# Patient Record
Sex: FEMALE | Race: OTHER | Employment: OTHER | URBAN - METROPOLITAN AREA
[De-identification: names, ages, dates, MRNs, and addresses within clinical notes are randomized per-mention and may not be internally consistent; named-entity substitution may affect disease eponyms.]

---

## 2017-05-10 ENCOUNTER — OFFICE VISIT (OUTPATIENT)
Dept: CARDIOLOGY CLINIC | Age: 82
End: 2017-05-10

## 2017-05-10 VITALS
WEIGHT: 181 LBS | HEART RATE: 70 BPM | BODY MASS INDEX: 30.16 KG/M2 | SYSTOLIC BLOOD PRESSURE: 122 MMHG | OXYGEN SATURATION: 98 % | DIASTOLIC BLOOD PRESSURE: 80 MMHG | HEIGHT: 65 IN

## 2017-05-10 DIAGNOSIS — I25.10 CORONARY ARTERY DISEASE INVOLVING NATIVE CORONARY ARTERY OF NATIVE HEART WITHOUT ANGINA PECTORIS: Primary | Chronic | ICD-10-CM

## 2017-05-10 NOTE — MR AVS SNAPSHOT
Visit Information Date & Time Provider Department Dept. Phone Encounter #  
 5/10/2017  2:00 PM Michelle Odonnell MD [facility] [phone] [id number] Follow-up Instructions Return in about 6 months (around 11/10/2017). Upcoming Health Maintenance Date Due ZOSTER VACCINE AGE 60> 4/26/1984 GLAUCOMA SCREENING Q2Y 4/26/1989 OSTEOPOROSIS SCREENING (DEXA) 4/26/1989 MEDICARE YEARLY EXAM 4/26/1989 Pneumococcal 65+ Low/Medium Risk (2 of 2 - PCV13) 5/10/2016 INFLUENZA AGE 9 TO ADULT 8/1/2017 DTaP/Tdap/Td series (2 - Td) 9/30/2024 Allergies as of 5/10/2017  Review Complete On: 5/10/2017 By: Osorio Fulton  
  
 Severity Noted Reaction Type Reactions Sulfa (Sulfonamide Antibiotics) Medium 05/08/2015   Systemic Rash Ampicillin  01/11/2011    Rash Tolerates cefazolin Lescol [Fluvastatin]  01/11/2011    Unknown (comments) Current Immunizations  Never Reviewed Name Date Pneumococcal Polysaccharide (PPSV-23) 5/10/2015  2:00 PM  
 Tdap 9/30/2014  4:17 PM  
  
 Not reviewed this visit You Were Diagnosed With   
  
 Codes Comments Coronary artery disease involving native coronary artery of native heart without angina pectoris    -  Primary ICD-10-CM: I25.10 ICD-9-CM: 414.01 Vitals BP Pulse Height(growth percentile) Weight(growth percentile) SpO2 BMI  
 122/80 (BP 1 Location: Left arm, BP Patient Position: Sitting) 70 5' 5\" (1.651 m) 181 lb (82.1 kg) 98% 30.12 kg/m2 OB Status Smoking Status Postmenopausal Never Smoker Vitals History BMI and BSA Data Body Mass Index Body Surface Area  
 30.12 kg/m 2 1.94 m 2 Preferred Pharmacy Pharmacy Name Phone [facility] [address]  [facility] [phone] Your Updated Medication List  
  
   
 This list is accurate as of: 5/10/17  3:04 PM.  Always use your most recent med list. amLODIPine 5 mg tablet Commonly known as:  Sharion Bayside Take 1 Tab by mouth daily. aspirin delayed-release 81 mg tablet Take 81 mg by mouth daily. calcium-vitamin D 500 mg(1,250mg) -200 unit per tablet Commonly known as:  OYSTER SHELL Take 1 Tab by mouth daily. FISH OIL 1,000 mg Cap Generic drug:  omega-3 fatty acids-vitamin e Take 1 Cap by mouth daily. gabapentin 100 mg capsule Commonly known as:  NEURONTIN Take 300 mg by mouth nightly. LIPITOR 10 mg tablet Generic drug:  atorvastatin Take 10 mg by mouth nightly. therapeutic multivitamin tablet Commonly known as:  [facility] Take 1 Tab by mouth daily. traMADol 50 mg tablet Commonly known as:  ULTRAM  
Take 50 mg by mouth every six (6) hours as needed for Pain. TYLENOL 325 mg tablet Generic drug:  acetaminophen Take 500 mg by mouth every six (6) hours as needed for Pain. We Performed the Following AMB POC EKG ROUTINE W/ 12 LEADS, INTER & REP [30018 CPT(R)] Follow-up Instructions Return in about 6 months (around 11/10/2017). Introducing [facility]! [facility] introduces AdYouNet patient portal. Now you can access parts of your medical record, email your doctor's office, and request medication refills online. 1. In your internet browser, go to [email or web address] 2. Click on the First Time User? Click Here link in the Sign In box. You will see the New Member Sign Up page. 3. Enter your AdYouNet Access Code exactly as it appears below. You will not need to use this code after youve completed the sign-up process. If you do not sign up before the expiration date, you must request a new code. · AdYouNet Access Code: [email or web address] Expires: 8/8/2017  3:04 PM 
 
 4. Enter the last four digits of your Social Security Number (xxxx) and Date of Birth (mm/dd/yyyy) as indicated and click Submit. You will be taken to the next sign-up page. 5. Create a Lellan ID. This will be your Lellan login ID and cannot be changed, so think of one that is secure and easy to remember. 6. Create a Lellan password. You can change your password at any time. 7. Enter your Password Reset Question and Answer. This can be used at a later time if you forget your password. 8. Enter your e-mail address. You will receive e-mail notification when new information is available in [address] 9. Click Sign Up. You can now view and download portions of your medical record. 10. Click the Download Summary menu link to download a portable copy of your medical information. If you have questions, please visit the Frequently Asked Questions section of the Lellan website. Remember, Lellan is NOT to be used for urgent needs. For medical emergencies, dial 911. Now available from your iPhone and Android! Please provide this summary of care documentation to your next provider. Your primary care clinician is listed as Darrian Salas. If you have any questions after today's visit, please call [phone].

## 2017-05-10 NOTE — PROGRESS NOTES
Visit Vitals    /80 (BP 1 Location: Left arm, BP Patient Position: Sitting)    Pulse 70    Ht 5' 5\" (1.651 m)    Wt 181 lb (82.1 kg)    SpO2 98%    BMI 30.12 kg/m2

## 2017-05-10 NOTE — PROGRESS NOTES
LAST OFFICE VISIT : 2016        ICD-10-CM ICD-9-CM   1. Coronary artery disease involving native coronary artery of native heart without angina pectoris I25.10 414.01            Guillaume Bird is a 80 y.o. female with HTN and dyslipidemia referred for 6 month follow up. Cardiac risk factors: dyslipidemia, hypertension, post-menopausal  I have personally obtained the history from the patient. Sandeep Rodriguez is doing well with no interval issues. She is normotensive at home on current medical regimen. Adherent with BP regimen. No recurrent chest pain or palpitations. She remains active with no exertional symptoms. The patient denies chest pain/ shortness of breath, orthopnea, PND, LE edema, palpitations, syncope, presyncope or fatigue.        ACTIVE PROBLEM LIST     Patient Active Problem List    Diagnosis Date Noted    UTI (urinary tract infection) 2016    Rash 2015    GERD (gastroesophageal reflux disease) 2014    CAD (coronary artery disease)     HTN (hypertension), benign 2011    Mixed hyperlipidemia 2011           PAST MEDICAL HISTORY     Past Medical History:   Diagnosis Date    CAD (coronary artery disease)     Femur fracture (HCC)     right    GERD (gastroesophageal reflux disease)     HTN (hypertension), benign 2011    Mixed hyperlipidemia 2011           PAST SURGICAL HISTORY     Past Surgical History:   Procedure Laterality Date    HX FEMUR FRACTURE TX      HX KNEE REPLACEMENT      bilateral    HX ORTHOPAEDIC      bilat knee replacements    HX VEIN STRIPPING            ALLERGIES     Allergies   Allergen Reactions    Sulfa (Sulfonamide Antibiotics) Rash    Ampicillin Rash     Tolerates cefazolin    Lescol [Fluvastatin] Unknown (comments)          FAMILY HISTORY     Family History   Problem Relation Age of Onset    Other Mother       of renal failure, not sure what caused id    Other Father something with throat, not sure if it was cancer    Heart Disease Sister     negative for cardiac disease       SOCIAL HISTORY     Social History     Social History    Marital status:      Spouse name: N/A    Number of children: N/A    Years of education: N/A     Social History Main Topics    Smoking status: Never Smoker    Smokeless tobacco: Never Used    Alcohol use No    Drug use: No    Sexual activity: Not Asked     Other Topics Concern    None     Social History Narrative         MEDICATIONS     Current Outpatient Prescriptions   Medication Sig    traMADol (ULTRAM) 50 mg tablet Take 50 mg by mouth every six (6) hours as needed for Pain.  omega-3 fatty acids-vitamin e (FISH OIL) 1,000 mg cap Take 1 Cap by mouth daily.  atorvastatin (LIPITOR) 10 mg tablet Take 10 mg by mouth nightly.  acetaminophen (TYLENOL) 325 mg tablet Take 500 mg by mouth every six (6) hours as needed for Pain.  aspirin delayed-release 81 mg tablet Take 81 mg by mouth daily.  calcium-vitamin D (OYSTER SHELL) 500 mg(1,250mg) -200 unit per tablet Take 1 Tab by mouth daily.  therapeutic multivitamin (THERAGRAN) tablet Take 1 Tab by mouth daily.  gabapentin (NEURONTIN) 100 mg capsule Take 300 mg by mouth nightly.  amLODIPine (NORVASC) 5 mg tablet Take 1 Tab by mouth daily. No current facility-administered medications for this visit. I have reviewed the nurses notes, vitals, problem list, allergy list, medical history, family, social history and medications. REVIEW OF SYMPTOMS      General: Pt denies excessive weight gain or loss. Pt is able to conduct ADL's  HEENT: Denies blurred vision, headaches, hearing loss, epistaxis and difficulty swallowing. Respiratory: Denies cough, congestion, shortness of breath, SHAH, wheezing or stridor.   Cardiovascular: Denies precordial pain, palpitations, edema or PND  Gastrointestinal: Denies poor appetite, indigestion, abdominal pain or blood in stool  Genitourinary: Denies hematuria, dysuria, increased urinary frequency  Musculoskeletal: Denies joint pain or swelling from muscles or joints  Neurologic: Denies tremor, paresthesias, headache, or sensory motor disturbance  Psychiatric: Denies confusion, insomnia, depression  Integumentray: Denies rash, itching or ulcers. Hematologic: Denies easy bruising, bleeding     PHYSICAL EXAMINATION      Vitals:    05/10/17 1417   BP: 122/80   Pulse: 70   SpO2: 98%   Weight: 181 lb (82.1 kg)   Height: 5' 5\" (1.651 m)     General: Well developed, in no acute distress. HEENT: No jaundice, oral mucosa moist, no oral ulcers  Neck: Supple, no stiffness, no lymphadenopathy, supple  Heart:  Normal S1/S2 negative S3 or S4. Regular, no murmur, gallop or rub, no jugular venous distention  Respiratory: Clear bilaterally x 4, no wheezing or rales  Extremities:  No edema, normal cap refill, no cyanosis. Musculoskeletal: No clubbing, no deformities  Neuro: A&Ox3, speech clear, gait stable, cooperative, no focal neurologic deficits  Skin: Skin color is normal. No rashes or lesions. Non diaphoretic, moist.  Vascular: 2+ pulses symmetric in all extremities    EKG 05/10/17-SR, frequent PACs, left axis deviation, poor R-wave progression     DIAGNOSTIC DATA   1. Echo  12/2/08 - EF 60-65%    2. Myocardial perfusion study  12/20/00 - persantine, mild mid distal anterolateral wall ischemia, EF 62%    3.  Cholesterol  8/23/12 - , HDL 61, LDL 85,   8/2/13 - , HDL 61, LDL 86,   8/24/14 - , HDL 61, LDL 88,   2/24/17- , HDL 75, ,      LABORATORY DATA        Lab Results   Component Value Date/Time    WBC 8.7 02/08/2016 03:45 AM    HGB 12.3 02/08/2016 03:45 AM    HCT 38.4 02/08/2016 03:45 AM    PLATELET 274 63/45/8200 03:45 AM    MCV 95.0 02/08/2016 03:45 AM      Lab Results   Component Value Date/Time    Sodium 140 02/08/2016 03:45 AM    Potassium 3.8 02/08/2016 03:45 AM    Chloride 106 02/08/2016 03:45 AM    CO2 26 02/08/2016 03:45 AM    Anion gap 8 02/08/2016 03:45 AM    Glucose 96 02/08/2016 03:45 AM    BUN 14 02/08/2016 03:45 AM    Creatinine 0.98 02/08/2016 03:45 AM    BUN/Creatinine ratio 14 02/08/2016 03:45 AM    GFR est AA >60 02/08/2016 03:45 AM    GFR est non-AA 53 02/08/2016 03:45 AM    Calcium 8.0 02/08/2016 03:45 AM    Bilirubin, total 0.4 02/07/2016 03:15 AM    AST (SGOT) 22 02/07/2016 03:15 AM    Alk. phosphatase 97 02/07/2016 03:15 AM    Protein, total 7.3 02/07/2016 03:15 AM    Albumin 2.7 02/07/2016 03:15 AM    Globulin 4.6 02/07/2016 03:15 AM    A-G Ratio 0.6 02/07/2016 03:15 AM    ALT (SGPT) 22 02/07/2016 03:15 AM           ASSESSMENT/RECOMMENDATIONS:.      1. HTN  -BP under good control current medical regimen. No adjustments to antihypertensives. 2. Dyslipidemia  -Followed by Paty Wellington MD    3. Return in 6 months or PRN    Orders Placed This Encounter    AMB POC EKG ROUTINE W/ 12 LEADS, INTER & REP     Order Specific Question:   Reason for Exam:     Answer:   cad        Follow-up Disposition: Not on File      I have discussed the diagnosis with  Laurence Abel and the intended plan as seen in the above orders. Questions were answered concerning future plans. I have discussed medication side effects and warnings with the patient as well. Thank you,  Paty Wellington MD for involving me in the care of  Laurence Abel. Please do not hesitate to contact me for further questions/concerns. Written by Javon Shah, as dictated by Jane Mistry MD.    Umesh Pradhan MD, [address]      [facility], [address] [place] [address] Drive      [phone] / [phone] Fax

## 2017-11-08 ENCOUNTER — OFFICE VISIT (OUTPATIENT)
Dept: CARDIOLOGY CLINIC | Age: 82
End: 2017-11-08

## 2017-11-08 VITALS
OXYGEN SATURATION: 96 % | WEIGHT: 183.7 LBS | BODY MASS INDEX: 30.6 KG/M2 | HEIGHT: 65 IN | HEART RATE: 72 BPM | SYSTOLIC BLOOD PRESSURE: 160 MMHG | DIASTOLIC BLOOD PRESSURE: 80 MMHG

## 2017-11-08 DIAGNOSIS — I10 HTN (HYPERTENSION), BENIGN: Chronic | ICD-10-CM

## 2017-11-08 DIAGNOSIS — I25.10 CORONARY ARTERY DISEASE INVOLVING NATIVE CORONARY ARTERY OF NATIVE HEART WITHOUT ANGINA PECTORIS: Primary | Chronic | ICD-10-CM

## 2017-11-08 DIAGNOSIS — E78.2 MIXED HYPERLIPIDEMIA: Chronic | ICD-10-CM

## 2017-11-08 NOTE — PROGRESS NOTES
LAST OFFICE VISIT : 5/10/2017        ICD-10-CM ICD-9-CM   1. Coronary artery disease involving native coronary artery of native heart without angina pectoris I25.10 414.01   2. HTN (hypertension), benign I10 401.1   3. Mixed hyperlipidemia E78.2 272.2        Alley Morrison is a 80 y.o. female with HTN and dyslipidemia referred for 6 month follow up.        Cardiac risk factors: dyslipidemia, hypertension, post-menopausal  I have personally obtained the history from the patient. Chivo Sorensen feels well overall with no interval issues. She has not been checking her BP at home but notes BP 1 month ago at doctor's office was normal. She admits she ate a salty lunch at [employer]. The patient denies chest pain/ shortness of breath, orthopnea, PND, LE edema, palpitations, syncope, presyncope or fatigue. Lipids monitored by PCP.        ACTIVE PROBLEM LIST     Patient Active Problem List    Diagnosis Date Noted    UTI (urinary tract infection) 02/06/2016    Rash 05/07/2015    GERD (gastroesophageal reflux disease) 09/30/2014    CAD (coronary artery disease)     HTN (hypertension), benign 01/11/2011    Mixed hyperlipidemia 01/11/2011           PAST MEDICAL HISTORY     Past Medical History:   Diagnosis Date    CAD (coronary artery disease)     Femur fracture (HCC)     right    GERD (gastroesophageal reflux disease)     HTN (hypertension), benign 1/11/2011    Mixed hyperlipidemia 1/11/2011           PAST SURGICAL HISTORY     Past Surgical History:   Procedure Laterality Date    HX FEMUR FRACTURE TX      HX KNEE REPLACEMENT      bilateral    HX ORTHOPAEDIC      bilat knee replacements    HX VEIN STRIPPING            ALLERGIES     Allergies   Allergen Reactions    Sulfa (Sulfonamide Antibiotics) Rash    Ampicillin Rash     Tolerates cefazolin    Lescol [Fluvastatin] Unknown (comments)          FAMILY HISTORY     Family History   Problem Relation Age of Onset    Other Mother       of renal failure, not sure what caused id    Other Father      something with throat, not sure if it was cancer    Heart Disease Sister     negative for cardiac disease       SOCIAL HISTORY     Social History     Social History    Marital status:      Spouse name: N/A    Number of children: N/A    Years of education: N/A     Social History Main Topics    Smoking status: Never Smoker    Smokeless tobacco: Never Used    Alcohol use No    Drug use: No    Sexual activity: Not Asked     Other Topics Concern    None     Social History Narrative         MEDICATIONS     Current Outpatient Prescriptions   Medication Sig    omega-3 fatty acids-vitamin e (FISH OIL) 1,000 mg cap Take 1 Cap by mouth daily.  atorvastatin (LIPITOR) 10 mg tablet Take 10 mg by mouth nightly.  aspirin delayed-release 81 mg tablet Take 81 mg by mouth daily.  calcium-vitamin D (OYSTER SHELL) 500 mg(1,250mg) -200 unit per tablet Take 1 Tab by mouth daily.  gabapentin (NEURONTIN) 100 mg capsule Take 300 mg by mouth nightly.  amLODIPine (NORVASC) 5 mg tablet Take 1 Tab by mouth daily. No current facility-administered medications for this visit. I have reviewed the nurses notes, vitals, problem list, allergy list, medical history, family, social history and medications. REVIEW OF SYMPTOMS      General: Pt denies excessive weight gain or loss. Pt is able to conduct ADL's  HEENT: Denies blurred vision, headaches, hearing loss, epistaxis and difficulty swallowing. Respiratory: Denies cough, congestion, shortness of breath, SHAH, wheezing or stridor.   Cardiovascular: Denies precordial pain, palpitations, edema or PND  Gastrointestinal: Denies poor appetite, indigestion, abdominal pain or blood in stool  Genitourinary: Denies hematuria, dysuria, increased urinary frequency  Musculoskeletal: Denies joint pain or swelling from muscles or joints  Neurologic: Denies tremor, paresthesias, headache, or sensory motor disturbance  Psychiatric: Denies confusion, insomnia, depression  Integumentray: Denies rash, itching or ulcers. Hematologic: Denies easy bruising, bleeding     PHYSICAL EXAMINATION      Vitals:    11/08/17 1432   BP: 160/80   Pulse: 72   SpO2: 96%   Weight: 183 lb 11.2 oz (83.3 kg)   Height: 5' 5\" (1.651 m)     General: Well developed, in no acute distress. HEENT: No jaundice, oral mucosa moist, no oral ulcers  Neck: Supple, no stiffness, no lymphadenopathy, supple  Heart:  Normal S1/S2 negative S3 or S4. Regular, no murmur, gallop or rub, no jugular venous distention  Respiratory: Clear bilaterally x 4, no wheezing or rales  Extremities:  No edema, normal cap refill, no cyanosis. Musculoskeletal: No clubbing, no deformities  Neuro: A&Ox3, speech clear, gait stable, cooperative, no focal neurologic deficits  Skin: Skin color is normal. No rashes or lesions. Non diaphoretic, moist.         DIAGNOSTIC DATA     1. Echo  12/2/08 - EF 60-65%  10/1/14- EF 70%, grade 2 DD, LAE mod, AI/TR mild    2. Myocardial perfusion study  12/20/00 - persantine, mild mid distal anterolateral wall ischemia, EF 62%    3.  Cholesterol  8/23/12 - , HDL 61, LDL 85,   8/2/13 - , HDL 61, LDL 86,   8/24/14 - , HDL 61, LDL 88,   2/24/17- , HDL 75, ,   9/21/17- , HDL 55, LDL 70,          LABORATORY DATA            Lab Results   Component Value Date/Time    WBC 8.7 02/08/2016 03:45 AM    HGB 12.3 02/08/2016 03:45 AM    HCT 38.4 02/08/2016 03:45 AM    PLATELET 151 14/90/4240 03:45 AM    MCV 95.0 02/08/2016 03:45 AM      Lab Results   Component Value Date/Time    Sodium 140 02/08/2016 03:45 AM    Potassium 3.8 02/08/2016 03:45 AM    Chloride 106 02/08/2016 03:45 AM    CO2 26 02/08/2016 03:45 AM    Anion gap 8 02/08/2016 03:45 AM    Glucose 96 02/08/2016 03:45 AM    BUN 14 02/08/2016 03:45 AM    Creatinine 0.98 02/08/2016 03:45 AM    BUN/Creatinine ratio 14 02/08/2016 03:45 AM    GFR est AA >60 02/08/2016 03:45 AM    GFR est non-AA 53 02/08/2016 03:45 AM    Calcium 8.0 02/08/2016 03:45 AM    Bilirubin, total 0.4 02/07/2016 03:15 AM    AST (SGOT) 22 02/07/2016 03:15 AM    Alk. phosphatase 97 02/07/2016 03:15 AM    Protein, total 7.3 02/07/2016 03:15 AM    Albumin 2.7 02/07/2016 03:15 AM    Globulin 4.6 02/07/2016 03:15 AM    A-G Ratio 0.6 02/07/2016 03:15 AM    ALT (SGPT) 22 02/07/2016 03:15 AM           ASSESSMENT/RECOMMENDATIONS:.      1. HTN  -BP currently elevated but she states she most likely had a salty meal. She will have her BP rechecked in 2 weeks at PCP's office. No adjustments to antihypertensives.      2. Dyslipidemia  -TG slightly high but would not restrict her diet. May even consider stopping her Lipitor at her age.      3. Return in 6 months or PRN    No orders of the defined types were placed in this encounter. Follow-up Disposition: Not on File      I have discussed the diagnosis with  Rosmery Lares and the intended plan as seen in the above orders. Questions were answered concerning future plans. I have discussed medication side effects and warnings with the patient as well. Thank you,  Irais Paniagua MD for involving me in the care of  Rosmery Lares. Please do not hesitate to contact me for further questions/concerns. Written by Haily Beck, dictated by Mili Malin MD.    Liza Pradhan MD, [address]      [facility], [address] [place] [address], [facility] [address]      [phone] / [phone] Fax

## 2017-11-08 NOTE — PROGRESS NOTES
Visit Vitals    /80 (BP 1 Location: Left arm)    Pulse 72    Ht 5' 5\" (1.651 m)    Wt 183 lb 11.2 oz (83.3 kg)    SpO2 96%    BMI 30.57 kg/m2

## 2017-11-08 NOTE — MR AVS SNAPSHOT
Visit Information Date & Time Provider Department Dept. Phone Encounter #  
 11/8/2017  2:20 PM Chencho Calvert MD [facility] Leora Garduno [phone] [id number] Upcoming Health Maintenance Date Due ZOSTER VACCINE AGE 60> 2/26/1984 GLAUCOMA SCREENING Q2Y 4/26/1989 OSTEOPOROSIS SCREENING (DEXA) 4/26/1989 MEDICARE YEARLY EXAM 4/26/1989 Pneumococcal 65+ Low/Medium Risk (2 of 2 - PCV13) 5/10/2016 Influenza Age 5 to Adult 8/1/2017 DTaP/Tdap/Td series (2 - Td) 9/30/2024 Allergies as of 11/8/2017  Review Complete On: 11/8/2017 By: Chencho Calvert MD  
  
 Severity Noted Reaction Type Reactions Sulfa (Sulfonamide Antibiotics) Medium 05/08/2015   Systemic Rash Ampicillin  01/11/2011    Rash Tolerates cefazolin Lescol [Fluvastatin]  01/11/2011    Unknown (comments) Current Immunizations  Never Reviewed Name Date Pneumococcal Polysaccharide (PPSV-23) 5/10/2015  2:00 PM  
 Tdap 9/30/2014  4:17 PM  
  
 Not reviewed this visit You Were Diagnosed With   
  
 Codes Comments Coronary artery disease involving native coronary artery of native heart without angina pectoris    -  Primary ICD-10-CM: I25.10 ICD-9-CM: 414.01   
 HTN (hypertension), benign     ICD-10-CM: I10 
ICD-9-CM: 401.1 Mixed hyperlipidemia     ICD-10-CM: E78.2 ICD-9-CM: 272.2 Vitals BP Pulse Height(growth percentile) Weight(growth percentile) SpO2 BMI  
 160/80 (BP 1 Location: Left arm) 72 5' 5\" (1.651 m) 183 lb 11.2 oz (83.3 kg) 96% 30.57 kg/m2 OB Status Smoking Status Postmenopausal Never Smoker Vitals History BMI and BSA Data Body Mass Index Body Surface Area 30.57 kg/m 2 1.95 m 2 Preferred Pharmacy Pharmacy Name Phone [facility] 1 [address]  [facility] [phone] Your Updated Medication List  
  
   
 This list is accurate as of: 11/8/17  3:00 PM.  Always use your most recent med list. amLODIPine 5 mg tablet Commonly known as:  Alex Anne Take 1 Tab by mouth daily. aspirin delayed-release 81 mg tablet Take 81 mg by mouth daily. calcium-vitamin D 500 mg(1,250mg) -200 unit per tablet Commonly known as:  OYSTER SHELL Take 1 Tab by mouth daily. FISH OIL 1,000 mg Cap Generic drug:  omega-3 fatty acids-vitamin e Take 1 Cap by mouth daily. gabapentin 100 mg capsule Commonly known as:  NEURONTIN Take 300 mg by mouth nightly. LIPITOR 10 mg tablet Generic drug:  atorvastatin Take 10 mg by mouth nightly. Patient Instructions Get your blood pressure checked at your primary care's office in 2 weeks. Introducing [facility] & [facility]! [facility] introduces Rep patient portal. Now you can access parts of your medical record, email your doctor's office, and request medication refills online. 1. In your internet browser, go to [email or web address] 2. Click on the First Time User? Click Here link in the Sign In box. You will see the New Member Sign Up page. 3. Enter your Rep Access Code exactly as it appears below. You will not need to use this code after youve completed the sign-up process. If you do not sign up before the expiration date, you must request a new code. · Rep Access Code: [email or web address] Expires: 2/6/2018  2:24 PM 
 
4. Enter the last four digits of your Social Security Number (xxxx) and Date of Birth (mm/dd/yyyy) as indicated and click Submit. You will be taken to the next sign-up page. 5. Create a Rep ID. This will be your Rep login ID and cannot be changed, so think of one that is secure and easy to remember. 6. Create a Rep password. You can change your password at any time. 7. Enter your Password Reset Question and Answer.  This can be used at a later time if you forget your password. 8. Enter your e-mail address. You will receive e-mail notification when new information is available in [address] 9. Click Sign Up. You can now view and download portions of your medical record. 10. Click the Download Summary menu link to download a portable copy of your medical information. If you have questions, please visit the Frequently Asked Questions section of the [employer] website. Remember, [employer] is NOT to be used for urgent needs. For medical emergencies, dial 911. Now available from your iPhone and Android! Please provide this summary of care documentation to your next provider. Your primary care clinician is listed as Jose Shin. If you have any questions after today's visit, please call [phone].

## 2017-11-14 ENCOUNTER — TELEPHONE (OUTPATIENT)
Dept: CARDIOLOGY CLINIC | Age: 82
End: 2017-11-14

## 2017-11-14 NOTE — TELEPHONE ENCOUNTER
Patient states that her bp continues to be elevated. Had it checked at [place]. Requests a call back at [phone]. Thanks!

## 2017-11-16 RX ORDER — AMLODIPINE BESYLATE 10 MG/1
TABLET ORAL DAILY
COMMUNITY
End: 2017-11-16 | Stop reason: SDUPTHER

## 2017-11-16 RX ORDER — AMLODIPINE BESYLATE 10 MG/1
10 TABLET ORAL DAILY
Qty: 30 TAB | Refills: 6 | Status: SHIPPED | OUTPATIENT
Start: 2017-11-16 | End: 2018-05-09 | Stop reason: SDUPTHER

## 2018-05-09 ENCOUNTER — OFFICE VISIT (OUTPATIENT)
Dept: CARDIOLOGY CLINIC | Age: 83
End: 2018-05-09

## 2018-05-09 VITALS
BODY MASS INDEX: 30.92 KG/M2 | HEART RATE: 80 BPM | SYSTOLIC BLOOD PRESSURE: 132 MMHG | WEIGHT: 185.6 LBS | OXYGEN SATURATION: 96 % | HEIGHT: 65 IN | DIASTOLIC BLOOD PRESSURE: 64 MMHG | RESPIRATION RATE: 18 BRPM

## 2018-05-09 DIAGNOSIS — E78.2 MIXED HYPERLIPIDEMIA: Chronic | ICD-10-CM

## 2018-05-09 DIAGNOSIS — I10 HTN (HYPERTENSION), BENIGN: Primary | Chronic | ICD-10-CM

## 2018-05-09 RX ORDER — CALCIUM CARBONATE 600 MG
600 TABLET ORAL 2 TIMES DAILY
COMMUNITY
End: 2019-09-08

## 2018-05-09 RX ORDER — LISINOPRIL 10 MG/1
10 TABLET ORAL DAILY
COMMUNITY
End: 2020-01-20

## 2018-05-09 RX ORDER — AMLODIPINE BESYLATE 5 MG/1
5 TABLET ORAL DAILY
Status: ON HOLD | COMMUNITY
End: 2021-12-30 | Stop reason: SDUPTHER

## 2018-05-09 RX ORDER — PREGABALIN 50 MG/1
50 CAPSULE ORAL
COMMUNITY

## 2018-05-09 NOTE — PROGRESS NOTES
LAST OFFICE VISIT : 2017        ICD-10-CM ICD-9-CM   1. HTN (hypertension), benign I10 401.1   2. Mixed hyperlipidemia E78.2 272.2       Lawrence López is a 80 y.o. female with HTN and dyslipidemia referred for 6 month follow up.         Cardiac risk factors: dyslipidemia, hypertension, post-menopausal  I have personally obtained the history from the patient. Craig Dukesdock      Ms. Basilia Wooten feels well overall although notes her fingers will burn from time to time, typically in the AM, and will sometimes wake her from sleep. She notes her Amlodipine was decreased from 10 to 5 mg recently by her PCP due to LE edema, now resolved. The patient denies chest pain/ shortness of breath, orthopnea, PND, LE edema, palpitations, syncope, presyncope or fatigue.        ACTIVE PROBLEM LIST     Patient Active Problem List    Diagnosis Date Noted    UTI (urinary tract infection) 2016    Rash 2015    GERD (gastroesophageal reflux disease) 2014    HTN (hypertension), benign 2011    Mixed hyperlipidemia 2011           PAST MEDICAL HISTORY     Past Medical History:   Diagnosis Date    CAD (coronary artery disease)     Femur fracture (HCC)     right    GERD (gastroesophageal reflux disease)     HTN (hypertension), benign 2011    Mixed hyperlipidemia 2011           PAST SURGICAL HISTORY     Past Surgical History:   Procedure Laterality Date    HX FEMUR FRACTURE TX      HX KNEE REPLACEMENT      bilateral    HX ORTHOPAEDIC      bilat knee replacements    HX VEIN STRIPPING            ALLERGIES     Allergies   Allergen Reactions    Sulfa (Sulfonamide Antibiotics) Rash    Ampicillin Rash     Tolerates cefazolin    Lescol [Fluvastatin] Unknown (comments)          FAMILY HISTORY     Family History   Problem Relation Age of Onset    Other Mother       of renal failure, not sure what caused id    Other Father      something with throat, not sure if it was cancer    Heart Disease Sister     negative for cardiac disease       SOCIAL HISTORY     Social History     Social History    Marital status:      Spouse name: N/A    Number of children: N/A    Years of education: N/A     Social History Main Topics    Smoking status: Never Smoker    Smokeless tobacco: Never Used    Alcohol use No    Drug use: No    Sexual activity: Not Asked     Other Topics Concern    None     Social History Narrative         MEDICATIONS     Current Outpatient Prescriptions   Medication Sig    amLODIPine (NORVASC) 5 mg tablet Take 5 mg by mouth daily.  lisinopril (PRINIVIL, ZESTRIL) 10 mg tablet Take  by mouth daily.  pregabalin (LYRICA) 50 mg capsule Take 100 mg by mouth nightly.  calcium carbonate (CALTREX) 600 mg calcium (1,500 mg) tablet Take 600 mg by mouth two (2) times a day.  folic acid/multivit-min/lutein (CENTRUM SILVER PO) Take  by mouth daily.  omega-3 fatty acids-vitamin e (FISH OIL) 1,000 mg cap Take 3 Caps by mouth daily.  atorvastatin (LIPITOR) 10 mg tablet Take 10 mg by mouth nightly.  aspirin delayed-release 81 mg tablet Take 81 mg by mouth daily. No current facility-administered medications for this visit. I have reviewed the nurses notes, vitals, problem list, allergy list, medical history, family, social history and medications. REVIEW OF SYMPTOMS      General: Pt denies excessive weight gain or loss. Pt is able to conduct ADL's  HEENT: Denies blurred vision, headaches, hearing loss, epistaxis and difficulty swallowing. Respiratory: Denies cough, congestion, shortness of breath, SHAH, wheezing or stridor.   Cardiovascular: Denies precordial pain, palpitations, edema or PND  Gastrointestinal: Denies poor appetite, indigestion, abdominal pain or blood in stool  Genitourinary: Denies hematuria, dysuria, increased urinary frequency  Musculoskeletal: Denies joint pain or swelling from muscles or joints  Neurologic: Denies tremor, paresthesias, headache, or sensory motor disturbance +finger burning   Psychiatric: Denies confusion, insomnia, depression  Integumentray: Denies rash, itching or ulcers. Hematologic: Denies easy bruising, bleeding     PHYSICAL EXAMINATION      Vitals:    05/09/18 1405   BP: 132/64   Pulse: 80   Resp: 18   SpO2: 96%   Weight: 185 lb 9.6 oz (84.2 kg)   Height: 5' 5\" (1.651 m)     General: Well developed, in no acute distress. HEENT: No jaundice, oral mucosa moist, no oral ulcers  Neck: Supple, no stiffness, no lymphadenopathy, supple  Heart:  Normal S1/S2 negative S3 or S4. Regular, no murmur, gallop or rub, no jugular venous distention  Respiratory: Clear bilaterally x 4, no wheezing or rales   Extremities:  No edema, normal cap refill, no cyanosis. Musculoskeletal: No clubbing, no deformities  Neuro: A&Ox3, speech clear, gait stable, cooperative, no focal neurologic deficits  Skin: Skin color is normal. No rashes or lesions. Non diaphoretic, moist.       DIAGNOSTIC DATA     1. Echo  12/2/08 - EF 60-65%  10/1/14- EF 70%, grade 2 DD, LAE mod, AI/TR mild    2. Myocardial perfusion study  12/20/00 - persantine, mild mid distal anterolateral wall ischemia, EF 62%    3.  Cholesterol  8/23/12 - , HDL 61, LDL 85,   8/2/13 - , HDL 61, LDL 86,   8/24/14 - , HDL 61, LDL 88,   2/24/17- , HDL 75, ,   9/21/17- , HDL 55, LDL 70,   3/14/18- , HDL 62, LDL 70,            LABORATORY DATA            Lab Results   Component Value Date/Time    WBC 8.7 02/08/2016 03:45 AM    HGB 12.3 02/08/2016 03:45 AM    HCT 38.4 02/08/2016 03:45 AM    PLATELET 481 73/58/8297 03:45 AM    MCV 95.0 02/08/2016 03:45 AM      Lab Results   Component Value Date/Time    Sodium 140 02/08/2016 03:45 AM    Potassium 3.8 02/08/2016 03:45 AM    Chloride 106 02/08/2016 03:45 AM    CO2 26 02/08/2016 03:45 AM    Anion gap 8 02/08/2016 03:45 AM    Glucose 96 02/08/2016 03:45 AM BUN 14 02/08/2016 03:45 AM    Creatinine 0.98 02/08/2016 03:45 AM    BUN/Creatinine ratio 14 02/08/2016 03:45 AM    GFR est AA >60 02/08/2016 03:45 AM    GFR est non-AA 53 (L) 02/08/2016 03:45 AM    Calcium 8.0 (L) 02/08/2016 03:45 AM    Bilirubin, total 0.4 02/07/2016 03:15 AM    AST (SGOT) 22 02/07/2016 03:15 AM    Alk. 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      1. HTN  -BP is good in this 79 yo. Amlodipine was recently decreased from 10 mg to 5 mg by PCP due to increasing LE edema, now resolved. No adjustments in antihypertensives.      2. Dyslipidemia  -Remains on Lipitor 10 mg daily. Followed by PCP.       3. Return in 1 year, sooner PRN    Orders Placed This Encounter    AMB POC EKG ROUTINE W/ 12 LEADS, INTER & REP     Order Specific Question:   Reason for Exam:     Answer:   CAD    amLODIPine (NORVASC) 5 mg tablet     Sig: Take 5 mg by mouth daily.  lisinopril (PRINIVIL, ZESTRIL) 10 mg tablet     Sig: Take  by mouth daily.  pregabalin (LYRICA) 50 mg capsule     Sig: Take 100 mg by mouth nightly.  calcium carbonate (CALTREX) 600 mg calcium (1,500 mg) tablet     Sig: Take 600 mg by mouth two (2) times a day.  folic acid/multivit-min/lutein (CENTRUM SILVER PO)     Sig: Take  by mouth daily. Follow-up Disposition: Not on File      I have discussed the diagnosis with  Lucy Acosta and the intended plan as seen in the above orders. Questions were answered concerning future plans. I have discussed medication side effects and warnings with the patient as well. Thank you,  Kenneth Seaman MD for involving me in the care of  Lucy Dave. Please do not hesitate to contact me for further questions/concerns. Written by Romayne Speak, dictated by Mauro Gtz MD.    Albania Pradhan MD, [address] [place] [address] Rd., [address]      [facility], [address] [place] [address] [facility]      [phone] / [phone] Fax

## 2018-05-09 NOTE — PROGRESS NOTES
Medication changes are per verbal order of Dr. Isis Crowder.    Visit Darcus Barefoot /64 (BP 1 Location: Left arm)    Pulse 80    Resp 18    Ht 5' 5\" (1.651 m)    Wt 185 lb 9.6 oz (84.2 kg)    SpO2 96%    BMI 30.89 kg/m2

## 2018-05-09 NOTE — MR AVS SNAPSHOT
[address] [facility] [address] 
[phone] Patient: Marycruz Harrington MRN: [id number] Visit Information Date & Time Provider Department Dept. Phone Encounter #  
 2018  2:00 PM Chelsy Amos MD [facility] [place] [phone] [id number] Follow-up Instructions Return in about 1 year (around 2019). Your Appointments 5/15/2019  2:00 PM  
ESTABLISHED PATIENT with Chelsy Amos MD  
[facility] ([facility] SCHEDULING) Appt Note: annual  
 [address] [facility] [address]  
[address] [facility] [address] [address] Upcoming Health Maintenance Date Due ZOSTER VACCINE AGE 60> 1984 GLAUCOMA SCREENING Q2Y 1989 Bone Densitometry (Dexa) Screening 1989 Pneumococcal 65+ Low/Medium Risk (2 of 2 - PCV13) 5/10/2016 MEDICARE YEARLY EXAM 3/14/2018 Influenza Age 5 to Adult 2018 DTaP/Tdap/Td series (2 - Td) 2024 Allergies as of 2018  Review Complete On: 2018 By: Chelsy Amos MD  
  
 Severity Noted Reaction Type Reactions Sulfa (Sulfonamide Antibiotics) Medium 2015   Systemic Rash Ampicillin  2011    Rash Tolerates cefazolin Lescol [Fluvastatin]  2011    Unknown (comments) Current Immunizations  Never Reviewed Name Date Pneumococcal Polysaccharide (PPSV-23) 5/10/2015  2:00 PM  
 Tdap 2014  4:17 PM  
  
 Not reviewed this visit You Were Diagnosed With   
  
 Codes Comments HTN (hypertension), benign    -  Primary ICD-10-CM: I10 
ICD-9-CM: 401.1 Mixed hyperlipidemia     ICD-10-CM: E78.2 ICD-9-CM: 272.2 Vitals BP Pulse Resp Height(growth percentile) Weight(growth percentile) SpO2  
 132/64 (BP 1 Location: Left arm) 80 18 5' 5\" (1.651 m) 185 lb 9.6 oz (84.2 kg) 96% BMI OB Status Smoking Status 30.89 kg/m2 Postmenopausal Never Smoker Vitals History BMI and BSA Data Body Mass Index Body Surface Area  
 30.89 kg/m 2 1.97 m 2 Preferred Pharmacy Pharmacy Name Phone [facility] [address]  [facility] [phone] Your Updated Medication List  
  
   
This list is accurate as of 5/9/18  2:28 PM.  Always use your most recent med list.  
  
  
  
  
 aspirin delayed-release 81 mg tablet Take 81 mg by mouth daily. calcium carbonate 600 mg calcium (1,500 mg) tablet Commonly known as:  Kim Mailman Take 600 mg by mouth two (2) times a day. CENTRUM SILVER PO Take  by mouth daily. FISH OIL 1,000 mg Cap Generic drug:  omega-3 fatty acids-vitamin e Take 3 Caps by mouth daily. LIPITOR 10 mg tablet Generic drug:  atorvastatin Take 10 mg by mouth nightly. lisinopril 10 mg tablet Commonly known as:  Yony Littler Take  by mouth daily. LYRICA 50 mg capsule Generic drug:  pregabalin Take 100 mg by mouth nightly. NORVASC 5 mg tablet Generic drug:  amLODIPine Take 5 mg by mouth daily. We Performed the Following AMB POC EKG ROUTINE W/ 12 LEADS, INTER & REP [90053 CPT(R)] Follow-up Instructions Return in about 1 year (around 5/9/2019). Introducing [facility] & HEALTH SERVICES! [facility] introduces [employer] patient portal. Now you can access parts of your medical record, email your doctor's office, and request medication refills online. 1. In your internet browser, go to [email or web address] 2. Click on the First Time User? Click Here link in the Sign In box. You will see the New Member Sign Up page. 3. Enter your zhiwo Access Code exactly as it appears below. You will not need to use this code after youve completed the sign-up process.  If you do not sign up before the expiration date, you must request a new code. · MEEP Access Code: [email or web address] Expires: 8/7/2018  2:28 PM 
 
4. Enter the last four digits of your Social Security Number (xxxx) and Date of Birth (mm/dd/yyyy) as indicated and click Submit. You will be taken to the next sign-up page. 5. Create a MEEP ID. This will be your [employer] login ID and cannot be changed, so think of one that is secure and easy to remember. 6. Create a MEEP password. You can change your password at any time. 7. Enter your Password Reset Question and Answer. This can be used at a later time if you forget your password. 8. Enter your e-mail address. You will receive e-mail notification when new information is available in [address] 9. Click Sign Up. You can now view and download portions of your medical record. 10. Click the Download Summary menu link to download a portable copy of your medical information. If you have questions, please visit the Frequently Asked Questions section of the [employer] website. Remember, [employer] is NOT to be used for urgent needs. For medical emergencies, dial 911. Now available from your iPhone and Android! Please provide this summary of care documentation to your next provider. Your primary care clinician is listed as Santosh Allan. If you have any questions after today's visit, please call [phone].

## 2019-05-15 ENCOUNTER — OFFICE VISIT (OUTPATIENT)
Dept: CARDIOLOGY CLINIC | Age: 84
End: 2019-05-15

## 2019-05-15 VITALS
OXYGEN SATURATION: 95 % | WEIGHT: 184 LBS | HEIGHT: 65 IN | BODY MASS INDEX: 30.66 KG/M2 | HEART RATE: 80 BPM | DIASTOLIC BLOOD PRESSURE: 60 MMHG | RESPIRATION RATE: 20 BRPM | SYSTOLIC BLOOD PRESSURE: 130 MMHG

## 2019-05-15 DIAGNOSIS — E78.2 MIXED HYPERLIPIDEMIA: ICD-10-CM

## 2019-05-15 DIAGNOSIS — I25.10 CORONARY ARTERY DISEASE INVOLVING NATIVE CORONARY ARTERY OF NATIVE HEART WITHOUT ANGINA PECTORIS: ICD-10-CM

## 2019-05-15 DIAGNOSIS — I10 HTN (HYPERTENSION), BENIGN: Primary | ICD-10-CM

## 2019-05-15 DIAGNOSIS — I10 HTN (HYPERTENSION), BENIGN: ICD-10-CM

## 2019-05-15 NOTE — PROGRESS NOTES
Visit Vitals  /60   Pulse 80   Resp 20   Ht 5' 5\" (1.651 m)   Wt 184 lb (83.5 kg)   SpO2 95%   BMI 30.62 kg/m²     EKG today

## 2019-05-15 NOTE — PROGRESS NOTES
LAST OFFICE VISIT : 2018      ICD-10-CM ICD-9-CM   1. HTN (hypertension), benign I10 401.1   2. Mixed hyperlipidemia E78.2 272.2   3. Coronary artery disease involving native coronary artery of native heart without angina pectoris I25.10 414.01       Migdalia Benoit is a 80 y.o. female with HTN and dyslipidemia referred for 6 month follow up.         Cardiac risk factors: dyslipidemia, hypertension, post-menopausal  I have personally obtained the history from the patient. HISTORY OF PRESENTING ILLNESS      . Ms. Joseluis Rodriguez denies any issues and feels well overall. The patient denies chest pain/ shortness of breath, orthopnea, PND, LE edema, palpitations, syncope, presyncope or fatigue.        ACTIVE PROBLEM LIST     Patient Active Problem List    Diagnosis Date Noted    UTI (urinary tract infection) 2016    Rash 2015    GERD (gastroesophageal reflux disease) 2014    HTN (hypertension), benign 2011    Mixed hyperlipidemia 2011           PAST MEDICAL HISTORY     Past Medical History:   Diagnosis Date    CAD (coronary artery disease)     Femur fracture (HCC)     right    GERD (gastroesophageal reflux disease)     HTN (hypertension), benign 2011    Mixed hyperlipidemia 2011           PAST SURGICAL HISTORY     Past Surgical History:   Procedure Laterality Date    HX FEMUR FRACTURE TX      HX KNEE REPLACEMENT      bilateral    HX ORTHOPAEDIC      bilat knee replacements    HX VEIN STRIPPING            ALLERGIES     Allergies   Allergen Reactions    Sulfa (Sulfonamide Antibiotics) Rash    Ampicillin Rash     Tolerates cefazolin    Lescol [Fluvastatin] Unknown (comments)          FAMILY HISTORY     Family History   Problem Relation Age of Onset    Other Mother          of renal failure, not sure what caused id    Other Father         something with throat, not sure if it was cancer    Heart Disease Sister     negative for cardiac disease       SOCIAL HISTORY     Social History     Socioeconomic History    Marital status:      Spouse name: Not on file    Number of children: Not on file    Years of education: Not on file    Highest education level: Not on file   Tobacco Use    Smoking status: Never Smoker    Smokeless tobacco: Never Used   Substance and Sexual Activity    Alcohol use: No    Drug use: No         MEDICATIONS     Current Outpatient Medications   Medication Sig    amLODIPine (NORVASC) 5 mg tablet Take 5 mg by mouth daily.  lisinopril (PRINIVIL, ZESTRIL) 10 mg tablet Take  by mouth daily.  pregabalin (LYRICA) 50 mg capsule Take 100 mg by mouth nightly.  calcium carbonate (CALTREX) 600 mg calcium (1,500 mg) tablet Take 600 mg by mouth two (2) times a day.  folic acid/multivit-min/lutein (CENTRUM SILVER PO) Take  by mouth daily.  omega-3 fatty acids-vitamin e (FISH OIL) 1,000 mg cap Take 3 Caps by mouth daily.  atorvastatin (LIPITOR) 10 mg tablet Take 10 mg by mouth nightly.  aspirin delayed-release 81 mg tablet Take 81 mg by mouth daily. No current facility-administered medications for this visit. I have reviewed the nurses notes, vitals, problem list, allergy list, medical history, family, social history and medications. REVIEW OF SYMPTOMS      General: Pt denies excessive weight gain or loss. Pt is able to conduct ADL's  HEENT: Denies blurred vision, headaches, hearing loss, epistaxis and difficulty swallowing. Respiratory: Denies cough, congestion, shortness of breath, SHAH, wheezing or stridor.   Cardiovascular: Denies precordial pain, palpitations, edema or PND  Gastrointestinal: Denies poor appetite, indigestion, abdominal pain or blood in stool  Genitourinary: Denies hematuria, dysuria, increased urinary frequency  Musculoskeletal: Denies joint pain or swelling from muscles or joints  Neurologic: Denies tremor, paresthesias, headache, or sensory motor disturbance +finger burning   Psychiatric: Denies confusion, insomnia, depression  Integumentray: Denies rash, itching or ulcers. Hematologic: Denies easy bruising, bleeding     PHYSICAL EXAMINATION      Vitals:    05/15/19 1424   BP: 130/60   Pulse: 80   Resp: 20   SpO2: 95%   Weight: 184 lb (83.5 kg)   Height: 5' 5\" (1.651 m)      General: Well developed, in no acute distress. HEENT: No jaundice, oral mucosa moist, no oral ulcers  Neck: Supple, no stiffness, no lymphadenopathy, supple  Heart:  Normal S1/S2 negative S3 or S4. Regular, no murmur, gallop or rub, no jugular venous distention  Respiratory: Clear bilaterally x 4, no wheezing or rales   Extremities:  No edema, normal cap refill, no cyanosis. Musculoskeletal: No clubbing, no deformities  Neuro: A&Ox3, speech clear, gait stable, cooperative, no focal neurologic deficits  Skin: Skin color is normal. No rashes or lesions. Non diaphoretic, moist.    EKG: SR, occasional PACs with LAB      DIAGNOSTIC DATA     1. Echo  12/2/08 - EF 60-65%  10/1/14- EF 70%, grade 2 DD, LAE mod, AI/TR mild    2. Myocardial perfusion study  12/20/00 - persantine, mild mid distal anterolateral wall ischemia, EF 62%    3.  Cholesterol  8/23/12 - , HDL 61, LDL 85,   8/2/13 - , HDL 61, LDL 86,   8/24/14 - , HDL 61, LDL 88,   2/24/17- , HDL 75, ,   9/21/17- , HDL 55, LDL 70,   3/14/18- , HDL 62, LDL 70,            LABORATORY DATA            Lab Results   Component Value Date/Time    WBC 8.7 02/08/2016 03:45 AM    HGB 12.3 02/08/2016 03:45 AM    HCT 38.4 02/08/2016 03:45 AM    PLATELET 189 74/00/6208 03:45 AM    MCV 95.0 02/08/2016 03:45 AM      Lab Results   Component Value Date/Time    Sodium 140 02/08/2016 03:45 AM    Potassium 3.8 02/08/2016 03:45 AM    Chloride 106 02/08/2016 03:45 AM    CO2 26 02/08/2016 03:45 AM    Anion gap 8 02/08/2016 03:45 AM    Glucose 96 02/08/2016 03:45 AM    BUN 14 02/08/2016 03:45 AM    Creatinine 0.98 02/08/2016 03:45 AM    BUN/Creatinine ratio 14 02/08/2016 03:45 AM    GFR est AA >60 02/08/2016 03:45 AM    GFR est non-AA 53 (L) 02/08/2016 03:45 AM    Calcium 8.0 (L) 02/08/2016 03:45 AM    Bilirubin, total 0.4 02/07/2016 03:15 AM    AST (SGOT) 22 02/07/2016 03:15 AM    Alk. 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      1. HTN  -BP is good in this 79 yo. Under control on current medication regiment, no adjustments.      2. Dyslipidemia  -Remains on Lipitor 10 mg daily. Followed by Yuliya Leonard MD . Lipids have not been checked in a year. Given a requisition to have this checked.      3. Return in 1 year, sooner PRN    Orders Placed This Encounter    AMB POC EKG ROUTINE W/ 12 LEADS, INTER & REP     Order Specific Question:   Reason for Exam:     Answer:   annual              I have discussed the diagnosis with  Glenetta Sicard and the intended plan as seen in the above orders. Questions were answered concerning future plans. I have discussed medication side effects and warnings with the patient as well. Thank you,  Yuliya Leonard MD for involving me in the care of  Glenetta Sicard. Please do not hesitate to contact me for further questions/concerns. Written by Monty Roberts, dictated by Corrine Paige MD.    Inez Pradhan MD, [address]  [address]      [facility], [address] [place] [address], [facility] [address].      [phone] / [phone] Fax

## 2019-09-08 ENCOUNTER — APPOINTMENT (OUTPATIENT)
Dept: CT IMAGING | Age: 84
End: 2019-09-08
Attending: EMERGENCY MEDICINE
Payer: MEDICARE

## 2019-09-08 ENCOUNTER — HOSPITAL ENCOUNTER (EMERGENCY)
Dept: GENERAL RADIOLOGY | Age: 84
Discharge: HOME | End: 2019-09-08
Attending: EMERGENCY MEDICINE
Payer: MEDICARE

## 2019-09-08 ENCOUNTER — HOSPITAL ENCOUNTER (OUTPATIENT)
Age: 84
Setting detail: OBSERVATION
Discharge: HOME HEALTH | End: 2019-09-13
Attending: EMERGENCY MEDICINE | Admitting: INTERNAL MEDICINE
Payer: MEDICARE

## 2019-09-08 DIAGNOSIS — R62.7 FTT (FAILURE TO THRIVE) IN ADULT: ICD-10-CM

## 2019-09-08 DIAGNOSIS — Z71.89 ADVANCED CARE PLANNING/COUNSELING DISCUSSION: ICD-10-CM

## 2019-09-08 DIAGNOSIS — Z71.89 DNR (DO NOT RESUSCITATE) DISCUSSION: ICD-10-CM

## 2019-09-08 DIAGNOSIS — R53.1 WEAKNESS: Primary | ICD-10-CM

## 2019-09-08 DIAGNOSIS — M25.511 ACUTE PAIN OF RIGHT SHOULDER: ICD-10-CM

## 2019-09-08 DIAGNOSIS — Z71.89 GOALS OF CARE, COUNSELING/DISCUSSION: ICD-10-CM

## 2019-09-08 PROBLEM — R82.71 BACTERIURIA: Status: ACTIVE | Noted: 2019-09-08

## 2019-09-08 PROBLEM — G62.9 NEUROPATHY: Status: ACTIVE | Noted: 2019-09-08

## 2019-09-08 PROBLEM — N18.30 CKD (CHRONIC KIDNEY DISEASE) STAGE 3, GFR 30-59 ML/MIN (HCC): Status: ACTIVE | Noted: 2019-09-08

## 2019-09-08 PROBLEM — R29.6 FREQUENT FALLS: Status: ACTIVE | Noted: 2019-09-08

## 2019-09-08 LAB
ALBUMIN SERPL-MCNC: 3.3 G/DL (ref 3.5–5)
ALBUMIN/GLOB SERPL: 0.7 {RATIO} (ref 1.1–2.2)
ALP SERPL-CCNC: 82 U/L (ref 45–117)
ALT SERPL-CCNC: 20 U/L (ref 12–78)
ANION GAP SERPL CALC-SCNC: 6 MMOL/L (ref 5–15)
APPEARANCE UR: ABNORMAL
AST SERPL-CCNC: 24 U/L (ref 15–37)
BACTERIA URNS QL MICRO: ABNORMAL /HPF
BASOPHILS # BLD: 0.1 K/UL (ref 0–0.1)
BASOPHILS NFR BLD: 1 % (ref 0–1)
BILIRUB SERPL-MCNC: 0.5 MG/DL (ref 0.2–1)
BILIRUB UR QL: NEGATIVE
BUN SERPL-MCNC: 22 MG/DL (ref 6–20)
BUN/CREAT SERPL: 21 (ref 12–20)
CALCIUM SERPL-MCNC: 8.9 MG/DL (ref 8.5–10.1)
CHLORIDE SERPL-SCNC: 113 MMOL/L (ref 97–108)
CO2 SERPL-SCNC: 24 MMOL/L (ref 21–32)
COLOR UR: ABNORMAL
CREAT SERPL-MCNC: 1.07 MG/DL (ref 0.55–1.02)
DIFFERENTIAL METHOD BLD: ABNORMAL
EOSINOPHIL # BLD: 0.1 K/UL (ref 0–0.4)
EOSINOPHIL NFR BLD: 1 % (ref 0–7)
EPITH CASTS URNS QL MICRO: ABNORMAL /LPF
ERYTHROCYTE [DISTWIDTH] IN BLOOD BY AUTOMATED COUNT: 13.3 % (ref 11.5–14.5)
GLOBULIN SER CALC-MCNC: 4.5 G/DL (ref 2–4)
GLUCOSE SERPL-MCNC: 107 MG/DL (ref 65–100)
GLUCOSE UR STRIP.AUTO-MCNC: NEGATIVE MG/DL
HCT VFR BLD AUTO: 40.3 % (ref 35–47)
HGB BLD-MCNC: 13 G/DL (ref 11.5–16)
HGB UR QL STRIP: NEGATIVE
HYALINE CASTS URNS QL MICRO: ABNORMAL /LPF (ref 0–5)
IMM GRANULOCYTES # BLD AUTO: 0.1 K/UL (ref 0–0.04)
IMM GRANULOCYTES NFR BLD AUTO: 1 % (ref 0–0.5)
KETONES UR QL STRIP.AUTO: NEGATIVE MG/DL
LEUKOCYTE ESTERASE UR QL STRIP.AUTO: ABNORMAL
LYMPHOCYTES # BLD: 1.9 K/UL (ref 0.8–3.5)
LYMPHOCYTES NFR BLD: 20 % (ref 12–49)
MAGNESIUM SERPL-MCNC: 2.3 MG/DL (ref 1.6–2.4)
MCH RBC QN AUTO: 30.4 PG (ref 26–34)
MCHC RBC AUTO-ENTMCNC: 32.3 G/DL (ref 30–36.5)
MCV RBC AUTO: 94.2 FL (ref 80–99)
MONOCYTES # BLD: 0.8 K/UL (ref 0–1)
MONOCYTES NFR BLD: 9 % (ref 5–13)
NEUTS SEG # BLD: 6.7 K/UL (ref 1.8–8)
NEUTS SEG NFR BLD: 68 % (ref 32–75)
NITRITE UR QL STRIP.AUTO: NEGATIVE
NRBC # BLD: 0 K/UL (ref 0–0.01)
NRBC BLD-RTO: 0 PER 100 WBC
PH UR STRIP: 7 [PH] (ref 5–8)
PLATELET # BLD AUTO: 201 K/UL (ref 150–400)
PMV BLD AUTO: 11.4 FL (ref 8.9–12.9)
POTASSIUM SERPL-SCNC: 4.4 MMOL/L (ref 3.5–5.1)
PROT SERPL-MCNC: 7.8 G/DL (ref 6.4–8.2)
PROT UR STRIP-MCNC: NEGATIVE MG/DL
RBC # BLD AUTO: 4.28 M/UL (ref 3.8–5.2)
RBC #/AREA URNS HPF: ABNORMAL /HPF (ref 0–5)
SODIUM SERPL-SCNC: 143 MMOL/L (ref 136–145)
SP GR UR REFRACTOMETRY: <1.005 (ref 1–1.03)
TROPONIN I SERPL-MCNC: <0.05 NG/ML
UR CULT HOLD, URHOLD: NORMAL
UROBILINOGEN UR QL STRIP.AUTO: 0.2 EU/DL (ref 0.2–1)
WBC # BLD AUTO: 9.6 K/UL (ref 3.6–11)
WBC URNS QL MICRO: ABNORMAL /HPF (ref 0–4)

## 2019-09-08 PROCEDURE — 85025 COMPLETE CBC W/AUTO DIFF WBC: CPT

## 2019-09-08 PROCEDURE — 87086 URINE CULTURE/COLONY COUNT: CPT

## 2019-09-08 PROCEDURE — 99218 HC RM OBSERVATION: CPT

## 2019-09-08 PROCEDURE — 87077 CULTURE AEROBIC IDENTIFY: CPT

## 2019-09-08 PROCEDURE — 74011250636 HC RX REV CODE- 250/636: Performed by: INTERNAL MEDICINE

## 2019-09-08 PROCEDURE — 70450 CT HEAD/BRAIN W/O DYE: CPT

## 2019-09-08 PROCEDURE — 96372 THER/PROPH/DIAG INJ SC/IM: CPT

## 2019-09-08 PROCEDURE — 83735 ASSAY OF MAGNESIUM: CPT

## 2019-09-08 PROCEDURE — 74011000250 HC RX REV CODE- 250: Performed by: INTERNAL MEDICINE

## 2019-09-08 PROCEDURE — 81001 URINALYSIS AUTO W/SCOPE: CPT

## 2019-09-08 PROCEDURE — 65660000000 HC RM CCU STEPDOWN

## 2019-09-08 PROCEDURE — 96375 TX/PRO/DX INJ NEW DRUG ADDON: CPT

## 2019-09-08 PROCEDURE — 71046 X-RAY EXAM CHEST 2 VIEWS: CPT

## 2019-09-08 PROCEDURE — 65270000029 HC RM PRIVATE

## 2019-09-08 PROCEDURE — 80053 COMPREHEN METABOLIC PANEL: CPT

## 2019-09-08 PROCEDURE — 74011250637 HC RX REV CODE- 250/637: Performed by: INTERNAL MEDICINE

## 2019-09-08 PROCEDURE — 99284 EMERGENCY DEPT VISIT MOD MDM: CPT

## 2019-09-08 PROCEDURE — 71100 X-RAY EXAM RIBS UNI 2 VIEWS: CPT

## 2019-09-08 PROCEDURE — 93005 ELECTROCARDIOGRAM TRACING: CPT

## 2019-09-08 PROCEDURE — 84484 ASSAY OF TROPONIN QUANT: CPT

## 2019-09-08 PROCEDURE — 51701 INSERT BLADDER CATHETER: CPT

## 2019-09-08 PROCEDURE — 36415 COLL VENOUS BLD VENIPUNCTURE: CPT

## 2019-09-08 RX ORDER — PREGABALIN 50 MG/1
50 CAPSULE ORAL
Status: DISCONTINUED | OUTPATIENT
Start: 2019-09-08 | End: 2019-09-13 | Stop reason: HOSPADM

## 2019-09-08 RX ORDER — ASPIRIN 81 MG/1
81 TABLET ORAL
Status: DISCONTINUED | OUTPATIENT
Start: 2019-09-08 | End: 2019-09-13 | Stop reason: HOSPADM

## 2019-09-08 RX ORDER — FERROUS SULFATE, DRIED 160(50) MG
2 TABLET, EXTENDED RELEASE ORAL
Status: DISCONTINUED | OUTPATIENT
Start: 2019-09-09 | End: 2019-09-13 | Stop reason: HOSPADM

## 2019-09-08 RX ORDER — ACETAMINOPHEN 500 MG
500 TABLET ORAL
COMMUNITY
End: 2021-03-02

## 2019-09-08 RX ORDER — GLUCOSAM/CHONDRO/HERB 149/HYAL 750-100 MG
1 TABLET ORAL DAILY
Status: DISCONTINUED | OUTPATIENT
Start: 2019-09-09 | End: 2019-09-13 | Stop reason: HOSPADM

## 2019-09-08 RX ORDER — THERA TABS 400 MCG
1 TAB ORAL DAILY
Status: DISCONTINUED | OUTPATIENT
Start: 2019-09-09 | End: 2019-09-13 | Stop reason: HOSPADM

## 2019-09-08 RX ORDER — MULTIVITAMIN
2 TABLET ORAL DAILY
COMMUNITY

## 2019-09-08 RX ORDER — ATORVASTATIN CALCIUM 10 MG/1
10 TABLET, FILM COATED ORAL
Status: DISCONTINUED | OUTPATIENT
Start: 2019-09-08 | End: 2019-09-13 | Stop reason: HOSPADM

## 2019-09-08 RX ORDER — LISINOPRIL 5 MG/1
10 TABLET ORAL DAILY
Status: DISCONTINUED | OUTPATIENT
Start: 2019-09-09 | End: 2019-09-13 | Stop reason: HOSPADM

## 2019-09-08 RX ORDER — SODIUM CHLORIDE 0.9 % (FLUSH) 0.9 %
5-40 SYRINGE (ML) INJECTION EVERY 8 HOURS
Status: DISCONTINUED | OUTPATIENT
Start: 2019-09-08 | End: 2019-09-13 | Stop reason: HOSPADM

## 2019-09-08 RX ORDER — ACETAMINOPHEN 325 MG/1
650 TABLET ORAL
Status: DISCONTINUED | OUTPATIENT
Start: 2019-09-08 | End: 2019-09-09

## 2019-09-08 RX ORDER — AMLODIPINE BESYLATE 5 MG/1
5 TABLET ORAL DAILY
Status: DISCONTINUED | OUTPATIENT
Start: 2019-09-09 | End: 2019-09-13 | Stop reason: HOSPADM

## 2019-09-08 RX ORDER — NALOXONE HYDROCHLORIDE 0.4 MG/ML
0.4 INJECTION, SOLUTION INTRAMUSCULAR; INTRAVENOUS; SUBCUTANEOUS AS NEEDED
Status: DISCONTINUED | OUTPATIENT
Start: 2019-09-08 | End: 2019-09-13 | Stop reason: HOSPADM

## 2019-09-08 RX ORDER — ENOXAPARIN SODIUM 100 MG/ML
40 INJECTION SUBCUTANEOUS EVERY 24 HOURS
Status: DISCONTINUED | OUTPATIENT
Start: 2019-09-08 | End: 2019-09-13 | Stop reason: HOSPADM

## 2019-09-08 RX ORDER — SODIUM CHLORIDE 0.9 % (FLUSH) 0.9 %
5-40 SYRINGE (ML) INJECTION AS NEEDED
Status: DISCONTINUED | OUTPATIENT
Start: 2019-09-08 | End: 2019-09-13 | Stop reason: HOSPADM

## 2019-09-08 RX ADMIN — ATORVASTATIN CALCIUM 10 MG: 10 TABLET, FILM COATED ORAL at 22:29

## 2019-09-08 RX ADMIN — ENOXAPARIN SODIUM 40 MG: 40 INJECTION SUBCUTANEOUS at 20:24

## 2019-09-08 RX ADMIN — WATER 1 G: 1 INJECTION INTRAMUSCULAR; INTRAVENOUS; SUBCUTANEOUS at 15:56

## 2019-09-08 RX ADMIN — ACETAMINOPHEN 650 MG: 325 TABLET ORAL at 16:46

## 2019-09-08 RX ADMIN — PREGABALIN 50 MG: 50 CAPSULE ORAL at 22:29

## 2019-09-08 RX ADMIN — ASPIRIN 81 MG: 81 TABLET, COATED ORAL at 22:29

## 2019-09-08 RX ADMIN — Medication 10 ML: at 22:29

## 2019-09-08 RX ADMIN — ACETAMINOPHEN 650 MG: 325 TABLET ORAL at 22:27

## 2019-09-08 NOTE — ROUTINE PROCESS
Bedside shift change report given to [address] [place] [address] (oncoming nurse) by Eduardo Miranda (offgoing nurse). Report included the following information SBAR, Kardex and ED Summary.

## 2019-09-08 NOTE — ED PROVIDER NOTES
80 y.o. female with past medical history significant for HTN, mixed hyperlipidemia, GERD, CAD, femur fracture s/p knee replacement who presents via EMS to the ED with cc of evaluation after fall. Patient states that she fell yesterday was able to get up and walk around. Patient reports that she fell again this morning, and struck the right side of her torso on a chair. She arrives at the ED with worsening right rib pain. Patient states that she took tylenol last night and this morning with some relief. She endorses use of walker at baseline and notes that she lives alone. Patient also endorses history of breaking her femur in . Patient endorses general weakness and some incontinence. She denies chest pain, lightheadedness, dizziness, cough, fever, vomiting, diarrhea, dysuria or numbness    There are no other acute medical concerns at this time. Social Hx: no Tobacco use, no EtOH use, no Illicit Drug use  PCP: Sally Lindo MD    Note written by Areli Lemons, as dictated by Eric Flores MD 2:05 PM      The history is provided by the patient and the EMS personnel. No  was used.         Past Medical History:   Diagnosis Date    CAD (coronary artery disease)     Femur fracture (HCC)     right    GERD (gastroesophageal reflux disease)     HTN (hypertension), benign 2011    Mixed hyperlipidemia 2011       Past Surgical History:   Procedure Laterality Date    HX FEMUR FRACTURE TX      HX KNEE REPLACEMENT      bilateral    HX ORTHOPAEDIC      bilat knee replacements    HX VEIN STRIPPING           Family History:   Problem Relation Age of Onset    Other Mother          of renal failure, not sure what caused id    Other Father         something with throat, not sure if it was cancer    Heart Disease Sister        Social History     Socioeconomic History    Marital status:      Spouse name: Not on file    Number of children: Not on file    Years of education: Not on file    Highest education level: Not on file   Occupational History    Not on file   Social Needs    Financial resource strain: Not on file    Food insecurity:     Worry: Not on file     Inability: Not on file    Transportation needs:     Medical: Not on file     Non-medical: Not on file   Tobacco Use    Smoking status: Never Smoker    Smokeless tobacco: Never Used   Substance and Sexual Activity    Alcohol use: No    Drug use: No    Sexual activity: Not on file   Lifestyle    Physical activity:     Days per week: Not on file     Minutes per session: Not on file    Stress: Not on file   Relationships    Social connections:     Talks on phone: Not on file     Gets together: Not on file     Attends Religion service: Not on file     Active member of club or organization: Not on file     Attends meetings of clubs or organizations: Not on file     Relationship status: Not on file    Intimate partner violence:     Fear of current or ex partner: Not on file     Emotionally abused: Not on file     Physically abused: Not on file     Forced sexual activity: Not on file   Other Topics Concern    Not on file   Social History Narrative    Not on file         ALLERGIES: Sulfa (sulfonamide antibiotics); Ampicillin; and Lescol [fluvastatin]    Review of Systems   Constitutional: Negative for activity change, chills and fever. HENT: Negative for nosebleeds, sore throat, trouble swallowing and voice change. Eyes: Negative for visual disturbance. Respiratory: Negative for cough and shortness of breath. Cardiovascular: Negative for chest pain and palpitations. Gastrointestinal: Negative for abdominal pain, constipation, diarrhea and nausea. Genitourinary: Negative for difficulty urinating, dysuria, hematuria and urgency. Musculoskeletal: Positive for arthralgias. Negative for back pain, neck pain and neck stiffness. Skin: Negative for color change.    Allergic/Immunologic: Negative for immunocompromised state. Neurological: Positive for weakness. Negative for dizziness, seizures, syncope, light-headedness, numbness and headaches. Psychiatric/Behavioral: Negative for behavioral problems, confusion, hallucinations, self-injury and suicidal ideas. Vitals:    09/08/19 1357   BP: 147/55   Pulse: 76   Resp: 15   Temp: 97.7 °F (36.5 °C)   SpO2: 94%   Weight: 83 kg (183 lb)   Height: 5' 5\" (1.651 m)            Physical Exam   Constitutional: She is oriented to person, place, and time. She appears well-developed and well-nourished. No distress. HENT:   Head: Normocephalic and atraumatic. Eyes: Pupils are equal, round, and reactive to light. Neck: Normal range of motion. Neck supple. Cardiovascular: Normal rate, regular rhythm and normal heart sounds. Exam reveals no gallop and no friction rub. No murmur heard. Pulmonary/Chest: Effort normal and breath sounds normal. No respiratory distress. She has no wheezes. Abdominal: Soft. Bowel sounds are normal. She exhibits no distension. There is no tenderness. There is no rebound and no guarding. Musculoskeletal: Normal range of motion. Neurological: She is alert and oriented to person, place, and time. Skin: Skin is warm. No rash noted. She is not diaphoretic. Psychiatric: She has a normal mood and affect. Her behavior is normal. Judgment and thought content normal.   Nursing note and vitals reviewed. MDM     This is a 41-year-old female presenting with complaints of weakness, ground-level falls, urinary incontinence, and chest wall pain. Patient states she had 2 ground-level falls yesterday, despite the use of her walker. She endorses the second fall caused her to fall across a chair, injuring her right chest wall. Per report, she was evaluated by EMS, declining transportation, until this afternoon.   She denies recent chest pain, shortness of breath, lightheadedness, dizziness, states she is incontinent of urine, taking Tylenol for her right chest wall pain with some improvement. Physical exam is remarkable for a well-appearing elderly female, in no acute distress, noted to be afebrile without tachycardia or hypotension, satting well on room air. She has mild chest wall tenderness to palpation in the anterior axillary line, approximately T12 without ecchymosis crepitus, or instability. Differential includes rib fracture versus contusion, dehydration, electrolyte abnormality, UTI, CVA. Plan to obtain CMP, CBC, UA, plan films of the chest, right ribs, head CT. We will reevaluate, and make a disposition. Procedures    Hospitalist David for Admission  3:33 PM    ED Room Number: ER03/03  Patient Name and age:  Pool Fernand[age] y.o.  female  Working Diagnosis:   1. Weakness    2. FTT (failure to thrive) in adult      Readmission: no  Isolation Requirements:  no  Recommended Level of Care:  med/surg  Code Status:  Full Code  Department:[facility] ED - [phone]  Other:  Discussed with Dr. Rosalinda Elias    CONSULT NOTE:   Dr Toi Apley is in the ED at this time and evaluated the patient. He will write orders and admit the patient.  3:45 PM

## 2019-09-08 NOTE — ED NOTES
TRANSFER - OUT REPORT:    Verbal report given to Kandy on [facility]  being transferred to [phone] for routine progression of care       Report consisted of patients Situation, Background, Assessment and   Recommendations(SBAR). Information from the following report(s) SBAR, ED Summary, [facility] and Recent Results was reviewed with the receiving nurse. Opportunity for questions and clarification was provided.

## 2019-09-08 NOTE — PROGRESS NOTES
[facility]: MED RECONCILIATION    Comments/Recommendations:   Patient is awake, alert, and knowledgeable about home medications   Verifies allergies    Medications added:     Acetaminophen    Medications removed:    None    Medications adjusted:    Aspirin  Calcium plus D  Centrum silver  Fish oil  Pregabalin    Allergies: Sulfa (sulfonamide antibiotics); Ampicillin; and Lescol [fluvastatin]    Prior to Admission Medications:       Prior to Admission Medications   Prescriptions Last Dose Informant Patient Reported? Taking?   acetaminophen (TYLENOL) 500 mg tablet 9/7/2019 at pm Self Yes Yes   Sig: Take 500 mg by mouth every six (6) hours as needed for Pain. amLODIPine (NORVASC) 5 mg tablet 9/8/2019 at Unknown time Self Yes Yes   Sig: Take 5 mg by mouth daily. aspirin delayed-release 81 mg tablet 9/7/2019 at Unknown time Self Yes Yes   Sig: Take 81 mg by mouth nightly. atorvastatin (LIPITOR) 10 mg tablet 9/7/2019 at Unknown time Self Yes Yes   Sig: Take 10 mg by mouth nightly. calcium-cholecalciferol, D3, (CALTRATE 600+D) tablet 9/8/2019 at Unknown time Self Yes Yes   Sig: Take 2 Tabs by mouth daily. folic acid/multivit-min/lutein (CENTRUM SILVER PO) 9/8/2019 at Unknown time Self Yes Yes   Sig: Take 1 Tab by mouth daily. lisinopril (PRINIVIL, ZESTRIL) 10 mg tablet 9/8/2019 at Unknown time Self Yes Yes   Sig: Take 10 mg by mouth daily. omega-3 fatty acids-vitamin e (FISH OIL) 1,000 mg cap 9/8/2019 at Unknown time Self Yes Yes   Sig: Take 2 Caps by mouth daily. pregabalin (LYRICA) 50 mg capsule 9/7/2019 at Unknown time Self Yes Yes   Sig: Take 50 mg by mouth nightly.  Indications: neuropathy      Facility-Administered Medications: None      Thank you,      Ej Coleman, SheilaD, BCPS

## 2019-09-08 NOTE — ED TRIAGE NOTES
Pt arrives for fall last night after tripping. EMS helped pt up but did not transport at that time. Today pt reports worsening right rib pain. Pt struck side on chair when she fell. EMS adds that pt had episode of incontinence with malodorous urine.  Denies hitting head or use of blood thinners

## 2019-09-08 NOTE — H&P
[facility]  [address] [facility], [facility] [address]  [phone]    Hospitalist Admission Note      NAME:  Efrain Ruiz   :   1924   MRN:  [id number]     PCP:  Jacki Cai MD     Date/Time:  2019 5:50 PM         Assessment / Plan:         Frequent falls: 2 yesterday. No rib fractures. No focal neurological deficits to suggest CVA. May have been precipitated by neuropathy, advanced age/deconditioning, possible UTI. Unsafe to live alone. Likely needs placement. Discussed with pt and daughter. Fall precautions, PT/OT, DC planning. Bacteriuria: possible UTI. Empiric IV CTX for now pending urine culture. Noted ampicillin allergy however has tolerated cefazolin prior. CAD (coronary artery disease) (): continue ASA, statin. Not on BB       HTN (hypertension), benign: BP fluctuating however acceptable for now considering age. Continue Norvasc, lisinopril. Mixed hyperlipidemia: continue statin, fish oil      CKD (chronic kidney disease) stage 3, GFR 30-59 ml/min: appears stable. Follow BMP      Neuropathy: cont Lyrica         Code Status: DNR, d/w pt and daughter     Surrogate decision maker: daughter      ED notes and lab results reviewed.        Total time spent with patient: 79 Minutes   Time spent in the care of this patient included reviewing records, discussing with nursing, obtaining history and examining the patient, and discussing treatment plans, with >50% time spent counseling/coordinating care    Risk of deterioration: High                 Care Plan discussed with: ED provider, Patient, Family, Nursing Staff and >50% of time spent in counseling and coordination of care    Discussed:  Code Status, Care Plan and D/C Planning    Prophylaxis:  Lovenox    Disposition:  SNF/LTC                 Subjective:     CHIEF COMPLAINT: falls    HISTORY OF PRESENT ILLNESS:     Ms. Araceli Taylor is a 80 y.o. female w/ hx of CAD, HTN, HLD, GERD who presents with falls. Had 2 falls at home yesterday. Legs gave out, unsteady on her feet resulting in falls. Lives alone. Has noted more frequent falls recently. Has life-alert so called for help. Denies HA, dizziness, CP, SOB, fevers, chills, focal weakness, numbness. Ms. Gonzales is admitted for further evaluation and management. Past Medical History:   Diagnosis Date    CAD (coronary artery disease)     Femur fracture (HCC)     right    GERD (gastroesophageal reflux disease)     HTN (hypertension), benign 2011    Mixed hyperlipidemia 2011        Past Surgical History:   Procedure Laterality Date    HX FEMUR FRACTURE TX      HX KNEE REPLACEMENT      bilateral    HX ORTHOPAEDIC      bilat knee replacements    HX VEIN STRIPPING         Social History     Tobacco Use    Smoking status: Never Smoker    Smokeless tobacco: Never Used   Substance Use Topics    Alcohol use: No        Family History   Problem Relation Age of Onset    Other Mother          of renal failure, not sure what caused id    Other Father         something with throat, not sure if it was cancer    Heart Disease Sister      Allergies   Allergen Reactions    Sulfa (Sulfonamide Antibiotics) Rash    Ampicillin Rash     Tolerates cefazolin    Lescol [Fluvastatin] Unknown (comments)        Prior to Admission medications    Medication Sig Start Date End Date Taking? Authorizing Provider   calcium-cholecalciferol, D3, (CALTRATE 600+D) tablet Take 2 Tabs by mouth daily. Yes Provider, Historical   acetaminophen (TYLENOL) 500 mg tablet Take 500 mg by mouth every six (6) hours as needed for Pain. Yes Provider, Historical   amLODIPine (NORVASC) 5 mg tablet Take 5 mg by mouth daily. Yes Provider, Historical   lisinopril (PRINIVIL, ZESTRIL) 10 mg tablet Take 10 mg by mouth daily. Yes Provider, Historical   pregabalin (LYRICA) 50 mg capsule Take 50 mg by mouth nightly.  Indications: neuropathy   Yes Provider, Historical   folic acid/multivit-min/lutein (CENTRUM SILVER PO) Take 1 Tab by mouth daily. Yes Provider, Historical   omega-3 fatty acids-vitamin e (FISH OIL) 1,000 mg cap Take 2 Caps by mouth daily. Yes Provider, Historical   atorvastatin (LIPITOR) 10 mg tablet Take 10 mg by mouth nightly. Yes Provider, Historical   aspirin delayed-release 81 mg tablet Take 81 mg by mouth nightly. Yes Provider, Historical       Review of Systems:  (bold if positive, if negative)    Gen:  Eyes:  ENT:  CVS:  Pulm:  GI:    :    MS:  Pain, weaknessarthritisSkin:  Psych:  Endo:    Hem:  Renal:    Neuro:            Objective:      VITALS:    Vital signs reviewed; most recent are:    Visit Vitals  /85   Pulse 82   Temp 97.7 °F (36.5 °C)   Resp 19   Ht 5' 5\" (1.651 m)   Wt 83 kg (183 lb)   SpO2 95%   BMI 30.45 kg/m²     SpO2 Readings from Last 6 Encounters:   09/08/19 95%   05/15/19 95%   05/09/18 96%   11/08/17 96%   05/10/17 98%   02/08/16 94%        No intake or output data in the 24 hours ending 09/08/19 1750         Exam:     Physical Exam:    Gen:  Elderly, well-developed, well-nourished, in no acute distress. Appears younger than stated age  [de-identified]:  No scleral icterus, PERRL, hearing intact to voice, moist mucous membranes  Neck:  Supple, without masses. Thyroid non-tender  Resp:  No accessory muscle use. CTAB without wheezing, rales, rhonchi  Card: RRR. Normal S1 and S2 without murmurs, rubs, or gallops. No peripheral lower extremity edema. No JVD. Peripheral pulses in tact. Abd:  Normoactive bowel sounds. Soft, non-tender, non-distended. No rebound, no guarding. No appreciable hepatosplenomegaly   Lymph:  No cervical adenopathy  Musc:  No cyanosis or clubbing  Skin:  No rashes or ulcers; turgor intact. Neuro:  Cranial nerves 3-12 intact, no focal motor weakness (generalized weakness), no numbness. Follows commands appropriately  Psych:  Fair insight, normal affect. Alert, oriented x 3.  Answers questions appropriately Labs:    Recent Labs     09/08/19  1413   WBC 9.6   HGB 13.0   HCT 40.3        Recent Labs     09/08/19  1413      K 4.4   *   CO2 24   *   BUN 22*   CREA 1.07*   CA 8.9   MG 2.3   ALB 3.3*   SGOT 24   ALT 20     No components found for: GLPOC  No results for input(s): PH, PCO2, PO2, HCO3, FIO2 in the last 72 hours. No results for input(s): INR in the last 72 hours. No lab exists for component: INREXT  No results found for: SDES  Lab Results   Component Value Date/Time    Culture result: NO GROWTH 2 DAYS 02/06/2016 05:20 PM    Culture result: NO GROWTH 5 DAYS 02/06/2016 04:44 PM    Culture result: NO GROWTH 6 DAYS 05/07/2015 08:40 PM     All other current labs reviewed in the computer. Imaging/Studies:\    Head CT:  IMPRESSION: No evidence of acute process.       CXR: No acute cardiopulmonary process  Imaging personally reviewed    Rib XR: no fxs    EKG: sinus rhythm  EKG personally reviewed    ___________________________________________________    Attending Physician: Ashely Vale MD

## 2019-09-09 ENCOUNTER — APPOINTMENT (OUTPATIENT)
Dept: GENERAL RADIOLOGY | Age: 84
End: 2019-09-09
Attending: INTERNAL MEDICINE
Payer: MEDICARE

## 2019-09-09 PROBLEM — G62.9 NEUROPATHY: Chronic | Status: ACTIVE | Noted: 2019-09-08

## 2019-09-09 PROBLEM — N18.30 CKD (CHRONIC KIDNEY DISEASE) STAGE 3, GFR 30-59 ML/MIN (HCC): Chronic | Status: ACTIVE | Noted: 2019-09-08

## 2019-09-09 LAB
ALBUMIN SERPL-MCNC: 2.9 G/DL (ref 3.5–5)
ALBUMIN/GLOB SERPL: 0.8 {RATIO} (ref 1.1–2.2)
ALP SERPL-CCNC: 70 U/L (ref 45–117)
ALT SERPL-CCNC: 18 U/L (ref 12–78)
ANION GAP SERPL CALC-SCNC: 3 MMOL/L (ref 5–15)
AST SERPL-CCNC: 18 U/L (ref 15–37)
ATRIAL RATE: 79 BPM
BACTERIA SPEC CULT: ABNORMAL
BASOPHILS # BLD: 0 K/UL (ref 0–0.1)
BASOPHILS NFR BLD: 1 % (ref 0–1)
BILIRUB SERPL-MCNC: 0.5 MG/DL (ref 0.2–1)
BUN SERPL-MCNC: 19 MG/DL (ref 6–20)
BUN/CREAT SERPL: 20 (ref 12–20)
CALCIUM SERPL-MCNC: 8.4 MG/DL (ref 8.5–10.1)
CALCULATED R AXIS, ECG10: -40 DEGREES
CALCULATED T AXIS, ECG11: 41 DEGREES
CC UR VC: ABNORMAL
CHLORIDE SERPL-SCNC: 115 MMOL/L (ref 97–108)
CO2 SERPL-SCNC: 25 MMOL/L (ref 21–32)
CREAT SERPL-MCNC: 0.96 MG/DL (ref 0.55–1.02)
DIAGNOSIS, 93000: NORMAL
DIFFERENTIAL METHOD BLD: NORMAL
EOSINOPHIL # BLD: 0.1 K/UL (ref 0–0.4)
EOSINOPHIL NFR BLD: 2 % (ref 0–7)
ERYTHROCYTE [DISTWIDTH] IN BLOOD BY AUTOMATED COUNT: 13.5 % (ref 11.5–14.5)
GLOBULIN SER CALC-MCNC: 3.8 G/DL (ref 2–4)
GLUCOSE SERPL-MCNC: 102 MG/DL (ref 65–100)
HCT VFR BLD AUTO: 37.2 % (ref 35–47)
HGB BLD-MCNC: 11.8 G/DL (ref 11.5–16)
IMM GRANULOCYTES # BLD AUTO: 0 K/UL (ref 0–0.04)
IMM GRANULOCYTES NFR BLD AUTO: 0 % (ref 0–0.5)
LYMPHOCYTES # BLD: 2.4 K/UL (ref 0.8–3.5)
LYMPHOCYTES NFR BLD: 33 % (ref 12–49)
MAGNESIUM SERPL-MCNC: 2.2 MG/DL (ref 1.6–2.4)
MCH RBC QN AUTO: 30.3 PG (ref 26–34)
MCHC RBC AUTO-ENTMCNC: 31.7 G/DL (ref 30–36.5)
MCV RBC AUTO: 95.4 FL (ref 80–99)
MONOCYTES # BLD: 0.6 K/UL (ref 0–1)
MONOCYTES NFR BLD: 8 % (ref 5–13)
NEUTS SEG # BLD: 4 K/UL (ref 1.8–8)
NEUTS SEG NFR BLD: 56 % (ref 32–75)
NRBC # BLD: 0 K/UL (ref 0–0.01)
NRBC BLD-RTO: 0 PER 100 WBC
PHOSPHATE SERPL-MCNC: 2.9 MG/DL (ref 2.6–4.7)
PLATELET # BLD AUTO: 183 K/UL (ref 150–400)
PMV BLD AUTO: 11.3 FL (ref 8.9–12.9)
POTASSIUM SERPL-SCNC: 4.5 MMOL/L (ref 3.5–5.1)
PROT SERPL-MCNC: 6.7 G/DL (ref 6.4–8.2)
Q-T INTERVAL, ECG07: 400 MS
QRS DURATION, ECG06: 98 MS
QTC CALCULATION (BEZET), ECG08: 458 MS
RBC # BLD AUTO: 3.9 M/UL (ref 3.8–5.2)
SERVICE CMNT-IMP: ABNORMAL
SODIUM SERPL-SCNC: 143 MMOL/L (ref 136–145)
VENTRICULAR RATE, ECG03: 79 BPM
WBC # BLD AUTO: 7.2 K/UL (ref 3.6–11)

## 2019-09-09 PROCEDURE — 96365 THER/PROPH/DIAG IV INF INIT: CPT

## 2019-09-09 PROCEDURE — 74011250637 HC RX REV CODE- 250/637: Performed by: INTERNAL MEDICINE

## 2019-09-09 PROCEDURE — 85025 COMPLETE CBC W/AUTO DIFF WBC: CPT

## 2019-09-09 PROCEDURE — 74011250636 HC RX REV CODE- 250/636: Performed by: INTERNAL MEDICINE

## 2019-09-09 PROCEDURE — 97116 GAIT TRAINING THERAPY: CPT

## 2019-09-09 PROCEDURE — 73030 X-RAY EXAM OF SHOULDER: CPT

## 2019-09-09 PROCEDURE — 77030038269 HC DRN EXT URIN PURWCK BARD -A

## 2019-09-09 PROCEDURE — 36415 COLL VENOUS BLD VENIPUNCTURE: CPT

## 2019-09-09 PROCEDURE — 96372 THER/PROPH/DIAG INJ SC/IM: CPT

## 2019-09-09 PROCEDURE — 84100 ASSAY OF PHOSPHORUS: CPT

## 2019-09-09 PROCEDURE — 80053 COMPREHEN METABOLIC PANEL: CPT

## 2019-09-09 PROCEDURE — 97162 PT EVAL MOD COMPLEX 30 MIN: CPT

## 2019-09-09 PROCEDURE — 76450000000

## 2019-09-09 PROCEDURE — 97535 SELF CARE MNGMENT TRAINING: CPT

## 2019-09-09 PROCEDURE — 74011000250 HC RX REV CODE- 250: Performed by: INTERNAL MEDICINE

## 2019-09-09 PROCEDURE — 65660000000 HC RM CCU STEPDOWN

## 2019-09-09 PROCEDURE — 97165 OT EVAL LOW COMPLEX 30 MIN: CPT

## 2019-09-09 PROCEDURE — 74011000258 HC RX REV CODE- 258: Performed by: INTERNAL MEDICINE

## 2019-09-09 PROCEDURE — 83735 ASSAY OF MAGNESIUM: CPT

## 2019-09-09 PROCEDURE — 99218 HC RM OBSERVATION: CPT

## 2019-09-09 RX ORDER — LIDOCAINE 4 G/100G
1 PATCH TOPICAL EVERY 24 HOURS
Status: DISCONTINUED | OUTPATIENT
Start: 2019-09-09 | End: 2019-09-13 | Stop reason: HOSPADM

## 2019-09-09 RX ORDER — ACETAMINOPHEN 325 MG/1
650 TABLET ORAL EVERY 6 HOURS
Status: DISCONTINUED | OUTPATIENT
Start: 2019-09-09 | End: 2019-09-13 | Stop reason: HOSPADM

## 2019-09-09 RX ORDER — TRAMADOL HYDROCHLORIDE 50 MG/1
50 TABLET ORAL ONCE
Status: COMPLETED | OUTPATIENT
Start: 2019-09-09 | End: 2019-09-09

## 2019-09-09 RX ADMIN — Medication 10 ML: at 21:15

## 2019-09-09 RX ADMIN — ASPIRIN 81 MG: 81 TABLET, COATED ORAL at 21:15

## 2019-09-09 RX ADMIN — ENOXAPARIN SODIUM 40 MG: 40 INJECTION SUBCUTANEOUS at 19:44

## 2019-09-09 RX ADMIN — LISINOPRIL 10 MG: 5 TABLET ORAL at 08:50

## 2019-09-09 RX ADMIN — AMLODIPINE BESYLATE 5 MG: 5 TABLET ORAL at 08:50

## 2019-09-09 RX ADMIN — THERA TABS 1 TABLET: TAB at 08:50

## 2019-09-09 RX ADMIN — Medication 10 ML: at 15:09

## 2019-09-09 RX ADMIN — OMEGA-3 FATTY ACIDS CAP 1000 MG 1 CAPSULE: 1000 CAP at 08:50

## 2019-09-09 RX ADMIN — OYSTER SHELL CALCIUM WITH VITAMIN D 2 TABLET: 500; 200 TABLET, FILM COATED ORAL at 08:50

## 2019-09-09 RX ADMIN — Medication 10 ML: at 06:00

## 2019-09-09 RX ADMIN — ACETAMINOPHEN 650 MG: 325 TABLET ORAL at 04:46

## 2019-09-09 RX ADMIN — TRAMADOL HYDROCHLORIDE 50 MG: 50 TABLET, FILM COATED ORAL at 01:40

## 2019-09-09 RX ADMIN — ATORVASTATIN CALCIUM 10 MG: 10 TABLET, FILM COATED ORAL at 21:15

## 2019-09-09 RX ADMIN — PREGABALIN 50 MG: 50 CAPSULE ORAL at 21:15

## 2019-09-09 RX ADMIN — CEFTRIAXONE SODIUM 1 G: 1 INJECTION, POWDER, FOR SOLUTION INTRAMUSCULAR; INTRAVENOUS at 16:05

## 2019-09-09 NOTE — PROGRESS NOTES
Bedside shift change report given to Cyndy (oncoming nurse) by Shlomo Salas (offgoing nurse). Report included the following information SBAR, Kardex, Intake/Output, MAR and Accordion.

## 2019-09-09 NOTE — PROGRESS NOTES
Problem: Mobility Impaired (Adult and Pediatric)  Goal: *Acute Goals and Plan of Care (Insert Text)  Description  FUNCTIONAL STATUS PRIOR TO ADMISSION: Patient was modified independent using a Rolling walker for functional mobility. HOME SUPPORT PRIOR TO ADMISSION: The patient lived alone with daughter nearby to bring groceries, transportation, assist with cleaning. Physical Therapy Goals  Initiated 9/9/2019  1. Patient will move from supine to sit and sit to supine , scoot up and down and roll side to side in bed with independence within 7 day(s). 2.  Patient will transfer from bed to chair and chair to bed with modified independence using the least restrictive device within 7 day(s). 3.  Patient will perform sit to stand with modified independence within 7 day(s). 4.  Patient will ambulate with modified independence for 200 feet with the least restrictive device within 7 day(s). Outcome: Progressing Towards Goal   PHYSICAL THERAPY EVALUATION  Patient: Alice Bermudez (64 y.o. female)  Date: 9/9/2019  Primary Diagnosis: Frequent falls [R29.6]        Precautions:   Fall      ASSESSMENT  Based on the objective data described below, the patient presents with impaired dynamic standing balance, gait disturbance, decreased activity tolerance and genearlized weakness which mildly is below her baseline level. Patient exhibited generally appropriate safety awareness and proper use of rolling walker without cues today. Primarily limited by fatigue, no LOB observed. Will benefit from skilled PT to work to improve endurance and balance while in acute setting    Current Level of Function Impacting Discharge (mobility/balance): CGA for transfers and gait with RW x 100' with one brief standing break    Functional Outcome Measure: The patient scored 55 on the Barthel outcome measure which is indicative of 45% impaired ability to care for basic self needs/dependency on others.       Other factors to consider for discharge: Patient lives alone in [facility] unit, multiple falls in the last few months/years. Patient considering move to FDC facility. Patient will benefit from skilled therapy intervention to address the above noted impairments. PLAN :  Recommendations and Planned Interventions: bed mobility training, transfer training, gait training, therapeutic exercises, patient and family training/education and therapeutic activities      Frequency/Duration: Patient will be followed by physical therapy:  5 times a week to address goals.     Recommendation for discharge: (in order for the patient to meet his/her long term goals)  Physical therapy at least 2 days/week in the home with 24/7 supervision either via in home care/family or via FDC    This discharge recommendation:  Has been made in collaboration with the attending provider and/or case management    Equipment recommendations for successful discharge (if) home: patient owns DME required for discharge         SUBJECTIVE:   Patient stated It may be time for me to move to an FDC.    OBJECTIVE DATA SUMMARY:   HISTORY:    Past Medical History:   Diagnosis Date    CAD (coronary artery disease)     Femur fracture ([address]     right    GERD (gastroesophageal reflux disease)     HTN (hypertension), benign 1/11/2011    Mixed hyperlipidemia 1/11/2011     Past Surgical History:   Procedure Laterality Date    HX FEMUR FRACTURE TX      HX KNEE REPLACEMENT      bilateral    HX ORTHOPAEDIC      bilat knee replacements    HX VEIN STRIPPING             Home Situation  Home Environment: Apartment  # Steps to Enter: 0  One/Two Story Residence: One story  Living Alone: Yes  Support Systems: Family member(s)(daughter comes daily, brings grocery)  Patient Expects to be Discharged to[de-identified] Private residence  Current DME Used/Available at Home: [address], rolling, [address], rollator, Cathlene Backers, straight, Grab bars, Shower chair, Raised toilet seat  Tub or Shower Type: Shower    EXAMINATION/PRESENTATION/DECISION MAKING:   Critical Behavior:  Neurologic State: Alert  Orientation Level: Oriented X4  Cognition: Follows commands  Safety/Judgement: Awareness of environment  Hearing: Auditory  Auditory Impairment: None    Range Of Motion:  AROM: Generally decreased, functional                       Strength:    Strength: Generally decreased, functional                    Tone & Sensation:   Tone: Normal              Sensation: Intact               Coordination:  Coordination: Within functional limits  Vision:   Corrective Lenses: Glasses  Functional Mobility:  Bed Mobility:     Supine to Sit: Supervision; Additional time     Scooting: Supervision  Transfers:  Sit to Stand: Contact guard assistance  Stand to Sit: Contact guard assistance                       Balance:   Sitting: Intact  Standing: Intact; With support  Ambulation/Gait Training:  Distance (ft): 100 Feet (ft)  Assistive Device: Gait belt;Walker, rolling  Ambulation - Level of Assistance: Contact guard assistance        Gait Abnormalities: Decreased step clearance              Speed/Meghan: Pace decreased (<100 feet/min)  Step Length: Right shortened;Left shortened                Functional Measure:  Barthel Index:    Bathin  Bladder: 5  Bowels: 5  Groomin  Dressin  Feeding: 10  Mobility: 10  Stairs: 0  Toilet Use: 5  Transfer (Bed to Chair and Back): 10  Total: 55/100       The Barthel ADL Index: Guidelines  1. The index should be used as a record of what a patient does, not as a record of what a patient could do. 2. The main aim is to establish degree of independence from any help, physical or verbal, however minor and for whatever reason. 3. The need for supervision renders the patient not independent. 4. A patient's performance should be established using the best available evidence.  Asking the patient, friends/relatives and nurses are the usual sources, but direct observation and common sense are also important. However direct testing is not needed. 5. Usually the patient's performance over the preceding 24-48 hours is important, but occasionally longer periods will be relevant. 6. Middle categories imply that the patient supplies over 50 per cent of the effort. 7. Use of aids to be independent is allowed. Samantha Faith., Barthel, D.W. ([id number]). Functional evaluation: the Barthel Index. [address] (14)2. Genia Voss darryl YENI Booth, Juan Jose Cool, Imer Medina., [place], [address] (1999). Measuring the change indisability after inpatient rehabilitation; comparison of the responsiveness of the Barthel Index and Functional [place] Measure. Journal of Neurology, Neurosurgery, and Psychiatry, 66(4), 108-568. LETICIA Duran, RACHEL Weaver, & Vikas Sheridan M.A. (2004.) Assessment of post-stroke quality of life in cost-effectiveness studies: The usefulness of the Barthel Index and the EuroQoL-5D. Quality of Life Research, 15, 879-76            Physical Therapy Evaluation Charge Determination   History Examination Presentation Decision-Making   HIGH Complexity :3+ comorbidities / personal factors will impact the outcome/ POC  MEDIUM Complexity : 3 Standardized tests and measures addressing body structure, function, activity limitation and / or participation in recreation  MEDIUM Complexity : Evolving with changing characteristics  Other outcome measures Barthel 55/100  MEDIUM      Based on the above components, the patient evaluation is determined to be of the following complexity level: MEDIUM        Activity Tolerance:   Fair  Please refer to the flowsheet for vital signs taken during this treatment. After treatment patient left in no apparent distress:   Sitting in chair, Call bell within reach and Bed / chair alarm activated    COMMUNICATION/EDUCATION:   The patients plan of care was discussed with: Registered Nurse.     Fall prevention education was provided and the patient/caregiver indicated understanding. and Patient/family agree to work toward stated goals and plan of care.     Thank you for this referral.  Malena Werner, PT   Time Calculation: 20 mins

## 2019-09-09 NOTE — ACP (ADVANCE CARE PLANNING)
Advance care planning reviewed with patient. She has not completed advanced medical directive but remains very clear that her legal next of kin and only child, Gianluca Villa would be her medical power of . She is clear on do not attempt resuscitation and we will complete a durable DO NOT RESUSCITATE form prior to discharge. Her #1 goal is to return to her current home if at all possible.

## 2019-09-09 NOTE — PROGRESS NOTES
CM Note:  Met with pt for d/c planning. PTA pt stated she had been independent with ADL's and used a RW to assist with ambulation. She relies on her DIL for transportation. She has someone in once a week to vacuum. Medications from [facility].  Reason for Admission:   Frequent falls                  RRAT Score:     15             Do you (patient/family) have any concerns for transition/discharge? no              Plan for utilizing home health: To be determined; none in past.  She was at [facility] for rehab    Current Advanced Directive/Advance Care Plan:  Yes, not on file            Transition of Care Plan:        1. PT/OT consult  2. Palliative Care consult  3. Daughter to transport at d/c  4. Home with help or snf  MARKUS Francisco RN    Care Management Interventions  PCP Verified by CM: Yes(Dr. Alatorre Bel. No NN)  Palliative Care Criteria Met (RRAT>21 & CHF Dx)?: No  Mode of Transport at Discharge:  Other (see comment)(daughter)  Transition of Care Consult (CM Consult): Discharge Planning  MyChart Signup: No  Discharge Durable Medical Equipment: No(RW, grab bars, shower chair, life alert)  Physical Therapy Consult: Yes  Occupational Therapy Consult: Yes  Speech Therapy Consult: No  Current Support Network: Lives Alone, Own Home(1 level condo with no entry steps)  Plan discussed with Pt/Family/Caregiver: Yes  Discharge Location  Discharge Placement: Unable to determine at this time

## 2019-09-09 NOTE — CONSULTS
Palliative Medicine Consult  Reddy: [phone] ([phone])    Patient Name: Fiona Jimenez  YOB: 1924    Date of Initial Consult: 2019  Reason for Consult: Care decisions  Requesting Provider: Dr. Samuel Parker  Primary Care Physician: George May MD     SUMMARY:   Fiona Jimenez is a 80 y. o. with a past history of neuropathy, coronary disease, hypertension, hyperlipidemia, chronic kidney disease, who was admitted on 2019 from home with a diagnosis of fall. Current medical issues leading to Palliative Medicine involvement include: Care decisions. Chart reviewedpatient admitted after a fall over the weekend. Patient with some mild right shoulder pain. She has been living alone for many years in a townhouse that is all one for. She enjoys living in her own home and has been able to care for herself. She did have 2 falls over the weekend where she just felt like her legs \"gave way. She had been driving up to the age of 80. Social historypatient has been  since . Her   of cancer. She had 2 children but her son  in 56. She has 1 daughterSoila Arroyo and a daughter-in-lawSaadia who are very hopeful in caring for her. Her daughter-in-law has stayed very involved even after the death of the patient's son/her . Patient still enjoys going to [employer]. She worked for [employer] for many years after the death of her . PALLIATIVE DIAGNOSES:   1. Goals of care discussion  2. DNR discussion  3. Advance care planning  4. Right shoulder pain       PLAN:   1. Met with patient and reviewed the role of palliative medicine in her care. Despite being 80years of age, patient is very alert and oriented and able to provide a comprehensive history. She has no significant new complaints except for some slight right shoulder pain which is been managed by Tylenol.   2. Goals of care ideally, the patient wants to return to her current home and not go to assisted living. She feels comfortable in her current home and it is all on one level. She knows that people are talking about transition to assisted living but she does not feel this would provide any significant benefit. 3. Advance care planningshe has not completed an advanced medical directive but is clear that her daughter would be her medical power of . As her legal next of kin and only child, her daughter automatically would serve as medical POA unless the patient wanted somebody else. 4. CODE STATUSpatient very clear on no attempts at resuscitation. We will assist with the completion of a durable DO NOT RESUSCITATE form when she is in the hospital  5. Psychosocialpatient has great support from her daughter and daughter-in-law. Angela is very important to her and she attempts to go to [employer] regularly. 6. Symptom managementshe has mild right shoulder pain which is currently being managed with Tylenol and Lidoderm patch. 7. Discussed with bedside nurse  8. Initial consult note routed to primary continuity provider and/or primary health care team members  9.  Communicated plan of care with: Palliative Ryan CAROLINA [facility] Team     GOALS OF CARE / TREATMENT PREFERENCES:     GOALS OF CARE:  Patient/Health Care Proxy Stated Goals: Prolong life    TREATMENT PREFERENCES:   Code Status: DNR    Advance Care Planning:  [x] [facility] Interdisciplinary Team has updated the ACP Navigator with Health Care Decision Maker and Patient Capacity      Advance Care Planning 9/8/2019   Patient's Healthcare Decision Maker is: -   Confirm Advance Directive Yes, on file   Does the patient have other document types -       Medical Interventions: Limited additional interventions     Other Instructions:   Artificially Administered Nutrition: No feeding tube     Other:    As far as possible, the palliative care team has discussed with patient / health care proxy about goals of care / treatment preferences for patient. HISTORY:     History obtained from: Chart, patient    CHIEF COMPLAINT: Fall    HPI/SUBJECTIVE:    The patient is:   [x] Verbal and participatory  [] Non-participatory due to:   Patient with mild right shoulder discomfort only with reaching. Feels better overall    Clinical Pain Assessment (nonverbal scale for severity on nonverbal patients):   Clinical Pain Assessment  Severity: 3  Location: Right shoulder  Character: Throbbing  Duration: Days  Effect: Limits reaching  Factors: Movement  Frequency: Intermittent          Duration: for how long has pt been experiencing pain (e.g., 2 days, 1 month, years)  Frequency: how often pain is an issue (e.g., several times per day, once every few days, constant)     FUNCTIONAL ASSESSMENT:     Palliative Performance Scale (PPS):  PPS: 60       PSYCHOSOCIAL/SPIRITUAL SCREENING:     Palliative IDT has assessed this patient for cultural preferences / practices and a referral made as appropriate to needs (Cultural Services, Patient Advocacy, Ethics, etc.)    Any spiritual / Anglican concerns:  [] Yes /  [x] No    Caregiver Burnout:  [] Yes /  [] No /  [x] No Caregiver Present      Anticipatory grief assessment:   [x] Normal  / [] Maladaptive       ESAS Anxiety: Anxiety: 0    ESAS Depression: Depression: 0        REVIEW OF SYSTEMS:     Positive and pertinent negative findings in ROS are noted above in HPI. The following systems were [x] reviewed / [] unable to be reviewed as noted in HPI  Other findings are noted below. Systems: constitutional, ears/nose/mouth/throat, respiratory, gastrointestinal, genitourinary, musculoskeletal, integumentary, neurologic, psychiatric, endocrine. Positive findings noted below.   Modified ESAS Completed by: provider   Fatigue: 0 Drowsiness: 0   Depression: 0 Pain: 3   Anxiety: 0 Nausea: 0   Anorexia: 0 Dyspnea: 0     Constipation: No     Stool Occurrence(s): 0        PHYSICAL EXAM:     From RN flowsheet:  Wt Readings from Last 3 Encounters:   19 183 lb (83 kg)   05/15/19 184 lb (83.5 kg)   18 185 lb 9.6 oz (84.2 kg)     Blood pressure 168/79, pulse 83, temperature 98 °F (36.7 °C), resp. rate 18, height 5' 5\" (1.651 m), weight 183 lb (83 kg), SpO2 96 %. Pain Scale 1: Numeric (0 - 10)  Pain Intensity 1: 3     Pain Location 1: Shoulder  Pain Orientation 1: Right  Pain Description 1: Aching, Constant  Pain Intervention(s) 1: Medication (see MAR)  Last bowel movement, if known:     Constitutional: Alert and oriented, no acute distress  Eyes: pupils equal, anicteric  ENMT: no nasal discharge, moist mucous membranes  Cardiovascular: regular rhythm, distal pulses intact  Respiratory: breathing not labored, symmetric  Gastrointestinal: soft non-tender, +bowel sounds  Musculoskeletal: no deformity, mild tenderness to palpation to the proximal humerus on the right.   Skin: warm, dry  Neurologic: following commands, moving all extremities  Psychiatric: full affect, no hallucinations  Other:       HISTORY:     Principal Problem:    Frequent falls (2019)    Active Problems:    HTN (hypertension), benign (2011)      Mixed hyperlipidemia (2011)      CAD (coronary artery disease) ()      CKD (chronic kidney disease) stage 3, GFR 30-59 ml/min ([facility] [address] (2019)      Bacteriuria (2019)      Neuropathy (2019)      Past Medical History:   Diagnosis Date    CAD (coronary artery disease)     Femur fracture (HCC)     right    GERD (gastroesophageal reflux disease)     HTN (hypertension), benign 2011    Mixed hyperlipidemia 2011      Past Surgical History:   Procedure Laterality Date    HX FEMUR FRACTURE TX      HX KNEE REPLACEMENT      bilateral    HX ORTHOPAEDIC      bilat knee replacements    HX VEIN STRIPPING        Family History   Problem Relation Age of Onset    Other Mother          of renal failure, not sure what caused id    Other Father         something with throat, not sure if it was cancer    Heart Disease Sister       History reviewed, no pertinent family history.   Social History     Tobacco Use    Smoking status: Never Smoker    Smokeless tobacco: Never Used   Substance Use Topics    Alcohol use: No     Allergies   Allergen Reactions    Sulfa (Sulfonamide Antibiotics) Rash    Ampicillin Rash     Tolerates cefazolin    Lescol [Fluvastatin] Unknown (comments)      Current Facility-Administered Medications   Medication Dose Route Frequency    lidocaine 4 % patch 1 Patch  1 Patch TransDERmal Q24H    acetaminophen (TYLENOL) tablet 650 mg  650 mg Oral Q6H    sodium chloride (NS) flush 5-40 mL  5-40 mL IntraVENous Q8H    sodium chloride (NS) flush 5-40 mL  5-40 mL IntraVENous PRN    naloxone (NARCAN) injection 0.4 mg  0.4 mg IntraVENous PRN    cefTRIAXone (ROCEPHIN) 1 g in 0.9% sodium chloride (MBP/ADV) 50 mL  1 g IntraVENous Q24H    enoxaparin (LOVENOX) injection 40 mg  40 mg SubCUTAneous Q24H    amLODIPine (NORVASC) tablet 5 mg  5 mg Oral DAILY    aspirin delayed-release tablet 81 mg  81 mg Oral QHS    atorvastatin (LIPITOR) tablet 10 mg  10 mg Oral QHS    therapeutic multivitamin (THERAGRAN) tablet 1 Tab  1 Tab Oral DAILY    lisinopril (PRINIVIL, ZESTRIL) tablet 10 mg  10 mg Oral DAILY    omega 3-DHA-EPA-fish oil 1,000 mg (120 mg-180 mg) capsule 1 Cap  1 Cap Oral DAILY    pregabalin (LYRICA) capsule 50 mg  50 mg Oral QHS    calcium-vitamin D (OS-DARIN) 500 mg-200 unit tablet  2 Tab Oral DAILY WITH BREAKFAST          LAB AND IMAGING FINDINGS:     Lab Results   Component Value Date/Time    WBC 7.2 09/09/2019 04:39 AM    HGB 11.8 09/09/2019 04:39 AM    PLATELET 349 94/71/6994 04:39 AM     Lab Results   Component Value Date/Time    Sodium 143 09/09/2019 04:39 AM    Potassium 4.5 09/09/2019 04:39 AM    Chloride 115 (H) 09/09/2019 04:39 AM    CO2 25 09/09/2019 04:39 AM    BUN 19 09/09/2019 04:39 AM    Creatinine 0.96 09/09/2019 04:39 AM    Calcium 8.4 (L) 09/09/2019 04:39 AM    Magnesium 2.2 09/09/2019 04:39 AM    Phosphorus 2.9 09/09/2019 04:39 AM      Lab Results   Component Value Date/Time    AST (SGOT) 18 09/09/2019 04:39 AM    Alk. phosphatase 70 09/09/2019 04:39 AM    Protein, total 6.7 09/09/2019 04:39 AM    Albumin 2.9 (L) 09/09/2019 04:39 AM    Globulin 3.8 09/09/2019 04:39 AM     Lab Results   Component Value Date/Time    INR 1.0 05/07/2015 08:40 PM    INR, External 1.1 05/20/2015    Prothrombin time 10.0 05/07/2015 08:40 PM    aPTT 32.1 05/07/2015 08:40 PM      No results found for: IRON, FE, TIBC, IBCT, PSAT, FERR   No results found for: PH, PCO2, PO2  No components found for: [place]   Lab Results   Component Value Date/Time    CK 38 04/05/2015 08:21 PM    CK - MB <0.5 (L) 04/05/2015 08:21 PM                Total time: 50  Counseling / coordination time, spent as noted above:45   > 50% counseling / coordination?: yes    Prolonged service was provided for  []30 min   []75 min in face to face time in the presence of the patient, spent as noted above. Time Start:   Time End:   Note: this can only be billed with 48194 (initial) or 78722 (follow up). If multiple start / stop times, list each separately.

## 2019-09-09 NOTE — PROGRESS NOTES
Chris Diggs gianni [place] 79  380 [facility], [address] [place] [address]  [phone]      Medical Progress Note      NAME: Rollo Bamberger   :  1924  MRM:  [id number]    Date/Time: 2019  7:53 AM         Subjective:     Chief Complaint:  Pain: right shoulder, severe at times, mainly with movement/pulling herself up in bed, intermittent    ROS:  (bold if positive, if negative)                        Tolerating Diet          Objective:       Vitals:          Last 24hrs VS reviewed since prior progress note.  Most recent are:    Visit Vitals  /69 (BP 1 Location: Right arm, BP Patient Position: At rest)   Pulse 60   Temp 97.5 °F (36.4 °C)   Resp 18   Ht 5' 5\" (1.651 m)   Wt 83 kg (183 lb)   SpO2 90%   BMI 30.45 kg/m²     SpO2 Readings from Last 6 Encounters:   19 90%   05/15/19 95%   18 96%   17 96%   05/10/17 98%   16 94%            Intake/Output Summary (Last 24 hours) at 2019 0753  Last data filed at 2019 0617  Gross per 24 hour   Intake    Output 350 ml   Net -350 ml          Exam:     Physical Exam:    Gen:  Well-developed, obese, frail, elderly, in no acute distress  HEENT:  Pink conjunctivae, PERRL, hearing intact to voice, moist mucous membranes  Neck:  Supple, without masses, thyroid non-tender  Resp:  No accessory muscle use, clear breath sounds without wheezes rales or rhonchi  Card:  No murmurs, normal S1, S2 without thrills, bruits or peripheral edema  Abd:  Soft, non-tender, non-distended, normoactive bowel sounds are present, no palpable organomegaly and no detectable hernias  Lymph:  No cervical or inguinal adenopathy  Musc:  No cyanosis or clubbing  Skin:  No rashes or ulcers, skin turgor is good  Neuro:  Cranial nerves are grossly intact, no focal motor weakness, follows commands appropriately  Psych:  Fair insight, oriented to person, place and time, alert    Medications Reviewed: (see below)    Lab Data Reviewed: (see below)    ______________________________________________________________________    Medications:     Current Facility-Administered Medications   Medication Dose Route Frequency    lidocaine 4 % patch 1 Patch  1 Patch TransDERmal Q24H    acetaminophen (TYLENOL) tablet 650 mg  650 mg Oral Q6H    sodium chloride (NS) flush 5-40 mL  5-40 mL IntraVENous Q8H    sodium chloride (NS) flush 5-40 mL  5-40 mL IntraVENous PRN    naloxone (NARCAN) injection 0.4 mg  0.4 mg IntraVENous PRN    cefTRIAXone (ROCEPHIN) 1 g in 0.9% sodium chloride (MBP/ADV) 50 mL  1 g IntraVENous Q24H    enoxaparin (LOVENOX) injection 40 mg  40 mg SubCUTAneous Q24H    amLODIPine (NORVASC) tablet 5 mg  5 mg Oral DAILY    aspirin delayed-release tablet 81 mg  81 mg Oral QHS    atorvastatin (LIPITOR) tablet 10 mg  10 mg Oral QHS    therapeutic multivitamin (THERAGRAN) tablet 1 Tab  1 Tab Oral DAILY    lisinopril (PRINIVIL, ZESTRIL) tablet 10 mg  10 mg Oral DAILY    omega 3-DHA-EPA-fish oil 1,000 mg (120 mg-180 mg) capsule 1 Cap  1 Cap Oral DAILY    pregabalin (LYRICA) capsule 50 mg  50 mg Oral QHS    calcium-vitamin D (OS-DARIN) 500 mg-200 unit tablet  2 Tab Oral DAILY WITH BREAKFAST            Lab Review:     Recent Labs     09/09/19  0439 09/08/19  1413   WBC 7.2 9.6   HGB 11.8 13.0   HCT 37.2 40.3    201     Recent Labs     09/09/19  0439 09/08/19  1413    143   K 4.5 4.4   * 113*   CO2 25 24   * 107*   BUN 19 22*   CREA 0.96 1.07*   CA 8.4* 8.9   MG 2.2 2.3   PHOS 2.9  --    ALB 2.9* 3.3*   TBILI 0.5 0.5   SGOT 18 24   ALT 18 20     No results found for: GLUCPOC  No results for input(s): PH, PCO2, PO2, HCO3, FIO2 in the last 72 hours. No results for input(s): INR in the last 72 hours.     No lab exists for component: INREXT  No results found for: SDES  Lab Results   Component Value Date/Time    Culture result: NO GROWTH 2 DAYS 02/06/2016 05:20 PM    Culture result: NO GROWTH 5 DAYS 02/06/2016 04:44 PM    Culture result: NO GROWTH 6 DAYS 05/07/2015 08:40 PM            Assessment:     Principal Problem:    Frequent falls (9/8/2019)    Active Problems:    HTN (hypertension), benign (1/11/2011)      Mixed hyperlipidemia (1/11/2011)      CAD (coronary artery disease) ()      CKD (chronic kidney disease) stage 3, GFR 30-59 ml/min ([facility] [address] (9/8/2019)      Bacteriuria (9/8/2019)      Neuropathy (9/8/2019)           Plan:     Principal Problem:    Frequent falls (9/8/2019)   - needs 24 hour supervision, PT/OT to assess, CM consulted   - complaining of more right shoulder pain than anything else this AM, will get plain films, place lidocaine patch and schedule Tylenol    Active Problems:    HTN (hypertension), benign (1/11/2011)   - BP okay on meds as above      Mixed hyperlipidemia (1/11/2011)   - continue statin      CAD (coronary artery disease) ()   - stable, continue cardiac meds      CKD (chronic kidney disease) stage 3, GFR 30-59 ml/min ([facility]) (9/8/2019)   - creatinine at baseline      Bacteriuria (9/8/2019)   - suspect asymptomatic bacteriuria, NOT UTI   - await culture, on ceftriaxone for now per Dr. Jaz Muñiz      Neuropathy (9/8/2019)   - continue Lyrica, although I am concerned this may be contributing to her fall risk, alternatively her neuropathy may also contribute   - PT/OT to assess      Total time spent in patient care: 35 minutes                  Care Plan discussed with: Patient, Care Manager and Nursing Staff    Discussed:  Code Status, Care Plan and D/C Planning    Prophylaxis:  Lovenox    Disposition:  SNF/LTC           ___________________________________________________    Attending Physician: Derick Davis MD

## 2019-09-09 NOTE — PROGRESS NOTES
Bedside and Verbal shift change report given to Jeanne Sanchez (oncoming nurse) by Milo Hodgkin (offgoing nurse). Report included the following information SBAR, Kardex, Procedure Summary, Intake/Output, MAR, Recent Results and Med Rec Status.

## 2019-09-10 PROBLEM — R29.6 FALLS FREQUENTLY: Status: ACTIVE | Noted: 2019-09-10

## 2019-09-10 PROCEDURE — 96366 THER/PROPH/DIAG IV INF ADDON: CPT

## 2019-09-10 PROCEDURE — 77030038269 HC DRN EXT URIN PURWCK BARD -A

## 2019-09-10 PROCEDURE — 74011000250 HC RX REV CODE- 250: Performed by: INTERNAL MEDICINE

## 2019-09-10 PROCEDURE — 74011250636 HC RX REV CODE- 250/636: Performed by: INTERNAL MEDICINE

## 2019-09-10 PROCEDURE — 96372 THER/PROPH/DIAG INJ SC/IM: CPT

## 2019-09-10 PROCEDURE — 74011250637 HC RX REV CODE- 250/637: Performed by: INTERNAL MEDICINE

## 2019-09-10 PROCEDURE — 99218 HC RM OBSERVATION: CPT

## 2019-09-10 PROCEDURE — 74011000258 HC RX REV CODE- 258: Performed by: INTERNAL MEDICINE

## 2019-09-10 PROCEDURE — 97530 THERAPEUTIC ACTIVITIES: CPT

## 2019-09-10 PROCEDURE — 97116 GAIT TRAINING THERAPY: CPT

## 2019-09-10 RX ADMIN — ASPIRIN 81 MG: 81 TABLET, COATED ORAL at 21:53

## 2019-09-10 RX ADMIN — ACETAMINOPHEN 650 MG: 325 TABLET ORAL at 05:40

## 2019-09-10 RX ADMIN — AMLODIPINE BESYLATE 5 MG: 5 TABLET ORAL at 08:45

## 2019-09-10 RX ADMIN — ACETAMINOPHEN 650 MG: 325 TABLET ORAL at 21:53

## 2019-09-10 RX ADMIN — OYSTER SHELL CALCIUM WITH VITAMIN D 2 TABLET: 500; 200 TABLET, FILM COATED ORAL at 08:46

## 2019-09-10 RX ADMIN — PREGABALIN 50 MG: 50 CAPSULE ORAL at 21:53

## 2019-09-10 RX ADMIN — ATORVASTATIN CALCIUM 10 MG: 10 TABLET, FILM COATED ORAL at 21:53

## 2019-09-10 RX ADMIN — CEFTRIAXONE SODIUM 1 G: 1 INJECTION, POWDER, FOR SOLUTION INTRAMUSCULAR; INTRAVENOUS at 15:32

## 2019-09-10 RX ADMIN — THERA TABS 1 TABLET: TAB at 08:46

## 2019-09-10 RX ADMIN — LISINOPRIL 10 MG: 5 TABLET ORAL at 08:46

## 2019-09-10 RX ADMIN — ENOXAPARIN SODIUM 40 MG: 40 INJECTION SUBCUTANEOUS at 20:04

## 2019-09-10 RX ADMIN — Medication 10 ML: at 15:00

## 2019-09-10 RX ADMIN — OMEGA-3 FATTY ACIDS CAP 1000 MG 1 CAPSULE: 1000 CAP at 08:45

## 2019-09-10 RX ADMIN — Medication 10 ML: at 05:40

## 2019-09-10 NOTE — PROGRESS NOTES
Bedside and Verbal shift change report given to Johnathan Francisco RN   (oncoming nurse) by Theron Leventhal, RN  (offgoing nurse). Report included the following information SBAR, Kardex and Recent Results.

## 2019-09-10 NOTE — PROGRESS NOTES
Problem: Mobility Impaired (Adult and Pediatric)  Goal: *Acute Goals and Plan of Care (Insert Text)  Description  FUNCTIONAL STATUS PRIOR TO ADMISSION: Patient was modified independent using a Rolling walker for functional mobility. HOME SUPPORT PRIOR TO ADMISSION: The patient lived alone with daughter nearby to bring groceries, transportation, assist with cleaning. Physical Therapy Goals  Initiated 9/9/2019  1. Patient will move from supine to sit and sit to supine , scoot up and down and roll side to side in bed with independence within 7 day(s). 2.  Patient will transfer from bed to chair and chair to bed with modified independence using the least restrictive device within 7 day(s). 3.  Patient will perform sit to stand with modified independence within 7 day(s). 4.  Patient will ambulate with modified independence for 200 feet with the least restrictive device within 7 day(s). Outcome: Progressing Towards Goal   PHYSICAL THERAPY TREATMENT  Patient: Luis Alberto Anderson (89 y.o. female)  Date: 9/10/2019  Diagnosis: Frequent falls [R29.6]  Falls frequently [R29.6] Frequent falls       Precautions: Fall  Chart, physical therapy assessment, plan of care and goals were reviewed. ASSESSMENT  Based on the objective data described below, the patient demonstrates slightly decreased safety awareness, decreased dynamic balance and high risk for falls. Patient should have assistance at all times with gait activity. Current Level of Function Impacting Discharge (mobility/balance): Received in chair willing to work with PT but asking to use restroom. She stood and ambulated with rolling walker to restroom, then in hallway with +1 CGA for 120 feet total. Patient needs cues at times for safety but does well overall especially for advanced age. Returned to chair and purewick in place.     Other factors to consider for discharge: lives alone         PLAN :  Patient continues to benefit from skilled intervention to address the above impairments. Continue treatment per established plan of care. to address goals. Recommendation for discharge: (in order for the patient to meet his/her long term goals)  Therapy up to 5 days/week in SNF setting    This discharge recommendation:  Has not yet been discussed the attending provider and/or case management    Equipment recommendations for successful discharge (if) home: patient owns DME required for discharge       SUBJECTIVE:   Patient stated People mean well when you are in the hospital, but I have been getting so many phone calls.     OBJECTIVE DATA SUMMARY:   Critical Behavior:  Neurologic State: Alert  Orientation Level: Oriented X4  Cognition: Appropriate for age attention/concentration, Appropriate decision making, Appropriate safety awareness, Follows commands, Recognition of people/places  Safety/Judgement: Awareness of environment  Functional Mobility Training:  Bed Mobility:      Received up in chair and left up in chair. Transfers:  Sit to Stand: Contact guard assistance  Stand to Sit: Contact guard assistance                             Balance:  Sitting: Intact  Standing: Intact; With support  Ambulation/Gait Training:  Distance (ft): 120 Feet (ft)  Assistive Device: Walker, rolling;Gait belt  Ambulation - Level of Assistance: Contact guard assistance        Gait Abnormalities: Decreased step clearance              Speed/Meghan: Pace decreased (<100 feet/min)  Step Length: Left shortened;Right shortened                                    Pain Rating:  None reported. Activity Tolerance:   Good  Please refer to the flowsheet for vital signs taken during this treatment.     After treatment patient left in no apparent distress:   Sitting in chair, Call bell within reach and Bed / chair alarm activated    COMMUNICATION/COLLABORATION:   The patients plan of care was discussed with: Registered Nurse and Certified Nursing Assistant/Patient Care Technician    [place], PT   Time Calculation: 25 mins

## 2019-09-10 NOTE — PROGRESS NOTES
Chris Diggs gianni [place] [address]  [address]  [phone]      Medical Progress Note      NAME: Carter Enrique   :  1924  MRM:  [id number]    Date/Time: 9/10/2019  11:20 AM          Subjective:     Chief Complaint:  Pain: right shoulder, severe at times, mainly with movement/pulling herself up in bed, intermittent, better today    ROS:  (bold if positive, if negative)                        Tolerating Diet          Objective:       Vitals:          Last 24hrs VS reviewed since prior progress note.  Most recent are:    Visit Vitals  /67 (BP 1 Location: Right arm, BP Patient Position: At rest)   Pulse 73   Temp 97.5 °F (36.4 °C)   Resp 16   Ht 5' 5\" (1.651 m)   Wt 83 kg (183 lb)   SpO2 91%   BMI 30.45 kg/m²     SpO2 Readings from Last 6 Encounters:   09/10/19 91%   05/15/19 95%   18 96%   17 96%   05/10/17 98%   16 94%            Intake/Output Summary (Last 24 hours) at 9/10/2019 1120  Last data filed at 9/10/2019 0908  Gross per 24 hour   Intake    Output 500 ml   Net -500 ml          Exam:     Physical Exam:    Gen:  Well-developed, obese, frail, elderly, in no acute distress  HEENT:  Pink conjunctivae, PERRL, hearing intact to voice, moist mucous membranes  Neck:  Supple, without masses, thyroid non-tender  Resp:  No accessory muscle use, clear breath sounds without wheezes rales or rhonchi  Card:  No murmurs, normal S1, S2 without thrills, bruits or peripheral edema  Abd:  Soft, non-tender, non-distended, normoactive bowel sounds are present, no palpable organomegaly and no detectable hernias  Lymph:  No cervical or inguinal adenopathy  Musc:  No cyanosis or clubbing  Skin:  No rashes or ulcers, skin turgor is good  Neuro:  Cranial nerves are grossly intact, no focal motor weakness, follows commands appropriately  Psych:  Fair insight, oriented to person, place and time, alert    Medications Reviewed: (see below)    Lab Data Reviewed: (see below)    ______________________________________________________________________    Medications:     Current Facility-Administered Medications   Medication Dose Route Frequency    lidocaine 4 % patch 1 Patch  1 Patch TransDERmal Q24H    acetaminophen (TYLENOL) tablet 650 mg  650 mg Oral Q6H    sodium chloride (NS) flush 5-40 mL  5-40 mL IntraVENous Q8H    sodium chloride (NS) flush 5-40 mL  5-40 mL IntraVENous PRN    naloxone (NARCAN) injection 0.4 mg  0.4 mg IntraVENous PRN    cefTRIAXone (ROCEPHIN) 1 g in 0.9% sodium chloride (MBP/ADV) 50 mL  1 g IntraVENous Q24H    enoxaparin (LOVENOX) injection 40 mg  40 mg SubCUTAneous Q24H    amLODIPine (NORVASC) tablet 5 mg  5 mg Oral DAILY    aspirin delayed-release tablet 81 mg  81 mg Oral QHS    atorvastatin (LIPITOR) tablet 10 mg  10 mg Oral QHS    therapeutic multivitamin (THERAGRAN) tablet 1 Tab  1 Tab Oral DAILY    lisinopril (PRINIVIL, ZESTRIL) tablet 10 mg  10 mg Oral DAILY    omega 3-DHA-EPA-fish oil 1,000 mg (120 mg-180 mg) capsule 1 Cap  1 Cap Oral DAILY    pregabalin (LYRICA) capsule 50 mg  50 mg Oral QHS    calcium-vitamin D (OS-DARIN) 500 mg-200 unit tablet  2 Tab Oral DAILY WITH BREAKFAST            Lab Review:     Recent Labs     09/09/19  0439 09/08/19  1413   WBC 7.2 9.6   HGB 11.8 13.0   HCT 37.2 40.3    201     Recent Labs     09/09/19  0439 09/08/19  1413    143   K 4.5 4.4   * 113*   CO2 25 24   * 107*   BUN 19 22*   CREA 0.96 1.07*   CA 8.4* 8.9   MG 2.2 2.3   PHOS 2.9  --    ALB 2.9* 3.3*   TBILI 0.5 0.5   SGOT 18 24   ALT 18 20     No results found for: GLUCPOC  No results for input(s): PH, PCO2, PO2, HCO3, FIO2 in the last 72 hours. No results for input(s): INR in the last 72 hours.     No lab exists for component: INREXT, INREXT  No results found for: SDES  Lab Results   Component Value Date/Time    Culture result: (A) 09/08/2019 02:49 PM     AEROCOCCUS URINAE A. URINAE HAS BEEN DESCRIBED AS SUSCEPTIBLE TO PENICILLIN, AMOXICILLIN, PIPERACILLIN, CEFIPIME, RIFAMPIN AND NITROFURANTOIN, BUT RESISTANT TO SULFONAMIDES.     Culture result: NO GROWTH 2 DAYS 02/06/2016 05:20 PM    Culture result: NO GROWTH 5 DAYS 02/06/2016 04:44 PM            Assessment:     Principal Problem:    Frequent falls (9/8/2019)    Active Problems:    HTN (hypertension), benign (1/11/2011)      Mixed hyperlipidemia (1/11/2011)      CAD (coronary artery disease) ()      CKD (chronic kidney disease) stage 3, GFR 30-59 ml/min ([facility] [address] (9/8/2019)      Bacteriuria (9/8/2019)      Neuropathy (9/8/2019)      Falls frequently (9/10/2019)           Plan:     Principal Problem:    Frequent falls (9/8/2019)   - needs 24 hour supervision, PT/OT to assess, CM consulted   - no fractures, OA and rotator cuff issues, chronic, on plain films, visualized by me   - agreeable to placement at this point    Active Problems:    HTN (hypertension), benign (1/11/2011)   - BP okay on meds as above      Mixed hyperlipidemia (1/11/2011)   - continue statin      CAD (coronary artery disease) ()   - stable, continue cardiac meds      CKD (chronic kidney disease) stage 3, GFR 30-59 ml/min ([facility]) (9/8/2019)   - creatinine at baseline      Bacteriuria (9/8/2019)   - suspect asymptomatic bacteriuria, NOT UTI   - Aerococcus on culture, because we cannot be sure this did not contribute to fall, will treat with 3 days of ceftriaxone, finishes today      Neuropathy (9/8/2019)   - continue Lyrica, although I am concerned this may be contributing to her fall risk, alternatively her neuropathy may also contribute   - PT/OT to assess      Total time spent in patient care: 25 minutes                  Care Plan discussed with: Patient, Care Manager and Nursing Staff    Discussed:  Code Status, Care Plan and D/C Planning    Prophylaxis:  Lovenox    Disposition:  SNF/LTC           ___________________________________________________    Attending Physician: Maikol Alfaro MD

## 2019-09-10 NOTE — PROGRESS NOTES
Bedside and Verbal shift change report given to Cyndy (oncoming nurse) by Neil Vieyra (offgoing nurse). Report included the following information SBAR, Kardex, Intake/Output, MAR and Accordion.

## 2019-09-10 NOTE — PROGRESS NOTES
CMS Note   09/10/2019    Patient recived and signed both Observation and [facility] letter. Patient was given copies for their record.   Monisha Patches CMS

## 2019-09-10 NOTE — PROGRESS NOTES
09/10/19     MSW met with the patient to inform her that the [facility] [address] has accepted her. Answered questions for her . She agreed to going there and for them to start Authorization. HIRA Correa of [facility] and requested they start insurance authorization. Talked with Bulmaro Ga and she agreed.     Edwin Clinton MSW

## 2019-09-10 NOTE — PROGRESS NOTES
IV antibiotics completed at 1615 today; peripheral IV infiltrated upon recent assessment. Per MD, pt is okay without IV access due to pending discharge and completion of IV medications.  IV removed

## 2019-09-10 NOTE — PROGRESS NOTES
Problem: Falls - Risk of  Goal: *Absence of Falls  Description  Document Iván Deng Fall Risk and appropriate interventions in the flowsheet.   Outcome: Progressing Towards Goal  Note:   Fall Risk Interventions:  Mobility Interventions: Bed/chair exit alarm, Patient to call before getting OOB         Medication Interventions: Bed/chair exit alarm, Patient to call before getting OOB    Elimination Interventions: Bed/chair exit alarm, Call light in reach    History of Falls Interventions: Door open when patient unattended, Bed/chair exit alarm, Room close to nurse's station         Problem: Patient Education: Go to Patient Education Activity  Goal: Patient/Family Education  Outcome: Progressing Towards Goal

## 2019-09-10 NOTE — PROGRESS NOTES
1622:  Message from [facility]: The doctor can do a peer to peer by 12 noon on Wednesday by calling [phone] or if family wants to do a peer to peer themselves, they can call [phone] with reference # [id number]. MARKUS Callaway RN    CM Note:  Order for snf noted. Met with pt for choice. She wanted to be somewhere near her daughter. She chose [facility] and [facility]. Referrals were sent in CC link. Pt will need auth.   SAILAJA Hutchison

## 2019-09-11 PROCEDURE — 74011250637 HC RX REV CODE- 250/637: Performed by: INTERNAL MEDICINE

## 2019-09-11 PROCEDURE — 77030038269 HC DRN EXT URIN PURWCK BARD -A

## 2019-09-11 PROCEDURE — 97530 THERAPEUTIC ACTIVITIES: CPT

## 2019-09-11 PROCEDURE — 99218 HC RM OBSERVATION: CPT

## 2019-09-11 PROCEDURE — 74011250636 HC RX REV CODE- 250/636: Performed by: INTERNAL MEDICINE

## 2019-09-11 PROCEDURE — 97116 GAIT TRAINING THERAPY: CPT

## 2019-09-11 PROCEDURE — 96372 THER/PROPH/DIAG INJ SC/IM: CPT

## 2019-09-11 PROCEDURE — 74011000250 HC RX REV CODE- 250: Performed by: INTERNAL MEDICINE

## 2019-09-11 RX ORDER — POLYETHYLENE GLYCOL 3350 17 G/17G
17 POWDER, FOR SOLUTION ORAL 2 TIMES DAILY
Status: DISCONTINUED | OUTPATIENT
Start: 2019-09-11 | End: 2019-09-13 | Stop reason: HOSPADM

## 2019-09-11 RX ADMIN — LISINOPRIL 10 MG: 5 TABLET ORAL at 08:30

## 2019-09-11 RX ADMIN — THERA TABS 1 TABLET: TAB at 08:30

## 2019-09-11 RX ADMIN — POLYETHYLENE GLYCOL 3350 17 G: 17 POWDER, FOR SOLUTION ORAL at 18:29

## 2019-09-11 RX ADMIN — ENOXAPARIN SODIUM 40 MG: 40 INJECTION SUBCUTANEOUS at 20:28

## 2019-09-11 RX ADMIN — ACETAMINOPHEN 650 MG: 325 TABLET ORAL at 06:26

## 2019-09-11 RX ADMIN — ATORVASTATIN CALCIUM 10 MG: 10 TABLET, FILM COATED ORAL at 22:29

## 2019-09-11 RX ADMIN — OMEGA-3 FATTY ACIDS CAP 1000 MG 1 CAPSULE: 1000 CAP at 08:30

## 2019-09-11 RX ADMIN — POLYETHYLENE GLYCOL 3350 17 G: 17 POWDER, FOR SOLUTION ORAL at 06:47

## 2019-09-11 RX ADMIN — PREGABALIN 50 MG: 50 CAPSULE ORAL at 22:29

## 2019-09-11 RX ADMIN — AMLODIPINE BESYLATE 5 MG: 5 TABLET ORAL at 08:30

## 2019-09-11 RX ADMIN — ACETAMINOPHEN 650 MG: 325 TABLET ORAL at 22:29

## 2019-09-11 RX ADMIN — ASPIRIN 81 MG: 81 TABLET, COATED ORAL at 22:29

## 2019-09-11 RX ADMIN — OYSTER SHELL CALCIUM WITH VITAMIN D 2 TABLET: 500; 200 TABLET, FILM COATED ORAL at 08:30

## 2019-09-11 NOTE — PROGRESS NOTES
CM Note:  Hospitalist was paged x2 for an update on P2P with insurance company. No answer.   SAILAJA Hutchison

## 2019-09-11 NOTE — PROGRESS NOTES
Bedside and Verbal shift change report given to [address]  (oncoming nurse) by Theron Leventhal, RN  (offgoing nurse). Report included the following information SBAR, Kardex, Intake/Output, MAR and Recent Results.

## 2019-09-11 NOTE — PROGRESS NOTES
Problem: Falls - Risk of  Goal: *Absence of Falls  Description  Document Kerry Rajesh Fall Risk and appropriate interventions in the flowsheet.   Outcome: Progressing Towards Goal  Note:   Fall Risk Interventions:  Mobility Interventions: Bed/chair exit alarm, Patient to call before getting OOB         Medication Interventions: Bed/chair exit alarm, Patient to call before getting OOB    Elimination Interventions: Bed/chair exit alarm, Call light in reach    History of Falls Interventions: Bed/chair exit alarm, Door open when patient unattended, Room close to nurse's station         Problem: Patient Education: Go to Patient Education Activity  Goal: Patient/Family Education  Outcome: Progressing Towards Goal

## 2019-09-11 NOTE — ROUTINE PROCESS
Bedside shift change report given to Margarita Fagan [address] (oncoming nurse) by Devon Abebe (offgoing nurse). Report included the following information SBAR and Kardex.

## 2019-09-11 NOTE — PROGRESS NOTES
Chris Diggs [facility] [place] [facility]  [address] [facility], [address] [place] [address]  [phone]      Medical Progress Note      NAME: Luis Alberto Anderson   :  1924  MRM:  [id number]    Date/Time: 2019  11:20 AM          Subjective:     Chief Complaint:  Pain: right shoulder, severe at times, mainly with movement/pulling herself up in bed, intermittent, better     ROS:  (bold if positive, if negative)                        Tolerating Diet          Objective:       Vitals:          Last 24hrs VS reviewed since prior progress note.  Most recent are:    Visit Vitals  /68 (BP 1 Location: Right arm, BP Patient Position: At rest)   Pulse 61   Temp 98.1 °F (36.7 °C)   Resp 17   Ht 5' 5\" (1.651 m)   Wt 83 kg (183 lb)   SpO2 93%   BMI 30.45 kg/m²     SpO2 Readings from Last 6 Encounters:   19 93%   05/15/19 95%   18 96%   17 96%   05/10/17 98%   16 94%          No intake or output data in the 24 hours ending 19 0952       Exam:     Physical Exam:    Gen:  Well-developed, obese, frail, elderly, in no acute distress  HEENT:  Pink conjunctivae, PERRL, hearing intact to voice, moist mucous membranes  Neck:  Supple, without masses, thyroid non-tender  Resp:  No accessory muscle use, clear breath sounds without wheezes rales or rhonchi  Card:  No murmurs, normal S1, S2 without thrills, bruits or peripheral edema  Abd:  Soft, non-tender, non-distended, normoactive bowel sounds are present, no palpable organomegaly and no detectable hernias  Lymph:  No cervical or inguinal adenopathy  Musc:  No cyanosis or clubbing  Skin:  No rashes or ulcers, skin turgor is good  Neuro:  Cranial nerves are grossly intact, no focal motor weakness, follows commands appropriately  Psych:  Fair insight, oriented to person, place and time, alert    Medications Reviewed: (see below)    Lab Data Reviewed: (see below)    ______________________________________________________________________    Medications: Current Facility-Administered Medications   Medication Dose Route Frequency    polyethylene glycol (MIRALAX) packet 17 g  17 g Oral BID    lidocaine 4 % patch 1 Patch  1 Patch TransDERmal Q24H    acetaminophen (TYLENOL) tablet 650 mg  650 mg Oral Q6H    sodium chloride (NS) flush 5-40 mL  5-40 mL IntraVENous Q8H    sodium chloride (NS) flush 5-40 mL  5-40 mL IntraVENous PRN    naloxone (NARCAN) injection 0.4 mg  0.4 mg IntraVENous PRN    enoxaparin (LOVENOX) injection 40 mg  40 mg SubCUTAneous Q24H    amLODIPine (NORVASC) tablet 5 mg  5 mg Oral DAILY    aspirin delayed-release tablet 81 mg  81 mg Oral QHS    atorvastatin (LIPITOR) tablet 10 mg  10 mg Oral QHS    therapeutic multivitamin (THERAGRAN) tablet 1 Tab  1 Tab Oral DAILY    lisinopril (PRINIVIL, ZESTRIL) tablet 10 mg  10 mg Oral DAILY    omega 3-DHA-EPA-fish oil 1,000 mg (120 mg-180 mg) capsule 1 Cap  1 Cap Oral DAILY    pregabalin (LYRICA) capsule 50 mg  50 mg Oral QHS    calcium-vitamin D (OS-DARIN) 500 mg-200 unit tablet  2 Tab Oral DAILY WITH BREAKFAST            Lab Review:     Recent Labs     09/09/19  0439 09/08/19  1413   WBC 7.2 9.6   HGB 11.8 13.0   HCT 37.2 40.3    201     Recent Labs     09/09/19  0439 09/08/19  1413    143   K 4.5 4.4   * 113*   CO2 25 24   * 107*   BUN 19 22*   CREA 0.96 1.07*   CA 8.4* 8.9   MG 2.2 2.3   PHOS 2.9  --    ALB 2.9* 3.3*   TBILI 0.5 0.5   SGOT 18 24   ALT 18 20     No results found for: GLUCPOC  No results for input(s): PH, PCO2, PO2, HCO3, FIO2 in the last 72 hours. No results for input(s): INR in the last 72 hours. No lab exists for component: INREXT, INREXT  No results found for: SDES  Lab Results   Component Value Date/Time    Culture result: (A) 09/08/2019 02:49 PM     AEROCOCCUS URINAE A. URINAE HAS BEEN DESCRIBED AS SUSCEPTIBLE TO PENICILLIN, AMOXICILLIN, PIPERACILLIN, CEFIPIME, RIFAMPIN AND NITROFURANTOIN, BUT RESISTANT TO SULFONAMIDES.     Culture result: NO GROWTH 2 DAYS 02/06/2016 05:20 PM    Culture result: NO GROWTH 5 DAYS 02/06/2016 04:44 PM            Assessment:     Principal Problem:    Frequent falls (9/8/2019)    Active Problems:    HTN (hypertension), benign (1/11/2011)      Mixed hyperlipidemia (1/11/2011)      CAD (coronary artery disease) ()      CKD (chronic kidney disease) stage 3, GFR 30-59 ml/min ([facility] (9/8/2019)      Bacteriuria (9/8/2019)      Neuropathy (9/8/2019)      Falls frequently (9/10/2019)           Plan:     Principal Problem:    Frequent falls (9/8/2019)   no fractures, OA and rotator cuff issues, chronic, on plain films, visualized by me  - needs 24 hour supervision, PT/OT to assess, CM consulted    Active Problems:    HTN (hypertension), benign (1/11/2011)   - BP okay on meds as above      Mixed hyperlipidemia (1/11/2011)   - continue statin      CAD (coronary artery disease) ()   - stable, continue cardiac meds      CKD (chronic kidney disease) stage 3, GFR 30-59 ml/min ([facility]) (9/8/2019)   - creatinine at baseline      Bacteriuria (9/8/2019)   - suspect asymptomatic bacteriuria, NOT UTI   - Aerococcus on culture, because we cannot be sure this did not contribute to fall, will treat with 3 days of ceftriaxone, finishes today      Neuropathy (9/8/2019)   - continue Lyrica, although I am concerned this may be contributing to her fall risk, alternatively her neuropathy may also contribute   - PT/OT to assess      Total time spent in patient care: 25 minutes                  Care Plan discussed with: Patient, Care Manager and Nursing Staff    Discussed:  Code Status, Care Plan and D/C Planning     DNR    DVT Prophylaxis:  Lovenox    Disposition:  SNF/LTC           ___________________________________________________    Attending Physician: Izzy Mabry MD

## 2019-09-11 NOTE — PROGRESS NOTES
Spiritual Care Assessment/Progress Note  Danni Hodgkin      NAME: Cait Quinonez      MRN: [id number]  AGE: 80 y.o.  SEX: female  Voodoo Affiliation: Evangelical   Language: English     9/11/2019     Total Time (in minutes): 20     Spiritual Assessment begun in [facility] 5M1 MED SURG 1 through conversation with:         [x]Patient        [] Family    [] Friend(s)        Reason for Consult: Palliative Care, Initial/Spiritual Assessment     Spiritual beliefs: (Please include comment if needed)     [x] Identifies with a carlita tradition: Mell Mosquera        [x] Supported by a carlita community: Priscilla Damon [address]            [] Claims no spiritual orientation:           [] Seeking spiritual identity:                [] Adheres to an individual form of spirituality:           [] Not able to assess:                           Identified resources for coping:      [x] Prayer                               [] Music                  [] Guided Imagery     [x] Family/friends                 [] Pet visits     [] Devotional reading                         [] Unknown     [] Other:                                              Interventions offered during this visit: (See comments for more details)    Patient Interventions: Affirmation of emotions/emotional suffering, Affirmation of carlita, Coping skills reviewed/reinforced, Catharsis/review of pertinent events in supportive environment, Life review/legacy, Normalization of emotional/spiritual concerns, Issues around forgiveness/closure           Plan of Care:     [x] Support spiritual and/or cultural needs    [] Support AMD and/or advance care planning process      [] Support grieving process   [] Coordinate Rites and/or Rituals    [] Coordination with community clergy   [] No spiritual needs identified at this time   [] Detailed Plan of Care below (See Comments)  [] Make referral to Music Therapy  [] Make referral to Pet Therapy     [] Make referral to Addiction services  [] Make referral to [facility]  [] Make referral to Spiritual Care Partner  [] No future visits requested        [x] Follow up visits as needed     Comments:     visited patient, Mere Foster, for a palliative care initial spiritual assessment on the [facility]. Surigcal floor. Mere Foster was awake, alert, and sitting up in her recliner. No family was present at this time.  introduced herself and extended support through active listening and pastoral presence. Mere Foster greeted the  warmly and made good eye contact. She openly shared the recent falls she has been having and her hopes to be discharged to a rehab facility in the next few day. Mere Foster hopes that she will be strong enough to go back home, as she greatly valued her independence.  continued to be a supportive presence as Mere Foster shared her challenges with the aging process, the difficulty of being cared for by other people, and her desire to remain as independent as possible. Mere Foster continued to engage in story telling and life review, reminiscing on significant events that happened over the course of her life. She shared stories of frank and loss and reflected on the changes that she has seen over her 95 years of living. Mere Foster noted that she was  at an early age, and decided not to remarry. During that time her [employer] became a vital source of community for her and she continues to remain active in that community as much as possible. She has a strong carilta and God and feels that God and her family are helping her through her recovery process.  continued to engage active listening as Mere Foster discussed other thoughts and concerns. She thanked the  for her visit and is aware of the 's availability.  will follow up as able and/or needed  [employer]. Tyler Smith.      Paging Service: [phone])

## 2019-09-11 NOTE — PROGRESS NOTES
Problem: Mobility Impaired (Adult and Pediatric)  Goal: *Acute Goals and Plan of Care (Insert Text)  Description  FUNCTIONAL STATUS PRIOR TO ADMISSION: Patient was modified independent using a Rolling walker for functional mobility. HOME SUPPORT PRIOR TO ADMISSION: The patient lived alone with daughter nearby to bring groceries, transportation, assist with cleaning. Physical Therapy Goals  Initiated 9/9/2019  1. Patient will move from supine to sit and sit to supine , scoot up and down and roll side to side in bed with independence within 7 day(s). 2.  Patient will transfer from bed to chair and chair to bed with modified independence using the least restrictive device within 7 day(s). 3.  Patient will perform sit to stand with modified independence within 7 day(s). 4.  Patient will ambulate with modified independence for 200 feet with the least restrictive device within 7 day(s). Note:   PHYSICAL THERAPY TREATMENT  Patient: Janentte Diana (67 y.o. female)  Date: 9/11/2019  Diagnosis: Frequent falls [R29.6]  Falls frequently [R29.6] Frequent falls       Precautions: Fall  Chart, physical therapy assessment, plan of care and goals were reviewed. ASSESSMENT  Pt comes to stand with CGA to min assist.Pt ambulated 100ft with RW CGA. Pt progressing with mobility. Current Level of Function Impacting Discharge (mobility/balance): Pt ambulating at CGA to min assist level             PLAN :  Patient continues to benefit from skilled intervention to address the above impairments. Continue treatment per established plan of care. to address goals.     Recommendation for discharge: (in order for the patient to meet his/her long term goals)  Therapy up to 5 days/week in SNF setting    This discharge recommendation:  Has been made in collaboration with the attending provider and/or case management    Equipment recommendations for successful discharge (if) home: rolling walker           OBJECTIVE DATA SUMMARY:   Critical Behavior:  Neurologic State: Alert  Orientation Level: Oriented X4  Cognition: Appropriate for age attention/concentration  Safety/Judgement: Awareness of environment  Functional Mobility Training:             Transfers:  Sit to Stand: Contact guard assistance;Minimum assistance  Stand to Sit: Contact guard assistance                             Balance:     Ambulation/Gait Training:  Distance (ft): 100 Feet (ft)  Assistive Device: Gait belt;Walker, rolling  Ambulation - Level of Assistance: Contact guard assistance                       Speed/Meghan: Pace decreased (<100 feet/min)  Step Length: Right shortened;Left shortened                  Activity Tolerance:   Fair  Please refer to the flowsheet for vital signs taken during this treatment.     After treatment patient left in no apparent distress:   Sitting in chair    COMMUNICATION/COLLABORATION:   The patients plan of care was discussed with: Physical Therapist    Chandni Gallego PTA   Time Calculation: 23 mins

## 2019-09-12 ENCOUNTER — HOME HEALTH ADMISSION (OUTPATIENT)
Dept: HOME HEALTH SERVICES | Facility: HOME HEALTH | Age: 84
End: 2019-09-12
Payer: MEDICARE

## 2019-09-12 PROCEDURE — 77030038269 HC DRN EXT URIN PURWCK BARD -A

## 2019-09-12 PROCEDURE — 99218 HC RM OBSERVATION: CPT

## 2019-09-12 PROCEDURE — 74011000250 HC RX REV CODE- 250: Performed by: INTERNAL MEDICINE

## 2019-09-12 PROCEDURE — 74011250637 HC RX REV CODE- 250/637: Performed by: INTERNAL MEDICINE

## 2019-09-12 PROCEDURE — 74011250636 HC RX REV CODE- 250/636: Performed by: INTERNAL MEDICINE

## 2019-09-12 PROCEDURE — 96372 THER/PROPH/DIAG INJ SC/IM: CPT

## 2019-09-12 PROCEDURE — 97110 THERAPEUTIC EXERCISES: CPT

## 2019-09-12 RX ADMIN — PREGABALIN 50 MG: 50 CAPSULE ORAL at 21:01

## 2019-09-12 RX ADMIN — THERA TABS 1 TABLET: TAB at 08:51

## 2019-09-12 RX ADMIN — ENOXAPARIN SODIUM 40 MG: 40 INJECTION SUBCUTANEOUS at 20:56

## 2019-09-12 RX ADMIN — ACETAMINOPHEN 650 MG: 325 TABLET ORAL at 11:56

## 2019-09-12 RX ADMIN — AMLODIPINE BESYLATE 5 MG: 5 TABLET ORAL at 08:51

## 2019-09-12 RX ADMIN — ACETAMINOPHEN 650 MG: 325 TABLET ORAL at 22:48

## 2019-09-12 RX ADMIN — ACETAMINOPHEN 650 MG: 325 TABLET ORAL at 04:48

## 2019-09-12 RX ADMIN — POLYETHYLENE GLYCOL 3350 17 G: 17 POWDER, FOR SOLUTION ORAL at 08:51

## 2019-09-12 RX ADMIN — OMEGA-3 FATTY ACIDS CAP 1000 MG 1 CAPSULE: 1000 CAP at 08:51

## 2019-09-12 RX ADMIN — ATORVASTATIN CALCIUM 10 MG: 10 TABLET, FILM COATED ORAL at 21:01

## 2019-09-12 RX ADMIN — LISINOPRIL 10 MG: 5 TABLET ORAL at 08:51

## 2019-09-12 RX ADMIN — OYSTER SHELL CALCIUM WITH VITAMIN D 2 TABLET: 500; 200 TABLET, FILM COATED ORAL at 08:51

## 2019-09-12 RX ADMIN — ASPIRIN 81 MG: 81 TABLET, COATED ORAL at 21:01

## 2019-09-12 RX ADMIN — ACETAMINOPHEN 650 MG: 325 TABLET ORAL at 17:32

## 2019-09-12 NOTE — PROGRESS NOTES
Bedside and Verbal shift change report given to Dalia Sever, RN (oncoming nurse) by Macrina Ahn RN (offgoing nurse). Report included the following information SBAR, Kardex, Intake/Output, MAR and Recent Results.

## 2019-09-12 NOTE — PHYSICIAN ADVISORY
Letter of Status Determination: Current Status   OBSERVATION is Appropriate         Pt Name:  Abdifatah Bobo   MR#  [id number]   [facility]#   [id number]   [address] [place] [address]  [phone]  @ [address] [place] [address]   Hospitalization date  9/8/2019  1:54 PM   Current Attending Physician  Alma Lee MD   Principal diagnosis  Frequent falls    Clinicals  Ms. Nayana Cruz is a 80 y.o. female w/ hx of CAD, HTN, HLD, GERD who presents with falls. Had 2 falls at home yesterday. Legs gave out, unsteady on her feet resulting in falls. Lives alone. Has noted more frequent falls recently. probable UTI, given IV abx x 3 days, c/o shoulder pain, shoulder XR with rotator cuff pathology, fall risk, vitals stable, mildly dehtdrated,      [place] MCG criteria   Does  NOT apply    STATUS DETERMINATION  On the basis of clinical data, available documentaion, we believe that the current status of this patient as OBSERVATION is Appropriate     The final decision of the patient's hospitalization status depends on the attending physician's judgment    Additional comments     Insurance  Payor: Alessandra Barr / Plan: Oneyda Gil / Product Type: Managed Care Medicare /    [address] [place] [address] Phone:     Subscriber: Sugar Reagan Subscriber#: [id number]    Group#: [id number] Precert#:                    Armin Fernandez MD  Cell: [phone]  Physician Advisor

## 2019-09-12 NOTE — PROGRESS NOTES
Problem: Mobility Impaired (Adult and Pediatric)  Goal: *Acute Goals and Plan of Care (Insert Text)  Description  FUNCTIONAL STATUS PRIOR TO ADMISSION: Patient was modified independent using a Rolling walker for functional mobility. HOME SUPPORT PRIOR TO ADMISSION: The patient lived alone with daughter nearby to bring groceries, transportation, assist with cleaning. Physical Therapy Goals  Initiated 9/9/2019  1. Patient will move from supine to sit and sit to supine , scoot up and down and roll side to side in bed with independence within 7 day(s). 2.  Patient will transfer from bed to chair and chair to bed with modified independence using the least restrictive device within 7 day(s). 3.  Patient will perform sit to stand with modified independence within 7 day(s). 4.  Patient will ambulate with modified independence for 200 feet with the least restrictive device within 7 day(s). Note:   PHYSICAL THERAPY TREATMENT  Patient: Ceclia Cowden (89 y.o. female)  Date: 9/12/2019  Diagnosis: Frequent falls [R29.6]  Falls frequently [R29.6] Frequent falls       Precautions: Fall  Chart, physical therapy assessment, plan of care and goals were reviewed. ASSESSMENT  Pt declined PT concerned about multiple bouts of incontinence. Pt agreed to and performed LE exercise with cues. (Heel slides ankle pumps and hip abd/add)Pt tolerated treatment well. Current Level of Function Impacting Discharge (mobility/balance): CGA to min assist.             PLAN :  Patient continues to benefit from skilled intervention to address the above impairments. Continue treatment per established plan of care. to address goals.     Recommendation for discharge: (in order for the patient to meet his/her long term goals)  Therapy up to 5 days/week in SNF setting    This discharge recommendation:  Has been made in collaboration with the attending provider and/or case management    Equipment recommendations for successful discharge (if) home: rolling walker       SUBJECTIVE:       OBJECTIVE DATA SUMMARY:   Critical Behavior:  Neurologic State: Alert  Orientation Level: Oriented X4  Cognition: Appropriate decision making, Appropriate safety awareness, Follows commands  Safety/Judgement: Awareness of environment       Therapeutic Exercises:   Heel slides ankle pumps hip abd/add       Activity Tolerance:   Good  Please refer to the flowsheet for vital signs taken during this treatment.     After treatment patient left in no apparent distress:   Supine in bed    COMMUNICATION/COLLABORATION:   The patients plan of care was discussed with: Physical Therapist    Maldonado Johnson PTA   Time Calculation: 8 mins

## 2019-09-12 NOTE — PROGRESS NOTES
CM Note:  Denial for snf was upheld by [employer] during [facility] [employer]. Order for home health noted. Met with pt for choice who wanted [facility]. A referral was sent in [address] [place] [address]. Pt to call dtr to see if she is able to transport pt this evening and ask if she is able to stay with pt for several nights after d/c. MARKUS Bell RN

## 2019-09-12 NOTE — PROGRESS NOTES
Bedside shift change report given to Hieu Ortega RN (oncoming nurse) by Maricarmen Stewart RN (offgoing nurse). Report included the following information SBAR, MAR, Accordion and Recent Results.

## 2019-09-12 NOTE — PROGRESS NOTES
Chris Diggs [facility] [place] [facility]  [address] [facility], [place], [id number] [place] [address]  [phone]      Medical Progress Note      NAME: Sosa Chinchilla   :  1924  MRM:  [id number]    Date/Time: 2019  11:20 AM          Subjective:     Chief Complaint:  Pain: right shoulder, severe at times, mainly with movement/pulling herself up in bed, intermittent, better     ROS:  (bold if positive, if negative)                        Tolerating Diet          Objective:       Vitals:          Last 24hrs VS reviewed since prior progress note.  Most recent are:    Visit Vitals  /69 (BP 1 Location: Right arm, BP Patient Position: At rest)   Pulse 72   Temp 97.4 °F (36.3 °C)   Resp 18   Ht 5' 5\" (1.651 m)   Wt 83 kg (183 lb)   SpO2 95%   BMI 30.45 kg/m²     SpO2 Readings from Last 6 Encounters:   19 95%   05/15/19 95%   18 96%   17 96%   05/10/17 98%   16 94%            Intake/Output Summary (Last 24 hours) at 2019 0920  Last data filed at 2019 2101  Gross per 24 hour   Intake 100 ml   Output 1025 ml   Net -925 ml          Exam:     Physical Exam:    Gen:  Well-developed, obese, frail, elderly, in no acute distress  HEENT:  Pink conjunctivae, PERRL, hearing intact to voice, moist mucous membranes  Neck:  Supple, without masses, thyroid non-tender  Resp:  No accessory muscle use, clear breath sounds without wheezes rales or rhonchi  Card:  No murmurs, normal S1, S2 without thrills, bruits or peripheral edema  Abd:  Soft, non-tender, non-distended, normoactive bowel sounds are present, no palpable organomegaly and no detectable hernias  Lymph:  No cervical or inguinal adenopathy  Musc:  No cyanosis or clubbing  Skin:  No rashes or ulcers, skin turgor is good  Neuro:  Cranial nerves are grossly intact, no focal motor weakness, follows commands appropriately  Psych:  Fair insight, oriented to person, place and time, alert    Medications Reviewed: (see below)    Lab Data Reviewed: (see below)    ______________________________________________________________________    Medications:     Current Facility-Administered Medications   Medication Dose Route Frequency    polyethylene glycol (MIRALAX) packet 17 g  17 g Oral BID    lidocaine 4 % patch 1 Patch  1 Patch TransDERmal Q24H    acetaminophen (TYLENOL) tablet 650 mg  650 mg Oral Q6H    sodium chloride (NS) flush 5-40 mL  5-40 mL IntraVENous Q8H    sodium chloride (NS) flush 5-40 mL  5-40 mL IntraVENous PRN    naloxone (NARCAN) injection 0.4 mg  0.4 mg IntraVENous PRN    enoxaparin (LOVENOX) injection 40 mg  40 mg SubCUTAneous Q24H    amLODIPine (NORVASC) tablet 5 mg  5 mg Oral DAILY    aspirin delayed-release tablet 81 mg  81 mg Oral QHS    atorvastatin (LIPITOR) tablet 10 mg  10 mg Oral QHS    therapeutic multivitamin (THERAGRAN) tablet 1 Tab  1 Tab Oral DAILY    lisinopril (PRINIVIL, ZESTRIL) tablet 10 mg  10 mg Oral DAILY    omega 3-DHA-EPA-fish oil 1,000 mg (120 mg-180 mg) capsule 1 Cap  1 Cap Oral DAILY    pregabalin (LYRICA) capsule 50 mg  50 mg Oral QHS    calcium-vitamin D (OS-DARIN) 500 mg-200 unit tablet  2 Tab Oral DAILY WITH BREAKFAST            Lab Review:     No results for input(s): WBC, HGB, HCT, PLT, HGBEXT, HCTEXT, PLTEXT, HGBEXT, HCTEXT, PLTEXT in the last 72 hours. No results for input(s): NA, K, CL, CO2, GLU, BUN, CREA, CA, MG, PHOS, ALB, TBIL, TBILI, SGOT, ALT, INR in the last 72 hours. No lab exists for component: INREXT, INREXT  No results found for: GLUCPOC  No results for input(s): PH, PCO2, PO2, HCO3, FIO2 in the last 72 hours. No results for input(s): INR in the last 72 hours.     No lab exists for component: INREXT, INREXT  No results found for: SDES  Lab Results   Component Value Date/Time    Culture result: (A) 09/08/2019 02:49 PM     AEROCOCCUS URINAE A. URINAE HAS BEEN DESCRIBED AS SUSCEPTIBLE TO PENICILLIN, AMOXICILLIN, PIPERACILLIN, CEFIPIME, RIFAMPIN AND NITROFURANTOIN, BUT RESISTANT TO SULFONAMIDES.     Culture result: NO GROWTH 2 DAYS 02/06/2016 05:20 PM    Culture result: NO GROWTH 5 DAYS 02/06/2016 04:44 PM            Assessment:     Principal Problem:    Frequent falls (9/8/2019)    Active Problems:    HTN (hypertension), benign (1/11/2011)      Mixed hyperlipidemia (1/11/2011)      CAD (coronary artery disease) ()      CKD (chronic kidney disease) stage 3, GFR 30-59 ml/[facility] (9/8/2019)      Bacteriuria (9/8/2019)      Neuropathy (9/8/2019)      Falls frequently (9/10/2019)           Plan:     Principal Problem:    Frequent falls (9/8/2019)   no fractures, OA and rotator cuff issues, chronic, on plain films, visualized by me  - needs 24 hour supervision, PT/OT to assess, CM consulted    Active Problems:    HTN (hypertension), benign (1/11/2011)   - BP okay on meds as above      Mixed hyperlipidemia (1/11/2011)   - continue statin      CAD (coronary artery disease) ()   - stable, continue cardiac meds      CKD (chronic kidney disease) stage 3, GFR 30-59 ml/min ([facility]) (9/8/2019)   - creatinine at baseline      Bacteriuria (9/8/2019)   - suspect asymptomatic bacteriuria, NOT UTI   - Aerococcus on culture, because we cannot be sure this did not contribute to fall, will treat with 3 days of ceftriaxone, finishes today      Neuropathy (9/8/2019)   - continue Lyrica, although I am concerned this may be contributing to her fall risk, alternatively her neuropathy may also contribute   - PT/OT to assess      Total time spent in patient care: 25 minutes                  Care Plan discussed with: Patient, Care Manager and Nursing Staff    Discussed:  Code Status, Care Plan and D/C Planning     DNR    DVT Prophylaxis:  Lovenox    Disposition:  Home w/Family and HH PT, OT, RN           ___________________________________________________    Attending Physician: Corbin Sutherland MD

## 2019-09-13 VITALS
DIASTOLIC BLOOD PRESSURE: 65 MMHG | SYSTOLIC BLOOD PRESSURE: 117 MMHG | HEART RATE: 72 BPM | BODY MASS INDEX: 30.49 KG/M2 | TEMPERATURE: 98.2 F | RESPIRATION RATE: 17 BRPM | HEIGHT: 65 IN | OXYGEN SATURATION: 95 % | WEIGHT: 183 LBS

## 2019-09-13 PROCEDURE — 74011250637 HC RX REV CODE- 250/637: Performed by: INTERNAL MEDICINE

## 2019-09-13 PROCEDURE — 74011000250 HC RX REV CODE- 250: Performed by: INTERNAL MEDICINE

## 2019-09-13 PROCEDURE — 99218 HC RM OBSERVATION: CPT

## 2019-09-13 RX ADMIN — ACETAMINOPHEN 650 MG: 325 TABLET ORAL at 11:26

## 2019-09-13 RX ADMIN — ACETAMINOPHEN 650 MG: 325 TABLET ORAL at 04:30

## 2019-09-13 RX ADMIN — ACETAMINOPHEN 650 MG: 325 TABLET ORAL at 17:01

## 2019-09-13 RX ADMIN — LISINOPRIL 10 MG: 5 TABLET ORAL at 08:18

## 2019-09-13 RX ADMIN — AMLODIPINE BESYLATE 5 MG: 5 TABLET ORAL at 08:18

## 2019-09-13 RX ADMIN — OMEGA-3 FATTY ACIDS CAP 1000 MG 1 CAPSULE: 1000 CAP at 08:17

## 2019-09-13 RX ADMIN — OYSTER SHELL CALCIUM WITH VITAMIN D 2 TABLET: 500; 200 TABLET, FILM COATED ORAL at 08:18

## 2019-09-13 RX ADMIN — THERA TABS 1 TABLET: TAB at 08:18

## 2019-09-13 NOTE — PROGRESS NOTES
Chris Diggs gianni [place] [address]  [address]  [phone]      Medical Progress Note      NAME: Gloria Perez   :  1924  MRM:  [id number]    Date/Time: 2019  11:20 AM          Subjective:     Chief Complaint:  Pain: right shoulder, severe at times, mainly with movement/pulling herself up in bed, intermittent, better     ROS:  (bold if positive, if negative)                        Tolerating Diet          Objective:       Vitals:          Last 24hrs VS reviewed since prior progress note.  Most recent are:    Visit Vitals  /61 (BP 1 Location: Left arm, BP Patient Position: At rest)   Pulse 88   Temp 97.7 °F (36.5 °C)   Resp 16   Ht 5' 5\" (1.651 m)   Wt 83 kg (183 lb)   SpO2 92%   BMI 30.45 kg/m²     SpO2 Readings from Last 6 Encounters:   19 92%   05/15/19 95%   18 96%   17 96%   05/10/17 98%   16 94%            Intake/Output Summary (Last 24 hours) at 2019 0836  Last data filed at 2019 3842  Gross per 24 hour   Intake 1410 ml   Output 1281 ml   Net 129 ml          Exam:     Physical Exam:    Gen:  Well-developed, obese, frail, elderly, in no acute distress  HEENT:  Pink conjunctivae, PERRL, hearing intact to voice, moist mucous membranes  Neck:  Supple, without masses, thyroid non-tender  Resp:  No accessory muscle use, clear breath sounds without wheezes rales or rhonchi  Card:  No murmurs, normal S1, S2 without thrills, bruits or peripheral edema  Abd:  Soft, non-tender, non-distended, normoactive bowel sounds are present, no palpable organomegaly and no detectable hernias  Lymph:  No cervical or inguinal adenopathy  Musc:  No cyanosis or clubbing  Skin:  No rashes or ulcers, skin turgor is good  Neuro:  Cranial nerves are grossly intact, no focal motor weakness, follows commands appropriately  Psych:  Fair insight, oriented to person, place and time, alert    Medications Reviewed: (see below)    Lab Data Reviewed: (see below)    ______________________________________________________________________    Medications:     Current Facility-Administered Medications   Medication Dose Route Frequency    polyethylene glycol (MIRALAX) packet 17 g  17 g Oral BID    lidocaine 4 % patch 1 Patch  1 Patch TransDERmal Q24H    acetaminophen (TYLENOL) tablet 650 mg  650 mg Oral Q6H    sodium chloride (NS) flush 5-40 mL  5-40 mL IntraVENous Q8H    sodium chloride (NS) flush 5-40 mL  5-40 mL IntraVENous PRN    naloxone (NARCAN) injection 0.4 mg  0.4 mg IntraVENous PRN    enoxaparin (LOVENOX) injection 40 mg  40 mg SubCUTAneous Q24H    amLODIPine (NORVASC) tablet 5 mg  5 mg Oral DAILY    aspirin delayed-release tablet 81 mg  81 mg Oral QHS    atorvastatin (LIPITOR) tablet 10 mg  10 mg Oral QHS    therapeutic multivitamin (THERAGRAN) tablet 1 Tab  1 Tab Oral DAILY    lisinopril (PRINIVIL, ZESTRIL) tablet 10 mg  10 mg Oral DAILY    omega 3-DHA-EPA-fish oil 1,000 mg (120 mg-180 mg) capsule 1 Cap  1 Cap Oral DAILY    pregabalin (LYRICA) capsule 50 mg  50 mg Oral QHS    calcium-vitamin D (OS-DARIN) 500 mg-200 unit tablet  2 Tab Oral DAILY WITH BREAKFAST            Lab Review:     No results for input(s): WBC, HGB, HCT, PLT, HGBEXT, HCTEXT, PLTEXT, HGBEXT, HCTEXT, PLTEXT in the last 72 hours. No results for input(s): NA, K, CL, CO2, GLU, BUN, CREA, CA, MG, PHOS, ALB, TBIL, TBILI, SGOT, ALT, INR in the last 72 hours. No lab exists for component: INREXT, INREXT  No results found for: GLUCPOC  No results for input(s): PH, PCO2, PO2, HCO3, FIO2 in the last 72 hours. No results for input(s): INR in the last 72 hours.     No lab exists for component: INREXT, INREXT  No results found for: SDES  Lab Results   Component Value Date/Time    Culture result: (A) 09/08/2019 02:49 PM     AEROCOCCUS URINAE A. URINAE HAS BEEN DESCRIBED AS SUSCEPTIBLE TO PENICILLIN, AMOXICILLIN, PIPERACILLIN, CEFIPIME, RIFAMPIN AND NITROFURANTOIN, BUT RESISTANT TO SULFONAMIDES.     Culture result: NO GROWTH 2 DAYS 02/06/2016 05:20 PM    Culture result: NO GROWTH 5 DAYS 02/06/2016 04:44 PM            Assessment:     Principal Problem:    Frequent falls (9/8/2019)    Active Problems:    HTN (hypertension), benign (1/11/2011)      Mixed hyperlipidemia (1/11/2011)      CAD (coronary artery disease) ()      CKD (chronic kidney disease) stage 3, [facility] 30-59 ml[facility] (9/8/2019)      Bacteriuria (9/8/2019)      Neuropathy (9/8/2019)      Falls frequently (9/10/2019)           Plan:     Principal Problem:    Frequent falls (9/8/2019)   no fractures, OA and rotator cuff issues, chronic, on plain films, visualized by me  - needs 24 hour supervision, PT/OT to assess, CM consulted    Active Problems:    HTN (hypertension), benign (1/11/2011)   - BP okay on meds as above      Mixed hyperlipidemia (1/11/2011)   - continue statin      CAD (coronary artery disease) ()   - stable, continue cardiac meds      CKD (chronic kidney disease) stage 3, GFR 30-59 ml/min ([facility]) (9/8/2019)   - creatinine at baseline      Bacteriuria (9/8/2019)   - suspect asymptomatic bacteriuria, NOT UTI   - Aerococcus on culture, because we cannot be sure this did not contribute to fall, will treat with 3 days of ceftriaxone, finishes today      Neuropathy (9/8/2019)   - continue Lyrica, although I am concerned this may be contributing to her fall risk, alternatively her neuropathy may also contribute   - PT/OT to assess      Total time spent in patient care: 25 minutes                  Care Plan discussed with: Patient, Care Manager and Nursing Staff    Discussed:  Code Status, Care Plan and D/C Planning     DNR    DVT Prophylaxis:  Lovenox    Disposition:  Home w/Family and HH PT, OT, RN           ___________________________________________________    Attending Physician: Elida Israel MD

## 2019-09-13 NOTE — DISCHARGE SUMMARY
Physician Discharge Summary     Patient ID:  Jannette Diana  [id number]  24 y.o.  4/26/1924    Admit date: 9/8/2019    Discharge date and time: 9/13/2019    Admission Diagnoses: Frequent falls [R29.6]  Falls frequently [R29.6]    Discharge Diagnoses:    Principal Problem:    Frequent falls (9/8/2019)    Active Problems:    HTN (hypertension), benign (1/11/2011)      Mixed hyperlipidemia (1/11/2011)      CAD (coronary artery disease) ()      CKD (chronic kidney disease) stage 3, GFR 30-59 ml/min ([facility]) (9/8/2019)      Bacteriuria (9/8/2019)      Neuropathy (9/8/2019)      Falls frequently (9/10/2019)           Hospital Course:   80 y.o. female w/ hx of CAD, HTN, HLD, GERD who presents with falls. Had 2 falls at home yesterday. Legs gave out, unsteady on her feet resulting in falls. Lives alone. Has noted more frequent falls recently. Has life-alert so called for help. Frequent falls (9/8/2019)   no fractures, OA and rotator cuff issues, chronic, on plain films, visualized by me  - needs 24 hour supervision, PT/OT to assess, CM consulted    PCP: Quinton Hayes MD       Condition of patient at discharge: stable    Discharge Exam:  General:   WD, WN. Alert, cooperative, no acute distress    EENT:      EOMI. Anicteric sclerae. MMM  Resp:       CTA bilaterally, no wheezing or rales. No accessory muscle use  CV:           Regular  rhythm,  No edema  GI:            Soft, Non distended, Non tender.  +Bowel sounds  Neurologic: Alert and oriented X 3, normal speech,   Psych:       Good insight. Not anxious nor agitated  Skin:          No rashes. No jaundice    Disposition: home    Patient Instructions:   Current Discharge Medication List      CONTINUE these medications which have NOT CHANGED    Details   calcium-cholecalciferol, D3, (CALTRATE 600+D) tablet Take 2 Tabs by mouth daily. acetaminophen (TYLENOL) 500 mg tablet Take 500 mg by mouth every six (6) hours as needed for Pain.       amLODIPine (NORVASC) 5 mg tablet Take 5 mg by mouth daily. Associated Diagnoses: HTN (hypertension), benign; Mixed hyperlipidemia      lisinopril (PRINIVIL, ZESTRIL) 10 mg tablet Take 10 mg by mouth daily. Associated Diagnoses: HTN (hypertension), benign; Mixed hyperlipidemia      pregabalin (LYRICA) 50 mg capsule Take 50 mg by mouth nightly. Indications: neuropathy    Associated Diagnoses: HTN (hypertension), benign; Mixed hyperlipidemia      folic acid/multivit-min/lutein (CENTRUM SILVER PO) Take 1 Tab by mouth daily. Associated Diagnoses: HTN (hypertension), benign; Mixed hyperlipidemia      omega-3 fatty acids-vitamin e (FISH OIL) 1,000 mg cap Take 2 Caps by mouth daily. atorvastatin (LIPITOR) 10 mg tablet Take 10 mg by mouth nightly. aspirin delayed-release 81 mg tablet Take 81 mg by mouth nightly.            Activity: Activity as tolerated and PT/OT per Home Health  Diet: Regular Diet    Follow-up with Cassandra Blanco MD in 1-2weeks  Follow-up tests/labs n/a    Approximate time spent in patient care on day of discharge: Greater than 30mins     Signed:  Chayito Brady MD  9/13/2019  4:07 PM

## 2019-09-13 NOTE — PROGRESS NOTES
Palliative Medicine      Code Status: DNR    Advance Care Planning:  Advance Care Planning 2019   Patient's Healthcare Decision Maker is: Legal Next of Kin:  Dtr Carlo Hand, [phone]   Confirm Advance Directive Not on file   Does the patient have other document types Durable Do Not Resuscitate       Patient / Family Encounter Documentation    Participants (names): Pt, Palliative Medicine (Dr. Norma Diaz, [facility])    Narrative: Met with pt to assist with completion of DDNR in preparation for discharge. Pt was awake in bed, no family present. Pt was alert, pleasant, expressed some disappointment that her insurance denied SNF stay, was hopeful for some rehab before returning home. Pt will have home health in place, stated she was also given a list of private caregivers if she wishes to pay out of pocket for care. Pt stated dtr \"is in her 70s\" and will not be able to drive her home, stated transportation will need to be arranged. Dr. Norma Diaz spoke with pt earlier in the week re: code status. Pt confirmed today that she would not want attempts at resuscitation in the event of cardiac/respiratory arrest.  DDNR was reviewed and completed. Original and copies were returned to pt with instructions to post original in visible location of the home. Copy was placed on chart to be scanned into medical record. Psychosocial Issues Identified/ Resilience Factors:  Pt spoke of her advanced age and limited life expectancy, expressed gratitude that her mental capacity remains intact. Pt's  and son are ; dtr and dtr-in-law are supportive. Goals of Care / Plan: Discharge home today with home health. Pt now has DDNR in place. SW will be available for additional support/assistance, as needed. Discussed with Care Manager. Thank you for including Palliative Medicine in the care of Ms. Shana Murray.     Pete  DIEGO Henao, [facility]-  [phone])

## 2019-09-13 NOTE — PROGRESS NOTES
1542:  Pt's dtr unable to transport pt home but is planning to meet pt at her home at 4:30. [facility] was called for w/c [facility] transport.  is scheduled for a 5:30  and is $60 which was charged to pt's credit card. Pt was notified and suggested she may want to order dinner. Called pt's dtr to notify her of  time. CM Note:  Pt accepted by [facility]. Pt is trying to reach her dtr for transport home. She was given a list of private duty aides. No NN.   SAILAJA Hutchison

## 2019-09-13 NOTE — PROGRESS NOTES
Palliative Medicine Consult  [place]: [phone])    Patient Name: Toribio Bird  YOB: 1924    Date of Initial Consult: 2019  Reason for Consult: Care decisions  Requesting Provider: Dr. Kael Garcia  Primary Care Physician: Ema Porter MD     SUMMARY:   Toribio Bird is a 80 y. o. with a past history of neuropathy, coronary disease, hypertension, hyperlipidemia, chronic kidney disease, who was admitted on 2019 from home with a diagnosis of fall. Current medical issues leading to Palliative Medicine involvement include: Care decisions. Chart reviewedpatient admitted after a fall over the weekend. Patient with some mild right shoulder pain. She has been living alone for many years in a townhouse that is all one for. She enjoys living in her own home and has been able to care for herself. She did have 2 falls over the weekend where she just felt like her legs \"gave way. She had been driving up to the age of 80. Social historypatient has been  since . Her   of cancer. She had 2 children but her son  in 56. She has 1 daughterSoila Arroyo and a daughter-in-lawSaadia who are very hopeful in caring for her. Her daughter-in-law has stayed very involved even after the death of the patient's son/her . Patient still enjoys going to [employer]. She worked for [employer] for many years after the death of her . PALLIATIVE DIAGNOSES:   1. Goals of care discussion  2. DNR discussion  3. Advance care planning  4. Right shoulder pain       PLAN:   1. Tone Ireland, licensed clinical , and I met with patient. She is leaving today and we wanted to follow-up on her CODE STATUS discussion. She is glad to be returning to her apartment but was hopeful that she can receive more therapy in an inpatient setting. She was denied for skilled care. They plan on doing PT in the house.   2. Goals of care returning to her home and continue attempts at full restorative measures to at least return to her prior baseline. 3. Advance care planningshe has not completed an advanced medical directive but is clear that her daughter would be her medical power of . As her legal next of kin and only child, her daughter automatically would serve as medical POA unless the patient wanted somebody else. 4. CODE STATUSpatient very clear on no attempts at resuscitation. Completed a durable DO NOT RESUSCITATE form today. Copy made for the chart and original given to patient  5. Psychosocialpatient has great support from her daughter and daughter-in-law. Angela is very important to her and she attempts to go to [employer] regularly. 6. Symptom managementshe has mild right shoulder pain which is currently being managed with Tylenol and Lidoderm patch. 7. Discussed with bedside nurse  8. Initial consult note routed to primary continuity provider and/or primary health care team members  9. Communicated plan of care with: Palliative Ryan CAROLINA 192 Team     GOALS OF CARE / TREATMENT PREFERENCES:     GOALS OF CARE:  Patient/Health Care Proxy Stated Goals: Prolong life    TREATMENT PREFERENCES:   Code Status: DNR    Advance Care Planning:  [x] [facility] Interdisciplinary Team has updated the ACP Navigator with Health Care Decision Maker and Patient Capacity      Advance Care Planning 9/13/2019   Patient's Healthcare Decision Maker is: Legal Next of Kin   Confirm Advance Directive -   Does the patient have other document types Do Not Resuscitate       Medical Interventions: Limited additional interventions     Other Instructions:   Artificially Administered Nutrition: No feeding tube     Other:    As far as possible, the palliative care team has discussed with patient / health care proxy about goals of care / treatment preferences for patient.      HISTORY:     History obtained from: Chart, patient    CHIEF COMPLAINT: Fall    HPI/SUBJECTIVE: The patient is:   [x] Verbal and participatory  [] Non-participatory due to:   Patient with mild right shoulder discomfort only with reaching. Feels better overall    9/13patient feeling better overall. Ready to go home    Clinical Pain Assessment (nonverbal scale for severity on nonverbal patients):   Clinical Pain Assessment  Severity: 0  Location: Right shoulder  Character: Throbbing  Duration: Days  Effect: Limits reaching  Factors: Movement  Frequency: Intermittent          Duration: for how long has pt been experiencing pain (e.g., 2 days, 1 month, years)  Frequency: how often pain is an issue (e.g., several times per day, once every few days, constant)     FUNCTIONAL ASSESSMENT:     Palliative Performance Scale (PPS):  PPS: 60       PSYCHOSOCIAL/SPIRITUAL SCREENING:     Palliative IDT has assessed this patient for cultural preferences / practices and a referral made as appropriate to needs (Cultural Services, Patient Advocacy, Ethics, etc.)    Any spiritual / Taoism concerns:  [] Yes /  [x] No    Caregiver Burnout:  [] Yes /  [] No /  [x] No Caregiver Present      Anticipatory grief assessment:   [x] Normal  / [] Maladaptive       ESAS Anxiety: Anxiety: 0    ESAS Depression: Depression: 0        REVIEW OF SYSTEMS:     Positive and pertinent negative findings in ROS are noted above in HPI. The following systems were [x] reviewed / [] unable to be reviewed as noted in HPI  Other findings are noted below. Systems: constitutional, ears/nose/mouth/throat, respiratory, gastrointestinal, genitourinary, musculoskeletal, integumentary, neurologic, psychiatric, endocrine. Positive findings noted below.   Modified ESAS Completed by: provider   Fatigue: 0 Drowsiness: 0   Depression: 0 Pain: 0   Anxiety: 0 Nausea: 0   Anorexia: 0 Dyspnea: 0     Constipation: No     Stool Occurrence(s): 1        PHYSICAL EXAM:     From RN flowsheet:  Wt Readings from Last 3 Encounters:   09/08/19 183 lb (83 kg)   05/15/19 184 lb (83.5 kg)   18 185 lb 9.6 oz (84.2 kg)     Blood pressure 116/66, pulse 69, temperature 98.5 °F (36.9 °C), resp. rate 16, height 5' 5\" (1.651 m), weight 183 lb (83 kg), SpO2 94 %. Pain Scale 1: FLACC  Pain Intensity 1: 0     Pain Location 1: Shoulder  Pain Orientation 1: Right  Pain Description 1: Aching  Pain Intervention(s) 1: Medication (see MAR)  Last bowel movement, if known:     Constitutional: Alert and oriented, no acute distress  Eyes: pupils equal, anicteric  ENMT: no nasal discharge, moist mucous membranes  Cardiovascular: regular rhythm, distal pulses intact  Respiratory: breathing not labored, symmetric  Gastrointestinal: soft non-tender, +bowel sounds  Musculoskeletal: no deformity, mild tenderness to palpation to the proximal humerus on the right.   Skin: warm, dry  Neurologic: following commands, moving all extremities  Psychiatric: full affect, no hallucinations  Other:       HISTORY:     Principal Problem:    Frequent falls (2019)    Active Problems:    HTN (hypertension), benign (2011)      Mixed hyperlipidemia (2011)      CAD (coronary artery disease) ()      CKD (chronic kidney disease) stage 3, GFR 30-59 ml/min ([facility] (2019)      Bacteriuria (2019)      Neuropathy (2019)      Falls frequently (9/10/2019)      Past Medical History:   Diagnosis Date    CAD (coronary artery disease)     Femur fracture ([facility]     right    GERD (gastroesophageal reflux disease)     HTN (hypertension), benign 2011    Mixed hyperlipidemia 2011      Past Surgical History:   Procedure Laterality Date    HX FEMUR FRACTURE TX      HX KNEE REPLACEMENT      bilateral    HX ORTHOPAEDIC      bilat knee replacements    HX VEIN STRIPPING        Family History   Problem Relation Age of Onset    Other Mother          of renal failure, not sure what caused id    Other Father         something with throat, not sure if it was cancer    Heart Disease Sister       History reviewed, no pertinent family history.   Social History     Tobacco Use    Smoking status: Never Smoker    Smokeless tobacco: Never Used   Substance Use Topics    Alcohol use: No     Allergies   Allergen Reactions    Sulfa (Sulfonamide Antibiotics) Rash    Ampicillin Rash     Tolerates cefazolin    Lescol [Fluvastatin] Unknown (comments)      Current Facility-Administered Medications   Medication Dose Route Frequency    polyethylene glycol (MIRALAX) packet 17 g  17 g Oral BID    lidocaine 4 % patch 1 Patch  1 Patch TransDERmal Q24H    acetaminophen (TYLENOL) tablet 650 mg  650 mg Oral Q6H    sodium chloride (NS) flush 5-40 mL  5-40 mL IntraVENous Q8H    sodium chloride (NS) flush 5-40 mL  5-40 mL IntraVENous PRN    naloxone (NARCAN) injection 0.4 mg  0.4 mg IntraVENous PRN    enoxaparin (LOVENOX) injection 40 mg  40 mg SubCUTAneous Q24H    amLODIPine (NORVASC) tablet 5 mg  5 mg Oral DAILY    aspirin delayed-release tablet 81 mg  81 mg Oral QHS    atorvastatin (LIPITOR) tablet 10 mg  10 mg Oral QHS    therapeutic multivitamin (THERAGRAN) tablet 1 Tab  1 Tab Oral DAILY    lisinopril (PRINIVIL, ZESTRIL) tablet 10 mg  10 mg Oral DAILY    omega 3-DHA-EPA-fish oil 1,000 mg (120 mg-180 mg) capsule 1 Cap  1 Cap Oral DAILY    pregabalin (LYRICA) capsule 50 mg  50 mg Oral QHS    calcium-vitamin D (OS-DARIN) 500 mg-200 unit tablet  2 Tab Oral DAILY WITH BREAKFAST          LAB AND IMAGING FINDINGS:     Lab Results   Component Value Date/Time    WBC 7.2 09/09/2019 04:39 AM    HGB 11.8 09/09/2019 04:39 AM    PLATELET 098 96/90/0969 04:39 AM     Lab Results   Component Value Date/Time    Sodium 143 09/09/2019 04:39 AM    Potassium 4.5 09/09/2019 04:39 AM    Chloride 115 (H) 09/09/2019 04:39 AM    CO2 25 09/09/2019 04:39 AM    BUN 19 09/09/2019 04:39 AM    Creatinine 0.96 09/09/2019 04:39 AM    Calcium 8.4 (L) 09/09/2019 04:39 AM    Magnesium 2.2 09/09/2019 04:39 AM    Phosphorus 2.9 09/09/2019 04:39 AM      Lab Results   Component Value Date/Time    AST (SGOT) 18 09/09/2019 04:39 AM    Alk. phosphatase 70 09/09/2019 04:39 AM    Protein, total 6.7 09/09/2019 04:39 AM    Albumin 2.9 (L) 09/09/2019 04:39 AM    Globulin 3.8 09/09/2019 04:39 AM     Lab Results   Component Value Date/Time    INR 1.0 05/07/2015 08:40 PM    INR, External 1.1 05/20/2015    Prothrombin time 10.0 05/07/2015 08:40 PM    aPTT 32.1 05/07/2015 08:40 PM      No results found for: IRON, FE, TIBC, IBCT, PSAT, FERR   No results found for: PH, PCO2, PO2  No components found for: [place]   Lab Results   Component Value Date/Time    CK 38 04/05/2015 08:21 PM    CK - MB <0.5 (L) 04/05/2015 08:21 PM                Total time: 25  Counseling / coordination time, spent as noted above:20   > 50% counseling / coordination?: yes    Prolonged service was provided for  []30 min   []75 min in face to face time in the presence of the patient, spent as noted above. Time Start:   Time End:   Note: this can only be billed with 97087 (initial) or 06387 (follow up). If multiple start / stop times, list each separately.

## 2019-09-13 NOTE — PROGRESS NOTES
Noted plans for discharge, attempted to meet with pt to address DDNR. Staff currently working with pt; will attempt follow up prior to discharge.

## 2019-09-13 NOTE — PROGRESS NOTES
Bedside and Verbal shift change report given to Gretchen Moore RN (oncoming nurse) by Tasha Juarez RN (offgoing nurse). Report included the following information SBAR, Kardex, Intake/Output, MAR and Recent Results.

## 2019-09-13 NOTE — PALLIATIVE CARE DISCHARGE
Goals of Care/Treatment Preferences    The Palliative Medicine team was consulted as part of your/your loved one's care in the hospital. Our team is a supportive service; we strive to relieve suffering and improve quality of life. We reviewed advance care planning information, which includes the following:  Patient's [place] is[de-identified] Legal Next of Kin  Confirm Advance Directive: Yes, on file  Does the patient have other document types: Do Not Resuscitate    Patient/Health Care Proxy Stated Goals: Prolong life    We reviewed / discussed your code status as: DNR     Full Code means perform CPR in the event of cardiac arrest.      DNR means do NOT perform CPR in the event of cardiac arrest.      Partial Code means you have specific preferences, please discuss with your healthcare team.      Ihsan Arias means this issue was not addressed / resolved during your stay    Medical Interventions: Limited additional interventions     Other Instructions: You have a Durable Do Not Resuscitate Order in place, which should travel with you. When you are in a facility, this form should be placed on your chart. Once you are home, it is recommended that the [facility] form be placed in a visible location such as on the refrigerator or bedroom door. Artificially Administered Nutrition: No feeding tube    Because of the importance of this information, we are providing you with a printed copy to share with other healthcare providers after this hospitalization is complete.

## 2019-09-14 ENCOUNTER — HOME CARE VISIT (OUTPATIENT)
Dept: SCHEDULING | Facility: HOME HEALTH | Age: 84
End: 2019-09-14
Payer: MEDICARE

## 2019-09-14 VITALS
OXYGEN SATURATION: 97 % | SYSTOLIC BLOOD PRESSURE: 120 MMHG | DIASTOLIC BLOOD PRESSURE: 60 MMHG | TEMPERATURE: 98.7 F | HEART RATE: 57 BPM | RESPIRATION RATE: 16 BRPM

## 2019-09-14 PROCEDURE — G0151 HHCP-SERV OF PT,EA 15 MIN: HCPCS

## 2019-09-14 PROCEDURE — 400013 HH SOC

## 2019-09-16 ENCOUNTER — HOME CARE VISIT (OUTPATIENT)
Dept: SCHEDULING | Facility: HOME HEALTH | Age: 84
End: 2019-09-16
Payer: MEDICARE

## 2019-09-16 VITALS
SYSTOLIC BLOOD PRESSURE: 144 MMHG | RESPIRATION RATE: 18 BRPM | OXYGEN SATURATION: 94 % | TEMPERATURE: 99.4 F | DIASTOLIC BLOOD PRESSURE: 60 MMHG | HEART RATE: 57 BPM

## 2019-09-16 PROCEDURE — G0152 HHCP-SERV OF OT,EA 15 MIN: HCPCS

## 2019-09-17 ENCOUNTER — HOME CARE VISIT (OUTPATIENT)
Dept: SCHEDULING | Facility: HOME HEALTH | Age: 84
End: 2019-09-17
Payer: MEDICARE

## 2019-09-17 VITALS
DIASTOLIC BLOOD PRESSURE: 64 MMHG | RESPIRATION RATE: 16 BRPM | TEMPERATURE: 98.5 F | HEART RATE: 67 BPM | SYSTOLIC BLOOD PRESSURE: 120 MMHG | OXYGEN SATURATION: 97 %

## 2019-09-17 PROCEDURE — G0151 HHCP-SERV OF PT,EA 15 MIN: HCPCS

## 2019-09-19 ENCOUNTER — HOME CARE VISIT (OUTPATIENT)
Dept: SCHEDULING | Facility: HOME HEALTH | Age: 84
End: 2019-09-19
Payer: MEDICARE

## 2019-09-19 VITALS
HEART RATE: 57 BPM | SYSTOLIC BLOOD PRESSURE: 140 MMHG | TEMPERATURE: 97.7 F | DIASTOLIC BLOOD PRESSURE: 70 MMHG | RESPIRATION RATE: 16 BRPM | OXYGEN SATURATION: 94 %

## 2019-09-19 PROCEDURE — G0151 HHCP-SERV OF PT,EA 15 MIN: HCPCS

## 2019-09-20 ENCOUNTER — HOME CARE VISIT (OUTPATIENT)
Dept: SCHEDULING | Facility: HOME HEALTH | Age: 84
End: 2019-09-20
Payer: MEDICARE

## 2019-09-20 VITALS
RESPIRATION RATE: 18 BRPM | SYSTOLIC BLOOD PRESSURE: 125 MMHG | TEMPERATURE: 98.6 F | HEART RATE: 61 BPM | DIASTOLIC BLOOD PRESSURE: 69 MMHG

## 2019-09-20 VITALS
TEMPERATURE: 98.6 F | OXYGEN SATURATION: 97 % | DIASTOLIC BLOOD PRESSURE: 76 MMHG | SYSTOLIC BLOOD PRESSURE: 122 MMHG | RESPIRATION RATE: 16 BRPM | HEART RATE: 60 BPM

## 2019-09-20 PROCEDURE — G0151 HHCP-SERV OF PT,EA 15 MIN: HCPCS

## 2019-09-20 PROCEDURE — G0158 HHC OT ASSISTANT EA 15: HCPCS

## 2019-09-23 ENCOUNTER — HOME CARE VISIT (OUTPATIENT)
Dept: SCHEDULING | Facility: HOME HEALTH | Age: 84
End: 2019-09-23
Payer: MEDICARE

## 2019-09-23 VITALS
SYSTOLIC BLOOD PRESSURE: 118 MMHG | OXYGEN SATURATION: 97 % | RESPIRATION RATE: 16 BRPM | HEART RATE: 74 BPM | DIASTOLIC BLOOD PRESSURE: 57 MMHG | TEMPERATURE: 98.2 F

## 2019-09-23 VITALS
TEMPERATURE: 98.7 F | DIASTOLIC BLOOD PRESSURE: 58 MMHG | SYSTOLIC BLOOD PRESSURE: 112 MMHG | OXYGEN SATURATION: 97 % | HEART RATE: 59 BPM | RESPIRATION RATE: 16 BRPM

## 2019-09-23 PROCEDURE — G0151 HHCP-SERV OF PT,EA 15 MIN: HCPCS

## 2019-09-23 PROCEDURE — G0158 HHC OT ASSISTANT EA 15: HCPCS

## 2019-09-24 ENCOUNTER — HOME CARE VISIT (OUTPATIENT)
Dept: SCHEDULING | Facility: HOME HEALTH | Age: 84
End: 2019-09-24
Payer: MEDICARE

## 2019-09-24 VITALS
SYSTOLIC BLOOD PRESSURE: 118 MMHG | RESPIRATION RATE: 16 BRPM | TEMPERATURE: 98 F | DIASTOLIC BLOOD PRESSURE: 68 MMHG | HEART RATE: 57 BPM | OXYGEN SATURATION: 97 %

## 2019-09-24 PROCEDURE — G0151 HHCP-SERV OF PT,EA 15 MIN: HCPCS

## 2019-09-25 ENCOUNTER — HOME CARE VISIT (OUTPATIENT)
Dept: SCHEDULING | Facility: HOME HEALTH | Age: 84
End: 2019-09-25
Payer: MEDICARE

## 2019-09-26 ENCOUNTER — HOME CARE VISIT (OUTPATIENT)
Dept: SCHEDULING | Facility: HOME HEALTH | Age: 84
End: 2019-09-26
Payer: MEDICARE

## 2019-09-26 VITALS
DIASTOLIC BLOOD PRESSURE: 60 MMHG | HEART RATE: 61 BPM | OXYGEN SATURATION: 97 % | RESPIRATION RATE: 16 BRPM | TEMPERATURE: 98.4 F | SYSTOLIC BLOOD PRESSURE: 120 MMHG

## 2019-09-26 PROCEDURE — G0158 HHC OT ASSISTANT EA 15: HCPCS

## 2019-09-27 ENCOUNTER — HOME CARE VISIT (OUTPATIENT)
Dept: SCHEDULING | Facility: HOME HEALTH | Age: 84
End: 2019-09-27
Payer: MEDICARE

## 2019-09-27 VITALS
RESPIRATION RATE: 16 BRPM | TEMPERATURE: 98.8 F | HEART RATE: 70 BPM | DIASTOLIC BLOOD PRESSURE: 70 MMHG | SYSTOLIC BLOOD PRESSURE: 130 MMHG | OXYGEN SATURATION: 93 %

## 2019-09-27 PROCEDURE — G0151 HHCP-SERV OF PT,EA 15 MIN: HCPCS

## 2019-09-30 ENCOUNTER — HOME CARE VISIT (OUTPATIENT)
Dept: SCHEDULING | Facility: HOME HEALTH | Age: 84
End: 2019-09-30
Payer: MEDICARE

## 2019-09-30 VITALS
RESPIRATION RATE: 16 BRPM | HEART RATE: 69 BPM | DIASTOLIC BLOOD PRESSURE: 68 MMHG | SYSTOLIC BLOOD PRESSURE: 130 MMHG | OXYGEN SATURATION: 98 % | TEMPERATURE: 97.7 F

## 2019-09-30 PROCEDURE — G0151 HHCP-SERV OF PT,EA 15 MIN: HCPCS

## 2019-09-30 PROCEDURE — G0156 HHCP-SVS OF AIDE,EA 15 MIN: HCPCS

## 2019-10-01 ENCOUNTER — HOME CARE VISIT (OUTPATIENT)
Dept: SCHEDULING | Facility: HOME HEALTH | Age: 84
End: 2019-10-01
Payer: MEDICARE

## 2019-10-01 ENCOUNTER — HOME CARE VISIT (OUTPATIENT)
Dept: HOME HEALTH SERVICES | Facility: HOME HEALTH | Age: 84
End: 2019-10-01
Payer: MEDICARE

## 2019-10-01 VITALS
HEART RATE: 60 BPM | RESPIRATION RATE: 18 BRPM | TEMPERATURE: 98.5 F | OXYGEN SATURATION: 98 % | DIASTOLIC BLOOD PRESSURE: 60 MMHG | SYSTOLIC BLOOD PRESSURE: 120 MMHG

## 2019-10-01 PROCEDURE — G0151 HHCP-SERV OF PT,EA 15 MIN: HCPCS

## 2019-10-01 PROCEDURE — G0158 HHC OT ASSISTANT EA 15: HCPCS

## 2019-10-03 ENCOUNTER — HOME CARE VISIT (OUTPATIENT)
Dept: HOME HEALTH SERVICES | Facility: HOME HEALTH | Age: 84
End: 2019-10-03
Payer: MEDICARE

## 2019-10-03 ENCOUNTER — HOME CARE VISIT (OUTPATIENT)
Dept: SCHEDULING | Facility: HOME HEALTH | Age: 84
End: 2019-10-03
Payer: MEDICARE

## 2019-10-03 VITALS
SYSTOLIC BLOOD PRESSURE: 128 MMHG | OXYGEN SATURATION: 94 % | RESPIRATION RATE: 16 BRPM | HEART RATE: 55 BPM | TEMPERATURE: 98.6 F | DIASTOLIC BLOOD PRESSURE: 58 MMHG

## 2019-10-03 VITALS
HEART RATE: 55 BPM | RESPIRATION RATE: 18 BRPM | SYSTOLIC BLOOD PRESSURE: 128 MMHG | TEMPERATURE: 98.6 F | DIASTOLIC BLOOD PRESSURE: 58 MMHG | OXYGEN SATURATION: 94 %

## 2019-10-03 PROCEDURE — G0152 HHCP-SERV OF OT,EA 15 MIN: HCPCS

## 2019-10-03 PROCEDURE — G0156 HHCP-SVS OF AIDE,EA 15 MIN: HCPCS

## 2019-10-03 PROCEDURE — G0151 HHCP-SERV OF PT,EA 15 MIN: HCPCS

## 2020-01-18 ENCOUNTER — APPOINTMENT (OUTPATIENT)
Dept: GENERAL RADIOLOGY | Age: 85
End: 2020-01-18
Attending: EMERGENCY MEDICINE
Payer: MEDICARE

## 2020-01-18 ENCOUNTER — HOSPITAL ENCOUNTER (OUTPATIENT)
Age: 85
Setting detail: OBSERVATION
Discharge: HOME | End: 2020-01-20
Attending: EMERGENCY MEDICINE | Admitting: HOSPITALIST
Payer: MEDICARE

## 2020-01-18 ENCOUNTER — APPOINTMENT (OUTPATIENT)
Dept: CT IMAGING | Age: 85
End: 2020-01-18
Attending: EMERGENCY MEDICINE
Payer: MEDICARE

## 2020-01-18 DIAGNOSIS — R09.02 HYPOXIA: ICD-10-CM

## 2020-01-18 DIAGNOSIS — R06.02 SOB (SHORTNESS OF BREATH): Primary | ICD-10-CM

## 2020-01-18 LAB
ALBUMIN SERPL-MCNC: 3.3 G/DL (ref 3.5–5)
ALBUMIN/GLOB SERPL: 0.8 {RATIO} (ref 1.1–2.2)
ALP SERPL-CCNC: 81 U/L (ref 45–117)
ALT SERPL-CCNC: 18 U/L (ref 12–78)
ANION GAP SERPL CALC-SCNC: 4 MMOL/L (ref 5–15)
APPEARANCE UR: ABNORMAL
AST SERPL-CCNC: 15 U/L (ref 15–37)
BASOPHILS # BLD: 0.1 K/UL (ref 0–0.1)
BASOPHILS NFR BLD: 1 % (ref 0–1)
BILIRUB SERPL-MCNC: 0.4 MG/DL (ref 0.2–1)
BILIRUB UR QL: NEGATIVE
BNP SERPL-MCNC: 1090 PG/ML
BUN SERPL-MCNC: 39 MG/DL (ref 6–20)
BUN/CREAT SERPL: 27 (ref 12–20)
CALCIUM SERPL-MCNC: 9.1 MG/DL (ref 8.5–10.1)
CHLORIDE SERPL-SCNC: 114 MMOL/L (ref 97–108)
CO2 SERPL-SCNC: 25 MMOL/L (ref 21–32)
COLOR UR: ABNORMAL
COMMENT, HOLDF: NORMAL
CREAT SERPL-MCNC: 1.44 MG/DL (ref 0.55–1.02)
DIFFERENTIAL METHOD BLD: ABNORMAL
EOSINOPHIL # BLD: 0.1 K/UL (ref 0–0.4)
EOSINOPHIL NFR BLD: 1 % (ref 0–7)
ERYTHROCYTE [DISTWIDTH] IN BLOOD BY AUTOMATED COUNT: 13.9 % (ref 11.5–14.5)
FLUAV AG NPH QL IA: NEGATIVE
FLUBV AG NOSE QL IA: NEGATIVE
GLOBULIN SER CALC-MCNC: 3.9 G/DL (ref 2–4)
GLUCOSE SERPL-MCNC: 79 MG/DL (ref 65–100)
GLUCOSE UR STRIP.AUTO-MCNC: NEGATIVE MG/DL
HCT VFR BLD AUTO: 43.1 % (ref 35–47)
HGB BLD-MCNC: 13.7 G/DL (ref 11.5–16)
HGB UR QL STRIP: NEGATIVE
IMM GRANULOCYTES # BLD AUTO: 0 K/UL (ref 0–0.04)
IMM GRANULOCYTES NFR BLD AUTO: 1 % (ref 0–0.5)
KETONES UR QL STRIP.AUTO: NEGATIVE MG/DL
LEUKOCYTE ESTERASE UR QL STRIP.AUTO: NEGATIVE
LYMPHOCYTES # BLD: 2.4 K/UL (ref 0.8–3.5)
LYMPHOCYTES NFR BLD: 32 % (ref 12–49)
MCH RBC QN AUTO: 30.5 PG (ref 26–34)
MCHC RBC AUTO-ENTMCNC: 31.8 G/DL (ref 30–36.5)
MCV RBC AUTO: 96 FL (ref 80–99)
MONOCYTES # BLD: 0.6 K/UL (ref 0–1)
MONOCYTES NFR BLD: 9 % (ref 5–13)
NEUTS SEG # BLD: 4.2 K/UL (ref 1.8–8)
NEUTS SEG NFR BLD: 56 % (ref 32–75)
NITRITE UR QL STRIP.AUTO: NEGATIVE
NRBC # BLD: 0 K/UL (ref 0–0.01)
NRBC BLD-RTO: 0 PER 100 WBC
PH UR STRIP: 7.5 [PH] (ref 5–8)
PLATELET # BLD AUTO: 224 K/UL (ref 150–400)
PMV BLD AUTO: 10.9 FL (ref 8.9–12.9)
POTASSIUM SERPL-SCNC: 4.7 MMOL/L (ref 3.5–5.1)
PROT SERPL-MCNC: 7.2 G/DL (ref 6.4–8.2)
PROT UR STRIP-MCNC: NEGATIVE MG/DL
RBC # BLD AUTO: 4.49 M/UL (ref 3.8–5.2)
SAMPLES BEING HELD,HOLD: NORMAL
SODIUM SERPL-SCNC: 143 MMOL/L (ref 136–145)
SP GR UR REFRACTOMETRY: 1.01 (ref 1–1.03)
TROPONIN I SERPL-MCNC: <0.05 NG/ML
UROBILINOGEN UR QL STRIP.AUTO: 0.2 EU/DL (ref 0.2–1)
WBC # BLD AUTO: 7.4 K/UL (ref 3.6–11)

## 2020-01-18 PROCEDURE — 71046 X-RAY EXAM CHEST 2 VIEWS: CPT

## 2020-01-18 PROCEDURE — 96365 THER/PROPH/DIAG IV INF INIT: CPT

## 2020-01-18 PROCEDURE — 71250 CT THORAX DX C-: CPT

## 2020-01-18 PROCEDURE — 74011250636 HC RX REV CODE- 250/636: Performed by: HOSPITALIST

## 2020-01-18 PROCEDURE — 65270000029 HC RM PRIVATE

## 2020-01-18 PROCEDURE — 99285 EMERGENCY DEPT VISIT HI MDM: CPT

## 2020-01-18 PROCEDURE — 81003 URINALYSIS AUTO W/O SCOPE: CPT

## 2020-01-18 PROCEDURE — 96375 TX/PRO/DX INJ NEW DRUG ADDON: CPT

## 2020-01-18 PROCEDURE — 99218 HC RM OBSERVATION: CPT

## 2020-01-18 PROCEDURE — 83880 ASSAY OF NATRIURETIC PEPTIDE: CPT

## 2020-01-18 PROCEDURE — 80053 COMPREHEN METABOLIC PANEL: CPT

## 2020-01-18 PROCEDURE — 74011000250 HC RX REV CODE- 250: Performed by: EMERGENCY MEDICINE

## 2020-01-18 PROCEDURE — 74011636637 HC RX REV CODE- 636/637: Performed by: HOSPITALIST

## 2020-01-18 PROCEDURE — 96372 THER/PROPH/DIAG INJ SC/IM: CPT

## 2020-01-18 PROCEDURE — 93005 ELECTROCARDIOGRAM TRACING: CPT

## 2020-01-18 PROCEDURE — 74011250636 HC RX REV CODE- 250/636: Performed by: EMERGENCY MEDICINE

## 2020-01-18 PROCEDURE — 85025 COMPLETE CBC W/AUTO DIFF WBC: CPT

## 2020-01-18 PROCEDURE — 84484 ASSAY OF TROPONIN QUANT: CPT

## 2020-01-18 PROCEDURE — 36415 COLL VENOUS BLD VENIPUNCTURE: CPT

## 2020-01-18 PROCEDURE — 74011250637 HC RX REV CODE- 250/637: Performed by: HOSPITALIST

## 2020-01-18 PROCEDURE — 87086 URINE CULTURE/COLONY COUNT: CPT

## 2020-01-18 PROCEDURE — 77030038269 HC DRN EXT URIN PURWCK BARD -A

## 2020-01-18 PROCEDURE — 87804 INFLUENZA ASSAY W/OPTIC: CPT

## 2020-01-18 RX ORDER — SODIUM CHLORIDE 0.9 % (FLUSH) 0.9 %
5-40 SYRINGE (ML) INJECTION AS NEEDED
Status: DISCONTINUED | OUTPATIENT
Start: 2020-01-18 | End: 2020-01-20 | Stop reason: HOSPADM

## 2020-01-18 RX ORDER — ATORVASTATIN CALCIUM 10 MG/1
10 TABLET, FILM COATED ORAL
Status: DISCONTINUED | OUTPATIENT
Start: 2020-01-18 | End: 2020-01-20 | Stop reason: HOSPADM

## 2020-01-18 RX ORDER — AMLODIPINE BESYLATE 5 MG/1
5 TABLET ORAL DAILY
Status: DISCONTINUED | OUTPATIENT
Start: 2020-01-19 | End: 2020-01-20 | Stop reason: HOSPADM

## 2020-01-18 RX ORDER — GLUCOSAM/CHONDRO/HERB 149/HYAL 750-100 MG
2 TABLET ORAL DAILY
Status: DISCONTINUED | OUTPATIENT
Start: 2020-01-19 | End: 2020-01-20 | Stop reason: HOSPADM

## 2020-01-18 RX ORDER — ENOXAPARIN SODIUM 100 MG/ML
30 INJECTION SUBCUTANEOUS EVERY 24 HOURS
Status: DISCONTINUED | OUTPATIENT
Start: 2020-01-18 | End: 2020-01-20 | Stop reason: HOSPADM

## 2020-01-18 RX ORDER — PREGABALIN 50 MG/1
50 CAPSULE ORAL
Status: DISCONTINUED | OUTPATIENT
Start: 2020-01-18 | End: 2020-01-20 | Stop reason: HOSPADM

## 2020-01-18 RX ORDER — ASPIRIN 81 MG/1
81 TABLET ORAL
Status: DISCONTINUED | OUTPATIENT
Start: 2020-01-18 | End: 2020-01-20 | Stop reason: HOSPADM

## 2020-01-18 RX ORDER — LISINOPRIL 5 MG/1
10 TABLET ORAL DAILY
Status: CANCELLED | OUTPATIENT
Start: 2020-01-19

## 2020-01-18 RX ORDER — SODIUM CHLORIDE 0.9 % (FLUSH) 0.9 %
5-40 SYRINGE (ML) INJECTION EVERY 8 HOURS
Status: DISCONTINUED | OUTPATIENT
Start: 2020-01-18 | End: 2020-01-20 | Stop reason: HOSPADM

## 2020-01-18 RX ORDER — AZITHROMYCIN 250 MG/1
250 TABLET, FILM COATED ORAL DAILY
Status: DISCONTINUED | OUTPATIENT
Start: 2020-01-19 | End: 2020-01-19

## 2020-01-18 RX ORDER — ACETAMINOPHEN 500 MG
500 TABLET ORAL
Status: DISCONTINUED | OUTPATIENT
Start: 2020-01-18 | End: 2020-01-20 | Stop reason: HOSPADM

## 2020-01-18 RX ADMIN — ATORVASTATIN CALCIUM 10 MG: 10 TABLET, FILM COATED ORAL at 21:27

## 2020-01-18 RX ADMIN — ENOXAPARIN SODIUM 30 MG: 30 INJECTION SUBCUTANEOUS at 21:28

## 2020-01-18 RX ADMIN — PREGABALIN 50 MG: 50 CAPSULE ORAL at 21:27

## 2020-01-18 RX ADMIN — PREDNISONE 30 MG: 5 TABLET ORAL at 17:25

## 2020-01-18 RX ADMIN — ASPIRIN 81 MG: 81 TABLET, COATED ORAL at 21:27

## 2020-01-18 RX ADMIN — AZITHROMYCIN MONOHYDRATE 500 MG: 500 INJECTION, POWDER, LYOPHILIZED, FOR SOLUTION INTRAVENOUS at 14:57

## 2020-01-18 RX ADMIN — Medication 10 ML: at 21:28

## 2020-01-18 RX ADMIN — WATER 2 G: 1 INJECTION INTRAMUSCULAR; INTRAVENOUS; SUBCUTANEOUS at 14:53

## 2020-01-18 RX ADMIN — SODIUM CHLORIDE 500 ML: 900 INJECTION, SOLUTION INTRAVENOUS at 14:23

## 2020-01-18 NOTE — ED TRIAGE NOTES
Patient arrives via EMS for shortness of breath and general malaise since yesterday, denies any CP. Per EMS patient hypoxic at 89-90% on room air with clear lung sounds. . History of HTN and high cholestrol.  Patient reports burning with urination x1 day

## 2020-01-18 NOTE — ED PROVIDER NOTES
69-year-old female with history of CAD, femur fracture, GERD, HTN, HLD, with recent URI type symptoms up a couple of weeks ago presents to the emergency department noting progressively worsening shortness of breath and increased fatigue since yesterday. She denies any fever chills, chest pain but has been having persistent intermittent cough. Denies any other URI symptoms, nausea, vomiting, diarrhea, abdominal pain. EMS was called and found the patient to be hypoxic in the high 80s on room air and was transported to the ED on 2 L by nasal cannula with improvement in her symptoms. Additionally the patient notes some dysuria for the past 1 to 2 days and expresses some concern for urinary tract infection possibly.            Past Medical History:   Diagnosis Date    CAD (coronary artery disease)     Femur fracture (HCC)     right    GERD (gastroesophageal reflux disease)     HTN (hypertension), benign 2011    Mixed hyperlipidemia 2011       Past Surgical History:   Procedure Laterality Date    HX FEMUR FRACTURE TX      HX KNEE REPLACEMENT      bilateral    HX ORTHOPAEDIC      bilat knee replacements    HX VEIN STRIPPING           Family History:   Problem Relation Age of Onset    Other Mother          of renal failure, not sure what caused id    Other Father         something with throat, not sure if it was cancer    Heart Disease Sister        Social History     Socioeconomic History    Marital status:      Spouse name: Not on file    Number of children: Not on file    Years of education: Not on file    Highest education level: Not on file   Occupational History    Not on file   Social Needs    Financial resource strain: Not on file    Food insecurity:     Worry: Not on file     Inability: Not on file    Transportation needs:     Medical: Not on file     Non-medical: Not on file   Tobacco Use    Smoking status: Never Smoker    Smokeless tobacco: Never Used   Substance and Sexual Activity    Alcohol use: No    Drug use: No    Sexual activity: Not on file   Lifestyle    Physical activity:     Days per week: Not on file     Minutes per session: Not on file    Stress: Not on file   Relationships    Social connections:     Talks on phone: Not on file     Gets together: Not on file     Attends [employer] service: Not on file     Active member of club or organization: Not on file     Attends meetings of clubs or organizations: Not on file     Relationship status: Not on file    Intimate partner violence:     Fear of current or ex partner: Not on file     Emotionally abused: Not on file     Physically abused: Not on file     Forced sexual activity: Not on file   Other Topics Concern    Not on file   Social History Narrative    Not on file         ALLERGIES: Sulfa (sulfonamide antibiotics); Ampicillin; and Lescol [fluvastatin]    Review of Systems   Constitutional: Positive for activity change and fatigue. Negative for appetite change, chills and fever. HENT: Negative for congestion, rhinorrhea, sinus pressure, sneezing and sore throat. Eyes: Negative for photophobia and visual disturbance. Respiratory: Positive for cough and shortness of breath. Negative for chest tightness. Cardiovascular: Negative for chest pain and leg swelling. Gastrointestinal: Negative for abdominal pain, blood in stool, constipation, diarrhea, nausea and vomiting. Genitourinary: Negative for difficulty urinating, dysuria, flank pain, frequency, hematuria, menstrual problem, urgency, vaginal bleeding and vaginal discharge. Musculoskeletal: Negative for arthralgias, back pain, myalgias and neck pain. Skin: Negative for rash and wound. Neurological: Negative for syncope, weakness, numbness and headaches. Psychiatric/Behavioral: Negative for self-injury and suicidal ideas. All other systems reviewed and are negative.       Vitals:    01/18/20 1224   BP: 153/64   Pulse: 88   Resp: 18   Temp: 97.8 °F (36.6 °C)   SpO2: (!) 89%   Weight: 81.6 kg (180 lb)   Height: 5' 4\" (1.626 m)            Physical Exam  Vitals signs and nursing note reviewed. Constitutional:       General: She is not in acute distress. Appearance: She is well-developed. She is not diaphoretic. HENT:      Head: Normocephalic and atraumatic. Nose: Nose normal.      Comments: Nasal cannula in place  Eyes:      Conjunctiva/sclera: Conjunctivae normal.      Pupils: Pupils are equal, round, and reactive to light. Neck:      Musculoskeletal: Neck supple. Cardiovascular:      Rate and Rhythm: Normal rate and regular rhythm. Heart sounds: Normal heart sounds. Pulmonary:      Effort: Pulmonary effort is normal.      Breath sounds: Normal breath sounds. Abdominal:      General: There is no distension. Palpations: Abdomen is soft. Tenderness: There is no tenderness. Musculoskeletal:         General: No tenderness. Skin:     General: Skin is warm and dry. Neurological:      Mental Status: She is alert and oriented to person, place, and time. GCS: GCS eye subscore is 4. GCS verbal subscore is 5. GCS motor subscore is 6. Cranial Nerves: No cranial nerve deficit. Sensory: No sensory deficit. Coordination: Coordination normal.          MDM   61-year-old female presents with hypoxia on room air, but stable on 2 L by nasal cannula. History suggestive of post viral pneumonia    CXR viewed by myself and read by radiology showing no acute abnormalities. Labs returned showing BUN 39, creatinine 1.44, BNP 1090 but negative troponin, no leukocytosis nor anemia. Influenza swab negative  UA negative for infection    Given negative chest x-ray but hypoxia concern for occult pneumonia not visualized on x-ray. No chest pain or pleurisy suggestive of pulmonary embolism. Poor renal function limits ability for CTA chest to rule out pulmonary embolism.      CT chest without contrast was ordered to further evaluate. We will give dose of empiric Rocephin, azitho. with suspicion for CAP. Procedures    1225 EKG shows normal sinus rhythm with a rate of 91 bpm with LVH, prior septal infarct but no acute ST elevation or depression. Hospitalist Perfect Serve for Admission  2:13 PM    ED Room Number: ER07/07  Patient Name and age:  Kelli Doll 80 y.o.  female  Working Diagnosis:   1. SOB (shortness of breath)    2. Hypoxia      Readmission: no  Isolation Requirements:  no  Recommended Level of Care:  telemetry  Code Status:  Do Not Resuscitate  Department:[facility] ED - [phone]  Other: Recent viral type URI symptoms now with acute worsening shortness of breath and new hypoxia starting last night. No history of prior COPD, asthma, on no home O2. Stable on 2 L by nasal cannula. Chest x-ray negative, concern for occult pneumonia, ordered CT chest to further evaluate.   No chest pain, renal function too poor to tolerate CTA chest.  will give empiric antibiotics and small IV fluid bolus

## 2020-01-18 NOTE — PROGRESS NOTES
TRANSFER - IN REPORT:    Verbal report received from [address] [place] [address] Uday RN(name) on Natalie Monsalve  being received from ED(unit) for routine progression of care      Report consisted of patients Situation, Background, Assessment and   Recommendations(SBAR). Information from the following report(s) SBAR and Kardex was reviewed with the receiving nurse. Opportunity for questions and clarification was provided. Assessment completed upon patients arrival to unit and care assumed.

## 2020-01-18 NOTE — ED NOTES
TRANSFER - OUT REPORT:    Verbal report given to Evonne(name) on Anahi Hancock  being transferred to 5th floor(unit) for routine progression of care       Report consisted of patients Situation, Background, Assessment and   Recommendations(SBAR). Information from the following report(s) SBAR, ED Summary and MAR was reviewed with the receiving nurse. Lines:   Peripheral IV 01/18/20 Right Antecubital (Active)   Site Assessment Clean, dry, & intact 1/18/2020  2:52 PM   Phlebitis Assessment 0 1/18/2020  2:52 PM   Infiltration Assessment 0 1/18/2020  2:52 PM   Dressing Status Clean, dry, & intact 1/18/2020  2:52 PM   Dressing Type Transparent 1/18/2020  2:52 PM   Hub Color/Line Status Pink 1/18/2020  2:52 PM        Opportunity for questions and clarification was provided.       Patient transported with:   O2 @ 3 liters, transport

## 2020-01-18 NOTE — PROGRESS NOTES
[facility] Dosing Services: 1/18/20    Pharmacy note for Dr Dodie De La Rosa regarding Lovenox renal dose adjustment:    Wt Readings from Last 1 Encounters:   01/18/20 81.6 kg (180 lb)       Ht Readings from Last 1 Encounters:   01/18/20 162.6 cm (64\")           Previous Dose 40mg sq q24h   Creatinine Clearance Estimated Creatinine Clearance: 24.2 mL/min (A) (based on SCr of 1.44 mg/dL (H)). Creatinine Lab Results   Component Value Date/Time    Creatinine 1.44 (H) 01/18/2020 12:32 PM       Platelet Lab Results   Component Value Date/Time    PLATELET 722 66/21/6367 12:32 PM      H/H Lab Results   Component Value Date/Time    HGB 13.7 01/18/2020 12:32 PM           Pharmacist made change to enoxaparin therapy based on:  [ x ] Renal function: dose changed to: 30mg sq q 24h      Pharmacy to automatically make dose adjustment for renal dysfunction (creatinine clearance less than 30 mL/min)  Pharmacy to make dose rounding adjustments per [facility] dose adjustment scale. Pharmacy to monitor patients progress. Will make dose adjustment as needed per changing renal function. Will communicate further recommendations regarding patients anticoagulation therapy with prescriber.     Mart Ellison PHARMD . Contact information: [phone]

## 2020-01-18 NOTE — PROGRESS NOTES
Primary Nurse Rangel Solis RN and Killian Crowder RN performed a dual skin assessment on this patient Impairment noted- see wound doc flow sheet  John score is 16

## 2020-01-18 NOTE — PROGRESS NOTES
HP dictated     Hypoxia due to URI. Check flu swab/ sputum gram stain and culture. CT ordered pending.

## 2020-01-19 ENCOUNTER — APPOINTMENT (OUTPATIENT)
Dept: NON INVASIVE DIAGNOSTICS | Age: 85
End: 2020-01-19
Attending: HOSPITALIST
Payer: MEDICARE

## 2020-01-19 LAB
ANION GAP SERPL CALC-SCNC: 7 MMOL/L (ref 5–15)
ARTERIAL PATENCY WRIST A: ABNORMAL
AV PEAK GRADIENT: 41.27 MMHG
AV VELOCITY RATIO: 0.89
B PERT DNA SPEC QL NAA+PROBE: NOT DETECTED
BASE DEFICIT BLDA-SCNC: 2.8 MMOL/L
BDY SITE: ABNORMAL
BORDETELLA PARAPERTUSSIS PCR, BORPAR: NOT DETECTED
BUN SERPL-MCNC: 31 MG/DL (ref 6–20)
BUN/CREAT SERPL: 27 (ref 12–20)
C PNEUM DNA SPEC QL NAA+PROBE: NOT DETECTED
CALCIUM SERPL-MCNC: 8.3 MG/DL (ref 8.5–10.1)
CHLORIDE SERPL-SCNC: 114 MMOL/L (ref 97–108)
CO2 SERPL-SCNC: 20 MMOL/L (ref 21–32)
CREAT SERPL-MCNC: 1.13 MG/DL (ref 0.55–1.02)
ECHO AO ROOT DIAM: 3.39 CM
ECHO AV AREA PEAK VELOCITY: 3.6 CM2
ECHO AV AREA/BSA PEAK VELOCITY: 2.6 CM2/M2
ECHO AV PEAK GRADIENT: 6.1 MMHG
ECHO AV PEAK VELOCITY: 123.84 CM/S
ECHO AV REGURGITANT PHT: 982.1 CM
ECHO EST RA PRESSURE: 8 MMHG
ECHO LA AREA 4C: 25.7 CM2
ECHO LA MAJOR AXIS: 3.07 CM
ECHO LA TO AORTIC ROOT RATIO: 0.9
ECHO LA VOL 2C: 67.59 ML (ref 22–52)
ECHO LA VOL 4C: 89.41 ML (ref 22–52)
ECHO LA VOL BP: 79.5 ML (ref 22–52)
ECHO LA VOL/BSA BIPLANE: 42.52 ML/M2 (ref 16–28)
ECHO LA VOLUME INDEX A2C: 36.15 ML/M2 (ref 16–28)
ECHO LA VOLUME INDEX A4C: 47.82 ML/M2 (ref 16–28)
ECHO LV E' LATERAL VELOCITY: 8.1 CENTIMETER/SECOND
ECHO LV E' SEPTAL VELOCITY: 5.96 CENTIMETER/SECOND
ECHO LV EDV TEICHHOLZ: 33.81 ML
ECHO LV ESV TEICHHOLZ: 9.43 ML
ECHO LV INTERNAL DIMENSION DIASTOLIC: 3.38 CM (ref 3.9–5.3)
ECHO LV INTERNAL DIMENSION SYSTOLIC: 2.02 CM
ECHO LV IVSD: 1.19 CM (ref 0.6–0.9)
ECHO LV MASS 2D: 83.3 G (ref 67–162)
ECHO LV MASS INDEX 2D: 44.5 G/M2 (ref 43–95)
ECHO LV POSTERIOR WALL DIASTOLIC: 0.52 CM (ref 0.6–0.9)
ECHO LVOT DIAM: 2.27 CM
ECHO LVOT PEAK GRADIENT: 4.9 MMHG
ECHO LVOT PEAK VELOCITY: 110.58 CM/S
ECHO MV A VELOCITY: 141.88 CM/S
ECHO MV AREA PHT: 3.6 CM2
ECHO MV E DECELERATION TIME (DT): 213.1 MS
ECHO MV E VELOCITY: 112.11 CM/S
ECHO MV E/A RATIO: 0.79
ECHO MV PRESSURE HALF TIME (PHT): 61.8 MS
ECHO PULMONARY ARTERY SYSTOLIC PRESSURE (PASP): 49.1 MMHG
ECHO PV MAX VELOCITY: 67.67 CM/S
ECHO PV PEAK GRADIENT: 1.8 MMHG
ECHO RIGHT VENTRICULAR SYSTOLIC PRESSURE (RVSP): 49.1 MMHG
ECHO RV INTERNAL DIMENSION: 3.43 CM
ECHO RV TAPSE: 2.14 CM (ref 1.5–2)
ECHO TV REGURGITANT MAX VELOCITY: 320.51 CM/S
ECHO TV REGURGITANT PEAK GRADIENT: 41.1 MMHG
ERYTHROCYTE [DISTWIDTH] IN BLOOD BY AUTOMATED COUNT: 13.6 % (ref 11.5–14.5)
FIO2 ON VENT: 21 %
FLUAV H1 2009 PAND RNA SPEC QL NAA+PROBE: NOT DETECTED
FLUAV H1 RNA SPEC QL NAA+PROBE: NOT DETECTED
FLUAV H3 RNA SPEC QL NAA+PROBE: NOT DETECTED
FLUAV SUBTYP SPEC NAA+PROBE: NOT DETECTED
FLUBV RNA SPEC QL NAA+PROBE: NOT DETECTED
GLUCOSE SERPL-MCNC: 136 MG/DL (ref 65–100)
HADV DNA SPEC QL NAA+PROBE: NOT DETECTED
HCO3 BLDA-SCNC: 20 MMOL/L (ref 22–26)
HCOV 229E RNA SPEC QL NAA+PROBE: NOT DETECTED
HCOV HKU1 RNA SPEC QL NAA+PROBE: NOT DETECTED
HCOV NL63 RNA SPEC QL NAA+PROBE: NOT DETECTED
HCOV OC43 RNA SPEC QL NAA+PROBE: NOT DETECTED
HCT VFR BLD AUTO: 39.7 % (ref 35–47)
HGB BLD-MCNC: 12.6 G/DL (ref 11.5–16)
HMPV RNA SPEC QL NAA+PROBE: NOT DETECTED
HPIV1 RNA SPEC QL NAA+PROBE: NOT DETECTED
HPIV2 RNA SPEC QL NAA+PROBE: NOT DETECTED
HPIV3 RNA SPEC QL NAA+PROBE: NOT DETECTED
HPIV4 RNA SPEC QL NAA+PROBE: NOT DETECTED
LVFS 2D: 40.26 %
LVSV (TEICH): 24.39 ML
M PNEUMO DNA SPEC QL NAA+PROBE: NOT DETECTED
MCH RBC QN AUTO: 30.1 PG (ref 26–34)
MCHC RBC AUTO-ENTMCNC: 31.7 G/DL (ref 30–36.5)
MCV RBC AUTO: 95 FL (ref 80–99)
MV DEC SLOPE: 8.3
NRBC # BLD: 0 K/UL (ref 0–0.01)
NRBC BLD-RTO: 0 PER 100 WBC
PCO2 BLDA: 30 MMHG (ref 35–45)
PH BLDA: 7.44 [PH] (ref 7.35–7.45)
PISA AR MAX VEL: 321.21 CM/S
PLATELET # BLD AUTO: 210 K/UL (ref 150–400)
PMV BLD AUTO: 11.1 FL (ref 8.9–12.9)
PO2 BLDA: 51 MMHG (ref 80–100)
POTASSIUM SERPL-SCNC: 5.3 MMOL/L (ref 3.5–5.1)
PULMONARY ARTERY END DIASTOLIC PRESSURE: 11.7 MMHG
PULMONARY ARTERY MEAN PRESURE: 24.1 MMHG
PV END DIASTOLIC VELOCITY: 1 MMHG
RBC # BLD AUTO: 4.18 M/UL (ref 3.8–5.2)
RSV RNA SPEC QL NAA+PROBE: NOT DETECTED
RV+EV RNA SPEC QL NAA+PROBE: NOT DETECTED
SAO2 % BLD: 88 % (ref 92–97)
SAO2% DEVICE SAO2% SENSOR NAME: ABNORMAL
SERVICE CMNT-IMP: ABNORMAL
SODIUM SERPL-SCNC: 141 MMOL/L (ref 136–145)
SPECIMEN SITE: ABNORMAL
WBC # BLD AUTO: 5.1 K/UL (ref 3.6–11)

## 2020-01-19 PROCEDURE — 65270000029 HC RM PRIVATE

## 2020-01-19 PROCEDURE — 93306 TTE W/DOPPLER COMPLETE: CPT

## 2020-01-19 PROCEDURE — 77030029684 HC NEB SM VOL KT MONA -A

## 2020-01-19 PROCEDURE — 74011000250 HC RX REV CODE- 250: Performed by: HOSPITALIST

## 2020-01-19 PROCEDURE — 36600 WITHDRAWAL OF ARTERIAL BLOOD: CPT

## 2020-01-19 PROCEDURE — 36415 COLL VENOUS BLD VENIPUNCTURE: CPT

## 2020-01-19 PROCEDURE — 74011250637 HC RX REV CODE- 250/637: Performed by: HOSPITALIST

## 2020-01-19 PROCEDURE — 94640 AIRWAY INHALATION TREATMENT: CPT

## 2020-01-19 PROCEDURE — 77030038269 HC DRN EXT URIN PURWCK BARD -A

## 2020-01-19 PROCEDURE — 0100U RESPIRATORY PANEL,PCR,NASOPHARYNGEAL: CPT

## 2020-01-19 PROCEDURE — 77010033678 HC OXYGEN DAILY

## 2020-01-19 PROCEDURE — 80048 BASIC METABOLIC PNL TOTAL CA: CPT

## 2020-01-19 PROCEDURE — 82803 BLOOD GASES ANY COMBINATION: CPT

## 2020-01-19 PROCEDURE — 74011636637 HC RX REV CODE- 636/637: Performed by: HOSPITALIST

## 2020-01-19 PROCEDURE — 96372 THER/PROPH/DIAG INJ SC/IM: CPT

## 2020-01-19 PROCEDURE — 99218 HC RM OBSERVATION: CPT

## 2020-01-19 PROCEDURE — 74011250636 HC RX REV CODE- 250/636: Performed by: HOSPITALIST

## 2020-01-19 PROCEDURE — 85027 COMPLETE CBC AUTOMATED: CPT

## 2020-01-19 RX ORDER — IPRATROPIUM BROMIDE AND ALBUTEROL SULFATE 2.5; .5 MG/3ML; MG/3ML
3 SOLUTION RESPIRATORY (INHALATION)
Status: DISCONTINUED | OUTPATIENT
Start: 2020-01-20 | End: 2020-01-20 | Stop reason: HOSPADM

## 2020-01-19 RX ORDER — IPRATROPIUM BROMIDE AND ALBUTEROL SULFATE 2.5; .5 MG/3ML; MG/3ML
3 SOLUTION RESPIRATORY (INHALATION) EVERY 8 HOURS
Status: DISCONTINUED | OUTPATIENT
Start: 2020-01-19 | End: 2020-01-19

## 2020-01-19 RX ADMIN — PREGABALIN 50 MG: 50 CAPSULE ORAL at 22:18

## 2020-01-19 RX ADMIN — OMEGA-3 FATTY ACIDS CAP 1000 MG 2 CAPSULE: 1000 CAP at 09:12

## 2020-01-19 RX ADMIN — Medication 10 ML: at 22:19

## 2020-01-19 RX ADMIN — ATORVASTATIN CALCIUM 10 MG: 10 TABLET, FILM COATED ORAL at 22:18

## 2020-01-19 RX ADMIN — IPRATROPIUM BROMIDE AND ALBUTEROL SULFATE 3 ML: .5; 3 SOLUTION RESPIRATORY (INHALATION) at 14:20

## 2020-01-19 RX ADMIN — IPRATROPIUM BROMIDE AND ALBUTEROL SULFATE 3 ML: .5; 3 SOLUTION RESPIRATORY (INHALATION) at 19:48

## 2020-01-19 RX ADMIN — AZITHROMYCIN MONOHYDRATE 250 MG: 250 TABLET ORAL at 09:12

## 2020-01-19 RX ADMIN — ASPIRIN 81 MG: 81 TABLET, COATED ORAL at 22:18

## 2020-01-19 RX ADMIN — Medication 10 ML: at 06:00

## 2020-01-19 RX ADMIN — ENOXAPARIN SODIUM 30 MG: 30 INJECTION SUBCUTANEOUS at 22:17

## 2020-01-19 RX ADMIN — PREDNISONE 30 MG: 5 TABLET ORAL at 09:12

## 2020-01-19 RX ADMIN — AMLODIPINE BESYLATE 5 MG: 5 TABLET ORAL at 09:12

## 2020-01-19 NOTE — H&P
Chris De La Rosa [place] 79  HISTORY AND PHYSICAL    Name:  Andreina Mantilla  MR#:  [id number]  :  1924  ACCOUNT #:  [de-identified]  ADMIT DATE:  2020      PRIMARY CARE PHYSICIAN:      Ilya Mendoza MD    PRESENTING COMPLAINT:      Shortness of breath. HISTORY OF PRESENTING ILLNESS:      Patient is a 80-year-old  female who has previous history significant for coronary artery disease, hyperlipidemia, hypertension, neuropathy, she presents to the emergency room from home due to shortness of breath which is associated with cough. The patient's family members are on the bedside, they tell me that she has cold going on since . She was seen by Dr Leandra Mcneal. Her chest x-ray was done which was negative for pneumonia. She has taken two courses of steroids since then. Last night, she got acutely short of breath. She has a persistent cough with some sputum production. The patient denies having any fever or chills. In the emergency room, the patient was found to be hypoxic. On room air, her oxygen saturation was in mid 80s. Chest x-ray was done which was unremarkable . The patient tells me that she has no chest pain. No nausea, vomiting, or abdominal pain. She denies any fever. PAST MEDICAL HISTORY:      1. Coronary artery disease. 2.  Hypertension. 3.  Hyperlipidemia. 4.  Stage III chronic kidney disease. 5.  History of neuropathy. SOCIOECONOMIC HISTORY:      The patient does not smoke or drink. She lives alone at home. Code status is do not resuscitate. CURRENT MEDICATIONS PRIOR TO ADMISSION:        1. Tylenol. 2.  Norvasc 5 mg daily. 3.  Aspirin 81 daily. 4.  Lipitor 10 mg daily. 5.  Lisinopril 10 mg daily. 6.  Lyrica 50 mg daily. ALLERGIES:  INCLUDE SULFA, AMPICILLIN, AND LESCOL. REVIEW OF SYSTEMS:  Negative except as mentioned in history of present illness. All systems were reviewed. No positive finding was noted.     PHYSICAL EXAMINATION:  GENERAL:  The patient is a 41-year-old  female, not in any acute distress. VITAL SIGNS:  Temperature 97.8, blood pressure 152/64, pulse 88, respiratory rate of 18, and saturations 89% on room air, 94% on 3 L. HEENT:  Pupils equally reactive to light and accommodation. NECK:  Supple. There is no lymphadenopathy or JVD. CHEST:  Clear. No significant wheezing or crackles. CARDIOVASCULAR SYSTEM:  S1 and S2 regular. No murmur. No S3.  ABDOMEN:  No tenderness. No guarding or rigidity. Bowel sound are active. EXTREMITIES:  No pedal edema. Decreased peripheral pulses. No cyanosis or clubbing. CNS:  The patient is alert and oriented. Has normal strength, normal reflexes. Motor exam grossly is normal.  PSYCHE:  Unremarkable. LABORATORY DATA:  Revealed a white count of 7.4, hemoglobin of 13.7, hematocrit 43.1, MCV 96, platelet count is 799,234. Chemistry; sodium 143, potassium 4.7, chloride 114, bicarb is 25, gap of 4, glucose 79, BUN 39, creatinine is 1.44, bilirubin 0.4, protein 7.2, albumin 3.3, globulin is 3.9, ALT is 18, AST 15, alk phos 81. Troponin I less than 0.045, ProBNP is 1090. IMAGING DATA:  Chest x-ray shows no acute process. EKG is not done. ASSESSMENT AND PLAN:      The patient is a 41-year-old female who has previous history significant for hypertension, coronary artery disease, hyperlipidemia, and chronic kidney disease, who was admitted due to:    1. Cough, shortness of breath without fever, probably viral upper respiratory tract infection. The patient had a normal chest x-ray. A CT has been ordered but not done. ER physician has given her Rocephin and Zithromax. 2.  We will start empirically on Zithromax. If CT does not show pneumonia, antibiotics can be discontinued. 3.  Check influenza panel. 4.  Acute hypoxic respiratory failure. The patient will be started on supplemental oxygen. I have added low dose Prednisone.   5.  Chronic kidney disease. The patient is currently on lisinopril which will be held at this time. 6.  Deep vein thrombosis prophylaxis.       MD KALEB Lopez/[email or web address]  D:  01/18/2020 14:37  T:  JOB #:  [id number]

## 2020-01-19 NOTE — PROGRESS NOTES
Bedside shift change report given to Silvia Glez RN (oncoming nurse) by Peyton Roldan RN (offgoing nurse). Report included the following information SBAR and Kardex.

## 2020-01-19 NOTE — PROGRESS NOTES
Hospitalist Progress Note           2020  11:39 AM                       Patient:  Jared Strickland  PCP:  Alireza Scales MD  Date of admission:  2020    81 yo female with PMHx of CAD, HTN, HLD, Neuropathy admitted for SOB and cough. This has been ongoing issue since . Pt s/p 2 rounds of steroids since. No fever, chills, sore throate.      Assessment & Plan  Acute Respiratory failure with Hypoxia  - r/o CHF  - Not convinced this is infectious at this time   - ABG: pO2 51 on RA on admission   - improved Sat to 90s with 2L   - Wean o2  - DuoNeb q8h  - d/c prednisone as already had 2 rounds  - d/c Azithromycin, no signs of Pneumonia or bronchitis at this time  - ECHO to r/o LV dysfunction     Pam on CKD 3  - improved with hydration   - monitor   - avoid volume overload     CAD  - c/w ASA/Lipitor per home    Neuropathy  - c/w Gabapentin    HTN  - c/w Amlodipine      VTE prophylaxis: Lovenox   Follow-up labs/studies: AM labs  Discussed plan of care with Patient/Family and Nurse   Disposition:  Anticipate discharge to HOME      Subjective  Pt seen and examined  Sitting in bed  Appears comfortable on 2L  Still with cough and sob with exertion   No fever, chills   Review of systems otherwise as above     Physical examination  Visit Vitals  /67   Pulse 78   Temp 98.1 °F (36.7 °C)   Resp 18   Ht 5' 4\" (1.626 m)   Wt 81.6 kg (179 lb 14.3 oz)   SpO2 91%   BMI 30.88 kg/m²      Temp (24hrs), Av.9 °F (36.6 °C), Min:97.6 °F (36.4 °C), Max:98.4 °F (36.9 °C)     O2 Flow Rate (L/min): 4 l/min   O2 Device: Nasal cannula  Visit Vitals  /67   Pulse 78   Temp 98.1 °F (36.7 °C)   Resp 18   Ht 5' 4\" (1.626 m)   Wt 81.6 kg (179 lb 14.3 oz)   SpO2 91%   BMI 30.88 kg/m²    O2 Device: Nasal cannula  Visit Vitals  /67   Pulse 78   Temp 98.1 °F (36.7 °C)   Resp 18   Ht 5' 4\" (1.626 m)   Wt 81.6 kg (179 lb 14.3 oz)   SpO2 91%   BMI 30.88 kg/m²         Intake/Output Summary (Last 24 hours) at 1/19/2020 1139  Last data filed at 1/19/2020 1127  Gross per 24 hour   Intake 120 ml   Output 450 ml   Net -330 ml     Last shift:    01/19 0701 - 01/19 1900  In: 120 [P.O.:120]  Out: 450 [Urine:450]  Last 3 shifts:    No intake/output data recorded. General:   Alert, cooperative, no acute distress   Head:   No obvious abnormalities, atraumatic   Eyes:   Conjunctivae clear   Oropharynx:  Oral mucosa normal   Neck:  Supple, trachea midline, no adenopathy   No JVD   Lungs:   good air entry, no wheezing, rales at bases    Heart:   Regular rhythm, no murmur   Abdomen:    Soft, non-tender    Bowel sounds normal    No masses or organomegaly    Extremities:  No edema or DVT signs   Pulses:  Symmetric all extremities   Skin:  Warm and dry    No rashes or lesions   Neurologic:  Oriented x3   No focal deficits   Urinary catheter:  deferred     Data review  CT Chest  IMPRESSION:   A right middle lobe lung nodule measuring 6 x 10 mm is nonspecific. Malignancy  is not excluded. There is minimal bilateral lower lobe scarring/atelectasis.   There is no consolidation or evidence for pneumonia.         Recent Labs     01/19/20  0506 01/18/20  1232   WBC 5.1 7.4   HGB 12.6 13.7   HCT 39.7 43.1    224     Recent Labs     01/19/20  0506 01/18/20  1232    143   K 5.3* 4.7   * 114*   CO2 20* 25   * 79   BUN 31* 39*   CREA 1.13* 1.44*   CA 8.3* 9.1   ALB  --  3.3*   TBILI  --  0.4   SGOT  --  15   ALT  --  18     Current Facility-Administered Medications   Medication Dose Route Frequency    sodium chloride (NS) flush 5-40 mL  5-40 mL IntraVENous Q8H    sodium chloride (NS) flush 5-40 mL  5-40 mL IntraVENous PRN    enoxaparin (LOVENOX) injection 30 mg  30 mg SubCUTAneous Q24H    acetaminophen (TYLENOL) tablet 500 mg  500 mg Oral Q6H PRN    amLODIPine (NORVASC) tablet 5 mg  5 mg Oral DAILY    aspirin delayed-release tablet 81 mg  81 mg Oral QHS    atorvastatin (LIPITOR) tablet 10 mg  10 mg Oral QHS    pregabalin (LYRICA) capsule 50 mg  50 mg Oral QHS    azithromycin (ZITHROMAX) tablet 250 mg  250 mg Oral DAILY    albuterol 5mg / ipratropium 0.5mg neb solution  1 Dose Nebulization Q6H PRN    predniSONE (DELTASONE) tablet 30 mg  30 mg Oral DAILY WITH BREAKFAST    omega 3-DHA-EPA-fish oil 1,000 mg (120 mg-180 mg) capsule 2 Cap  2 Cap Oral DAILY     Total time spent managing care of the patient: 35 minutes.        Adalgisa Santos MD   --Hospitalist, Internal Medicine

## 2020-01-19 NOTE — PROGRESS NOTES
Bedside and Verbal shift change report given to SAILAJA Don (oncoming nurse) by Denver Decamp (offgoing nurse). Report included the following information SBAR, Kardex, Procedure Summary, Intake/Output, MAR, Recent Results and Med Rec Status.

## 2020-01-20 ENCOUNTER — HOME HEALTH ADMISSION (OUTPATIENT)
Dept: HOME HEALTH SERVICES | Facility: HOME HEALTH | Age: 85
End: 2020-01-20
Payer: MEDICARE

## 2020-01-20 VITALS
DIASTOLIC BLOOD PRESSURE: 61 MMHG | RESPIRATION RATE: 18 BRPM | OXYGEN SATURATION: 97 % | WEIGHT: 179.9 LBS | BODY MASS INDEX: 30.71 KG/M2 | TEMPERATURE: 98.2 F | SYSTOLIC BLOOD PRESSURE: 117 MMHG | HEART RATE: 71 BPM | HEIGHT: 64 IN

## 2020-01-20 PROBLEM — R06.02 SHORTNESS OF BREATH: Status: RESOLVED | Noted: 2020-01-18 | Resolved: 2020-01-20

## 2020-01-20 LAB
ANION GAP SERPL CALC-SCNC: 5 MMOL/L (ref 5–15)
ATRIAL RATE: 91 BPM
BACTERIA SPEC CULT: NORMAL
BUN SERPL-MCNC: 31 MG/DL (ref 6–20)
BUN/CREAT SERPL: 29 (ref 12–20)
CALCIUM SERPL-MCNC: 7.9 MG/DL (ref 8.5–10.1)
CALCULATED P AXIS, ECG09: 49 DEGREES
CALCULATED R AXIS, ECG10: -46 DEGREES
CALCULATED T AXIS, ECG11: 71 DEGREES
CC UR VC: NORMAL
CHLORIDE SERPL-SCNC: 113 MMOL/L (ref 97–108)
CO2 SERPL-SCNC: 23 MMOL/L (ref 21–32)
CREAT SERPL-MCNC: 1.08 MG/DL (ref 0.55–1.02)
DIAGNOSIS, 93000: NORMAL
GLUCOSE SERPL-MCNC: 123 MG/DL (ref 65–100)
P-R INTERVAL, ECG05: 172 MS
POTASSIUM SERPL-SCNC: 4.7 MMOL/L (ref 3.5–5.1)
Q-T INTERVAL, ECG07: 370 MS
QRS DURATION, ECG06: 96 MS
QTC CALCULATION (BEZET), ECG08: 455 MS
SERVICE CMNT-IMP: NORMAL
SODIUM SERPL-SCNC: 141 MMOL/L (ref 136–145)
VENTRICULAR RATE, ECG03: 91 BPM

## 2020-01-20 PROCEDURE — 77010033678 HC OXYGEN DAILY

## 2020-01-20 PROCEDURE — 77030038269 HC DRN EXT URIN PURWCK BARD -A

## 2020-01-20 PROCEDURE — 80048 BASIC METABOLIC PNL TOTAL CA: CPT

## 2020-01-20 PROCEDURE — 74011250637 HC RX REV CODE- 250/637: Performed by: HOSPITALIST

## 2020-01-20 PROCEDURE — 94640 AIRWAY INHALATION TREATMENT: CPT

## 2020-01-20 PROCEDURE — 99218 HC RM OBSERVATION: CPT

## 2020-01-20 PROCEDURE — 97116 GAIT TRAINING THERAPY: CPT

## 2020-01-20 PROCEDURE — 97161 PT EVAL LOW COMPLEX 20 MIN: CPT

## 2020-01-20 PROCEDURE — 74011000250 HC RX REV CODE- 250: Performed by: HOSPITALIST

## 2020-01-20 PROCEDURE — 36415 COLL VENOUS BLD VENIPUNCTURE: CPT

## 2020-01-20 RX ADMIN — Medication 20 ML: at 14:00

## 2020-01-20 RX ADMIN — OMEGA-3 FATTY ACIDS CAP 1000 MG 2 CAPSULE: 1000 CAP at 08:32

## 2020-01-20 RX ADMIN — IPRATROPIUM BROMIDE AND ALBUTEROL SULFATE 3 ML: .5; 3 SOLUTION RESPIRATORY (INHALATION) at 07:13

## 2020-01-20 RX ADMIN — AMLODIPINE BESYLATE 5 MG: 5 TABLET ORAL at 08:32

## 2020-01-20 NOTE — DISCHARGE INSTRUCTIONS
ACUTE DIAGNOSES:  Shortness of breath [R06.02]    CHRONIC MEDICAL DIAGNOSES:  Problem List as of 1/20/2020 Date Reviewed: 9/8/2019          Codes Class Noted - Resolved    Falls frequently ICD-10-CM: R29.6  ICD-9-CM: V15.88  9/10/2019 - Present        CKD (chronic kidney disease) stage 3, GFR 30-59 ml/min (HCC) (Chronic) ICD-10-CM: N18.3  ICD-9-CM: 585.3  9/8/2019 - Present        Bacteriuria ICD-10-CM: R82.71  ICD-9-CM: 791.9  9/8/2019 - Present        Frequent falls ICD-10-CM: R29.6  ICD-9-CM: V15.88  9/8/2019 - Present        Neuropathy (Chronic) ICD-10-CM: G62.9  ICD-9-CM: 355.9  9/8/2019 - Present        Rash ICD-10-CM: R21  ICD-9-CM: 782.1  5/7/2015 - Present        GERD (gastroesophageal reflux disease) (Chronic) ICD-10-CM: K21.9  ICD-9-CM: 530.81  9/30/2014 - Present        CAD (coronary artery disease) (Chronic) ICD-10-CM: I25.10  ICD-9-CM: 414.00  Unknown - Present        HTN (hypertension), benign (Chronic) ICD-10-CM: I10  ICD-9-CM: 401.1  1/11/2011 - Present        Mixed hyperlipidemia (Chronic) ICD-10-CM: E78.2  ICD-9-CM: 272.2  1/11/2011 - Present        RESOLVED: Shortness of breath ICD-10-CM: R06.02  ICD-9-CM: 786.05  1/18/2020 - 1/20/2020        RESOLVED: Sepsis ([facility] ICD-10-CM: A41.9  ICD-9-CM: 038.9, 995.91  2/6/2016 - 2/8/2016        RESOLVED: Nausea and vomiting ICD-10-CM: R11.2  ICD-9-CM: 787.01  2/6/2016 - 2/8/2016        RESOLVED: ARF (acute renal failure) ([facility] ICD-10-CM: N17.9  ICD-9-CM: 584.9  5/7/2015 - 5/10/2015        RESOLVED: Pneumonia ICD-10-CM: J18.9  ICD-9-CM: 486  4/5/2015 - 5/10/2015        RESOLVED: Sepsis ([facility] ICD-10-CM: A41.9  ICD-9-CM: 038.9, 995.91  4/5/2015 - 5/10/2015        RESOLVED: Femur fracture ([facility] ICD-10-CM: P50.17TU  ICD-9-CM: 821.00  12/1/2014 - 5/7/2015        RESOLVED: UTI (lower urinary tract infection) ICD-10-CM: N39.0  ICD-9-CM: 599.0  10/1/2014 - 12/1/2014        RESOLVED: Heart block ICD-10-CM: I45.9  ICD-9-CM: 426.9  9/30/2014 - 12/1/2014 RESOLVED: Junctional rhythm ICD-10-CM: I49.8  ICD-9-CM: 427.89  9/30/2014 - 12/1/2014        RESOLVED: Dehydration ICD-10-CM: E86.0  ICD-9-CM: 276.51  9/30/2014 - 12/1/2014        RESOLVED: ARF (acute renal failure) (HCC) ICD-10-CM: N17.9  ICD-9-CM: 584.9  9/30/2014 - 12/1/2014        RESOLVED: Dizziness ICD-10-CM: R42  ICD-9-CM: 780.4  9/30/2014 - 12/1/2014        RESOLVED: Syncope ICD-10-CM: R55  ICD-9-CM: 780.2  9/30/2014 - 12/1/2014        RESOLVED: Fall ICD-10-CM: W19. Toribio Boother  ICD-9-CM: E888.9  9/30/2014 - 12/1/2014        RESOLVED: Chest pain, unspecified ICD-10-CM: R07.9  ICD-9-CM: 786.50  1/11/2011 - 9/30/2014              DISCHARGE MEDICATIONS:          · It is important that you take the medication exactly as they are prescribed. · Keep your medication in the bottles provided by the pharmacist and keep a list of the medication names, dosages, and times to be taken in your wallet. · Do not take other medications without consulting your doctor. DIET:  Regular Diet    ACTIVITY: Activity as tolerated    ADDITIONAL INFORMATION: If you experience any of the following symptoms then please call your primary care physician or return to the emergency room if you cannot get hold of your doctor: Fever, chills, nausea, vomiting, diarrhea, change in mentation, falling, bleeding, shortness of breath. FOLLOW UP CARE:  Dr. Serina Barragan MD  you are to call and set up an appointment to see them in 2 weeks. Information obtained by :  I understand that if any problems occur once I am at home I am to contact my physician. I understand and acknowledge receipt of the instructions indicated above.                                                                                                                                            Physician's or R.N.'s Signature                                                                  Date/Time Patient or Representative Signature                                                          Date/Time

## 2020-01-20 NOTE — PROGRESS NOTES
Home Care Face to Face Encounter    Patients Name: Rena Reynolds    YOB: 1924    Primary Diagnosis: hypoxia    Date of Face to Face:   01/20/20                                  Face to Face Encounter findings are related to primary reason for home care:   yes. 1. I certify that the patient needs intermittent care as follows: physical therapy: strengthening, stretching/ROM, transfer training and gait/stair training  occupational therapy:  ADL safety (ie. cooking, bathing, dressing) and ROM    2. I certify that this patient is homebound, that is: 1) patient requires the use of a walker device, special transportation, or assistance of another to leave the home; or 2) patient's condition makes leaving the home medically contraindicated; and 3) patient has a normal inability to leave the home and leaving the home requires considerable and taxing effort. Patient may leave the home for infrequent and short duration for medical reasons, and occasional absences for non-medical reasons. Homebound status is due to the following functional limitations: Patient's ambulation limited secondary to severe pain and requires the use of an assistive device and the assistance of a caregiver for safe completion. Patient with strength and ROM deficits limiting ambulation endurance requiring the use of an assistive device and the assistance of a caregiver. Patient deemed temporarily homebound secondary to increased risk for infection when leaving home and going out into the community. 3. I certify that this patient is under my care and that I, or a nurse practitioner or  [id number], or clinical nurse specialist, or certified nurse midwife, working with me, had a Face-to-Face Encounter that meets the physician Face-to-Face Encounter requirements.   The following are the clinical findings from the [address] [place] [address] encounter that support the need for skilled services and is a summary of the encounter:      See discharge summary      Mauro Zabala MD  1/20/2020

## 2020-01-20 NOTE — PROGRESS NOTES
[facility]  [address]  [phone]      Medical Progress Note      NAME: Cynthia Aleman   :  1924  MRM:  [id number]    Date/Time: 2020  8:14 AM       Assessment and Plan:   1. Acute Respiratory failure with Hypoxia. Unclear cause. RVP is negative for viral infection. Echo EF is 65%. Doubt infection process. Improving Sat. Wean o2     2. Pam on CKD 3. improved with hydration      3.  CAD. Cont ASA/Lipitor per home     4. Neuropathy. On Gabapentin     5. HTN. Cont Amlodipine               Subjective:     Chief Complaint[de-identified] Patient was seen and examined as a follow up for acute resp failure. Chart was reviewed. feels well     ROS:  (bold if positive, if negative)    Tolerating PT  Tolerating Diet        Objective:     Last 24hrs VS reviewed since prior progress note.  Most recent are:    Visit Vitals  /61 (BP 1 Location: Right arm, BP Patient Position: At rest)   Pulse 71   Temp 98.2 °F (36.8 °C)   Resp 18   Ht 5' 4\" (1.626 m)   Wt 81.6 kg (179 lb 14.3 oz)   SpO2 97%   BMI 30.88 kg/m²     SpO2 Readings from Last 6 Encounters:   20 97%   10/03/19 94%   10/03/19 94%   10/01/19 98%   19 98%   19 93%    O2 Flow Rate (L/min): 1 l/min       Intake/Output Summary (Last 24 hours) at 2020 0814  Last data filed at 2020 4254  Gross per 24 hour   Intake 582 ml   Output 1050 ml   Net -468 ml        Physical Exam:    Gen:  Well-developed, well-nourished, in no acute distress  HEENT:  Pink conjunctivae, PERRL, hearing intact to voice, moist mucous membranes  Neck:  Supple, without masses, thyroid non-tender  Resp:  No accessory muscle use, clear breath sounds without wheezes rales or rhonchi  Card:  No murmurs, normal S1, S2 without thrills, bruits or peripheral edema  Abd:  Soft, non-tender, non-distended, normoactive bowel sounds are present, no palpable organomegaly and no detectable hernias  Lymph:  No cervical or inguinal adenopathy  Musc:  No cyanosis or clubbing  Skin:  No rashes or ulcers, skin turgor is good  Neuro:  Cranial nerves are grossly intact, no focal motor weakness, follows commands appropriately  Psych:  Good insight, oriented to person, place and time, alert  __________________________________________________________________  Medications Reviewed: (see below)  Medications:     Current Facility-Administered Medications   Medication Dose Route Frequency    albuterol-ipratropium (DUO-NEB) 2.5 MG-0.5 MG/3 ML  3 mL Nebulization Q8H RT    sodium chloride (NS) flush 5-40 mL  5-40 mL IntraVENous Q8H    sodium chloride (NS) flush 5-40 mL  5-40 mL IntraVENous PRN    enoxaparin (LOVENOX) injection 30 mg  30 mg SubCUTAneous Q24H    acetaminophen (TYLENOL) tablet 500 mg  500 mg Oral Q6H PRN    amLODIPine (NORVASC) tablet 5 mg  5 mg Oral DAILY    aspirin delayed-release tablet 81 mg  81 mg Oral QHS    atorvastatin (LIPITOR) tablet 10 mg  10 mg Oral QHS    pregabalin (LYRICA) capsule 50 mg  50 mg Oral QHS    albuterol 5mg / ipratropium 0.5mg neb solution  1 Dose Nebulization Q6H PRN    omega 3-DHA-EPA-fish oil 1,000 mg (120 mg-180 mg) capsule 2 Cap  2 Cap Oral DAILY        Lab Data Reviewed: (see below)  Lab Review:     Recent Labs     01/19/20  0506 01/18/20  1232   WBC 5.1 7.4   HGB 12.6 13.7   HCT 39.7 43.1    224     Recent Labs     01/20/20  0235 01/19/20  0506 01/18/20  1232    141 143   K 4.7 5.3* 4.7   * 114* 114*   CO2 23 20* 25   * 136* 79   BUN 31* 31* 39*   CREA 1.08* 1.13* 1.44*   CA 7.9* 8.3* 9.1   ALB  --   --  3.3*   TBILI  --   --  0.4   SGOT  --   --  15   ALT  --   --  18     No results found for: GLUCPOC  Recent Labs     01/19/20  0552   PH 7.44   PCO2 30*   PO2 51*   HCO3 20*   FIO2 21     No results for input(s): INR, INREXT in the last 72 hours.   All Micro Results     Procedure Component Value Units Date/Time    RESPIRATORY PANEL,PCR,NASOPHARYNGEAL [[id number]] Collected:  01/19/20 1737    Order Status:  Completed Specimen:  Nasopharyngeal Updated:  01/19/20 2330     Adenovirus NOT DETECTED        Coronavirus 229E NOT DETECTED        Coronavirus HKU1 NOT DETECTED        Coronavirus CVNL63 NOT DETECTED        Coronavirus OC43 NOT DETECTED        Metapneumovirus NOT DETECTED        Rhinovirus and Enterovirus NOT DETECTED        Influenza A NOT DETECTED        Influenza A, subtype H1 NOT DETECTED        Influenza A, subtype H3 NOT DETECTED        INFLUENZA A H1N1 PCR NOT DETECTED        Influenza B NOT DETECTED        Parainfluenza 1 NOT DETECTED        Parainfluenza 2 NOT DETECTED        Parainfluenza 3 NOT DETECTED        Parainfluenza virus 4 NOT DETECTED        RSV by PCR NOT DETECTED        B. parapertussis, PCR NOT DETECTED        Bordetella pertussis - PCR NOT DETECTED        Chlamydophila pneumoniae DNA, QL, PCR NOT DETECTED        Mycoplasma pneumoniae DNA, QL, PCR NOT DETECTED       CULTURE, URINE [[id number]] Collected:  01/18/20 1541    Order Status:  Completed Specimen:  Cath Urine Updated:  01/18/20 2249    URINE CULTURE HOLD SAMPLE [[id number]]     Order Status:  Sent Specimen:  Urine     CULTURE, RESPIRATORY/SPUTUM/BRONCH Elease Hussar STAIN [[id number]]     Order Status:  Sent Specimen:  Sputum     INFLUENZA A & B AG (RAPID TEST) [[id number]] Collected:  01/18/20 1501    Order Status:  Completed Specimen:  Nasopharyngeal from Nasal washing Updated:  01/18/20 1518     Influenza A Antigen NEGATIVE         Influenza B Antigen NEGATIVE              I have reviewed notes of prior 24hr. Other pertinent lab:      Total time spent with patient: [address] discussed with: Patient, Family, Nursing Staff and >50% of time spent in counseling and coordination of care    Discussed:  Care Plan    Prophylaxis:  Lovenox    Disposition:  Home w/Family           ___________________________________________________    Attending Physician: Rosalinda Pruitt MD

## 2020-01-20 NOTE — DISCHARGE SUMMARY
Hospitalist Discharge Summary     Patient ID:    Diaz Nunez  [id number]  15 y.o.  4/26/1924    Admit date: 1/18/2020    Discharge date and time: 1/20/2020    Admission Diagnoses: Shortness of breath [R06.02]    Chronic Diagnoses:    Problem List as of 1/20/2020 Date Reviewed: 9/8/2019          Codes Class Noted - Resolved    Falls frequently ICD-10-CM: R29.6  ICD-9-CM: V15.88  9/10/2019 - Present        CKD (chronic kidney disease) stage 3, GFR 30-59 ml/min (HCC) (Chronic) ICD-10-CM: N18.3  ICD-9-CM: 585.3  9/8/2019 - Present        Bacteriuria ICD-10-CM: R82.71  ICD-9-CM: 791.9  9/8/2019 - Present        Frequent falls ICD-10-CM: R29.6  ICD-9-CM: V15.88  9/8/2019 - Present        Neuropathy (Chronic) ICD-10-CM: G62.9  ICD-9-CM: 355.9  9/8/2019 - Present        Rash ICD-10-CM: R21  ICD-9-CM: 782.1  5/7/2015 - Present        GERD (gastroesophageal reflux disease) (Chronic) ICD-10-CM: K21.9  ICD-9-CM: 530.81  9/30/2014 - Present        CAD (coronary artery disease) (Chronic) ICD-10-CM: I25.10  ICD-9-CM: 414.00  Unknown - Present        HTN (hypertension), benign (Chronic) ICD-10-CM: I10  ICD-9-CM: 401.1  1/11/2011 - Present        Mixed hyperlipidemia (Chronic) ICD-10-CM: E78.2  ICD-9-CM: 272.2  1/11/2011 - Present        RESOLVED: Shortness of breath ICD-10-CM: R06.02  ICD-9-CM: 786.05  1/18/2020 - 1/20/2020        RESOLVED: Sepsis ([address] ICD-10-CM: A41.9  ICD-9-CM: 038.9, 995.91  2/6/2016 - 2/8/2016        RESOLVED: Nausea and vomiting ICD-10-CM: R11.2  ICD-9-CM: 787.01  2/6/2016 - 2/8/2016        RESOLVED: ARF (acute renal failure) ([facility] ICD-10-CM: N17.9  ICD-9-CM: 584.9  5/7/2015 - 5/10/2015        RESOLVED: Pneumonia ICD-10-CM: J18.9  ICD-9-CM: 778  4/5/2015 - 5/10/2015        RESOLVED: Sepsis ([facility] ICD-10-CM: A41.9  ICD-9-CM: 038.9, 995.91  4/5/2015 - 5/10/2015        RESOLVED: Femur fracture ([facility] ICD-10-CM: Z32.21ON  ICD-9-CM: 821.00  12/1/2014 - 5/7/2015        RESOLVED: UTI (lower urinary tract infection) ICD-10-CM: N39.0  ICD-9-CM: 599.0  10/1/2014 - 12/1/2014        RESOLVED: Heart block ICD-10-CM: I45.9  ICD-9-CM: 426.9  9/30/2014 - 12/1/2014        RESOLVED: Junctional rhythm ICD-10-CM: I49.8  ICD-9-CM: 427.89  9/30/2014 - 12/1/2014        RESOLVED: Dehydration ICD-10-CM: E86.0  ICD-9-CM: 276.51  9/30/2014 - 12/1/2014        RESOLVED: ARF (acute renal failure) ([facility] ICD-10-CM: N17.9  ICD-9-CM: 584.9  9/30/2014 - 12/1/2014        RESOLVED: Dizziness ICD-10-CM: R42  ICD-9-CM: 780.4  9/30/2014 - 12/1/2014        RESOLVED: Syncope ICD-10-CM: R55  ICD-9-CM: 780.2  9/30/2014 - 12/1/2014        RESOLVED: Fall ICD-10-CM: W19. Dirk Lawman  ICD-9-CM: E888.9  9/30/2014 - 12/1/2014        RESOLVED: Chest pain, unspecified ICD-10-CM: R07.9  ICD-9-CM: 786.50  1/11/2011 - 9/30/2014              Discharge Medications:   Current Discharge Medication List      CONTINUE these medications which have NOT CHANGED    Details   omega-3 fatty acids-fish oil (FISH OIL) 360-1,200 mg cap Take 2 Tabs by mouth daily. calcium-cholecalciferol, D3, (CALTRATE 600+D) tablet Take 2 Tabs by mouth daily. acetaminophen (TYLENOL) 500 mg tablet Take 500 mg by mouth every six (6) hours as needed for Pain. amLODIPine (NORVASC) 5 mg tablet Take 5 mg by mouth daily. Associated Diagnoses: HTN (hypertension), benign; Mixed hyperlipidemia      pregabalin (LYRICA) 50 mg capsule Take 50 mg by mouth nightly. Indications: neuropathy    Associated Diagnoses: HTN (hypertension), benign; Mixed hyperlipidemia      folic acid/multivit-min/lutein (CENTRUM SILVER PO) Take 1 Tab by mouth daily. Associated Diagnoses: HTN (hypertension), benign; Mixed hyperlipidemia      atorvastatin (LIPITOR) 10 mg tablet Take 10 mg by mouth nightly. aspirin delayed-release 81 mg tablet Take 81 mg by mouth nightly. STOP taking these medications       lisinopril (PRINIVIL, ZESTRIL) 10 mg tablet Comments:   Reason for Stopping: Follow up Care:    1. Essie Kiser MD in 1-2 weeks  2. Diet:  Regular Diet    Disposition:  Home. Advanced Directive:    Discharge Exam:  See today's note. CONSULTATIONS: None    Significant Diagnostic Studies:   Recent Labs     01/19/20  0506 01/18/20  1232   WBC 5.1 7.4   HGB 12.6 13.7   HCT 39.7 43.1    224     Recent Labs     01/20/20  0235 01/19/20  0506 01/18/20  1232    141 143   K 4.7 5.3* 4.7   * 114* 114*   CO2 23 20* 25   BUN 31* 31* 39*   CREA 1.08* 1.13* 1.44*   * 136* 79   CA 7.9* 8.3* 9.1     Recent Labs     01/18/20  1232   SGOT 15   ALT 18   AP 81   TBILI 0.4   TP 7.2   ALB 3.3*   GLOB 3.9     No results for input(s): INR, PTP, APTT, INREXT in the last 72 hours. No results for input(s): FE, TIBC, PSAT, FERR in the last 72 hours. Recent Labs     01/19/20  0552   PH 7.44   PCO2 30*   PO2 51*     No results for input(s): CPK, CKMB in the last 72 hours. No lab exists for component: TROPONINI  No results found for: Ilv[address] m [address]:   1. Acute Respiratory failure with Hypoxia. Now resolved. CT showed atelectasis. RVP is negative for viral infection. Echo EF is 65%. Doubt infection process. Improving Sat. off O2. Continue incentive spirometer. 2.  Pam on CKD 3. improved with hydration      3.  CAD. Cont ASA/Lipitor per home     4. Neuropathy. On Gabapentin     5. HTN. Cont Amlodipine        Discharged in improved condition.     Spent 35 minutes    Signed:  Rick Dickinson MD  1/20/2020  12:23 PM

## 2020-01-20 NOTE — PROGRESS NOTES
Problem: Mobility Impaired (Adult and Pediatric)  Goal: *Acute Goals and Plan of Care (Insert Text)  Description  FUNCTIONAL STATUS PRIOR TO ADMISSION: Patient was modified independent using a rolling walker for functional mobility. HOME SUPPORT PRIOR TO ADMISSION: The patient lived alone with daughter and additional family to provide assistance. Physical Therapy Goals  Initiated 1/20/2020  1. Patient will move from supine to sit and sit to supine  in bed with independence within 7 day(s). 2.  Patient will transfer from bed to chair and chair to bed with modified independence using the least restrictive device within 7 day(s). 3.  Patient will perform sit to stand with modified independence within 7 day(s). 4.  Patient will ambulate with modified independence for 250 feet with the least restrictive device within 7 day(s). 5.  Patient will ascend/descend 1 stairs with 1 handrail(s) with modified independence within 7 day(s). Outcome: Progressing Towards Goal   PHYSICAL THERAPY EVALUATION/DISCHARGE  Patient: Ron Munguia (57 y.o. female)  Date: 1/20/2020  Primary Diagnosis: Shortness of breath [R06.02]        Precautions:          ASSESSMENT  Based on the objective data described below, the patient presents with near baseline level of mobility following admission for SOB over the last several weeks. Pt has now weaned off of supplemental O2 and maintained saturations 95% and greater with ambulation. Largely SBA level for all mobility to include toileting and 200ft of ambulation with RW. Pt reports fear of falling and feeling off balanced while at home and is interested in trialing HHPT for further balance training. Functional Outcome Measure: The patient scored 20/28 on the Tinetti outcome measure which is indicative of moderate fall risk. Other factors to consider for discharge: lives alone     Further skilled acute physical therapy is not indicated at this time.      PLAN :  Recommendation for discharge: (in order for the patient to meet his/her long term goals)  Physical therapy at least 2 days/week in the home     This discharge recommendation:  Has been made in collaboration with the attending provider and/or case management    IF patient discharges home will need the following DME: patient owns DME required for discharge       SUBJECTIVE:   Patient stated I need my walker.     OBJECTIVE DATA SUMMARY:   HISTORY:    Past Medical History:   Diagnosis Date    CAD (coronary artery disease)     Femur fracture ([address]     right    GERD (gastroesophageal reflux disease)     HTN (hypertension), benign 1/11/2011    Mixed hyperlipidemia 1/11/2011     Past Surgical History:   Procedure Laterality Date    HX FEMUR FRACTURE TX      HX KNEE REPLACEMENT      bilateral    HX ORTHOPAEDIC      bilat knee replacements    HX VEIN STRIPPING         Prior level of function: Mod I with RW. Family lives close by and assists with transportation  Personal factors and/or comorbidities impacting plan of care: CAD, femur fracture, HTN, living alone    Home Situation  Home Environment: Private residence  # Steps to Enter: 1  One/Two Story Residence: One story  Living Alone: Yes  Support Systems: Family member(s), Friends \ neighbors  Patient Expects to be Discharged to[de-identified] Private residence  Current DME Used/Available at Home: Walker, rolling    EXAMINATION/PRESENTATION/DECISION MAKING:   Critical Behavior:  Neurologic State: Alert           Hearing: Auditory  Auditory Impairment: None  Skin:    Edema:   Range Of Motion:  AROM: Within functional limits           PROM: Within functional limits           Strength:    Strength:  Within functional limits                    Tone & Sensation:   Tone: Normal                              Coordination:  Coordination: Within functional limits  Vision:      Functional Mobility:  Bed Mobility:              Transfers:  Sit to Stand: Stand-by assistance  Stand to Sit: Stand-by assistance        Bed to Chair: Stand-by assistance              Balance:   Sitting: Intact  Standing: Intact; With support  Ambulation/Gait Training:  Distance (ft): 200 Feet (ft)  Assistive Device: Gait belt;Walker, rolling  Ambulation - Level of Assistance: Stand-by assistance        Gait Abnormalities: Decreased step clearance        Base of Support: Widened     Speed/Meghan: Pace decreased (<100 feet/min)  Step Length: Right shortened;Left shortened                     Stairs: Therapeutic Exercises:       Functional Measure:  Tinetti test:    Sitting Balance: 1  Arises: 1  Attempts to Rise: 2  Immediate Standing Balance: 2  Standing Balance: 1  Nudged: 1  Eyes Closed: 0  Turn 360 Degrees - Continuous/Discontinuous: 1  Turn 360 Degrees - Steady/Unsteady: 1  Sitting Down: 1  Balance Score: 11 Balance total score  Indication of Gait: 1  R Step Length/Height: 1  L Step Length/Height: 1  R Foot Clearance: 1  L Foot Clearance: 1  Step Symmetry: 1  Step Continuity: 1  Path: 1  Trunk: 0  Walking Time: 1  Gait Score: 9 Gait total score  Total Score: 20/28 Overall total score         Tinetti Tool Score Risk of Falls  <19 = High Fall Risk  19-24 = Moderate Fall Risk  25-28 = Low Fall Risk  Tinetti ME. Performance-Oriented Assessment of Mobility Problems in Elderly Patients. [address]; [id number].  (Scoring Description: PT Bulletin Feb. 10, 1993)    Older adults: Tanya Espinoza et al, 2009; n = 1000 [facility] elderly evaluated with ABC, JUN, ADL, and IADL)  · Mean JUN score for males aged 69-68 years = 26.21(3.40)  · Mean JUN score for females age 69-68 years = 25.16(4.30)  · Mean JUN score for males over 80 years = 23.29(6.02)  · Mean JUN score for females over 80 years = 17.20(8.32)            Physical Therapy Evaluation Charge Determination   History Examination Presentation Decision-Making   MEDIUM  Complexity : 1-2 comorbidities / personal factors will impact the outcome/ POC  MEDIUM Complexity : 3 Standardized tests and measures addressing body structure, function, activity limitation and / or participation in recreation  LOW Complexity : Stable, uncomplicated  Other outcome measures Tinetti  LOW       Based on the above components, the patient evaluation is determined to be of the following complexity level: LOW       Activity Tolerance:   Good and SpO2 stable on RA  Please refer to the flowsheet for vital signs taken during this treatment. After treatment patient left in no apparent distress:   Sitting in chair, Call bell within reach, and Caregiver / family present    COMMUNICATION/EDUCATION:   The patients plan of care was discussed with: Registered Nurse. Fall prevention education was provided and the patient/caregiver indicated understanding., Patient/family have participated as able in goal setting and plan of care. , and Patient/family agree to work toward stated goals and plan of care.     Thank you for this referral.  Ryan Galo, PT   Time Calculation: 22 mins

## 2020-01-20 NOTE — PROGRESS NOTES
1- Reason for Admission:  sob                   RRAT Score:                  Do you (patient/family) have any concerns for transition/discharge? nO                 Plan for utilizing home health:   yES    Current Advanced Directive/Advance Care Plan:  DNR            Transition of Care Plan:   Home with [address] with the patient to determine potential discharge needs. She lives alone in a one story condo with one entry step. She is independent with her ADL's, uses a rolling walker but depends on family for transportation. She has a cleaning woman every several weeks. Her PCP is Dr. Koki Kirkland. She has partial prescription drug coverage and gets her medications [facility] [place] [facility]. We discussed the consult for home PT/OT, she signed the choice  Letter for [employer] and I sent the referral thru [address] [facility]. [facility] accepted and the patient said her daughter will transport her home later today.     Care Management Interventions  PCP Verified by CM: Yes(Dr. Koki Kirkland)  Mode of Transport at Discharge: (Family)  Transition of Care Consult (CM Consult): [address]: Yes  Discharge Durable Medical Equipment: No(Has a rolling walker)  Physical Therapy Consult: Yes  Occupational Therapy Consult: Yes  Current Support Network: Lives Alone, Own Home  The Plan for Transition of Care is Related to the Following Treatment Goals : SOB  The Patient and/or Patient Representative was Provided with a Choice of Provider and Agrees with the Discharge Plan?: Yes  Name of the Patient Representative Who was Provided with a Choice of Provider and Agrees with the Discharge Plan: patient  Freedom of Choice List was Provided with Basic Dialogue that Supports the Patient's Individualized Plan of Care/Goals, Treatment Preferences and Shares the Quality Data Associated with the Providers?: (S) Yes  Discharge Location  Discharge Placement: (Home with [facility])    Scott Eubanks, BSW, CM

## 2020-01-20 NOTE — PROGRESS NOTES
Bedside and Verbal shift change report given to Joseluis Harris RN (oncoming nurse) by Seun Zapien RN (offgoing nurse). Report included the following information SBAR, Kardex, ED Summary, Procedure Summary, Intake/Output, Accordion and Recent Results.

## 2020-01-21 ENCOUNTER — HOME CARE VISIT (OUTPATIENT)
Dept: SCHEDULING | Facility: HOME HEALTH | Age: 85
End: 2020-01-21
Payer: MEDICARE

## 2020-01-21 VITALS
OXYGEN SATURATION: 95 % | RESPIRATION RATE: 16 BRPM | SYSTOLIC BLOOD PRESSURE: 138 MMHG | DIASTOLIC BLOOD PRESSURE: 72 MMHG | TEMPERATURE: 98.6 F | HEART RATE: 85 BPM

## 2020-01-21 PROCEDURE — G0151 HHCP-SERV OF PT,EA 15 MIN: HCPCS

## 2020-01-21 PROCEDURE — 400013 HH SOC

## 2020-01-23 ENCOUNTER — HOME CARE VISIT (OUTPATIENT)
Dept: SCHEDULING | Facility: HOME HEALTH | Age: 85
End: 2020-01-23
Payer: MEDICARE

## 2020-01-23 VITALS
OXYGEN SATURATION: 93 % | SYSTOLIC BLOOD PRESSURE: 127 MMHG | HEART RATE: 74 BPM | DIASTOLIC BLOOD PRESSURE: 65 MMHG | TEMPERATURE: 97.9 F

## 2020-01-23 PROCEDURE — G0157 HHC PT ASSISTANT EA 15: HCPCS

## 2020-01-23 PROCEDURE — G0152 HHCP-SERV OF OT,EA 15 MIN: HCPCS

## 2020-01-23 NOTE — ADT AUTH CERT NOTES
DOWNGRADED TO OBSERVATION 
 
ONCE RECEIVED AND COMPLETED, PLEASE FAX BACK CONFIRMATION AND NEW OBS AUTH# OR WHETHER OR NOT OBS REQUIRES AN AUTH.  
 
Leydi Lopez

## 2020-01-26 VITALS
HEART RATE: 63 BPM | RESPIRATION RATE: 18 BRPM | DIASTOLIC BLOOD PRESSURE: 65 MMHG | OXYGEN SATURATION: 95 % | TEMPERATURE: 97.5 F | SYSTOLIC BLOOD PRESSURE: 127 MMHG

## 2020-01-27 ENCOUNTER — HOME CARE VISIT (OUTPATIENT)
Dept: SCHEDULING | Facility: HOME HEALTH | Age: 85
End: 2020-01-27
Payer: MEDICARE

## 2020-01-27 VITALS
HEART RATE: 76 BPM | RESPIRATION RATE: 16 BRPM | OXYGEN SATURATION: 92 % | DIASTOLIC BLOOD PRESSURE: 72 MMHG | TEMPERATURE: 97.6 F | SYSTOLIC BLOOD PRESSURE: 124 MMHG

## 2020-01-27 PROCEDURE — G0157 HHC PT ASSISTANT EA 15: HCPCS

## 2020-01-29 ENCOUNTER — HOME CARE VISIT (OUTPATIENT)
Dept: SCHEDULING | Facility: HOME HEALTH | Age: 85
End: 2020-01-29
Payer: MEDICARE

## 2020-01-29 VITALS
SYSTOLIC BLOOD PRESSURE: 120 MMHG | TEMPERATURE: 98.3 F | OXYGEN SATURATION: 95 % | RESPIRATION RATE: 18 BRPM | DIASTOLIC BLOOD PRESSURE: 62 MMHG | HEART RATE: 94 BPM

## 2020-01-29 PROCEDURE — G0157 HHC PT ASSISTANT EA 15: HCPCS

## 2020-02-03 ENCOUNTER — HOME CARE VISIT (OUTPATIENT)
Dept: SCHEDULING | Facility: HOME HEALTH | Age: 85
End: 2020-02-03
Payer: MEDICARE

## 2020-02-03 PROCEDURE — G0151 HHCP-SERV OF PT,EA 15 MIN: HCPCS

## 2020-02-04 VITALS
TEMPERATURE: 98.7 F | RESPIRATION RATE: 14 BRPM | DIASTOLIC BLOOD PRESSURE: 82 MMHG | SYSTOLIC BLOOD PRESSURE: 158 MMHG | OXYGEN SATURATION: 95 % | HEART RATE: 50 BPM

## 2020-02-06 ENCOUNTER — HOME CARE VISIT (OUTPATIENT)
Dept: SCHEDULING | Facility: HOME HEALTH | Age: 85
End: 2020-02-06
Payer: MEDICARE

## 2020-02-06 PROCEDURE — G0151 HHCP-SERV OF PT,EA 15 MIN: HCPCS

## 2020-02-07 VITALS
OXYGEN SATURATION: 97 % | RESPIRATION RATE: 14 BRPM | SYSTOLIC BLOOD PRESSURE: 128 MMHG | TEMPERATURE: 98.6 F | DIASTOLIC BLOOD PRESSURE: 62 MMHG | HEART RATE: 59 BPM

## 2020-02-11 ENCOUNTER — HOME CARE VISIT (OUTPATIENT)
Dept: SCHEDULING | Facility: HOME HEALTH | Age: 85
End: 2020-02-11
Payer: MEDICARE

## 2020-02-11 VITALS
TEMPERATURE: 98.6 F | SYSTOLIC BLOOD PRESSURE: 142 MMHG | DIASTOLIC BLOOD PRESSURE: 72 MMHG | OXYGEN SATURATION: 96 % | RESPIRATION RATE: 16 BRPM | HEART RATE: 72 BPM

## 2020-02-11 PROCEDURE — G0151 HHCP-SERV OF PT,EA 15 MIN: HCPCS

## 2020-02-13 ENCOUNTER — HOME CARE VISIT (OUTPATIENT)
Dept: SCHEDULING | Facility: HOME HEALTH | Age: 85
End: 2020-02-13
Payer: MEDICARE

## 2020-02-13 VITALS
DIASTOLIC BLOOD PRESSURE: 70 MMHG | SYSTOLIC BLOOD PRESSURE: 144 MMHG | RESPIRATION RATE: 14 BRPM | HEART RATE: 84 BPM | OXYGEN SATURATION: 98 % | TEMPERATURE: 98.6 F

## 2020-02-13 PROCEDURE — G0151 HHCP-SERV OF PT,EA 15 MIN: HCPCS

## 2020-03-28 NOTE — TELEPHONE ENCOUNTER
Pt states that her BP still elevated - 161/70 at [place] done by pharmacist. She currently take Norvasc 5 mg. Improved

## 2020-05-21 ENCOUNTER — TELEPHONE (OUTPATIENT)
Dept: CARDIOLOGY CLINIC | Age: 85
End: 2020-05-21

## 2020-05-21 NOTE — TELEPHONE ENCOUNTER
Patient looking to have appt with Dr. Don Stratton on 6/5 moved to an afternoon appt. Please advise.      Phone: [phone]

## 2020-06-05 ENCOUNTER — VIRTUAL VISIT (OUTPATIENT)
Dept: CARDIOLOGY CLINIC | Age: 85
End: 2020-06-05

## 2020-06-05 DIAGNOSIS — I10 HTN (HYPERTENSION), BENIGN: Primary | ICD-10-CM

## 2020-06-05 DIAGNOSIS — E78.2 MIXED HYPERLIPIDEMIA: ICD-10-CM

## 2020-06-05 DIAGNOSIS — I25.10 CORONARY ARTERY DISEASE INVOLVING NATIVE CORONARY ARTERY OF NATIVE HEART WITHOUT ANGINA PECTORIS: ICD-10-CM

## 2020-06-05 DIAGNOSIS — N18.30 CKD (CHRONIC KIDNEY DISEASE) STAGE 3, GFR 30-59 ML/MIN (HCC): ICD-10-CM

## 2020-06-05 NOTE — PROGRESS NOTES
TELEPHONE VISIT DOCUMENTATION     Pursuant to the emergency declaration under the [facility], [facility] waiver authority and the [employer] Act, this Telephone Visit was conducted, with patient's consent, to reduce the patient's risk of exposure to COVID-19 and provide continuity of care for an established patient. She and/or health care decision maker is aware that that she may receive a bill for this telephone service, depending on her insurance coverage, and has provided verbal consent to proceed. This is a Patient Initiated Episode with an Established Patient who has not had a related appointment within my department in the past 7 days or scheduled within the next 24 hours. Total Time:6:20         ICD-10-CM ICD-9-CM   1. HTN (hypertension), benign I10 401.1   2. Mixed hyperlipidemia E78.2 272.2   3. Coronary artery disease involving native coronary artery of native heart without angina pectoris I25.10 414.01   4. CKD (chronic kidney disease) stage 3, GFR 30-59 ml/min ([facility]) N18.3 585. [facility] is a 80 y.o. female with HTN and dyslipidemia referred for 6 month follow up.         Cardiac risk factors: dyslipidemia, hypertension, post-menopausal  I have personally obtained the history from the patient. HISTORY OF PRESENTING ILLNESS      . Ms. Norma Marshall denies any issues and feels well overall. She states she is doing well with no chest pain or shortness of breath. She was admitted to the hospital earlier in the year with episode of shortness of breath and her proBNP was only 1000. Her cardiac enzymes were negative. She underwent a echocardiogram with normal heart function since then she is been feeling good. She was in the hospital for SOB . No etiology. ATTENTION:   This medical record was transcribed using an electronic medical records/speech recognition system.   Although proofread, it may and can contain electronic, spelling and other errors. Corrections may be executed at a later time. Please feel free to contact us for any clarifications as needed.        ACTIVE PROBLEM LIST     Patient Active Problem List    Diagnosis Date Noted    Falls frequently 09/10/2019    CKD (chronic kidney disease) stage 3, GFR 30-59 ml/min ([facility]) 2019    Bacteriuria 2019    Frequent falls 2019    Neuropathy 2019    Rash 2015    GERD (gastroesophageal reflux disease) 2014    CAD (coronary artery disease)     HTN (hypertension), benign 2011    Mixed hyperlipidemia 2011           PAST MEDICAL HISTORY     Past Medical History:   Diagnosis Date    CAD (coronary artery disease)     Femur fracture ([facility])     right    GERD (gastroesophageal reflux disease)     HTN (hypertension), benign 2011    Mixed hyperlipidemia 2011           PAST SURGICAL HISTORY     Past Surgical History:   Procedure Laterality Date    HX FEMUR FRACTURE TX      HX KNEE REPLACEMENT      bilateral    HX ORTHOPAEDIC      bilat knee replacements    HX VEIN STRIPPING            ALLERGIES     Allergies   Allergen Reactions    Sulfa (Sulfonamide Antibiotics) Rash    Ampicillin Rash     Tolerates cefazolin    Lescol [Fluvastatin] Unknown (comments)          FAMILY HISTORY     Family History   Problem Relation Age of Onset    Other Mother          of renal failure, not sure what caused id    Other Father         something with throat, not sure if it was cancer    Heart Disease Sister     negative for cardiac disease       SOCIAL HISTORY     Social History     Socioeconomic History    Marital status:      Spouse name: Not on file    Number of children: Not on file    Years of education: Not on file    Highest education level: Not on file   Tobacco Use    Smoking status: Never Smoker    Smokeless tobacco: Never Used   Substance and Sexual Activity    Alcohol use: No    Drug use: No         MEDICATIONS     Current Outpatient Medications   Medication Sig    omega-3 fatty acids-fish oil (FISH OIL) 360-1,200 mg cap Take 2 Tabs by mouth daily.  calcium-cholecalciferol, D3, (CALTRATE 600+D) tablet Take 2 Tabs by mouth daily.  acetaminophen (TYLENOL) 500 mg tablet Take 500 mg by mouth every six (6) hours as needed for Pain.  amLODIPine (NORVASC) 5 mg tablet Take 5 mg by mouth daily.  pregabalin (LYRICA) 50 mg capsule Take 50 mg by mouth nightly. Indications: neuropathy    folic acid/multivit-min/lutein (CENTRUM SILVER PO) Take 1 Tab by mouth daily.  atorvastatin (LIPITOR) 10 mg tablet Take 10 mg by mouth nightly.  aspirin delayed-release 81 mg tablet Take 81 mg by mouth nightly. No current facility-administered medications for this visit. I have reviewed the nurses notes, vitals, problem list, allergy list, medical history, family, social history and medications. REVIEW OF SYMPTOMS      General: Pt denies excessive weight gain or loss. Pt is able to conduct ADL's  HEENT: Denies blurred vision, headaches, hearing loss, epistaxis and difficulty swallowing. Respiratory: Denies cough, congestion, shortness of breath, SHAH, wheezing or stridor. Cardiovascular: Denies precordial pain, palpitations, edema or PND  Gastrointestinal: Denies poor appetite, indigestion, abdominal pain or blood in stool  Genitourinary: Denies hematuria, dysuria, increased urinary frequency  Musculoskeletal: Denies joint pain or swelling from muscles or joints  Neurologic: Denies tremor, paresthesias, headache, or sensory motor disturbance +finger burning   Psychiatric: Denies confusion, insomnia, depression  Integumentray: Denies rash, itching or ulcers. Hematologic: Denies easy bruising, bleeding     PHYSICAL EXAMINATION         General: Well developed, in no acute distress.   HEENT: No jaundice, oral mucosa moist, no oral ulcers  Neck: Supple, no stiffness, no lymphadenopathy, supple  Heart:  Normal S1/S2 negative S3 or S4. Regular, no murmur, gallop or rub, no jugular venous distention  Respiratory: Clear bilaterally x 4, no wheezing or rales   Extremities:  No edema, normal cap refill, no cyanosis. Musculoskeletal: No clubbing, no deformities  Neuro: A&Ox3, speech clear, gait stable, cooperative, no focal neurologic deficits  Skin: Skin color is normal. No rashes or lesions. Non diaphoretic, moist.    EKG: SR, occasional PACs with LAB      DIAGNOSTIC DATA     1. Echo  12/2/08 - EF 60-65%  10/1/14- EF 70%, grade 2 DD, LAE mod, AI/TR mild  1/19/20-EF 60 - 65%, LAE, Aortic valve sclerosis with no evidence of reduced excursion, Severe mitral annular calcification, mild MR, mild/mod pulm HTN    2. Myocardial perfusion study  12/20/00 - persantine, mild mid distal anterolateral wall ischemia, EF 62%    3.  Cholesterol  8/23/12 - , HDL 61, LDL 85,   8/2/13 - , HDL 61, LDL 86,   8/24/14 - , HDL 61, LDL 88,   2/24/17- , HDL 75, ,   9/21/17- , HDL 55, LDL 70,   3/14/18- , HDL 62, LDL 70,   11/29/18- , HDL 70, LDL 76,   1/6/20- , HDL 62, LDL 68,            LABORATORY DATA            Lab Results   Component Value Date/Time    WBC 5.1 01/19/2020 05:06 AM    HGB 12.6 01/19/2020 05:06 AM    HCT 39.7 01/19/2020 05:06 AM    PLATELET 099 34/40/7906 05:06 AM    MCV 95.0 01/19/2020 05:06 AM      Lab Results   Component Value Date/Time    Sodium 141 01/20/2020 02:35 AM    Potassium 4.7 01/20/2020 02:35 AM    Chloride 113 (H) 01/20/2020 02:35 AM    CO2 23 01/20/2020 02:35 AM    Anion gap 5 01/20/2020 02:35 AM    Glucose 123 (H) 01/20/2020 02:35 AM    BUN 31 (H) 01/20/2020 02:35 AM    Creatinine 1.08 (H) 01/20/2020 02:35 AM    BUN/Creatinine ratio 29 (H) 01/20/2020 02:35 AM    GFR est AA 57 (L) 01/20/2020 02:35 AM    GFR est non-AA 47 (L) 01/20/2020 02:35 AM    Calcium 7.9 (L) 01/20/2020 02:35 AM Bilirubin, total 0.4 01/18/2020 12:32 PM    Alk. phosphatase 81 01/18/2020 12:32 PM    Protein, total 7.2 01/18/2020 12:32 PM    Albumin 3.3 (L) 01/18/2020 12:32 PM    Globulin 3.9 01/18/2020 12:32 PM    A-G Ratio 0.8 (L) 01/18/2020 12:32 PM    ALT (SGPT) 18 01/18/2020 12:32 PM        ECG (5/9/18): NSR with old anterior MI and LAD   ASSESSMENT/RECOMMENDATIONS:.      1. HTN  -She is not checking her blood pressure at the house but occasionally they try to but she had no numbers at this time      2. Dyslipidemia  -Remains on Lipitor 10 mg daily. Followed by Adelaida Macdonald MD .  I see no recent cholesterol so I ordered a fasting lipid profile    3. Left foot swelling  -she is scheduled to see her primary care for this. No calf tenderness. Instructed her should this get worse she needs to go the emergency room     3. Return in 4 months, sooner PRN    No orders of the defined types were placed in this encounter. Follow-up and Dispositions  ·   Return in about 1 year (around 6/5/2021). I have discussed the diagnosis with  Bryanna Bliss and the intended plan as seen in the above orders. Questions were answered concerning future plans. I have discussed medication side effects and warnings with the patient as well. Thank you,  Adelaida Macdonald MD for involving me in the care of  Bryanna Bliss. Please do not hesitate to contact me for further questions/concerns. Written by Donal Castelan, dictated by Chitra Mas MD.    Mohinder Pradhan MD, [facility] [address]      [facility], [address] [place] [address], [facility] [address]      [phone] / [phone] Fax

## 2021-03-02 ENCOUNTER — HOSPITAL ENCOUNTER (EMERGENCY)
Age: 86
Discharge: NURSING, REHAB OR LONG-TERM CARE | End: 2021-03-02
Attending: EMERGENCY MEDICINE | Admitting: EMERGENCY MEDICINE
Payer: MEDICARE

## 2021-03-02 ENCOUNTER — APPOINTMENT (OUTPATIENT)
Dept: GENERAL RADIOLOGY | Age: 86
End: 2021-03-02
Attending: EMERGENCY MEDICINE
Payer: MEDICARE

## 2021-03-02 VITALS
RESPIRATION RATE: 21 BRPM | WEIGHT: 183 LBS | OXYGEN SATURATION: 93 % | HEART RATE: 89 BPM | TEMPERATURE: 98.5 F | HEIGHT: 64 IN | DIASTOLIC BLOOD PRESSURE: 68 MMHG | BODY MASS INDEX: 31.24 KG/M2 | SYSTOLIC BLOOD PRESSURE: 158 MMHG

## 2021-03-02 DIAGNOSIS — J98.11 ATELECTASIS: ICD-10-CM

## 2021-03-02 DIAGNOSIS — T50.Z95A SIDE EFFECTS OF VACCINATION, INITIAL ENCOUNTER: Primary | ICD-10-CM

## 2021-03-02 DIAGNOSIS — R11.0 NAUSEA WITHOUT VOMITING: ICD-10-CM

## 2021-03-02 LAB
ALBUMIN SERPL-MCNC: 4 G/DL (ref 3.5–5)
ALBUMIN/GLOB SERPL: 1 {RATIO} (ref 1.1–2.2)
ALP SERPL-CCNC: 97 U/L (ref 45–117)
ALT SERPL-CCNC: 20 U/L (ref 12–78)
ANION GAP SERPL CALC-SCNC: 7 MMOL/L (ref 5–15)
AST SERPL-CCNC: 19 U/L (ref 15–37)
ATRIAL RATE: 88 BPM
BASOPHILS # BLD: 0 K/UL (ref 0–0.1)
BASOPHILS NFR BLD: 0 % (ref 0–1)
BILIRUB SERPL-MCNC: 0.6 MG/DL (ref 0.2–1)
BUN SERPL-MCNC: 25 MG/DL (ref 6–20)
BUN/CREAT SERPL: 24 (ref 12–20)
CALCIUM SERPL-MCNC: 9.7 MG/DL (ref 8.5–10.1)
CALCULATED P AXIS, ECG09: 72 DEGREES
CALCULATED R AXIS, ECG10: -46 DEGREES
CALCULATED T AXIS, ECG11: 50 DEGREES
CHLORIDE SERPL-SCNC: 106 MMOL/L (ref 97–108)
CO2 SERPL-SCNC: 25 MMOL/L (ref 21–32)
CREAT SERPL-MCNC: 1.04 MG/DL (ref 0.55–1.02)
DIAGNOSIS, 93000: NORMAL
DIFFERENTIAL METHOD BLD: ABNORMAL
EOSINOPHIL # BLD: 0 K/UL (ref 0–0.4)
EOSINOPHIL NFR BLD: 0 % (ref 0–7)
ERYTHROCYTE [DISTWIDTH] IN BLOOD BY AUTOMATED COUNT: 13.5 % (ref 11.5–14.5)
GLOBULIN SER CALC-MCNC: 4 G/DL (ref 2–4)
GLUCOSE SERPL-MCNC: 130 MG/DL (ref 65–100)
HCT VFR BLD AUTO: 43.6 % (ref 35–47)
HGB BLD-MCNC: 14.2 G/DL (ref 11.5–16)
IMM GRANULOCYTES # BLD AUTO: 0.1 K/UL (ref 0–0.04)
IMM GRANULOCYTES NFR BLD AUTO: 1 % (ref 0–0.5)
LYMPHOCYTES # BLD: 0.7 K/UL (ref 0.8–3.5)
LYMPHOCYTES NFR BLD: 8 % (ref 12–49)
MCH RBC QN AUTO: 31.1 PG (ref 26–34)
MCHC RBC AUTO-ENTMCNC: 32.6 G/DL (ref 30–36.5)
MCV RBC AUTO: 95.4 FL (ref 80–99)
MONOCYTES # BLD: 1 K/UL (ref 0–1)
MONOCYTES NFR BLD: 12 % (ref 5–13)
NEUTS SEG # BLD: 6.4 K/UL (ref 1.8–8)
NEUTS SEG NFR BLD: 79 % (ref 32–75)
NRBC # BLD: 0 K/UL (ref 0–0.01)
NRBC BLD-RTO: 0 PER 100 WBC
P-R INTERVAL, ECG05: 164 MS
PLATELET # BLD AUTO: 194 K/UL (ref 150–400)
PMV BLD AUTO: 10.9 FL (ref 8.9–12.9)
POTASSIUM SERPL-SCNC: 4.4 MMOL/L (ref 3.5–5.1)
PROT SERPL-MCNC: 8 G/DL (ref 6.4–8.2)
Q-T INTERVAL, ECG07: 364 MS
QRS DURATION, ECG06: 98 MS
QTC CALCULATION (BEZET), ECG08: 440 MS
RBC # BLD AUTO: 4.57 M/UL (ref 3.8–5.2)
RBC MORPH BLD: ABNORMAL
SARS-COV-2, COV2: NORMAL
SARS-COV-2, XPLCVT: DETECTED
SODIUM SERPL-SCNC: 138 MMOL/L (ref 136–145)
SOURCE, COVRS: ABNORMAL
TROPONIN I SERPL-MCNC: <0.05 NG/ML
VENTRICULAR RATE, ECG03: 88 BPM
WBC # BLD AUTO: 8.2 K/UL (ref 3.6–11)

## 2021-03-02 PROCEDURE — 96374 THER/PROPH/DIAG INJ IV PUSH: CPT

## 2021-03-02 PROCEDURE — U0003 INFECTIOUS AGENT DETECTION BY NUCLEIC ACID (DNA OR RNA); SEVERE ACUTE RESPIRATORY SYNDROME CORONAVIRUS 2 (SARS-COV-2) (CORONAVIRUS DISEASE [COVID-19]), AMPLIFIED PROBE TECHNIQUE, MAKING USE OF HIGH THROUGHPUT TECHNOLOGIES AS DESCRIBED BY CMS-2020-01-R: HCPCS

## 2021-03-02 PROCEDURE — 74011250636 HC RX REV CODE- 250/636: Performed by: EMERGENCY MEDICINE

## 2021-03-02 PROCEDURE — 99284 EMERGENCY DEPT VISIT MOD MDM: CPT

## 2021-03-02 PROCEDURE — 74011250637 HC RX REV CODE- 250/637: Performed by: EMERGENCY MEDICINE

## 2021-03-02 PROCEDURE — 93005 ELECTROCARDIOGRAM TRACING: CPT

## 2021-03-02 PROCEDURE — 36415 COLL VENOUS BLD VENIPUNCTURE: CPT

## 2021-03-02 PROCEDURE — 84484 ASSAY OF TROPONIN QUANT: CPT

## 2021-03-02 PROCEDURE — 85025 COMPLETE CBC W/AUTO DIFF WBC: CPT

## 2021-03-02 PROCEDURE — 71045 X-RAY EXAM CHEST 1 VIEW: CPT

## 2021-03-02 PROCEDURE — 77030027138 HC INCENT SPIROMETER -A

## 2021-03-02 PROCEDURE — 80053 COMPREHEN METABOLIC PANEL: CPT

## 2021-03-02 RX ORDER — ONDANSETRON 4 MG/1
4 TABLET, ORALLY DISINTEGRATING ORAL
Qty: 15 TAB | Refills: 0 | Status: SHIPPED | OUTPATIENT
Start: 2021-03-02

## 2021-03-02 RX ORDER — ACETAMINOPHEN 325 MG/1
650 TABLET ORAL
Qty: 20 TAB | Refills: 0 | Status: SHIPPED | OUTPATIENT
Start: 2021-03-02

## 2021-03-02 RX ORDER — ONDANSETRON 2 MG/ML
4 INJECTION INTRAMUSCULAR; INTRAVENOUS
Status: DISCONTINUED | OUTPATIENT
Start: 2021-03-02 | End: 2021-03-02 | Stop reason: HOSPADM

## 2021-03-02 RX ORDER — ACETAMINOPHEN 500 MG
1000 TABLET ORAL
Status: COMPLETED | OUTPATIENT
Start: 2021-03-02 | End: 2021-03-02

## 2021-03-02 RX ADMIN — ONDANSETRON 4 MG: 2 INJECTION INTRAMUSCULAR; INTRAVENOUS at 00:41

## 2021-03-02 RX ADMIN — ACETAMINOPHEN 1000 MG: 500 TABLET ORAL at 00:38

## 2021-03-02 NOTE — ED NOTES
Patient seen by provider prior to discharge and no further questions. Discharge papers given to patient by RN. AMR to home and no apparent distress.      Visit Vitals  BP (!) 158/68   Pulse 89   Temp 98.5 °F (36.9 °C)   Resp 21   Ht 5' 4\" (1.626 m)   Wt 83 kg (183 lb)   SpO2 93%   BMI 31.41 kg/m²

## 2021-03-02 NOTE — ED TRIAGE NOTES
[facility] now reports \"feeling sick\" since receiving vaccine. Pt may have had an exposure to COVID two days ago.

## 2021-03-02 NOTE — ED PROVIDER NOTES
60-year-old female, DNR, history of CAD hypertension, GERD and recent hospitalization for hypoxia due to atelectasis January presenting to the ER with report of fatigue and nausea an hour or 2 after receiving her COVID-19 vaccination. Patient is from an assisted living facility and concern for possible exposure to Covid 19 2 days ago. Patient was not having any symptoms throughout the day. And then shortly after receiving the vaccination patient reports feeling chills nausea and fatigue. No chest pain no coughing. No abdominal pain no diarrhea. Patient denies any urinary symptoms. Patient has not taken any medicines for her symptoms.            Past Medical History:   Diagnosis Date    CAD (coronary artery disease)     Femur fracture (HCC)     right    GERD (gastroesophageal reflux disease)     HTN (hypertension), benign 2011    Mixed hyperlipidemia 2011       Past Surgical History:   Procedure Laterality Date    HX FEMUR FRACTURE TX      HX KNEE REPLACEMENT      bilateral    HX ORTHOPAEDIC      bilat knee replacements    HX VEIN STRIPPING           Family History:   Problem Relation Age of Onset    Other Mother          of renal failure, not sure what caused id    Other Father         something with throat, not sure if it was cancer    Heart Disease Sister        Social History     Socioeconomic History    Marital status:      Spouse name: Not on file    Number of children: Not on file    Years of education: Not on file    Highest education level: Not on file   Occupational History    Not on file   Social Needs    Financial resource strain: Not on file    Food insecurity     Worry: Not on file     Inability: Not on file    Transportation needs     Medical: Not on file     Non-medical: Not on file   Tobacco Use    Smoking status: Never Smoker    Smokeless tobacco: Never Used   Substance and Sexual Activity    Alcohol use: No    Drug use: No    Sexual activity: Not on file   Lifestyle    Physical activity     Days per week: Not on file     Minutes per session: Not on file    Stress: Not on file   Relationships    Social connections     Talks on phone: Not on file     Gets together: Not on file     Attends Mandaen service: Not on file     Active member of club or organization: Not on file     Attends meetings of clubs or organizations: Not on file     Relationship status: Not on file    Intimate partner violence     Fear of current or ex partner: Not on file     Emotionally abused: Not on file     Physically abused: Not on file     Forced sexual activity: Not on file   Other Topics Concern    Not on file   Social History Narrative    Not on file         ALLERGIES: Sulfa (sulfonamide antibiotics), Ampicillin, and Lescol [fluvastatin]    Review of Systems   Constitutional: Positive for chills and fatigue. Negative for fever. HENT: Negative for congestion and sore throat. Eyes: Negative for pain. Respiratory: Negative for cough and shortness of breath. Cardiovascular: Negative for chest pain. Gastrointestinal: Positive for nausea. Negative for abdominal pain, diarrhea and vomiting. Genitourinary: Negative for dysuria, flank pain, frequency and urgency. Musculoskeletal: Negative for back pain, myalgias and neck pain. Skin: Negative for rash. Neurological: Negative for dizziness and headaches. Vitals:    03/02/21 0008   BP: (!) 198/94   Pulse: 90   Resp: 21   Temp: 98.5 °F (36.9 °C)   SpO2: 92%   Weight: 83 kg (183 lb)   Height: 5' 4\" (1.626 m)            Physical Exam  Constitutional:       Appearance: She is well-developed. HENT:      Head: Normocephalic. Eyes:      Conjunctiva/sclera: Conjunctivae normal.   Neck:      Musculoskeletal: Normal range of motion and neck supple. Cardiovascular:      Rate and Rhythm: Normal rate and regular rhythm. Pulmonary:      Effort: Pulmonary effort is normal. No respiratory distress.       Breath sounds: Examination of the right-lower field reveals decreased breath sounds. Examination of the left-lower field reveals decreased breath sounds. Decreased breath sounds present. No wheezing, rhonchi or rales. Abdominal:      General: Bowel sounds are normal.      Palpations: Abdomen is soft. Tenderness: There is no abdominal tenderness. Musculoskeletal: Normal range of motion. Skin:     General: Skin is warm. Capillary Refill: Capillary refill takes less than 2 seconds. Findings: No rash. Neurological:      Mental Status: She is alert and oriented to person, place, and time. Comments: No gross motor or sensory deficits          MDM  Number of Diagnoses or Management Options  Atelectasis  Nausea without vomiting  Side effects of vaccination, initial encounter  Diagnosis management comments: Patient presented to ER started have nausea chills and fatigue after receiving her Covid shot. Work-up unremarkable. Patient having some lower oxygen levels. Atelectasis on chest x-ray and has known history of this with a recent hospitalization. Family concern she had Covid exposure 2 days ago. I explained that she should not be symptomatic from that exposure and symptoms align with postvaccination side effects. No other findings on lab work or examination. sPoke with patient's family regarding work-up and lab findings and disposition. Amount and/or Complexity of Data Reviewed  Clinical lab tests: reviewed  Tests in the radiology section of CPT®: reviewed      ED Course as of Mar 02 0106   Tue Mar 02, 2021   0048 EKG: Normal sinus rhythm rate of 88 beats minute with normal AL, QRS, QT interval.  Left axis deviation with LVH, no ST elevation or depressions.   EKG interpreted by Casey Sepulveda MD      [ZD]      ED Course User Index  [ZD] Pollo Mckeon MD       Procedures

## 2021-03-03 ENCOUNTER — PATIENT OUTREACH (OUTPATIENT)
Dept: CASE MANAGEMENT | Age: 86
End: 2021-03-03

## 2021-03-03 NOTE — PROGRESS NOTES
Called pt to follow up on ED visit to [facility] on 3/2/2021. Pt's grandson Anaid Stein answered the telephone and reports that pt was taken to [facility] ED and is being admitted to [facility] for [address]. Pt's grandson reports that pt received the Nelta Perking vaccine on 3/1/2021. I provided pt's grandson with my telephone number and name. Pt's grandson states that his main concern is the pt at this time. Episode of Care resolved.

## 2021-12-24 ENCOUNTER — APPOINTMENT (OUTPATIENT)
Dept: GENERAL RADIOLOGY | Age: 86
DRG: 689 | End: 2021-12-24
Attending: EMERGENCY MEDICINE
Payer: MEDICARE

## 2021-12-24 ENCOUNTER — APPOINTMENT (OUTPATIENT)
Dept: CT IMAGING | Age: 86
DRG: 689 | End: 2021-12-24
Attending: EMERGENCY MEDICINE
Payer: MEDICARE

## 2021-12-24 ENCOUNTER — HOSPITAL ENCOUNTER (INPATIENT)
Age: 86
LOS: 6 days | Discharge: NURSING, REHAB OR LONG-TERM CARE | DRG: 689 | End: 2021-12-30
Attending: EMERGENCY MEDICINE | Admitting: INTERNAL MEDICINE
Payer: MEDICARE

## 2021-12-24 DIAGNOSIS — R41.82 ALTERED MENTAL STATUS, UNSPECIFIED ALTERED MENTAL STATUS TYPE: ICD-10-CM

## 2021-12-24 DIAGNOSIS — I10 HTN (HYPERTENSION), BENIGN: Chronic | ICD-10-CM

## 2021-12-24 DIAGNOSIS — R52 GENERALIZED PAIN: ICD-10-CM

## 2021-12-24 DIAGNOSIS — R31.9 URINARY TRACT INFECTION WITH HEMATURIA, SITE UNSPECIFIED: Primary | ICD-10-CM

## 2021-12-24 DIAGNOSIS — E78.2 MIXED HYPERLIPIDEMIA: Chronic | ICD-10-CM

## 2021-12-24 DIAGNOSIS — R79.89 ELEVATED BRAIN NATRIURETIC PEPTIDE (BNP) LEVEL: ICD-10-CM

## 2021-12-24 DIAGNOSIS — Z51.5 ENCOUNTER FOR PALLIATIVE CARE: ICD-10-CM

## 2021-12-24 DIAGNOSIS — G93.41 ACUTE METABOLIC ENCEPHALOPATHY: ICD-10-CM

## 2021-12-24 DIAGNOSIS — I48.91 ATRIAL FIBRILLATION, RAPID (HCC): ICD-10-CM

## 2021-12-24 DIAGNOSIS — N39.0 URINARY TRACT INFECTION WITH HEMATURIA, SITE UNSPECIFIED: Primary | ICD-10-CM

## 2021-12-24 LAB
ALBUMIN SERPL-MCNC: 3.6 G/DL (ref 3.5–5)
ALBUMIN/GLOB SERPL: 0.8 {RATIO} (ref 1.1–2.2)
ALP SERPL-CCNC: 171 U/L (ref 45–117)
ALT SERPL-CCNC: 14 U/L (ref 12–78)
AMMONIA PLAS-SCNC: 13 UMOL/L
ANION GAP SERPL CALC-SCNC: 6 MMOL/L (ref 5–15)
APPEARANCE UR: ABNORMAL
ARTERIAL PATENCY WRIST A: POSITIVE
AST SERPL-CCNC: 20 U/L (ref 15–37)
BACTERIA URNS QL MICRO: ABNORMAL /HPF
BASE DEFICIT BLD-SCNC: 0.4 MMOL/L
BASOPHILS # BLD: 0 K/UL (ref 0–0.1)
BASOPHILS NFR BLD: 0 % (ref 0–1)
BDY SITE: ABNORMAL
BILIRUB SERPL-MCNC: 0.6 MG/DL (ref 0.2–1)
BILIRUB UR QL: NEGATIVE
BNP SERPL-MCNC: 1894 PG/ML
BUN SERPL-MCNC: 24 MG/DL (ref 6–20)
BUN/CREAT SERPL: 20 (ref 12–20)
CALCIUM SERPL-MCNC: 9.2 MG/DL (ref 8.5–10.1)
CHLORIDE SERPL-SCNC: 107 MMOL/L (ref 97–108)
CO2 SERPL-SCNC: 26 MMOL/L (ref 21–32)
COLOR UR: ABNORMAL
COMMENT, HOLDF: NORMAL
COVID-19 RAPID TEST, COVR: NOT DETECTED
CREAT SERPL-MCNC: 1.18 MG/DL (ref 0.55–1.02)
DIFFERENTIAL METHOD BLD: ABNORMAL
EOSINOPHIL # BLD: 0 K/UL (ref 0–0.4)
EOSINOPHIL NFR BLD: 0 % (ref 0–7)
EPITH CASTS URNS QL MICRO: ABNORMAL /LPF
ERYTHROCYTE [DISTWIDTH] IN BLOOD BY AUTOMATED COUNT: 14.2 % (ref 11.5–14.5)
FLUAV AG NPH QL IA: NEGATIVE
FLUBV AG NOSE QL IA: NEGATIVE
GLOBULIN SER CALC-MCNC: 4.6 G/DL (ref 2–4)
GLUCOSE SERPL-MCNC: 172 MG/DL (ref 65–100)
GLUCOSE UR STRIP.AUTO-MCNC: NEGATIVE MG/DL
HCO3 BLD-SCNC: 22.3 MMOL/L (ref 22–26)
HCT VFR BLD AUTO: 48.4 % (ref 35–47)
HGB BLD-MCNC: 15.6 G/DL (ref 11.5–16)
HGB UR QL STRIP: ABNORMAL
IMM GRANULOCYTES # BLD AUTO: 0.1 K/UL (ref 0–0.04)
IMM GRANULOCYTES NFR BLD AUTO: 1 % (ref 0–0.5)
KETONES UR QL STRIP.AUTO: NEGATIVE MG/DL
LACTATE SERPL-SCNC: 1.3 MMOL/L (ref 0.4–2)
LEUKOCYTE ESTERASE UR QL STRIP.AUTO: ABNORMAL
LYMPHOCYTES # BLD: 0.7 K/UL (ref 0.8–3.5)
LYMPHOCYTES NFR BLD: 8 % (ref 12–49)
MAGNESIUM SERPL-MCNC: 2.1 MG/DL (ref 1.6–2.4)
MCH RBC QN AUTO: 30.2 PG (ref 26–34)
MCHC RBC AUTO-ENTMCNC: 32.2 G/DL (ref 30–36.5)
MCV RBC AUTO: 93.8 FL (ref 80–99)
MONOCYTES # BLD: 0.3 K/UL (ref 0–1)
MONOCYTES NFR BLD: 3 % (ref 5–13)
NEUTS SEG # BLD: 7.3 K/UL (ref 1.8–8)
NEUTS SEG NFR BLD: 88 % (ref 32–75)
NITRITE UR QL STRIP.AUTO: NEGATIVE
NRBC # BLD: 0 K/UL (ref 0–0.01)
NRBC BLD-RTO: 0 PER 100 WBC
PCO2 BLD: 31.5 MMHG (ref 35–45)
PH BLD: 7.46 [PH] (ref 7.35–7.45)
PH UR STRIP: 7 [PH] (ref 5–8)
PLATELET # BLD AUTO: 206 K/UL (ref 150–400)
PMV BLD AUTO: 11.3 FL (ref 8.9–12.9)
PO2 BLD: 55 MMHG (ref 80–100)
POTASSIUM SERPL-SCNC: 4.7 MMOL/L (ref 3.5–5.1)
PROT SERPL-MCNC: 8.2 G/DL (ref 6.4–8.2)
PROT UR STRIP-MCNC: 300 MG/DL
RBC # BLD AUTO: 5.16 M/UL (ref 3.8–5.2)
RBC #/AREA URNS HPF: ABNORMAL /HPF (ref 0–5)
RBC MORPH BLD: ABNORMAL
SAMPLES BEING HELD,HOLD: NORMAL
SAO2 % BLD: 90.3 % (ref 92–97)
SODIUM SERPL-SCNC: 139 MMOL/L (ref 136–145)
SOURCE, COVRS: NORMAL
SP GR UR REFRACTOMETRY: 1.01 (ref 1–1.03)
SPECIMEN TYPE: ABNORMAL
TROPONIN-HIGH SENSITIVITY: 46 NG/L (ref 0–51)
UR CULT HOLD, URHOLD: NORMAL
UROBILINOGEN UR QL STRIP.AUTO: 0.2 EU/DL (ref 0.2–1)
WBC # BLD AUTO: 8.4 K/UL (ref 3.6–11)
WBC URNS QL MICRO: ABNORMAL /HPF (ref 0–4)

## 2021-12-24 PROCEDURE — 65270000029 HC RM PRIVATE

## 2021-12-24 PROCEDURE — 85025 COMPLETE CBC W/AUTO DIFF WBC: CPT

## 2021-12-24 PROCEDURE — 87077 CULTURE AEROBIC IDENTIFY: CPT

## 2021-12-24 PROCEDURE — 87186 SC STD MICRODIL/AGAR DIL: CPT

## 2021-12-24 PROCEDURE — 87804 INFLUENZA ASSAY W/OPTIC: CPT

## 2021-12-24 PROCEDURE — 74011000258 HC RX REV CODE- 258: Performed by: INTERNAL MEDICINE

## 2021-12-24 PROCEDURE — 82803 BLOOD GASES ANY COMBINATION: CPT

## 2021-12-24 PROCEDURE — 51701 INSERT BLADDER CATHETER: CPT

## 2021-12-24 PROCEDURE — 83605 ASSAY OF LACTIC ACID: CPT

## 2021-12-24 PROCEDURE — 71045 X-RAY EXAM CHEST 1 VIEW: CPT

## 2021-12-24 PROCEDURE — 83880 ASSAY OF NATRIURETIC PEPTIDE: CPT

## 2021-12-24 PROCEDURE — 36600 WITHDRAWAL OF ARTERIAL BLOOD: CPT

## 2021-12-24 PROCEDURE — 87635 SARS-COV-2 COVID-19 AMP PRB: CPT

## 2021-12-24 PROCEDURE — 73060 X-RAY EXAM OF HUMERUS: CPT

## 2021-12-24 PROCEDURE — 36415 COLL VENOUS BLD VENIPUNCTURE: CPT

## 2021-12-24 PROCEDURE — 99284 EMERGENCY DEPT VISIT MOD MDM: CPT

## 2021-12-24 PROCEDURE — 74011250636 HC RX REV CODE- 250/636: Performed by: INTERNAL MEDICINE

## 2021-12-24 PROCEDURE — 84484 ASSAY OF TROPONIN QUANT: CPT

## 2021-12-24 PROCEDURE — 87086 URINE CULTURE/COLONY COUNT: CPT

## 2021-12-24 PROCEDURE — 70450 CT HEAD/BRAIN W/O DYE: CPT

## 2021-12-24 PROCEDURE — 87040 BLOOD CULTURE FOR BACTERIA: CPT

## 2021-12-24 PROCEDURE — 82140 ASSAY OF AMMONIA: CPT

## 2021-12-24 PROCEDURE — 83735 ASSAY OF MAGNESIUM: CPT

## 2021-12-24 PROCEDURE — 74011000250 HC RX REV CODE- 250: Performed by: INTERNAL MEDICINE

## 2021-12-24 PROCEDURE — 81001 URINALYSIS AUTO W/SCOPE: CPT

## 2021-12-24 PROCEDURE — 80053 COMPREHEN METABOLIC PANEL: CPT

## 2021-12-24 RX ORDER — HYDRALAZINE HYDROCHLORIDE 20 MG/ML
20 INJECTION INTRAMUSCULAR; INTRAVENOUS
Status: DISCONTINUED | OUTPATIENT
Start: 2021-12-24 | End: 2021-12-30 | Stop reason: HOSPADM

## 2021-12-24 RX ORDER — HALOPERIDOL 1 MG/1
1 TABLET ORAL
Status: DISCONTINUED | OUTPATIENT
Start: 2021-12-24 | End: 2021-12-25

## 2021-12-24 RX ORDER — ACETAMINOPHEN 500 MG
1000 TABLET ORAL
Status: DISPENSED | OUTPATIENT
Start: 2021-12-24 | End: 2021-12-25

## 2021-12-24 RX ORDER — PROMETHAZINE HYDROCHLORIDE 25 MG/1
12.5 TABLET ORAL
Status: DISCONTINUED | OUTPATIENT
Start: 2021-12-24 | End: 2021-12-29

## 2021-12-24 RX ORDER — ENOXAPARIN SODIUM 100 MG/ML
40 INJECTION SUBCUTANEOUS DAILY
Status: DISCONTINUED | OUTPATIENT
Start: 2021-12-25 | End: 2021-12-27

## 2021-12-24 RX ORDER — SODIUM CHLORIDE 0.9 % (FLUSH) 0.9 %
5-40 SYRINGE (ML) INJECTION AS NEEDED
Status: DISCONTINUED | OUTPATIENT
Start: 2021-12-24 | End: 2021-12-30 | Stop reason: HOSPADM

## 2021-12-24 RX ORDER — ONDANSETRON 2 MG/ML
4 INJECTION INTRAMUSCULAR; INTRAVENOUS
Status: DISCONTINUED | OUTPATIENT
Start: 2021-12-24 | End: 2021-12-30 | Stop reason: HOSPADM

## 2021-12-24 RX ORDER — LORAZEPAM 2 MG/ML
0.5 INJECTION INTRAMUSCULAR
Status: COMPLETED | OUTPATIENT
Start: 2021-12-24 | End: 2021-12-24

## 2021-12-24 RX ORDER — SODIUM CHLORIDE 0.9 % (FLUSH) 0.9 %
5-40 SYRINGE (ML) INJECTION EVERY 8 HOURS
Status: DISCONTINUED | OUTPATIENT
Start: 2021-12-24 | End: 2021-12-30 | Stop reason: HOSPADM

## 2021-12-24 RX ORDER — POLYETHYLENE GLYCOL 3350 17 G/17G
17 POWDER, FOR SOLUTION ORAL DAILY PRN
Status: DISCONTINUED | OUTPATIENT
Start: 2021-12-24 | End: 2021-12-26

## 2021-12-24 RX ORDER — SENNOSIDES 8.6 MG/1
1 TABLET ORAL DAILY PRN
Status: DISCONTINUED | OUTPATIENT
Start: 2021-12-24 | End: 2021-12-26

## 2021-12-24 RX ORDER — ACETAMINOPHEN 650 MG/1
650 SUPPOSITORY RECTAL
Status: DISCONTINUED | OUTPATIENT
Start: 2021-12-24 | End: 2021-12-30 | Stop reason: HOSPADM

## 2021-12-24 RX ORDER — MORPHINE SULFATE 2 MG/ML
1 INJECTION, SOLUTION INTRAMUSCULAR; INTRAVENOUS
Status: DISCONTINUED | OUTPATIENT
Start: 2021-12-24 | End: 2021-12-30 | Stop reason: HOSPADM

## 2021-12-24 RX ORDER — ACETAMINOPHEN 325 MG/1
650 TABLET ORAL
Status: DISCONTINUED | OUTPATIENT
Start: 2021-12-24 | End: 2021-12-30 | Stop reason: HOSPADM

## 2021-12-24 RX ADMIN — LORAZEPAM 0.5 MG: 2 INJECTION INTRAMUSCULAR; INTRAVENOUS at 13:00

## 2021-12-24 RX ADMIN — HYDRALAZINE HYDROCHLORIDE 20 MG: 20 INJECTION INTRAMUSCULAR; INTRAVENOUS at 18:49

## 2021-12-24 RX ADMIN — Medication 10 ML: at 15:38

## 2021-12-24 RX ADMIN — Medication 10 ML: at 23:00

## 2021-12-24 RX ADMIN — WATER 1 G: 1 INJECTION INTRAMUSCULAR; INTRAVENOUS; SUBCUTANEOUS at 12:47

## 2021-12-24 RX ADMIN — DOXYCYCLINE 100 MG: 100 INJECTION, POWDER, LYOPHILIZED, FOR SOLUTION INTRAVENOUS at 15:36

## 2021-12-24 NOTE — ED NOTES
TRANSFER - OUT REPORT:    Verbal report given to Kylie(name) on Pat Newberry  being transferred to 417 (unit) for routine progression of care       Report consisted of patients Situation, Background, Assessment and   Recommendations(SBAR). Information from the following report(s) SBAR, Kardex and ED Summary was reviewed with the receiving nurse. Lines:   Peripheral IV 12/24/21 Right Antecubital (Active)       Peripheral IV 12/24/21 Anterior; Left Forearm (Active)        Opportunity for questions and clarification was provided.       Patient transported with:   [employer]

## 2021-12-24 NOTE — H&P
[address]  [phone]    Admission History and Physical      NAME:       Carole Calloway   :       1924   MRN:      [id number]     PCP:      Anibal Gan MD     Date of service:   2021     Chief  Complaint: Altered mental status    History Of Presenting Illness:       Ms. Yoli Smith is a 80 y.o. female who is being admitted for acute metabolic encephalopathy due to a UTi. Ms. Yoli Smith presented to our Emergency Department today complaining of not feeling well. She was brought from [facility] senior living with altered mental status from baseline that started this morning. No reported fever or chills. No nausea or vomiting. She is not able to give much hx and I have discussed with her family at bedside who state she does have frequent UTIs. In the ED, a CT scan head was unremarkable. CXR showed minimal edema and concern for an atypical pneumonia. Rapid CoV-19 test was neg. She was found to have a UTi. She will be admitted for further management. Allergies   Allergen Reactions    Sulfa (Sulfonamide Antibiotics) Rash    Ampicillin Rash     Tolerates cefazolin    Lescol [Fluvastatin] Unknown (comments)       Prior to Admission medications    Medication Sig Start Date End Date Taking? Authorizing Provider   acetaminophen (TYLENOL) 325 mg tablet Take 2 Tabs by mouth every four (4) hours as needed for Pain. 3/2/21   Victoria Alcala MD   ondansetron (Zofran ODT) 4 mg disintegrating tablet Take 1 Tab by mouth every eight (8) hours as needed for Nausea. 3/2/21   Victoria Alcala MD   omega-3 fatty acids-fish oil (FISH OIL) 360-1,200 mg cap Take 2 Tabs by mouth daily. Anibal Gan MD   calcium-cholecalciferol, D3, (CALTRATE 600+D) tablet Take 2 Tabs by mouth daily.     Provider, Historical   amLODIPine (NORVASC) 5 mg tablet Take 5 mg by mouth daily. Provider, Historical   pregabalin (LYRICA) 50 mg capsule Take 50 mg by mouth nightly. Indications: neuropathy    Provider, Historical   folic acid/multivit-min/lutein (CENTRUM SILVER PO) Take 1 Tab by mouth daily. Provider, Historical   atorvastatin (LIPITOR) 10 mg tablet Take 10 mg by mouth nightly. Provider, Historical   aspirin delayed-release 81 mg tablet Take 81 mg by mouth nightly. Provider, Historical       Past Medical History:   Diagnosis Date    CAD (coronary artery disease)     Femur fracture (HCC)     right    GERD (gastroesophageal reflux disease)     HTN (hypertension), benign 2011    Mixed hyperlipidemia 2011        Past Surgical History:   Procedure Laterality Date    HX FEMUR FRACTURE TX      HX KNEE REPLACEMENT      bilateral    HX ORTHOPAEDIC      bilat knee replacements    HX VEIN STRIPPING         Social History     Tobacco Use    Smoking status: Never Smoker    Smokeless tobacco: Never Used   Substance Use Topics    Alcohol use: No        Family History   Problem Relation Age of Onset    Other Mother          of renal failure, not sure what caused id    Other Father         something with throat, not sure if it was cancer    Heart Disease Sister         Review of Systems: unable to obtain from her due to current illness    Examination:    Constitutional:    Visit Vitals  BP (!) (P) 154/109 (BP 1 Location: Left upper arm, BP Patient Position: Semi fowlers)   Pulse (P) 79   Temp (P) 99.7 °F (37.6 °C)   Resp (P) 18   Ht (P) 5' 4\" (1.626 m)   Wt (P) 90.9 kg (200 lb 8 oz)   SpO2 (P) 93%   BMI (P) 34.42 kg/m²         General:  Weak, frail and ill looking patient, appears uncomfortable. Eyes: Pink conjunctivae, PERRLA with no discharge.  Normal eye movements  Ear, Nose, Mouth & Throat: No ottorrhea, rhinorrhea, non tender sinuses, dry mucous membranes  Respiratory:  No accessory muscle use, decreased breath sounds without crackles or wheezes  Cardiovascular:  No JVD or murmurs, regular and normal S1, S2 without thrills, bruits or peripheral edema. GI & :  Soft abdomen, non-tender, non-distended, normoactive bowel sounds with no palpable organomegaly  Heme:  No cervical or axillary adenopathy. Musculoskeletal:  No cyanosis, clubbing, atrophy or deformities  Skin:  No rashes, bruising or ulcers   Neurological: Awake and alert but seems uncomfotable, trying to get out of bed. Moves all extremities   Psychiatric:  Has no insight to her illness   ________________________________________________________________________    Data Review:    Labs:    Recent Labs     12/24/21  0954   WBC 8.4   HGB 15.6   HCT 48.4*        Recent Labs     12/24/21  0954      K 4.7      CO2 26   *   BUN 24*   CREA 1.18*   CA 9.2   MG 2.1   ALB 3.6   ALT 14     No components found for: GLPOC  No results for input(s): PH, PCO2, PO2, HCO3, FIO2 in the last 72 hours. No results for input(s): INR, INREXT in the last 72 hours. Imaging Studies:      Chest X-ray, CT scan head- all reviewed    I have also reviewed available old medical records. Assessment & Impression:     Ms. Washington Garland is a 80 y.o. female being evaluated for:     Principal Problem:    Acute metabolic encephalopathy (65/94/5233)    Active Problems:    HTN (hypertension), benign (1/11/2011)      Mixed hyperlipidemia (1/11/2011)      GERD (gastroesophageal reflux disease) (9/30/2014)      CAD (coronary artery disease) ()      UTI (urinary tract infection) (10/1/2014)         Plan of management:    Acute metabolic encephalopathy (95/24/0018) POA: likely due to a UTi. CT scan head neg for acute changes. CXR concerning for an atypical infection. Admit to hospital. Fall precautions. At high risk for delirium. Treat UTi. Add IV Doxycycline for now. Haldol PRN. IV Morphine PRN pain.  Continue supportive care    UTI (urinary tract infection) (10/1/2014) POA: start empiric IV Ceftriaxone and follow urine cultures    HTN (hypertension), benign (1/11/2011) POA: worsened by her restlessness.      CAD (coronary artery disease) / Mixed hyperlipidemia (1/11/2011) POA: Hold medications until fully alert    GERD (gastroesophageal reflux disease) POA: start IV Pepcid    Code Status:  DNR    Surrogate decision maker: Family    Risk of deterioration: high      Total time spent for the care of the patient: [address] discussed with: Family, Nursing Staff and ED physician    Discussed:  Code Status, Care Plan and D/C Planning    Prophylaxis:  Lovenox    Probable Disposition:  SNF/LTC           ___________________________________________________    Attending Physician: Logan Garner MD

## 2021-12-24 NOTE — ED TRIAGE NOTES
At arrives vis EMS from nursing home for AMS that started this morning    Pt HTN en route and during triage    Pt non verbal at this time

## 2021-12-24 NOTE — ED PROVIDER NOTES
41-year-old female with a history of CAD, GERD, HTN, HLD, presents to the emergency department by EMS with reports from family that she has been increasingly altered for the last couple of days. Family states that she is normally much more oriented and interactive, no history of dementia.,  Does note a history of prior UTIs and expresses concern for the same. History is provided by EMS and family but is limited from the patient due to altered mental status.       Altered mental status          Past Medical History:   Diagnosis Date    CAD (coronary artery disease)     Femur fracture (HCC)     right    GERD (gastroesophageal reflux disease)     HTN (hypertension), benign 2011    Mixed hyperlipidemia 2011       Past Surgical History:   Procedure Laterality Date    HX FEMUR FRACTURE TX      HX KNEE REPLACEMENT      bilateral    HX ORTHOPAEDIC      bilat knee replacements    HX VEIN STRIPPING           Family History:   Problem Relation Age of Onset    Other Mother          of renal failure, not sure what caused id    Other Father         something with throat, not sure if it was cancer    Heart Disease Sister        Social History     Socioeconomic History    Marital status:      Spouse name: Not on file    Number of children: Not on file    Years of education: Not on file    Highest education level: Not on file   Occupational History    Not on file   Tobacco Use    Smoking status: Never Smoker    Smokeless tobacco: Never Used   Substance and Sexual Activity    Alcohol use: No    Drug use: No    Sexual activity: Not on file   Other Topics Concern    Not on file   Social History Narrative    Not on file     Social Determinants of Health     Financial Resource Strain:     Difficulty of Paying Living Expenses: Not on file   Food Insecurity:     Worried About Running Out of Food in the Last Year: Not on file    Angely of Food in the Last Year: Not on SEN Agrawal Needs:     Lack of Transportation (Medical): Not on file    Lack of Transportation (Non-Medical): Not on file   Physical Activity:     Days of Exercise per Week: Not on file    Minutes of Exercise per Session: Not on file   Stress:     Feeling of Stress : Not on file   Social Connections:     Frequency of Communication with Friends and Family: Not on file    Frequency of Social Gatherings with Friends and Family: Not on file    Attends Religion Services: Not on file    Active Member of Clubs or Organizations: Not on file    Attends Club or Organization Meetings: Not on file    Marital Status: Not on file   Intimate Partner Violence:     Fear of Current or Ex-Partner: Not on file    Emotionally Abused: Not on file    Physically Abused: Not on file    Sexually Abused: Not on file   Housing Stability:     Unable to Pay for Housing in the Last Year: Not on file    Number of [place] in the Last Year: Not on file    Unstable Housing in the Last Year: Not on file         ALLERGIES: Sulfa (sulfonamide antibiotics), Ampicillin, and Lescol [fluvastatin]    Review of Systems   Unable to perform ROS: Mental status change       Vitals:    12/24/21 0927   BP: (!) (P) 154/109   Pulse: (P) 79   Resp: (P) 18   Temp: (P) 99.7 °F (37.6 °C)   SpO2: (P) 93%   Weight: (P) 90.9 kg (200 lb 8 oz)   Height: (P) 5' 4\" (1.626 m)            Physical Exam  Vitals and nursing note reviewed. Constitutional:       General: She is not in acute distress. Appearance: Normal appearance. She is well-developed. She is ill-appearing. She is not diaphoretic. HENT:      Head: Normocephalic and atraumatic. Nose: Nose normal.   Eyes:      Extraocular Movements: Extraocular movements intact. Conjunctiva/sclera: Conjunctivae normal.      Pupils: Pupils are equal, round, and reactive to light. Cardiovascular:      Rate and Rhythm: Normal rate and regular rhythm. Heart sounds: Normal heart sounds.    Pulmonary: Effort: Pulmonary effort is normal.      Breath sounds: Normal breath sounds. Abdominal:      General: There is no distension. Palpations: Abdomen is soft. Tenderness: There is no abdominal tenderness. Musculoskeletal:         General: No tenderness. Cervical back: Neck supple. Comments: Decreased range of motion and tenderness noted in right shoulder but no noted evidence of trauma, no deformity. Skin:     General: Skin is warm and dry. Neurological:      General: No focal deficit present. Mental Status: She is alert. She is disoriented and confused. GCS: GCS eye subscore is 4. GCS verbal subscore is 4. GCS motor subscore is 6. Cranial Nerves: No cranial nerve deficit. Sensory: No sensory deficit. Motor: No weakness. Coordination: Coordination normal.      Comments: Follows commands moves all 4 extremities equally with generalized weakness but significantly altered and confused. MDM   42-year-old female presents with AMS. Concern for metabolic encephalopathy. Labs returned showing no leukocytosis or anemia, creatinine 1.8, BUN 24, unremarkable LFTs, negative troponin but elevated BNP at [address]. Normal lactic acid. Negative rapid Covid and influenza, normal ammonia. CXR viewed by myself and read by radiology showing minimal prominence in the interstitium likely representing minimal edema versus atypical pneumonia. X-ray of right humerus shows degenerative changes but no acute abnormalities. CXR viewed by myself and read by radiology showing no acute abnormalities. ABG shows pH 7.46, CO2 31.5, O2 55, bicarb 22.5. Family states that her normal oxygen level is usually around 93% at baseline, on no home O2.    UA returned showing 20-50 WBCs with small blood and 4+ bacteria, concern for UTI as etiology for her metabolic encephalopathy. She was given dose of IV Rocephin.   Will admit to the hospitalist service for further care and assessment. Procedures    Perfect Serve Consult for Admission  11:25 AM    ED Room Number: IF27/04  Patient Name and age:  Roselia Nicholas 80 y.o.  female  Working Diagnosis:   1. Urinary tract infection with hematuria, site unspecified    2. Acute metabolic encephalopathy    3. Altered mental status, unspecified altered mental status type    4. Elevated brain natriuretic peptide (BNP) level        COVID-19 Suspicion:  no  Sepsis present:  no  Reassessment needed: no  Code Status:  Do Not Resuscitate  Readmission: no  Isolation Requirements:  no  Recommended Level of Care:  telemetry  Department:[facility] ED - [phone]  Other: 80-year-old female presents with worsening altered mental status over the last couple of days with increased confusion. Appears to have UTI, sent urine culture will give IV Rocephin. CT head negative. Afebrile with vital signs stable. Mild elevation of BNP with trace edema versus infection on CXR. Covid, influenza negative.

## 2021-12-24 NOTE — PROGRESS NOTES
1839: received following order from Dr Cole Aguilar: IV hydralazine 20 mg q6 hrs sBP > 160 first dose now. 1810: attempted to contact Dr Cole Aguilar by phone x 2 w/ no answer. Perfectserve message sent regarding elevated BP. Awaiting response.

## 2021-12-25 LAB
ALBUMIN SERPL-MCNC: 3.2 G/DL (ref 3.5–5)
ALBUMIN/GLOB SERPL: 0.8 {RATIO} (ref 1.1–2.2)
ALP SERPL-CCNC: 138 U/L (ref 45–117)
ALT SERPL-CCNC: 17 U/L (ref 12–78)
ANION GAP SERPL CALC-SCNC: 9 MMOL/L (ref 5–15)
AST SERPL-CCNC: 48 U/L (ref 15–37)
BASOPHILS # BLD: 0 K/UL (ref 0–0.1)
BASOPHILS NFR BLD: 0 % (ref 0–1)
BILIRUB SERPL-MCNC: 0.8 MG/DL (ref 0.2–1)
BUN SERPL-MCNC: 27 MG/DL (ref 6–20)
BUN/CREAT SERPL: 25 (ref 12–20)
CALCIUM SERPL-MCNC: 8.6 MG/DL (ref 8.5–10.1)
CHLORIDE SERPL-SCNC: 110 MMOL/L (ref 97–108)
CO2 SERPL-SCNC: 22 MMOL/L (ref 21–32)
CREAT SERPL-MCNC: 1.07 MG/DL (ref 0.55–1.02)
DIFFERENTIAL METHOD BLD: ABNORMAL
EOSINOPHIL # BLD: 0 K/UL (ref 0–0.4)
EOSINOPHIL NFR BLD: 0 % (ref 0–7)
ERYTHROCYTE [DISTWIDTH] IN BLOOD BY AUTOMATED COUNT: 14.4 % (ref 11.5–14.5)
GLOBULIN SER CALC-MCNC: 4.2 G/DL (ref 2–4)
GLUCOSE SERPL-MCNC: 141 MG/DL (ref 65–100)
HCT VFR BLD AUTO: 44.1 % (ref 35–47)
HGB BLD-MCNC: 14.4 G/DL (ref 11.5–16)
IMM GRANULOCYTES # BLD AUTO: 0.1 K/UL (ref 0–0.04)
IMM GRANULOCYTES NFR BLD AUTO: 0 % (ref 0–0.5)
LYMPHOCYTES # BLD: 0.9 K/UL (ref 0.8–3.5)
LYMPHOCYTES NFR BLD: 8 % (ref 12–49)
MCH RBC QN AUTO: 30.3 PG (ref 26–34)
MCHC RBC AUTO-ENTMCNC: 32.7 G/DL (ref 30–36.5)
MCV RBC AUTO: 92.6 FL (ref 80–99)
MONOCYTES # BLD: 1.2 K/UL (ref 0–1)
MONOCYTES NFR BLD: 11 % (ref 5–13)
NEUTS SEG # BLD: 9.4 K/UL (ref 1.8–8)
NEUTS SEG NFR BLD: 81 % (ref 32–75)
NRBC # BLD: 0 K/UL (ref 0–0.01)
NRBC BLD-RTO: 0 PER 100 WBC
PLATELET # BLD AUTO: 196 K/UL (ref 150–400)
PMV BLD AUTO: 11.2 FL (ref 8.9–12.9)
POTASSIUM SERPL-SCNC: 3.7 MMOL/L (ref 3.5–5.1)
PROT SERPL-MCNC: 7.4 G/DL (ref 6.4–8.2)
RBC # BLD AUTO: 4.76 M/UL (ref 3.8–5.2)
SODIUM SERPL-SCNC: 141 MMOL/L (ref 136–145)
WBC # BLD AUTO: 11.6 K/UL (ref 3.6–11)

## 2021-12-25 PROCEDURE — 80053 COMPREHEN METABOLIC PANEL: CPT

## 2021-12-25 PROCEDURE — 36415 COLL VENOUS BLD VENIPUNCTURE: CPT

## 2021-12-25 PROCEDURE — 74011000250 HC RX REV CODE- 250: Performed by: INTERNAL MEDICINE

## 2021-12-25 PROCEDURE — 74011000258 HC RX REV CODE- 258: Performed by: INTERNAL MEDICINE

## 2021-12-25 PROCEDURE — 74011250636 HC RX REV CODE- 250/636: Performed by: INTERNAL MEDICINE

## 2021-12-25 PROCEDURE — 85025 COMPLETE CBC W/AUTO DIFF WBC: CPT

## 2021-12-25 PROCEDURE — 77030038269 HC DRN EXT URIN PURWCK BARD -A

## 2021-12-25 PROCEDURE — 65270000029 HC RM PRIVATE

## 2021-12-25 RX ORDER — HALOPERIDOL 5 MG/ML
1 INJECTION INTRAMUSCULAR
Status: DISCONTINUED | OUTPATIENT
Start: 2021-12-25 | End: 2021-12-27

## 2021-12-25 RX ORDER — AMLODIPINE BESYLATE 5 MG/1
5 TABLET ORAL DAILY
Status: DISCONTINUED | OUTPATIENT
Start: 2021-12-25 | End: 2021-12-27

## 2021-12-25 RX ADMIN — Medication 10 ML: at 22:30

## 2021-12-25 RX ADMIN — WATER 1 G: 1 INJECTION INTRAMUSCULAR; INTRAVENOUS; SUBCUTANEOUS at 15:26

## 2021-12-25 RX ADMIN — HYDRALAZINE HYDROCHLORIDE 20 MG: 20 INJECTION INTRAMUSCULAR; INTRAVENOUS at 17:31

## 2021-12-25 RX ADMIN — Medication 10 ML: at 15:27

## 2021-12-25 RX ADMIN — HALOPERIDOL LACTATE 1 MG: 5 INJECTION, SOLUTION INTRAMUSCULAR at 15:26

## 2021-12-25 RX ADMIN — Medication 10 ML: at 05:48

## 2021-12-25 RX ADMIN — MORPHINE SULFATE 1 MG: 2 INJECTION, SOLUTION INTRAMUSCULAR; INTRAVENOUS at 17:29

## 2021-12-25 RX ADMIN — VANCOMYCIN HYDROCHLORIDE 2250 MG: 10 INJECTION, POWDER, LYOPHILIZED, FOR SOLUTION INTRAVENOUS at 16:53

## 2021-12-25 RX ADMIN — ENOXAPARIN SODIUM 40 MG: 100 INJECTION SUBCUTANEOUS at 08:32

## 2021-12-25 RX ADMIN — HYDRALAZINE HYDROCHLORIDE 20 MG: 20 INJECTION INTRAMUSCULAR; INTRAVENOUS at 08:32

## 2021-12-25 RX ADMIN — MORPHINE SULFATE 1 MG: 2 INJECTION, SOLUTION INTRAMUSCULAR; INTRAVENOUS at 08:32

## 2021-12-25 RX ADMIN — DOXYCYCLINE 100 MG: 100 INJECTION, POWDER, LYOPHILIZED, FOR SOLUTION INTRAVENOUS at 15:26

## 2021-12-25 RX ADMIN — MORPHINE SULFATE 1 MG: 2 INJECTION, SOLUTION INTRAMUSCULAR; INTRAVENOUS at 00:26

## 2021-12-25 RX ADMIN — DOXYCYCLINE 100 MG: 100 INJECTION, POWDER, LYOPHILIZED, FOR SOLUTION INTRAVENOUS at 00:20

## 2021-12-25 RX ADMIN — HYDRALAZINE HYDROCHLORIDE 20 MG: 20 INJECTION INTRAMUSCULAR; INTRAVENOUS at 03:37

## 2021-12-25 RX ADMIN — MORPHINE SULFATE 1 MG: 2 INJECTION, SOLUTION INTRAMUSCULAR; INTRAVENOUS at 04:33

## 2021-12-25 NOTE — PROGRESS NOTES
5046 Report given to Nurse Ramirez. Report included SBAR, MAR, LABS, Orders and care given during the shift.

## 2021-12-25 NOTE — PROGRESS NOTES
Problem: Pressure Injury - Risk of  Goal: *Prevention of pressure injury  Description: Document John Scale and appropriate interventions in the flowsheet. Outcome: Progressing Towards Goal  Note: Pressure Injury Interventions:  Sensory Interventions: Assess changes in LOC,Minimize linen layers,Maintain/enhance activity level    Moisture Interventions: Absorbent underpads    Activity Interventions: Increase time out of bed    Mobility Interventions: HOB 30 degrees or less,Pressure redistribution bed/mattress (bed type)    Nutrition Interventions:  (npo)    Friction and Shear Interventions: Apply protective barrier, creams and emollients,Lift sheet,Lift team/patient mobility team    Problem: Patient Education: Go to Patient Education Activity  Goal: Patient/Family Education  Outcome: Progressing Towards Goal     Problem: Falls - Risk of  Goal: *Absence of Falls  Description: Document Jesse Fall Risk and appropriate interventions in the flowsheet.   Outcome: Progressing Towards Goal  Note: Fall Risk Interventions:  Mobility Interventions: Bed/chair exit alarm    Mentation Interventions: Adequate sleep, hydration, pain control,Bed/chair exit alarm    Medication Interventions: Patient to call before getting OOB    Elimination Interventions: Call light in reach    Problem: Patient Education: Go to Patient Education Activity  Goal: Patient/Family Education  Outcome: Progressing Towards Goal

## 2021-12-25 NOTE — PROGRESS NOTES
Problem: Pressure Injury - Risk of  Goal: *Prevention of pressure injury  Description: Document John Scale and appropriate interventions in the flowsheet.   Outcome: Progressing Towards Goal  Note: Pressure Injury Interventions:  Sensory Interventions: Assess changes in LOC,Minimize linen layers,Maintain/enhance activity level    Moisture Interventions: Absorbent underpads    Activity Interventions: Increase time out of bed    Mobility Interventions: HOB 30 degrees or less,Pressure redistribution bed/mattress (bed type)    Nutrition Interventions:  (npo)    Friction and Shear Interventions: Apply protective barrier, creams and emollients,Lift sheet,Lift team/patient mobility team

## 2021-12-25 NOTE — PROGRESS NOTES
[facility] Progress Note: Antimicrobial Stewardship    Consult for antibiotic dosing of vancomycin by Dr. Beach Courts  Indication: Bacteremia  Day of Therapy: 1    Plan:  Vancomycin therapy:   Start with loading dose of vancomycin 2250 mg IV (25 mg/kg, max 2.5 gm)   Follow with maintenance dose of vancomycin 1000 mg IV every 24 hours    Dose calculated to approximate a   Target AUC/ARNOLD of 400-600  Trough of N/A mcg/mL. Plan:  1000 mg = 11 mg/kg, predicts AUC of 499. Order level tomorrow  Pharmacy to follow daily and will make changes to dose and/or frequency based on clinical status. Date Dose & Interval Measured (mcg/mL) Extrapolated (mcg/mL)   ? ? ? ?   ? ? ? ?   ? ? ? ? Other Antimicrobial  (not dosed by pharmacist)   Ceftriaxone 1 g IV every 24 hours  Doxycycline 100 mg IV every 12 hours   Cultures     12/24 - Blood, paired - GPC in clusters in 1 of 4 bottles - Prelim  12/24 - Urine - GNR - Prelim   Serum Creatinine     Lab Results   Component Value Date/Time    Creatinine 1.07 (H) 12/25/2021 05:44 AM       Creatinine Clearance Estimated Creatinine Clearance: 32.8 mL/min (A) (based on SCr of 1.07 mg/dL (H)). Procalcitonin  No results found for: PCT     Temp   98.7 °F (37.1 °C)    WBC   Lab Results   Component Value Date/Time    WBC 11.6 (H) 12/25/2021 05:44 AM       For Antifungals, Metronidazole and Nafcillin: Lab Results   Component Value Date/Time    ALT (SGPT) 17 12/25/2021 05:44 AM    AST (SGOT) 48 (H) 12/25/2021 05:44 AM    Alk.  phosphatase 138 (H) 12/25/2021 05:44 AM    Bilirubin, total 0.8 12/25/2021 05:44 AM         Pharmacist: Signed RAQUEL CatalanD

## 2021-12-25 NOTE — PROGRESS NOTES
Chris Diggs gianni [place] [address]  [facility] [address] [place] [address]  [phone]      Medical Progress Note      NAME:         Bibi Paz   :        1924  MRM:        [id number]    Date of service: 2021      Subjective: Patient has been seen and examined as a follow up for multiple medical issues. Chart, labs, diagnostics reviewed. Patient is a poor historian due to her dementia. I have discussed with her nurse re: last night and am told she remained stable overall but a little restless this morning    Objective:    Vital Signs:    Visit Vitals  BP (!) 171/83 (BP 1 Location: Left upper arm, BP Patient Position: At rest;Semi fowlers)   Pulse (!) 101   Temp 98.5 °F (36.9 °C)   Resp 24   Ht 5' 4\" (1.626 m)   Wt 90.9 kg (200 lb 8 oz)   SpO2 94%   BMI 34.42 kg/m²          Intake/Output Summary (Last 24 hours) at 2021 0846  Last data filed at 2021 0550  Gross per 24 hour   Intake    Output 500 ml   Net -500 ml        Physical Examination:    General:   Weak, frail and ill looking elderly female, not in distress   Eyes:   pink conjunctivae, PERRLA with no discharge. ENT:   no ottorrhea or rhinorrhea with dry mucous membranes  Neck: no masses, thyroid non-tender and trachea central.  Pulm:  + accessory muscle use, decreased but clear breath sounds without crackles or wheezes  Card:  no JVD or murmurs, has sinus tachycardia, without thrills, bruits or peripheral edema  Abd:  Soft, non-tender, non-distended, normoactive bowel sounds with no palpable organomegaly  Musc:  No cyanosis, clubbing, atrophy or deformities. Skin:  No rashes, bruising or ulcers. Neuro: Awake and alert but confused. Moves all extremities.    Psych:  Has no insight to her illness     Current Facility-Administered Medications   Medication Dose Route Frequency    amLODIPine (NORVASC) tablet 5 mg  5 mg Oral DAILY    haloperidol lactate (HALDOL) injection 1 mg  1 mg IntraVENous Q8H PRN    sodium chloride (NS) flush 5-40 mL  5-40 mL IntraVENous Q8H    sodium chloride (NS) flush 5-40 mL  5-40 mL IntraVENous PRN    acetaminophen (TYLENOL) tablet 650 mg  650 mg Oral Q6H PRN    Or    acetaminophen (TYLENOL) suppository 650 mg  650 mg Rectal Q6H PRN    polyethylene glycol (MIRALAX) packet 17 g  17 g Oral DAILY PRN    senna (SENOKOT) tablet 8.6 mg  1 Tablet Oral DAILY PRN    promethazine (PHENERGAN) tablet 12.5 mg  12.5 mg Oral Q6H PRN    Or    ondansetron (ZOFRAN) injection 4 mg  4 mg IntraVENous Q6H PRN    enoxaparin (LOVENOX) injection 40 mg  40 mg SubCUTAneous DAILY    morphine injection 1 mg  1 mg IntraVENous Q4H PRN    cefTRIAXone (ROCEPHIN) 1 g in sterile water (preservative free) 10 mL IV syringe  1 g IntraVENous Q24H    doxycycline (VIBRAMYCIN) 100 mg in 0.9% sodium chloride (MBP/ADV) 100 mL MBP  100 mg IntraVENous Q12H    hydrALAZINE (APRESOLINE) 20 mg/mL injection 20 mg  20 mg IntraVENous Q6H PRN        Laboratory data and review:    Recent Labs     12/25/21  0544 12/24/21  0954   WBC 11.6* 8.4   HGB 14.4 15.6   HCT 44.1 48.4*    206     Recent Labs     12/25/21  0544 12/24/21  0954    139   K 3.7 4.7   * 107   CO2 22 26   * 172*   BUN 27* 24*   CREA 1.07* 1.18*   CA 8.6 9.2   MG  --  2.1   ALB 3.2* 3.6   ALT 17 14     No components found for: [place]    Diagnostics: Imaging studies have been reviewed    Assessment and Plan:    Acute metabolic encephalopathy (57/69/3424) POA: likely due to a UTi vs pneumonia. CT scan head neg for acute changes. CXR concerning for an atypical infection. Blood cultures flagged + in 1/4 bottles, likely a contaminant. Continue fall precautions. At high risk for delirium. Treat UTi and suspected pneumonia. IV Haldol PRN. IV Morphine PRN pain.  Given her frail nature, advanced age, persistent agitations, she will need a palliative care consult to address [address] 64 if he does not improve within next 48 hrs      UTI (urinary tract infection) (10/1/2014) POA: Continue empiric IV Ceftriaxone and follow urine cultures     HTN (hypertension), benign (1/11/2011) POA: worsened by her restlessness.  Start IV Hydralazine PRN     CAD (coronary artery disease) / Mixed hyperlipidemia (1/11/2011) POA: Hold medications for now.      GERD (gastroesophageal reflux disease) POA: continue IV Pepcid     Code Status:  DNR    Total time spent for the patient's care: [facility] discussed with: Nursing Staff    Discussed:  Care Plan and D/C Planning    Prophylaxis:  Lovenox    Anticipated Disposition:  SNF/LTC vs TBD           ___________________________________________________    Attending Physician:   Dariela Thakkar MD

## 2021-12-26 LAB
ANION GAP SERPL CALC-SCNC: 5 MMOL/L (ref 5–15)
BACTERIA SPEC CULT: ABNORMAL
BUN SERPL-MCNC: 34 MG/DL (ref 6–20)
BUN/CREAT SERPL: 34 (ref 12–20)
CALCIUM SERPL-MCNC: 8.8 MG/DL (ref 8.5–10.1)
CC UR VC: ABNORMAL
CHLORIDE SERPL-SCNC: 114 MMOL/L (ref 97–108)
CO2 SERPL-SCNC: 26 MMOL/L (ref 21–32)
COMMENT, HOLDF: NORMAL
CREAT SERPL-MCNC: 0.99 MG/DL (ref 0.55–1.02)
GLUCOSE SERPL-MCNC: 111 MG/DL (ref 65–100)
POTASSIUM SERPL-SCNC: 3.7 MMOL/L (ref 3.5–5.1)
SAMPLES BEING HELD,HOLD: NORMAL
SERVICE CMNT-IMP: ABNORMAL
SODIUM SERPL-SCNC: 145 MMOL/L (ref 136–145)

## 2021-12-26 PROCEDURE — 2709999900 HC NON-CHARGEABLE SUPPLY

## 2021-12-26 PROCEDURE — 36415 COLL VENOUS BLD VENIPUNCTURE: CPT

## 2021-12-26 PROCEDURE — 74011000258 HC RX REV CODE- 258: Performed by: INTERNAL MEDICINE

## 2021-12-26 PROCEDURE — 77030038269 HC DRN EXT URIN PURWCK BARD -A

## 2021-12-26 PROCEDURE — 80048 BASIC METABOLIC PNL TOTAL CA: CPT

## 2021-12-26 PROCEDURE — 74011250637 HC RX REV CODE- 250/637: Performed by: INTERNAL MEDICINE

## 2021-12-26 PROCEDURE — 65270000029 HC RM PRIVATE

## 2021-12-26 PROCEDURE — 87040 BLOOD CULTURE FOR BACTERIA: CPT

## 2021-12-26 PROCEDURE — 74011250636 HC RX REV CODE- 250/636: Performed by: INTERNAL MEDICINE

## 2021-12-26 PROCEDURE — 74011000250 HC RX REV CODE- 250: Performed by: INTERNAL MEDICINE

## 2021-12-26 RX ORDER — DEXTROSE MONOHYDRATE AND SODIUM CHLORIDE 5; .9 G/100ML; G/100ML
75 INJECTION, SOLUTION INTRAVENOUS CONTINUOUS
Status: DISCONTINUED | OUTPATIENT
Start: 2021-12-26 | End: 2021-12-27

## 2021-12-26 RX ADMIN — Medication 10 ML: at 23:28

## 2021-12-26 RX ADMIN — ENOXAPARIN SODIUM 40 MG: 100 INJECTION SUBCUTANEOUS at 09:49

## 2021-12-26 RX ADMIN — HYDRALAZINE HYDROCHLORIDE 20 MG: 20 INJECTION INTRAMUSCULAR; INTRAVENOUS at 23:22

## 2021-12-26 RX ADMIN — DEXTROSE AND SODIUM CHLORIDE 75 ML/HR: 5; 900 INJECTION, SOLUTION INTRAVENOUS at 12:13

## 2021-12-26 RX ADMIN — MORPHINE SULFATE 1 MG: 2 INJECTION, SOLUTION INTRAMUSCULAR; INTRAVENOUS at 00:30

## 2021-12-26 RX ADMIN — DOXYCYCLINE 100 MG: 100 INJECTION, POWDER, LYOPHILIZED, FOR SOLUTION INTRAVENOUS at 00:37

## 2021-12-26 RX ADMIN — WATER 1 G: 1 INJECTION INTRAMUSCULAR; INTRAVENOUS; SUBCUTANEOUS at 13:24

## 2021-12-26 RX ADMIN — DOXYCYCLINE 100 MG: 100 INJECTION, POWDER, LYOPHILIZED, FOR SOLUTION INTRAVENOUS at 13:24

## 2021-12-26 RX ADMIN — Medication 10 ML: at 13:23

## 2021-12-26 RX ADMIN — Medication 10 ML: at 04:51

## 2021-12-26 RX ADMIN — VANCOMYCIN HYDROCHLORIDE 1000 MG: 1 INJECTION, POWDER, LYOPHILIZED, FOR SOLUTION INTRAVENOUS at 17:11

## 2021-12-26 NOTE — PROGRESS NOTES
Physical Therapy  As noted by OT, Nursing reports patient is not following commands. This is not her baseline. Will hold assessment until tomorrow in hopes of improved ability to participate.   Frank Patel PT

## 2021-12-26 NOTE — PROGRESS NOTES
Per nursing, pt with 0% command following. Will defer OT eval at this time and will con't to follow peripherally and evaluate once medically appropriate. Thanks!

## 2021-12-26 NOTE — PROGRESS NOTES
Problem: Pressure Injury - Risk of  Goal: *Prevention of pressure injury  Description: Document John Scale and appropriate interventions in the flowsheet.   Outcome: Progressing Towards Goal  Note: Pressure Injury Interventions:  Sensory Interventions: Assess changes in LOC,Float heels,Keep linens dry and wrinkle-free,Minimize linen layers,Monitor skin under medical devices    Moisture Interventions: Absorbent underpads,Check for incontinence Q2 hours and as needed,Internal/External fecal devices    Activity Interventions: PT/OT evaluation    Mobility Interventions: Float heels,HOB 30 degrees or less,PT/OT evaluation    Nutrition Interventions: Discuss nutritional consult with provider    Friction and Shear Interventions: Lift sheet,Minimize layers

## 2021-12-26 NOTE — PROGRESS NOTES
Problem: Pressure Injury - Risk of  Goal: *Prevention of pressure injury  Description: Document John Scale and appropriate interventions in the flowsheet. Outcome: Progressing Towards Goal  Note: Pressure Injury Interventions:  Sensory Interventions: Assess changes in LOC,Float heels,Keep linens dry and wrinkle-free,Minimize linen layers,Monitor skin under medical devices    Moisture Interventions: Absorbent underpads,Check for incontinence Q2 hours and as needed,Internal/External fecal devices    Activity Interventions: PT/OT evaluation    Mobility Interventions: Float heels,HOB 30 degrees or less,PT/OT evaluation    Nutrition Interventions: Discuss nutritional consult with provider    Friction and Shear Interventions: Lift sheet,Minimize layers                Problem: Patient Education: Go to Patient Education Activity  Goal: Patient/Family Education  Outcome: Progressing Towards Goal     Problem: Falls - Risk of  Goal: *Absence of Falls  Description: Document Jesse Fall Risk and appropriate interventions in the flowsheet.   Outcome: Progressing Towards Goal  Note: Fall Risk Interventions:  Mobility Interventions: Bed/chair exit alarm,OT consult for ADLs,PT Consult for mobility concerns,PT Consult for assist device competence    Mentation Interventions: Adequate sleep, hydration, pain control,Bed/chair exit alarm,Door open when patient unattended    Medication Interventions: Bed/chair exit alarm    Elimination Interventions: Bed/chair exit alarm              Problem: Patient Education: Go to Patient Education Activity  Goal: Patient/Family Education  Outcome: Progressing Towards Goal

## 2021-12-26 NOTE — ROUTINE PROCESS
Bedside shift change report given to Joana Barraza RN (oncoming nurse) by GREG Menendez RN  (offgoing nurse). Report included the following information SBAR and Recent Results.

## 2021-12-26 NOTE — PROGRESS NOTES
Chris Diggs [facility]  [address] [facility], [address] [place] [address]  [phone]      Medical Progress Note      NAME:         Jose F Ruiz   :        1924  MRM:        [id number]    Date of service: 2021      Subjective: Patient has been seen and examined as a follow up for multiple medical issues. Chart, labs, diagnostics reviewed. Patient is a poor historian due to her dementia. I have discussed with her nurse. She has not been as agitated  But not tolerating anything by mouth at this time. Objective:    Vital Signs:    Visit Vitals  BP (!) 175/79 (BP 1 Location: Left upper arm, BP Patient Position: At rest)   Pulse 89   Temp 96.9 °F (36.1 °C)   Resp 20   Ht 5' 4\" (1.626 m)   Wt 90.9 kg (200 lb 8 oz)   SpO2 96%   BMI 34.42 kg/m²        No intake or output data in the 24 hours ending 21 0956     Physical Examination:    General:   Weak, frail and ill looking elderly female, not in distress   Eyes:   pink conjunctivae, PERRLA with no discharge. ENT:   no ottorrhea or rhinorrhea with dry mucous membranes  Neck: no masses, thyroid non-tender and trachea central.  Pulm:  decreased but clear breath sounds without crackles or wheezes  Card:  no JVD or murmurs, has sinus tachycardia, without thrills, bruits or peripheral edema  Abd:  Soft, non-tender, non-distended, normoactive bowel sounds   Musc:  No cyanosis, clubbing, atrophy or deformities. Skin:  No rashes, bruising or ulcers. Neuro: Awake and alert but confused. Moves all extremities. Psych:  Has no insight to her illness     Current Facility-Administered Medications   Medication Dose Route Frequency    [START ON 2021] Vancomycin - Please draw random level with AM labs on . Thanks!    Other ONCE    [Held by provider] amLODIPine (NORVASC) tablet 5 mg  5 mg Oral DAILY    haloperidol lactate (HALDOL) injection 1 mg  1 mg IntraVENous Q8H PRN  vancomycin (VANCOCIN) 1,000 mg in 0.9% sodium chloride 250 mL (VIAL-MATE)  1,000 mg IntraVENous Q24H    sodium chloride (NS) flush 5-40 mL  5-40 mL IntraVENous Q8H    sodium chloride (NS) flush 5-40 mL  5-40 mL IntraVENous PRN    acetaminophen (TYLENOL) tablet 650 mg  650 mg Oral Q6H PRN    Or    acetaminophen (TYLENOL) suppository 650 mg  650 mg Rectal Q6H PRN    promethazine (PHENERGAN) tablet 12.5 mg  12.5 mg Oral Q6H PRN    Or    ondansetron (ZOFRAN) injection 4 mg  4 mg IntraVENous Q6H PRN    enoxaparin (LOVENOX) injection 40 mg  40 mg SubCUTAneous DAILY    morphine injection 1 mg  1 mg IntraVENous Q4H PRN    cefTRIAXone (ROCEPHIN) 1 g in sterile water (preservative free) 10 mL IV syringe  1 g IntraVENous Q24H    doxycycline (VIBRAMYCIN) 100 mg in 0.9% sodium chloride (MBP/ADV) 100 mL MBP  100 mg IntraVENous Q12H    hydrALAZINE (APRESOLINE) 20 mg/mL injection 20 mg  20 mg IntraVENous Q6H PRN        Laboratory data and review:    Recent Labs     12/25/21  0544 12/24/21  0954   WBC 11.6* 8.4   HGB 14.4 15.6   HCT 44.1 48.4*    206     Recent Labs     12/26/21  0749 12/25/21  0544 12/24/21  0954    141 139   K 3.7 3.7 4.7   * 110* 107   CO2 26 22 26   * 141* 172*   BUN 34* 27* 24*   CREA 0.99 1.07* 1.18*   CA 8.8 8.6 9.2   MG  --   --  2.1   ALB  --  3.2* 3.6   ALT  --  17 14     No components found for: [place]    Diagnostics: Imaging studies have been reviewed    Assessment and Plan:    Acute metabolic encephalopathy (92/12/5645) POA: likely due to a UTi vs pneumonia. CT scan head neg for acute changes. CXR concerning for an atypical infection. Blood cultures flagged + in 1/4 bottles isolating Gram positive cocci in clusters. Continue fall precautions. At high risk for delirium. Treat UTi, suspected pneumonia and bacteremia pending speciation. IV Haldol PRN. IV Morphine PRN pain. IV fluids. Keep NPO.  Given her frail nature, advanced age, persistent agitations, she will need a palliative care consult to address [address] 64 if he does not improve within next 24 hrs      UTI (urinary tract infection) (10/1/2014) POA: urine cultures isolating Gram neg rods. Continue empiric IV Ceftriaxone      HTN (hypertension), benign (1/11/2011) POA: worsened by her restlessness.  Start IV Hydralazine PRN     CAD (coronary artery disease) / Mixed hyperlipidemia (1/11/2011) POA: Hold medications for now.      GERD (gastroesophageal reflux disease) POA: continue IV Pepcid     Code Status:  DNR    Total time spent for the patient's care: [facility] discussed with: Nursing Staff and her family    Discussed:  Care Plan and D/C Planning    Prophylaxis:  Lovenox    Anticipated Disposition:  SNF/LTC vs TBD           ___________________________________________________    Attending Physician:   Maverick Tay MD

## 2021-12-26 NOTE — PROGRESS NOTES
9073 This Nurse tried to draw blood from patient for ordered labs X2 without success.   Will notify dayshift Nurse and dayshift phlebotomist.

## 2021-12-27 LAB
ANION GAP SERPL CALC-SCNC: 8 MMOL/L (ref 5–15)
BASOPHILS # BLD: 0 K/UL (ref 0–0.1)
BASOPHILS NFR BLD: 0 % (ref 0–1)
BUN SERPL-MCNC: 30 MG/DL (ref 6–20)
BUN/CREAT SERPL: 32 (ref 12–20)
CALCIUM SERPL-MCNC: 8 MG/DL (ref 8.5–10.1)
CHLORIDE SERPL-SCNC: 122 MMOL/L (ref 97–108)
CO2 SERPL-SCNC: 20 MMOL/L (ref 21–32)
CREAT SERPL-MCNC: 0.94 MG/DL (ref 0.55–1.02)
DIFFERENTIAL METHOD BLD: ABNORMAL
EOSINOPHIL # BLD: 0 K/UL (ref 0–0.4)
EOSINOPHIL NFR BLD: 0 % (ref 0–7)
ERYTHROCYTE [DISTWIDTH] IN BLOOD BY AUTOMATED COUNT: 14.7 % (ref 11.5–14.5)
GLUCOSE SERPL-MCNC: 404 MG/DL (ref 65–100)
HCT VFR BLD AUTO: 44.1 % (ref 35–47)
HGB BLD-MCNC: 13.9 G/DL (ref 11.5–16)
IMM GRANULOCYTES # BLD AUTO: 0 K/UL (ref 0–0.04)
IMM GRANULOCYTES NFR BLD AUTO: 0 % (ref 0–0.5)
LYMPHOCYTES # BLD: 1.2 K/UL (ref 0.8–3.5)
LYMPHOCYTES NFR BLD: 12 % (ref 12–49)
MCH RBC QN AUTO: 30.4 PG (ref 26–34)
MCHC RBC AUTO-ENTMCNC: 31.5 G/DL (ref 30–36.5)
MCV RBC AUTO: 96.5 FL (ref 80–99)
MONOCYTES # BLD: 1.5 K/UL (ref 0–1)
MONOCYTES NFR BLD: 15 % (ref 5–13)
NEUTS SEG # BLD: 7 K/UL (ref 1.8–8)
NEUTS SEG NFR BLD: 73 % (ref 32–75)
NRBC # BLD: 0 K/UL (ref 0–0.01)
NRBC BLD-RTO: 0 PER 100 WBC
PLATELET # BLD AUTO: 186 K/UL (ref 150–400)
PMV BLD AUTO: 11.4 FL (ref 8.9–12.9)
POTASSIUM SERPL-SCNC: 3.1 MMOL/L (ref 3.5–5.1)
RBC # BLD AUTO: 4.57 M/UL (ref 3.8–5.2)
SODIUM SERPL-SCNC: 150 MMOL/L (ref 136–145)
VANCOMYCIN SERPL-MCNC: 17.8 UG/ML
WBC # BLD AUTO: 9.8 K/UL (ref 3.6–11)

## 2021-12-27 PROCEDURE — 74011250636 HC RX REV CODE- 250/636: Performed by: INTERNAL MEDICINE

## 2021-12-27 PROCEDURE — 74011250637 HC RX REV CODE- 250/637: Performed by: INTERNAL MEDICINE

## 2021-12-27 PROCEDURE — 74011000250 HC RX REV CODE- 250: Performed by: INTERNAL MEDICINE

## 2021-12-27 PROCEDURE — 93005 ELECTROCARDIOGRAM TRACING: CPT

## 2021-12-27 PROCEDURE — 80048 BASIC METABOLIC PNL TOTAL CA: CPT

## 2021-12-27 PROCEDURE — 65660000000 HC RM CCU STEPDOWN

## 2021-12-27 PROCEDURE — 74011000258 HC RX REV CODE- 258: Performed by: INTERNAL MEDICINE

## 2021-12-27 PROCEDURE — 99222 1ST HOSP IP/OBS MODERATE 55: CPT | Performed by: INTERNAL MEDICINE

## 2021-12-27 PROCEDURE — 85025 COMPLETE CBC W/AUTO DIFF WBC: CPT

## 2021-12-27 PROCEDURE — 92610 EVALUATE SWALLOWING FUNCTION: CPT

## 2021-12-27 PROCEDURE — 36415 COLL VENOUS BLD VENIPUNCTURE: CPT

## 2021-12-27 PROCEDURE — 80202 ASSAY OF VANCOMYCIN: CPT

## 2021-12-27 RX ORDER — ACETAMINOPHEN 325 MG/1
650 TABLET ORAL EVERY 6 HOURS
Status: DISCONTINUED | OUTPATIENT
Start: 2021-12-27 | End: 2021-12-30 | Stop reason: HOSPADM

## 2021-12-27 RX ORDER — HALOPERIDOL 5 MG/ML
2 INJECTION INTRAMUSCULAR
Status: DISCONTINUED | OUTPATIENT
Start: 2021-12-27 | End: 2021-12-28

## 2021-12-27 RX ORDER — ENOXAPARIN SODIUM 100 MG/ML
1 INJECTION SUBCUTANEOUS EVERY 12 HOURS
Status: DISCONTINUED | OUTPATIENT
Start: 2021-12-27 | End: 2021-12-28

## 2021-12-27 RX ORDER — DEXTROSE, SODIUM CHLORIDE, AND POTASSIUM CHLORIDE 5; .45; .3 G/100ML; G/100ML; G/100ML
INJECTION INTRAVENOUS CONTINUOUS
Status: DISCONTINUED | OUTPATIENT
Start: 2021-12-27 | End: 2021-12-28

## 2021-12-27 RX ADMIN — Medication 10 ML: at 21:42

## 2021-12-27 RX ADMIN — ACETAMINOPHEN 650 MG: 325 TABLET ORAL at 21:36

## 2021-12-27 RX ADMIN — WATER 1 G: 1 INJECTION INTRAMUSCULAR; INTRAVENOUS; SUBCUTANEOUS at 13:38

## 2021-12-27 RX ADMIN — HYDRALAZINE HYDROCHLORIDE 20 MG: 20 INJECTION INTRAMUSCULAR; INTRAVENOUS at 06:33

## 2021-12-27 RX ADMIN — DOXYCYCLINE 100 MG: 100 INJECTION, POWDER, LYOPHILIZED, FOR SOLUTION INTRAVENOUS at 13:38

## 2021-12-27 RX ADMIN — Medication 10 ML: at 04:15

## 2021-12-27 RX ADMIN — DEXTROSE MONOHYDRATE 150 MG: 50 INJECTION, SOLUTION INTRAVENOUS at 21:39

## 2021-12-27 RX ADMIN — ENOXAPARIN SODIUM 40 MG: 100 INJECTION SUBCUTANEOUS at 09:14

## 2021-12-27 RX ADMIN — ACETAMINOPHEN 650 MG: 325 TABLET ORAL at 17:43

## 2021-12-27 RX ADMIN — DOXYCYCLINE 100 MG: 100 INJECTION, POWDER, LYOPHILIZED, FOR SOLUTION INTRAVENOUS at 01:00

## 2021-12-27 RX ADMIN — POTASSIUM CHLORIDE, DEXTROSE MONOHYDRATE AND SODIUM CHLORIDE: 300; 5; 450 INJECTION, SOLUTION INTRAVENOUS at 09:20

## 2021-12-27 RX ADMIN — ENOXAPARIN SODIUM 90 MG: 100 INJECTION SUBCUTANEOUS at 21:41

## 2021-12-27 RX ADMIN — DEXTROSE AND SODIUM CHLORIDE 75 ML/HR: 5; 900 INJECTION, SOLUTION INTRAVENOUS at 06:34

## 2021-12-27 RX ADMIN — HALOPERIDOL LACTATE 1 MG: 5 INJECTION, SOLUTION INTRAMUSCULAR at 06:38

## 2021-12-27 RX ADMIN — Medication 10 ML: at 17:44

## 2021-12-27 RX ADMIN — MORPHINE SULFATE 1 MG: 2 INJECTION, SOLUTION INTRAMUSCULAR; INTRAVENOUS at 22:02

## 2021-12-27 NOTE — PROGRESS NOTES
Occupational Therapy Note:    Chart reviewed, spoke to pt's nurse. Pt not following commands and not appropriate for OT evaluation at this time. Will defer and continue to follow. Thank you.     Sandeep Banuelos OTR/L

## 2021-12-27 NOTE — ADT AUTH CERT NOTES
Comments  Comment            Patient Demographics    Patient Name   Mara Culp   [id number] Legal Sex   Female    1924 Address   [address] [address] Phone   [phone] (Home)   [phone] (Work)   [phone] (Mobile) *Preferred*     Patient Demographics    Patient Name   Mara Culp   [id number] Legal Sex   Female    1924 Address   [address] [address] Phone   [phone] (Home)   [phone] (Work)   [phone] (Mobile) *Preferred*   CSN:   [id number]     Admit Date: Admit Time Room Bed   Dec 24, 2021  9:06  [06250] 01 [23398]       Attending Providers    Provider Pager From To   Esther Saunders DO  21   Ana Maria Singleton MD  21      Emergency Contact(s)    Name Relation Home Work Mobile   Soila Arroyo [phone]     Saadia Huffman Daughter-in-Law [phone]       Utilization Reviews         Urinary Tract Infection (UTI) - Care Day 3 (2021) by Patricia Finley RN       Review Entered Review Status   2021 17:00 Completed      Criteria Review      Care Day: 3 Care Date: 2021 Level of Care: Inpatient Floor    Guideline Day 2    Level Of Care    (X) Floor    Clinical Status    (X) * Hypotension absent    2021 16:59:55 EST by Rhoda Hatchet      P 89, 91  /83, 187/88    R 20  83, 96% NC 2L    ( ) * Mental status at baseline    2021 16:59:55 EST by Rhoda Hatchet      Neuro: Awake and alert but confused. Moves all extremities.    Psych:  Has no insight to her illness    (X) * Afebrile or fever improved    2021 16:59:55 EST by Rhoda Hatchet      T 96.9, 98.7    (X) * Vomiting absent or improved    Activity    (X) * Ambulatory    Routes    (X) IV fluids    2021 16:59:55 EST by Rhoda Hatchet      D5 NS IV @ 75 ml/h    (X) IV medications    2021 16:59:55 EST by Rhoda Hatchet      hydralazine 20 mg IV prn x 1    ( ) Advance diet as tolerated    2021 16:59:55 EST by Dinh Tinsley      npo    Interventions    (X) * Reversible urinary system abnormality (eg, obstruction, abscess) absent, addressed, or to be treated at next level of care    (X) WBC    2021 16:59:55 EST by Dinh Tinsley      WBC 9.8    K 3.1        Blood Cx  Culture result: GRAM POSITIVE COCCI IN CLUSTERS GROWING IN 1 OF 4 BOTTLES DRAWN , SITE L FA   Abnormal      Preliminary    (X) Renal function tests    2021 16:59:55 EST by Dinh Tinsley      Urine Cx  [place] Count>100,000  COLONIES/mL  Final  Culture result:ESCHERICHIA COLI  Final    BUN 30  Crea 0.94    Medications    (X) Antibiotics    2021 16:59:55 EST by Dinh Tinsley      doxycycline 100 mg IV q12h  rocephin 1 g IV q24h  Vancomycin 2250 mg IV once, then per rx dosing    (X) Possible analgesics    2021 16:59:55 EST by Dinh Tinsley      morphine 1 mg IV prn x 1    * Milestone   Additional Notes   Physical Therapy   As noted by OT, Nursing reports patient is not following commands.  This is not her baseline.  Will hold assessment until tomorrow in hopes of improved ability to participate   Medical Progress Note           NAME:         Roselia Nicholas         :           1924   MRM:           [id number]       Date of service: 2021         Subjective: Patient has been seen and examined as a follow up for multiple medical issues. Chart, labs, diagnostics reviewed. Patient is a poor historian due to her dementia. I have discussed with her nurse. She has not been as agitated  But not tolerating anything by mouth at this time. Assessment and Plan:       Acute metabolic encephalopathy () POA: likely due to a UTi vs pneumonia. CT scan head neg for acute changes. CXR concerning for an atypical infection. Blood cultures flagged + in 1/4 bottles isolating Gram positive cocci in clusters. Continue fall precautions. At high risk for delirium.  Treat UTi, suspected pneumonia and bacteremia pending speciation. IV Haldol PRN. IV Morphine PRN pain. IV fluids. Keep NPO. Given her frail nature, advanced age, persistent agitations, she will need a palliative care consult to address [address] 64 if he does not improve within next 24 hrs        UTI (urinary tract infection) (10/1/2014) POA: urine cultures isolating Gram neg rods. Continue empiric IV Ceftriaxone        HTN (hypertension), benign (1/11/2011) POA: worsened by her restlessness. Start IV Hydralazine PRN       CAD (coronary artery disease) / Mixed hyperlipidemia (1/11/2011) POA: Hold medications for now.        GERD (gastroesophageal reflux disease) POA: continue IV Pepcid       Code Status:  DNR       Total time spent for the patient's care: 35 Minutes                                                                                                                                    Care Plan discussed with: Nursing Staff and her family       Discussed: 2018 [facility] and D/C Planning       Prophylaxis:  Lovenox               Urinary Tract Infection (UTI) - Care Day 2 (12/25/2021) by Rosalind Barrientos RN       Review Entered Review Status   12/27/2021 16:51 Completed      Criteria Review      Care Day: 2 Care Date: 12/25/2021 Level of Care: Inpatient Floor    Guideline Day 2    Level Of Care    (X) Floor    Clinical Status    ( ) * Hypotension absent    12/27/2021 16:51:52 EST by Michael Kim      P 191, 105, 97  /76, 143/62  R 22, 24, 20  90% RA 94% NC 2L    ( ) * Mental status at baseline    12/27/2021 16:51:52 EST by Michael Kim      Less agitated. Neuro: Awake and alert but confused. Moves all extremities.    Psych:  Has no insight to her illness    (X) * Afebrile or fever improved    12/27/2021 16:51:52 EST by Michael Kim      T 99.9, 97.8    (X) * Vomiting absent or improved    Activity    (X) * Ambulatory    12/27/2021 16:51:52 EST by Michael Kim      up ad amy    Routes    (X) IV medications 12/27/2021 16:51:52 EST by Wong Pond      hydralazine 20 mg IV q6h prn x 4  haldol 1 mg IV once    ( ) Advance diet as tolerated    12/27/2021 16:51:52 EST by Wong Pond      npo    Interventions    (X) * Reversible urinary system abnormality (eg, obstruction, abscess) absent, addressed, or to be treated at next level of care    (X) WBC    12/27/2021 16:51:52 EST by Wong Pond      WBC 11.6      BUN 27  Crea 1.07  Alk Phos 138  AST 48    (X) Renal function tests    Medications    (X) Antibiotics    12/27/2021 16:51:52 EST by Wong Pond      doxycycline 100 mg IV q12h  rocephin 1 g IV q24h  Vancomycin 2250 mg IV once, then per rx dosing    (X) Possible analgesics    12/27/2021 16:51:52 EST by Wong Pond      morphine 1 mg IV q4h prn x 5    * Milestone   Additional Notes   Medical Progress Note    Date of service: 12/25/2021         Subjective: Patient has been seen and examined as a follow up for multiple medical issues. Chart, labs, diagnostics reviewed. Patient is a poor historian due to her dementia. I have discussed with her nurse re: last night and am told she remained stable overall but a little restless this morning   Physical Examination:       General:   Weak, frail and ill looking elderly female, not in distress    Eyes:   pink conjunctivae, PERRLA with no discharge. ENT:   no ottorrhea or rhinorrhea with dry mucous membranes   Neck: no masses, thyroid non-tender and trachea central.   Pulm:  + accessory muscle use, decreased but clear breath sounds without crackles or wheezes   Card:  no JVD or murmurs, has sinus tachycardia, without thrills, bruits or peripheral edema   Abd:  Soft, non-tender, non-distended, normoactive bowel sounds with no palpable organomegaly   Musc:  No cyanosis, clubbing, atrophy or deformities. Skin:  No rashes, bruising or ulcers. Neuro: Awake and alert but confused. Moves all extremities.     Psych:  Has no insight to her illness Assessment and Plan:       Acute metabolic encephalopathy (91/04/7301) POA: likely due to a UTi vs pneumonia. CT scan head neg for acute changes. CXR concerning for an atypical infection. Blood cultures flagged + in 1/4 bottles, likely a contaminant. Continue fall precautions. At high risk for delirium. Treat UTi and suspected pneumonia. IV Haldol PRN. IV Morphine PRN pain. Given her frail nature, advanced age, persistent agitations, she will need a palliative care consult to address [address] 64 if he does not improve within next 48 hrs        UTI (urinary tract infection) (10/1/2014) POA: Continue empiric IV Ceftriaxone and follow urine cultures       HTN (hypertension), benign (1/11/2011) POA: worsened by her restlessness.  Start IV Hydralazine PRN       CAD (coronary artery disease) / Mixed hyperlipidemia (1/11/2011) POA: Hold medications for now.        GERD (gastroesophageal reflux disease) POA: continue IV Pepcid       Code Status:  DNR       Total time spent for the patient's care: 35 Minutes                                                                                                                                    Care Plan discussed with: Nursing Staff       Discussed: [address] [place] [address] and D/C Planning       Prophylaxis:  Lovenox       Anticipated Disposition:  SNF/LTC vs TBD

## 2021-12-27 NOTE — PROGRESS NOTES
Problem: Dysphagia (Adult)  Goal: *Acute Goals and Plan of Care (Insert Text)  Description: Swallowing goals initaited 12-27-21:  1) tolerate full liquids and pills without s/s aspiration by 12-30-21  2) SLP to re-eval for diet upgrade. By 12-30-21  Outcome: Progressing Towards Goal   SPEECH LANGUAGE PATHOLOGY BEDSIDE SWALLOW EVALUATION  Patient: Adela Varela (35 y.o. female)  Date: 12/27/2021  Primary Diagnosis: Acute metabolic encephalopathy [G63.75]        Precautions: aspiration       ASSESSMENT :  Based on the objective data described below, the patient presents with functional swallow. She has some aspiration risks due to 1) feeder status 2)  Impaired LOC at times  3) dry oral mucosa  Admitted 12-24-21 with AMS, met encephalopathy,  PNA. CXR: atypical PNA. PMh: CAD< GERD< HTN< HL< UTI's, snf, dementia. .    Patient will benefit from skilled intervention to address the above impairments. Patients rehabilitation potential is considered to be Fair     PLAN :  Recommendations and Planned Interventions:  Start full liquids. Upright for all PO  Start with sips of thins before taking meds. Frequency/Duration: Patient will be followed by speech-language pathology 2 times a week to address goals. Discharge Recommendations: To Be Determined     SUBJECTIVE:   Patient stated I am in the hospital?.     OBJECTIVE:     Past Medical History:   Diagnosis Date    CAD (coronary artery disease)     Femur fracture ([address]     right    GERD (gastroesophageal reflux disease)     HTN (hypertension), benign 1/11/2011    Mixed hyperlipidemia 1/11/2011     Past Surgical History:   Procedure Laterality Date    HX FEMUR FRACTURE TX      HX KNEE REPLACEMENT      bilateral    HX ORTHOPAEDIC      bilat knee replacements    HX VEIN STRIPPING       Prior Level of Function/Home Situation:   Home Situation  Support Systems: [facility])  Diet prior to admission: regular, thins  Current Diet:  NPO   Cognitive and Communication Status:  Neurologic State: Confused,Drowsy  Orientation Level: Oriented to person,Disoriented to place,Disoriented to situation,Disoriented to time  Cognition: Impaired decision making,Memory loss  Perception: Appears intact  Perseveration: No perseveration noted  Safety/Judgement: Lack of insight into deficits  Oral Assessment:  Oral Assessment  Labial: No impairment  Dentition: Natural  Oral Hygiene: moderately dry  Lingual: No impairment  P.O. Trials:  Patient Position: upright in bed  Vocal quality prior to P.O.:  (mild-moderate dysarthria, complicated by extremely dry mouth)  Consistency Presented: Thin liquid; Solid;Puree  How Presented: SLP-fed/presented;Straw;Successive swallows   ORAL PHASE:   Bolus Acceptance: No impairment  Bolus Formation/Control: No impairment     Propulsion: No impairment  Oral Residue: None  PHARYNGEAL PHASE:   Initiation of Swallow: No impairment  Laryngeal Elevation: Functional  Aspiration Signs/Symptoms: None  Pharyngeal Phase Characteristics: No impairment, issues, or problems                      NOMS:   The NOMS functional outcome measure was used to quantify this patient's level of swallowing impairment. Based on the NOMS, the patient was determined to be at level 5 for swallow function       NOMS Swallowing Levels:  Level 1 (CN): NPO  Level 2 (CM): NPO but takes consistency in therapy  Level 3 (CL): Takes less than 50% of nutrition p.o. and continues with nonoral feedings; and/or safe with mod cues; and/or max diet restriction  Level 4 (CK): Safe swallow but needs mod cues; and/or mod diet restriction; and/or still requires some nonoral feeding/supplements  Level 5 (CJ): Safe swallow with min diet restriction; and/or needs min cues  Level 6 (CI): Independent with p.o.; rare cues; usually self cues; may need to avoid some foods or needs extra time  Level 7 ([address] [place] [address]): Independent for all p.o.  MADYSON. (2003).  National Outcomes Measurement System (NOMS): Adult Speech-Language Pathology User's Guide. Pain:  Pain Scale 1: Adult Nonverbal Pain Scale          After treatment:   Patient left in no apparent distress in bed, Nursing notified, and Caregiver / family present    COMMUNICATION/EDUCATION:   Patient was educated regarding her deficit(s) of DYSPHAGIA  as this relates to her diagnosis of MET ENCEPHALOPATHY. She demonstrated Good understanding as evidenced by DISCUSSION. .    The patient's plan of care including recommendations, planned interventions, and recommended diet changes were discussed with: Registered nurse. MD    Patient is unable to participate in goal setting and plan of care.     Thank you for this referral.  Kelby Roman, LAKESHIA  Time Calculation: 15 mins

## 2021-12-27 NOTE — PROGRESS NOTES
Problem: Falls - Risk of  Goal: *Absence of Falls  Description: Document Addie Tamayo Fall Risk and appropriate interventions in the flowsheet.   Outcome: Progressing Towards Goal  Note: Fall Risk Interventions:  Mobility Interventions: Bed/chair exit alarm,OT consult for ADLs,PT Consult for mobility concerns,PT Consult for assist device competence    Mentation Interventions: Adequate sleep, hydration, pain control,Bed/chair exit alarm,Door open when patient unattended    Medication Interventions: Bed/chair exit alarm    Elimination Interventions: Bed/chair exit alarm

## 2021-12-27 NOTE — PROGRESS NOTES
12/27/2021  10:02 AM  Care Management Assessment      Reason for Admission: Emergency -       ICD-10-CM ICD-9-CM    1. Urinary tract infection with hematuria, site unspecified  N39.0 599.0     R31.9 599.70    2. Acute metabolic encephalopathy  X96.85 348.31    3. Altered mental status, unspecified altered mental status type  R41.82 780.97    4. Elevated brain natriuretic peptide (BNP) level  R79.89 790.99        Assessment:   []In person with pt   []Via p/c with pt   []With family member in person. Who/Relation:     [x]With family member via p/c. Who/Relation: Mikal Fell - DTR   []Chart Review    RUR: 12%  Risk Level: []Low [x]Moderate []High  Value-based purchasing: [] Yes [x] No  Bundle patient: [] Yes [x] No   Specify:     Advance Directive: DNR. No AD. 2 DTRs are NOK. DTR reported that her son (pt's grandson) assists with making pt's medical decisions. [x] No AD on file. [] AD on file. [] Current AD not on file. Copy requested. [] Requests AD, and referral submitted to [facility]. Healthcare Decision Maker:   Primary Decision Maker: Mayra He - [phone]        Assessment:    Age: 80     Sex: [] Male [x]Female     Residency: []Private residence []Apartment [x]Assisted Living ([address])  []LTC []Other:   Exterior Steps: 0  Interior Steps: 0    *Address listed is to remain pt's mailing address. Lives With: []With spouse []Other family members []Underage children [x]Alone []Care provider []Other:    Prior functioning:  []Independent with ADLs and iADLS []Dependent with ADLs and iADLs [x]Partial dependence, Specify: assistance with iADLs. PTA, pt was A&Ox4.      [facility] provides pt transport to medical appts, etc.     Prior DME required:  []None [x]RW []Cane []Crutches []Bedside commode []CPAP []Home O2 (Liter/Provider: ) []Nebulizer   []Shower Chair [x]Wheelchair (primary) []Hospital Bed []Larissa []Stair lift []Rollator []Other:    Rehab history: []None []Outpatient PT []Home Health (Provider/Date: ) [x]SNF (Provider/Date: 04/2021 - [facility]) []IPR (Provider/Date: ) []LTC (Provider/Date: ) []Hospice (Provider/Date: )  []Other:     Discharge Concerns: [x]Yes []No []Unknown   Describe: Pt may require a higher level of care if her cognition does not improve. Comments: Insurer:   [address] [facility] L[facility] [place] [employer] Phone: [phone]    Subscriber: [employer] Subscriber#: [id number]    Group#: [id number] Precert#: --          PCP: Erika Valera   Address: [address] [place] [address] / [address] [place] [address]   Phone number: [phone]   Current patient: [x]Yes []No   Approximate date of last visit: 10/2021   Access to virtual PCP visits: []Yes [x]No    Pharmacy:  Medications through [facility]. DC Transport: TBD. Transition of care plan:    [x]Unable to determine at this time. Awaiting clinical progress, and disposition recommendations. [] Home with outpatient follow-up    [] Home with Outpatient PT and outpatient follow-up   Pt aware of OP appt? []Yes, Provider:   []Not scheduled   Transport provider:     [] Home with family assistance as needed and outpatient follow-up   Family able to assist:    Schedule:  Transport provider:      [] Home with Home Health   - Provider:     []SNF/IPR   -[]Preferences given:   []Listing provided and preferences requested   -Status: []Pending []Accepted:    -Auth required: []Yes []No    -Auth initiated date:   -3 midnight stay required: []Yes []No  Date satisfied:     [] Home with Hospice   -Provider:     [] [facility] information provided.      [] Other:

## 2021-12-27 NOTE — CONSULTS
Palliative Medicine Consult  Reddy: [phone] ([phone])    Patient Name: Colin Bullard  YOB: 1924    Date of Initial Consult: 21  Reason for Consult: care decisions  Requesting Provider: Dr. Chelita Wyman  Primary Care Physician: Lee Arteaga MD     SUMMARY:   Colin Bullard is a 80 y.o. with a medical history of HTN, CAD, neuropathy and h/o a femur fracture who was admitted on 2021 with a diagnosis of acute metabolic encephalopathy after presenting with malaise from [facility] facility. Urine culture is growing >100,000 E. Coli. Chest film with mild interstitial prominence. Current medical issues leading to Palliative Medicine involvement include: persistent delirium, high risk ongoing decline. Patient met the palliative medicine team in  following a fall. She completed a DDNR with Dr. Corinne Heaton at that time and affirmed that her legal NOK, daughter Lorin Minors, would serve as her healthcare decision maker if need arose. She was living independently until peter covid-19 in 2021, was hospitalized for 6 weeks then went to SNF for rehab, then transitioned to [address] [place] [address] facility permanently where she has thrived. Social historypatient has been  since . Her   of cancer. She had 2 children but her son  in 56. She has 1 daughterSoila Dina and a daughter-in-lawSaadia who are very hopeful in caring for her. Her daughter-in-law has stayed very involved even after the death of the patient's son/her . She worked for [employer] for many years after the death of her . PALLIATIVE DIAGNOSES:   1. Encounter for palliative care  2. [address] discussion   3. Delirium due to UTI   4. Chronic debility- requires wheelchair       PLAN:   1. Pt remains confused today, per RN she was more somnolent this am (had 1 mg IV Haldol at 6 am) but has increased alertness as day has progressed.   At time of visit she was distressed, stating repeatedly that she was uncomfortable and wanted help. She appeared worried, had a sense of doom. 2. LCSW Yaneth and I called her daughter Gaby Lopez. I provided a medical update and also shared that her mother remains confused and uncomfortable. Gaby Lopez shared that her mother has been thriving recently at [facility], without any hospitalizations or new health problems since her covid infection last Spring. She has been gaining weight and participating in activities, is very social.    3. We talked honestly that often infections can come on quickly and the ensuing delirium can be difficult to recover from in advanced age, but that her prognosis is uncertain at this time. I did recommend that Gaby Lopez come visit in case she doesn't do well (Gaby Lopez had been worried her presence may worsen her confusion, but it may help reorient and reassure her). 4. Code status- patient had signed a DDNR herself and Gaby Lopez is aware. 5. No other healthcare limitations at this time, will continue current plan of care. Soila expressed understanding that her mother may not recover, and we agreed to daily updates this week and meeting to make an alternate plan in case delirium does not clear. I gave her our palliative care number in case she needs to call us. 6. Generalized pain / discomfort- schedule APAP at 650 mg q6h. She has morphine 1 mg IV ordered which is reasonable but trying to avoid given overall situation. 7. Delirium- may have been worsened by morphine at even low dose. Inc Haldol to 2 mg IV q6h prn, would use this before morphine if she c/o discomfort. 8. Thank you for the opportunity to participate in the care of [address] [place] [address]   9. Please call with questions.    10. Communicated plan of care with: Palliative IDT, [address] Team including Dr. Jean Bustillo / TREATMENT PREFERENCES:     GOALS OF CARE:  Patient/Health Care Proxy Stated Goals: Rehabilitation    TREATMENT PREFERENCES:   Code Status: DNR      Advance Care Planning:  [x] The [facility] [place] Interdisciplinary Team has updated the ACP Navigator with Health Care Decision Maker and Patient Capacity      Primary Decision Maker: Matthew Olvera - [phone]     Advance Care Planning 12/27/2021   Patient's Healthcare Decision Maker is: Legal Next of Kin   Confirm Advance Directive None   Does the patient have other document types Do Not Resuscitate       Medical Interventions: Limited additional interventions       Other:    As far as possible, the palliative care team has discussed with patient / health care proxy about goals of care / treatment preferences for patient. HISTORY:     History obtained from: EMR, daughter    CHIEF COMPLAINT: confusion     HPI/SUBJECTIVE:    The patient is:   [x] Verbal and participatory  [] Non-participatory due to:     Pt alert but confused, requires frequent reorientation. She is able to verbalize that she is uncomfortable, in pain and anxious. Generally she doesn't feel well.      Clinical Pain Assessment (nonverbal scale for severity on nonverbal patients):   Clinical Pain Assessment  Severity: 5  Location: \"all over\"  Character: pt unable to describe  Duration: pt unable to describe- but had no pain prior to admission  Effect: pt unable to describe  Factors: pt unable to describe  Frequency: pt unable to describe     Activity (Movement): Lying quietly, normal position    Duration: for how long has pt been experiencing pain (e.g., 2 days, 1 month, years)  Frequency: how often pain is an issue (e.g., several times per day, once every few days, constant)     FUNCTIONAL ASSESSMENT:     Palliative Performance Scale (PPS):  PPS: 30       PSYCHOSOCIAL/SPIRITUAL SCREENING:     Palliative IDT has assessed this patient for cultural preferences / practices and a referral made as appropriate to needs (Cultural Services, Patient Advocacy, Ethics, etc.)    Any spiritual / Restoration concerns:  [] Yes /  [x] No   If \"Yes\" to discuss with pastoral care during IDT     Does caregiver feel burdened by caring for their loved one:   [] Yes /  [x] No /  [] No Caregiver Present    If \"Yes\" to discuss with social work during IDT    Anticipatory grief assessment:   [x] Normal  / [] Maladaptive     If \"Maladaptive\" to discuss with social work during IDT    ESAS Anxiety: Anxiety: 3    ESAS Depression:          REVIEW OF SYSTEMS:     Positive and pertinent negative findings in ROS are noted above in HPI. The following systems were [x] reviewed / [] unable to be reviewed as noted in HPI  Other findings are noted below. Systems: constitutional, ears/nose/mouth/throat, respiratory, gastrointestinal, genitourinary, musculoskeletal, integumentary, neurologic, psychiatric, endocrine. Positive findings noted below. Modified ESAS Completed by: provider   Fatigue: 3       Pain: 5   Anxiety: 3 Nausea: 0     Dyspnea: 0                    PHYSICAL EXAM:     From RN flowsheet:  Wt Readings from Last 3 Encounters:   12/24/21 200 lb 8 oz (90.9 kg)   03/02/21 183 lb (83 kg)   01/19/20 179 lb 14.3 oz (81.6 kg)     Blood pressure 138/81, pulse (!) 135, temperature 98.2 °F (36.8 °C), resp. rate 18, height 5' 4\" (1.626 m), weight 200 lb 8 oz (90.9 kg), SpO2 96 %.     Pain Scale 1: Adult Nonverbal Pain Scale  Pain Intensity 1: 0              Pain Intervention(s) 1: Medication (see MAR)  Last bowel movement, if known:     Constitutional: ill appearing woman, appears distressed  Eyes: sclerae anicteric  ENMT: no nasal discharge, moist mucous membranes  Cardiovascular: regular rhythm, distal pulses intact  Respiratory: breathing not labored, NC askew, symmetric bs anteriorly  Gastrointestinal: soft non-tender, +bowel sounds  Musculoskeletal: no deformity, no tenderness to palpation  Skin: warm, dry  Neurologic: alert to self only, confused, unable to provide history, can follow simple commands only, no tremor or rigidity  Psychiatric: worried facies, not restless or agitated       HISTORY:     Principal Problem:    Acute metabolic encephalopathy ()    Active Problems:    HTN (hypertension), benign (2011)      Mixed hyperlipidemia (2011)      GERD (gastroesophageal reflux disease) (2014)      CAD (coronary artery disease) ()      UTI (urinary tract infection) (10/1/2014)      Neuropathy (2019)      Past Medical History:   Diagnosis Date    CAD (coronary artery disease)     Femur fracture (HCC)     right    GERD (gastroesophageal reflux disease)     HTN (hypertension), benign 2011    Mixed hyperlipidemia 2011      Past Surgical History:   Procedure Laterality Date    HX FEMUR FRACTURE TX      HX KNEE REPLACEMENT      bilateral    HX ORTHOPAEDIC      bilat knee replacements    HX VEIN STRIPPING        Family History   Problem Relation Age of Onset    Other Mother          of renal failure, not sure what caused id    Other Father         something with throat, not sure if it was cancer    Heart Disease Sister       History reviewed, no pertinent family history.   Social History     Tobacco Use    Smoking status: Never Smoker    Smokeless tobacco: Never Used   Substance Use Topics    Alcohol use: No     Allergies   Allergen Reactions    Sulfa (Sulfonamide Antibiotics) Rash    Ampicillin Rash     Tolerates cefazolin    Lescol [Fluvastatin] Unknown (comments)      Current Facility-Administered Medications   Medication Dose Route Frequency    dextrose 5% - 0.45% NaCl with KCl 40 mEq/L infusion   IntraVENous CONTINUOUS    haloperidol lactate (HALDOL) injection 2 mg  2 mg IntraVENous Q6H PRN    acetaminophen (TYLENOL) tablet 650 mg  650 mg Oral Q6H    sodium chloride (NS) flush 5-40 mL  5-40 mL IntraVENous Q8H    sodium chloride (NS) flush 5-40 mL  5-40 mL IntraVENous PRN    acetaminophen (TYLENOL) tablet 650 mg  650 mg Oral Q6H PRN    Or    acetaminophen (TYLENOL) suppository 650 mg  650 mg Rectal Q6H PRN    promethazine (PHENERGAN) tablet 12.5 mg  12.5 mg Oral Q6H PRN    Or    ondansetron (ZOFRAN) injection 4 mg  4 mg IntraVENous Q6H PRN    enoxaparin (LOVENOX) injection 40 mg  40 mg SubCUTAneous DAILY    morphine injection 1 mg  1 mg IntraVENous Q4H PRN    cefTRIAXone (ROCEPHIN) 1 g in sterile water (preservative free) 10 mL IV syringe  1 g IntraVENous Q24H    doxycycline (VIBRAMYCIN) 100 mg in 0.9% sodium chloride (MBP/ADV) 100 mL MBP  100 mg IntraVENous Q12H    hydrALAZINE (APRESOLINE) 20 mg/mL injection 20 mg  20 mg IntraVENous Q6H PRN          LAB AND IMAGING FINDINGS:     Lab Results   Component Value Date/Time    WBC 9.8 12/27/2021 03:34 AM    HGB 13.9 12/27/2021 03:34 AM    PLATELET 801 30/59/6304 03:34 AM     Lab Results   Component Value Date/Time    Sodium 150 (H) 12/27/2021 03:34 AM    Potassium 3.1 (L) 12/27/2021 03:34 AM    Chloride 122 (H) 12/27/2021 03:34 AM    CO2 20 (L) 12/27/2021 03:34 AM    BUN 30 (H) 12/27/2021 03:34 AM    Creatinine 0.94 12/27/2021 03:34 AM    Calcium 8.0 (L) 12/27/2021 03:34 AM    Magnesium 2.1 12/24/2021 09:54 AM    Phosphorus 2.9 09/09/2019 04:39 AM      Lab Results   Component Value Date/Time    Alk.  phosphatase 138 (H) 12/25/2021 05:44 AM    Protein, total 7.4 12/25/2021 05:44 AM    Albumin 3.2 (L) 12/25/2021 05:44 AM    Globulin 4.2 (H) 12/25/2021 05:44 AM     Lab Results   Component Value Date/Time    INR 1.0 05/07/2015 08:40 PM    INR, External 1.1 05/20/2015 12:00 AM    Prothrombin time 10.0 05/07/2015 08:40 PM    aPTT 32.1 05/07/2015 08:40 PM      No results found for: IRON, FE, TIBC, IBCT, PSAT, FERR   Lab Results   Component Value Date/Time    pH 7.44 01/19/2020 05:52 AM    PCO2 30 (L) 01/19/2020 05:52 AM    PO2 51 (L) 01/19/2020 05:52 AM     No components found for: [place]   Lab Results   Component Value Date/Time    CK 38 04/05/2015 08:21 PM    CK - MB <0.5 (L) 04/05/2015 08:21 PM                Total time: 50m  Counseling / coordination time, spent as noted above: 40m  > 50% counseling / coordination?: y    Prolonged service was provided for  []30 min   []75 min in face to face time in the presence of the patient, spent as noted above. Time Start:   Time End:   Note: this can only be billed with 17283 (initial) or 39584 (follow up). If multiple start / stop times, list each separately.

## 2021-12-27 NOTE — PROGRESS NOTES
Chris Diggs [facility] 79  [address] [facility], [address] [place] [address]  [phone]      Medical Progress Note      NAME:         Rogerio Saucedo   :        1924  MRM:        [id number]    Date of service: 2021      Subjective: Patient has been seen and examined as a follow up for multiple medical issues. Chart, labs, diagnostics reviewed. Patient is a poor historian due to her dementia. I have discussed with her nurse. She had not been able to take much orally. Less agitated. Objective:    Vital Signs:    Visit Vitals  /77 (BP 1 Location: Right upper arm, BP Patient Position: At rest;Lying)   Pulse (!) 126   Temp (!) 96 °F (35.6 °C)   Resp 18   Ht 5' 4\" (1.626 m)   Wt 90.9 kg (200 lb 8 oz)   SpO2 93%   BMI 34.42 kg/m²          Intake/Output Summary (Last 24 hours) at 2021 1404  Last data filed at 2021 0000  Gross per 24 hour   Intake 30 ml   Output    Net 30 ml        Physical Examination:    General:   Weak, frail and ill looking elderly female, uncomfortable. Eyes:   pink conjunctivae, PERRLA with no discharge. ENT:   no ottorrhea or rhinorrhea with dry mucous membranes  Neck: no masses, thyroid non-tender and trachea central.  Pulm:  decreased but clear breath sounds without crackles or wheezes  Card:  no JVD or murmurs, has sinus tachycardia, without thrills, bruits or peripheral edema  Abd:  Soft, non-tender, non-distended, normoactive bowel sounds   Musc:  No cyanosis, clubbing, atrophy or deformities. Skin:  No rashes, bruising or ulcers. Neuro: Awake and alert but confused. Moves all extremities.    Psych:  Has no insight to her illness     Current Facility-Administered Medications   Medication Dose Route Frequency    dextrose 5% - 0.45% NaCl with KCl 40 mEq/L infusion   IntraVENous CONTINUOUS    haloperidol lactate (HALDOL) injection 1 mg  1 mg IntraVENous Q8H PRN    vancomycin (VANCOCIN) 1,000 mg in 0.9% sodium chloride 250 mL (VIAL-MATE)  1,000 mg IntraVENous Q24H    sodium chloride (NS) flush 5-40 mL  5-40 mL IntraVENous Q8H    sodium chloride (NS) flush 5-40 mL  5-40 mL IntraVENous PRN    acetaminophen (TYLENOL) tablet 650 mg  650 mg Oral Q6H PRN    Or    acetaminophen (TYLENOL) suppository 650 mg  650 mg Rectal Q6H PRN    promethazine (PHENERGAN) tablet 12.5 mg  12.5 mg Oral Q6H PRN    Or    ondansetron (ZOFRAN) injection 4 mg  4 mg IntraVENous Q6H PRN    enoxaparin (LOVENOX) injection 40 mg  40 mg SubCUTAneous DAILY    morphine injection 1 mg  1 mg IntraVENous Q4H PRN    cefTRIAXone (ROCEPHIN) 1 g in sterile water (preservative free) 10 mL IV syringe  1 g IntraVENous Q24H    doxycycline (VIBRAMYCIN) 100 mg in 0.9% sodium chloride (MBP/ADV) 100 mL MBP  100 mg IntraVENous Q12H    hydrALAZINE (APRESOLINE) 20 mg/mL injection 20 mg  20 mg IntraVENous Q6H PRN        Laboratory data and review:    Recent Labs     12/27/21  0334 12/25/21  0544   WBC 9.8 11.6*   HGB 13.9 14.4   HCT 44.1 44.1    196     Recent Labs     12/27/21  0334 12/26/21  0749 12/25/21  0544   * 145 141   K 3.1* 3.7 3.7   * 114* 110*   CO2 20* 26 22   * 111* 141*   BUN 30* 34* 27*   CREA 0.94 0.99 1.07*   CA 8.0* 8.8 8.6   ALB  --   --  3.2*   ALT  --   --  17     No components found for: [place]    Diagnostics: Imaging studies have been reviewed    Assessment and Plan:    Acute metabolic encephalopathy (47/67/7622) POA: likely due to a UTi, suspected pneumonia and now hypernatremia. CT scan head neg for acute changes. CXR concerning for an atypical infection. Blood cultures isolated CoN staph which is a contaminant. Continue fall precautions. Treat UTi, suspected pneumonia. IV Haldol PRN. IV Morphine PRN pain. IV fluids. Consult speech therapy and palliative care.       UTI (urinary tract infection) (10/1/2014) POA: urine cultures grew E coli.  Continue IV Ceftriaxone      Hypernatremia / hypokalemia - due to poor oral intake. Start dextrose with potassium and monitor BMP    HTN (hypertension), benign (1/11/2011) POA: BP stable today.  Continue  IV Hydralazine PRN     CAD (coronary artery disease) / Mixed hyperlipidemia (1/11/2011) POA: Hold medications.      GERD (gastroesophageal reflux disease) POA: continue IV Pepcid     Code Status:  DNR    Total time spent for the patient's care: [address]29 Junior discussed with: Nursing Staff and her family    Discussed:  Care Plan and D/C Planning    Prophylaxis:  Lovenox    Anticipated Disposition:  SNF/LTC vs TBD           ___________________________________________________    Attending Physician:   Abdiaziz Colon MD

## 2021-12-27 NOTE — PROGRESS NOTES
Spiritual Care Assessment/Progress Note  [address]      NAME: Carson Bloom      MRN: [id number]  AGE: 80 y.o.  SEX: female  Mandaeism Affiliation: Lutheran   Language: English     12/27/2021     Total Time (in minutes): 10     Spiritual Assessment begun in [facility] 4M POST SURG ORT 1 through conversation with:         []Patient        [] Family    [] Friend(s)        Reason for Consult: Palliative Care, Initial/Spiritual Assessment     Spiritual beliefs Per Previous  notes: (Please include comment if needed)     [x] Identifies with a carlita tradition:    Kike     [x] Supported by a carlita community:            [] Claims no spiritual orientation:           [] Seeking spiritual identity:                [] Adheres to an individual form of spirituality:           [] Not able to assess:                           Identified resources for coping:      [] Prayer                               [] Music                  [] Guided Imagery     [] Family/friends                 [] Pet visits     [] Devotional reading                         [x] Unknown     [] Other:                                            Interventions offered during this visit: (See comments for more details)    Patient Interventions: Initial/Spiritual assessment, patient floor (Attempted)           Plan of Care:     [] Support spiritual and/or cultural needs    [] Support AMD and/or advance care planning process      [] Support grieving process   [] Coordinate Rites and/or Rituals    [] Coordination with community clergy   [] No spiritual needs identified at this time   [] Detailed Plan of Care below (See Comments)  [] Make referral to Music Therapy  [] Make referral to Pet Therapy     [] Make referral to Addiction services  [] Make referral to [facility]  [] Make referral to Spiritual Care Partner  [] No future visits requested        [x] Contact Spiritual Care for further referrals     Comments: Attempted Initial spiritual assessment in 4 Post Surg. Miss Karol Fothergill appeared to be sleeping, did  Not disturb her. Contact Spiritual Care for any further referrals.   Jaclyn Borden., MS., [address] [facility] [place] ([phone])

## 2021-12-27 NOTE — PROGRESS NOTES
Physical Therapy  12/27/2021    Chart reviewed and spoke with RN. RN states patient is not appropriate for PT intervention at this time. Entered pt's room to assist with feeding lunch. Pt tolerated two small sips of soup and drink. Total A. Not following commands to participate in PT evaluation though does verbally communicate with this writer. Awaiting goals of care meeting.     Thank Adali Villalobos, PT, DPT'

## 2021-12-27 NOTE — PROGRESS NOTES
Palliative Medicine      Code Status: DNR    Advance Care Planning:  Advance Care Planning 2021   Patient's Healthcare Decision Maker is: Legal Next of Kin   Confirm Advance Directive None   Does the patient have other document types Do Not Resuscitate       Patient / Family Encounter Documentation    Participants (names): Pt, dtr Marcella Thomas by phone, Palliative Medicine (Dr. Chris Perez, [facility])     Narrative:  Pt was awake in bed, no family present. Pt appeared anxious, repeatedly stated she did not feel well but had difficulty elaborating, ultimately shared that she has pain \"all over. \"  Pt had lunch tray set up in front of her, appeared to have eaten bites only of her soup. Spoke with dtr Soila by phone. Dtr shared that pt was living alone until peter covid in March, was hospitalized x 6 weeks, family had been uncertain if pt would survive but pt did recover and was sent to [facility]. Pt moved to [address] [place], [place] [address] on 2021, where she has been thriving. Dtr shared that pt tries to participate in all the activities, including [employer], movies, group outings to see [employer] lights. Pt has been eating well, gained weight over the past months. Dtr visits 3 times/week, speaks with pt daily by phone. Dtr shared that pt is \"a little forgetful\" but overall is mentally sharp. Pt lost her  to cancer in , never remarried. Marcella Guzman is pt's only living child; pt had a son, who  in 56, son's wife Steve Treadwell remains active in pt's life. Pt worked for a time for the [employer]. Pt does not have AMD on file. In absence of verified Medical POA, dtr [facility] Thomas is legal  NOK and surrogate decision maker. Dtr was updated re: pt's current condition, expressed appreciation for mary communication. Dtr shared that pt has DNR in place, verbalized understanding that resuscitative efforts would likely prove more burdensome than beneficial in light of pt's advanced age.   DDNR is on file, dated 9/13/2019. Psychosocial Issues Identified/ Resilience Factors:  Dtr questioned whether family visiting would cause pt to become more agitated, was advised that familiar voices/faces might bring pt some comfort. No spiritual concerns identified; carlita has been noted to be a source of comfort/strength in the past.     Caregiver [place]: Low burden, pt has been residing in a facility since May; pt/family have been pleased with the care at facility   Does the caregiver feel confident administering medication? N/A, pt resides in a facility   Does the caregiver need any help connecting with community resources? No  Does the caregiver feel confident assisting with activities of daily living? N/A, pt resides in a facility     Goals of Care / Plan:  Dtr verbalized understanding of potential for further decline, expressed intention to visit though is aware pt is not at her baseline. Dtr verbalized understanding that further goals of care conversations will be necessary if pt's condition does not improve in the coming days. SW will follow for support. Music Therapy consult has been placed. Thank you for including Palliative Medicine in the care of Ms. Washington Garland.      Pete Henao LCSW, [facility]-SW  [phone])

## 2021-12-28 ENCOUNTER — APPOINTMENT (OUTPATIENT)
Dept: NON INVASIVE DIAGNOSTICS | Age: 86
DRG: 689 | End: 2021-12-28
Attending: INTERNAL MEDICINE
Payer: MEDICARE

## 2021-12-28 ENCOUNTER — APPOINTMENT (OUTPATIENT)
Dept: GENERAL RADIOLOGY | Age: 86
DRG: 689 | End: 2021-12-28
Attending: INTERNAL MEDICINE
Payer: MEDICARE

## 2021-12-28 LAB
ANION GAP SERPL CALC-SCNC: 5 MMOL/L (ref 5–15)
ATRIAL RATE: 91 BPM
BASOPHILS # BLD: 0 K/UL (ref 0–0.1)
BASOPHILS NFR BLD: 0 % (ref 0–1)
BNP SERPL-MCNC: ABNORMAL PG/ML
BUN SERPL-MCNC: 42 MG/DL (ref 6–20)
BUN/CREAT SERPL: 35 (ref 12–20)
CALCIUM SERPL-MCNC: 9.3 MG/DL (ref 8.5–10.1)
CALCULATED R AXIS, ECG10: -46 DEGREES
CALCULATED T AXIS, ECG11: 123 DEGREES
CHLORIDE SERPL-SCNC: 119 MMOL/L (ref 97–108)
CHOLEST SERPL-MCNC: 184 MG/DL
CO2 SERPL-SCNC: 25 MMOL/L (ref 21–32)
CREAT SERPL-MCNC: 1.21 MG/DL (ref 0.55–1.02)
DIAGNOSIS, 93000: NORMAL
DIFFERENTIAL METHOD BLD: ABNORMAL
ECHO AO ASC DIAM: 3 CM
ECHO AO ASCENDING AORTA INDEX: 1.56 CM/M2
ECHO AV AREA PEAK VELOCITY: 2.8 CM2
ECHO AV AREA PEAK VELOCITY: 2.8 CM2
ECHO AV AREA VTI: 2.6 CM2
ECHO AV AREA VTI: 2.6 CM2
ECHO AV MEAN GRADIENT: 2 MMHG
ECHO AV MEAN VELOCITY: 0.7 M/S
ECHO AV PEAK GRADIENT: 3 MMHG
ECHO AV PEAK VELOCITY: 0.9 M/S
ECHO AV VELOCITY RATIO: 0.89
ECHO AV VTI: 21.1 CM
ECHO LA DIAMETER INDEX: 2.24 CM/M2
ECHO LA DIAMETER: 4.3 CM
ECHO LA VOL 2C: 76 ML (ref 22–52)
ECHO LA VOL 4C: 84 ML (ref 22–52)
ECHO LA VOL BP: 83 ML (ref 22–52)
ECHO LA VOL BP: 83 ML (ref 22–52)
ECHO LA VOLUME AREA LENGTH: 89 ML
ECHO LA VOLUME INDEX A2C: 40 ML/M2 (ref 16–34)
ECHO LA VOLUME INDEX A4C: 44 ML/M2 (ref 16–34)
ECHO LA VOLUME INDEX AREA LENGTH: 46 ML/M2 (ref 16–34)
ECHO LV E' LATERAL VELOCITY: 6 CM/S
ECHO LV E' SEPTAL VELOCITY: 5 CM/S
ECHO LV FRACTIONAL SHORTENING: 27 % (ref 28–44)
ECHO LV INTERNAL DIMENSION DIASTOLE INDEX: 2.14 CM/M2
ECHO LV INTERNAL DIMENSION DIASTOLIC: 4.1 CM (ref 3.9–5.3)
ECHO LV INTERNAL DIMENSION SYSTOLIC INDEX: 1.56 CM/M2
ECHO LV INTERNAL DIMENSION SYSTOLIC: 3 CM
ECHO LV IVSD: 1.2 CM (ref 0.6–0.9)
ECHO LV MASS 2D: 171.7 G (ref 67–162)
ECHO LV MASS INDEX 2D: 89.5 G/M2 (ref 43–95)
ECHO LV POSTERIOR WALL DIASTOLIC: 1.2 CM (ref 0.6–0.9)
ECHO LV RELATIVE WALL THICKNESS RATIO: 0.59
ECHO LVOT AREA: 3.1 CM2
ECHO LVOT AV VTI INDEX: 0.8
ECHO LVOT DIAM: 2 CM
ECHO LVOT MEAN GRADIENT: 1 MMHG
ECHO LVOT PEAK GRADIENT: 2 MMHG
ECHO LVOT PEAK VELOCITY: 0.8 M/S
ECHO LVOT STROKE VOLUME INDEX: 27.6 ML/M2
ECHO LVOT SV: 53.1 ML
ECHO LVOT VTI: 16.9 CM
ECHO MV A VELOCITY: 0.68 M/S
ECHO MV AREA VTI: 1.6 CM2
ECHO MV E DECELERATION TIME (DT): 198.2 MS
ECHO MV E VELOCITY: 1.26 M/S
ECHO MV E/A RATIO: 1.85
ECHO MV E/E' LATERAL: 21
ECHO MV E/E' RATIO (AVERAGED): 23.1
ECHO MV E/E' SEPTAL: 25.2
ECHO MV LVOT VTI INDEX: 1.93
ECHO MV MAX VELOCITY: 1.7 M/S
ECHO MV MEAN GRADIENT: 3 MMHG
ECHO MV MEAN VELOCITY: 0.8 M/S
ECHO MV PEAK GRADIENT: 12 MMHG
ECHO MV VTI: 32.7 CM
ECHO PV MAX VELOCITY: 0.8 M/S
ECHO PV PEAK GRADIENT: 2 MMHG
ECHO RV INTERNAL DIMENSION: 4 CM
ECHO RV TAPSE: 1.6 CM (ref 1.5–2)
ECHO TV REGURGITANT MAX VELOCITY: 2.94 M/S
ECHO TV REGURGITANT PEAK GRADIENT: 35 MMHG
EOSINOPHIL # BLD: 0 K/UL (ref 0–0.4)
EOSINOPHIL NFR BLD: 0 % (ref 0–7)
ERYTHROCYTE [DISTWIDTH] IN BLOOD BY AUTOMATED COUNT: 15.1 % (ref 11.5–14.5)
GLUCOSE SERPL-MCNC: 116 MG/DL (ref 65–100)
HCT VFR BLD AUTO: 44.7 % (ref 35–47)
HDLC SERPL-MCNC: 84 MG/DL
HDLC SERPL: 2.2 {RATIO} (ref 0–5)
HGB BLD-MCNC: 14.3 G/DL (ref 11.5–16)
IMM GRANULOCYTES # BLD AUTO: 0 K/UL (ref 0–0.04)
IMM GRANULOCYTES NFR BLD AUTO: 0 % (ref 0–0.5)
LDLC SERPL CALC-MCNC: 86.2 MG/DL (ref 0–100)
LYMPHOCYTES # BLD: 1.4 K/UL (ref 0.8–3.5)
LYMPHOCYTES NFR BLD: 15 % (ref 12–49)
MAGNESIUM SERPL-MCNC: 2.2 MG/DL (ref 1.6–2.4)
MCH RBC QN AUTO: 30.5 PG (ref 26–34)
MCHC RBC AUTO-ENTMCNC: 32 G/DL (ref 30–36.5)
MCV RBC AUTO: 95.3 FL (ref 80–99)
MONOCYTES # BLD: 1.2 K/UL (ref 0–1)
MONOCYTES NFR BLD: 13 % (ref 5–13)
NEUTS SEG # BLD: 6.5 K/UL (ref 1.8–8)
NEUTS SEG NFR BLD: 72 % (ref 32–75)
NRBC # BLD: 0 K/UL (ref 0–0.01)
NRBC BLD-RTO: 0 PER 100 WBC
PHOSPHATE SERPL-MCNC: 2.5 MG/DL (ref 2.6–4.7)
PLATELET # BLD AUTO: 220 K/UL (ref 150–400)
PMV BLD AUTO: 11.5 FL (ref 8.9–12.9)
POTASSIUM SERPL-SCNC: 3.7 MMOL/L (ref 3.5–5.1)
PROCALCITONIN SERPL-MCNC: 0.11 NG/ML
Q-T INTERVAL, ECG07: 332 MS
QRS DURATION, ECG06: 96 MS
QTC CALCULATION (BEZET), ECG08: 490 MS
RBC # BLD AUTO: 4.69 M/UL (ref 3.8–5.2)
SODIUM SERPL-SCNC: 149 MMOL/L (ref 136–145)
TRIGL SERPL-MCNC: 69 MG/DL (ref ?–150)
TROPONIN-HIGH SENSITIVITY: 1098 NG/L (ref 0–51)
TROPONIN-HIGH SENSITIVITY: 86 NG/L (ref 0–51)
TROPONIN-HIGH SENSITIVITY: 915 NG/L (ref 0–51)
TSH SERPL DL<=0.05 MIU/L-ACNC: 2.61 UIU/ML (ref 0.36–3.74)
VENTRICULAR RATE, ECG03: 131 BPM
VLDLC SERPL CALC-MCNC: 13.8 MG/DL
WBC # BLD AUTO: 9.1 K/UL (ref 3.6–11)

## 2021-12-28 PROCEDURE — 80061 LIPID PANEL: CPT

## 2021-12-28 PROCEDURE — 84484 ASSAY OF TROPONIN QUANT: CPT

## 2021-12-28 PROCEDURE — 84145 PROCALCITONIN (PCT): CPT

## 2021-12-28 PROCEDURE — 74011000250 HC RX REV CODE- 250: Performed by: INTERNAL MEDICINE

## 2021-12-28 PROCEDURE — 74011000258 HC RX REV CODE- 258: Performed by: INTERNAL MEDICINE

## 2021-12-28 PROCEDURE — 97165 OT EVAL LOW COMPLEX 30 MIN: CPT | Performed by: OCCUPATIONAL THERAPIST

## 2021-12-28 PROCEDURE — 93306 TTE W/DOPPLER COMPLETE: CPT | Performed by: STUDENT IN AN ORGANIZED HEALTH CARE EDUCATION/TRAINING PROGRAM

## 2021-12-28 PROCEDURE — 85025 COMPLETE CBC W/AUTO DIFF WBC: CPT

## 2021-12-28 PROCEDURE — 74011250637 HC RX REV CODE- 250/637: Performed by: NURSE PRACTITIONER

## 2021-12-28 PROCEDURE — 84443 ASSAY THYROID STIM HORMONE: CPT

## 2021-12-28 PROCEDURE — 93306 TTE W/DOPPLER COMPLETE: CPT

## 2021-12-28 PROCEDURE — 99232 SBSQ HOSP IP/OBS MODERATE 35: CPT | Performed by: INTERNAL MEDICINE

## 2021-12-28 PROCEDURE — 84100 ASSAY OF PHOSPHORUS: CPT

## 2021-12-28 PROCEDURE — 65660000000 HC RM CCU STEPDOWN

## 2021-12-28 PROCEDURE — 92526 ORAL FUNCTION THERAPY: CPT | Performed by: SPEECH-LANGUAGE PATHOLOGIST

## 2021-12-28 PROCEDURE — 71045 X-RAY EXAM CHEST 1 VIEW: CPT

## 2021-12-28 PROCEDURE — 36415 COLL VENOUS BLD VENIPUNCTURE: CPT

## 2021-12-28 PROCEDURE — 73502 X-RAY EXAM HIP UNI 2-3 VIEWS: CPT

## 2021-12-28 PROCEDURE — 97535 SELF CARE MNGMENT TRAINING: CPT | Performed by: OCCUPATIONAL THERAPIST

## 2021-12-28 PROCEDURE — APPSS30 APP SPLIT SHARED TIME 16-30 MINUTES: Performed by: NURSE PRACTITIONER

## 2021-12-28 PROCEDURE — 97161 PT EVAL LOW COMPLEX 20 MIN: CPT

## 2021-12-28 PROCEDURE — 77010033678 HC OXYGEN DAILY

## 2021-12-28 PROCEDURE — 74011250637 HC RX REV CODE- 250/637: Performed by: INTERNAL MEDICINE

## 2021-12-28 PROCEDURE — 94760 N-INVAS EAR/PLS OXIMETRY 1: CPT

## 2021-12-28 PROCEDURE — 80048 BASIC METABOLIC PNL TOTAL CA: CPT

## 2021-12-28 PROCEDURE — 83735 ASSAY OF MAGNESIUM: CPT

## 2021-12-28 PROCEDURE — 83880 ASSAY OF NATRIURETIC PEPTIDE: CPT

## 2021-12-28 PROCEDURE — 74011250636 HC RX REV CODE- 250/636: Performed by: INTERNAL MEDICINE

## 2021-12-28 PROCEDURE — U0005 INFEC AGEN DETEC AMPLI PROBE: HCPCS

## 2021-12-28 PROCEDURE — 97530 THERAPEUTIC ACTIVITIES: CPT

## 2021-12-28 PROCEDURE — 99223 1ST HOSP IP/OBS HIGH 75: CPT | Performed by: SPECIALIST

## 2021-12-28 RX ORDER — ENOXAPARIN SODIUM 100 MG/ML
40 INJECTION SUBCUTANEOUS EVERY 24 HOURS
Status: DISCONTINUED | OUTPATIENT
Start: 2021-12-29 | End: 2021-12-30 | Stop reason: HOSPADM

## 2021-12-28 RX ORDER — ATORVASTATIN CALCIUM 20 MG/1
40 TABLET, FILM COATED ORAL
Status: DISCONTINUED | OUTPATIENT
Start: 2021-12-28 | End: 2021-12-30 | Stop reason: HOSPADM

## 2021-12-28 RX ORDER — TRAMADOL HYDROCHLORIDE 50 MG/1
25 TABLET ORAL
Status: DISCONTINUED | OUTPATIENT
Start: 2021-12-28 | End: 2021-12-30 | Stop reason: HOSPADM

## 2021-12-28 RX ORDER — GUAIFENESIN 100 MG/5ML
81 LIQUID (ML) ORAL DAILY
Status: DISCONTINUED | OUTPATIENT
Start: 2021-12-28 | End: 2021-12-30 | Stop reason: HOSPADM

## 2021-12-28 RX ORDER — HALOPERIDOL 5 MG/ML
1 INJECTION INTRAMUSCULAR
Status: DISCONTINUED | OUTPATIENT
Start: 2021-12-28 | End: 2021-12-30 | Stop reason: HOSPADM

## 2021-12-28 RX ORDER — ENOXAPARIN SODIUM 100 MG/ML
1 INJECTION SUBCUTANEOUS EVERY 24 HOURS
Status: DISCONTINUED | OUTPATIENT
Start: 2021-12-29 | End: 2021-12-28

## 2021-12-28 RX ORDER — METOPROLOL TARTRATE 25 MG/1
25 TABLET, FILM COATED ORAL EVERY 12 HOURS
Status: DISCONTINUED | OUTPATIENT
Start: 2021-12-28 | End: 2021-12-30 | Stop reason: HOSPADM

## 2021-12-28 RX ORDER — BUMETANIDE 0.25 MG/ML
1 INJECTION INTRAMUSCULAR; INTRAVENOUS ONCE
Status: COMPLETED | OUTPATIENT
Start: 2021-12-28 | End: 2021-12-28

## 2021-12-28 RX ADMIN — AMIODARONE HYDROCHLORIDE 0.5 MG/MIN: 50 INJECTION, SOLUTION INTRAVENOUS at 08:37

## 2021-12-28 RX ADMIN — Medication 10 ML: at 14:13

## 2021-12-28 RX ADMIN — DOXYCYCLINE 100 MG: 100 INJECTION, POWDER, LYOPHILIZED, FOR SOLUTION INTRAVENOUS at 02:55

## 2021-12-28 RX ADMIN — METOPROLOL TARTRATE 25 MG: 25 TABLET, FILM COATED ORAL at 22:03

## 2021-12-28 RX ADMIN — METOPROLOL TARTRATE 25 MG: 25 TABLET, FILM COATED ORAL at 09:52

## 2021-12-28 RX ADMIN — ACETAMINOPHEN 650 MG: 325 TABLET ORAL at 08:36

## 2021-12-28 RX ADMIN — ASPIRIN 81 MG: 81 TABLET, CHEWABLE ORAL at 09:52

## 2021-12-28 RX ADMIN — AMIODARONE HYDROCHLORIDE 1 MG/MIN: 50 INJECTION, SOLUTION INTRAVENOUS at 02:52

## 2021-12-28 RX ADMIN — Medication 10 ML: at 22:03

## 2021-12-28 RX ADMIN — BUMETANIDE 1 MG: 0.25 INJECTION, SOLUTION INTRAMUSCULAR; INTRAVENOUS at 09:52

## 2021-12-28 RX ADMIN — ACETAMINOPHEN 650 MG: 325 TABLET ORAL at 22:02

## 2021-12-28 RX ADMIN — ENOXAPARIN SODIUM 90 MG: 100 INJECTION SUBCUTANEOUS at 09:52

## 2021-12-28 RX ADMIN — ACETAMINOPHEN 650 MG: 325 TABLET ORAL at 03:07

## 2021-12-28 RX ADMIN — MORPHINE SULFATE 1 MG: 2 INJECTION, SOLUTION INTRAMUSCULAR; INTRAVENOUS at 14:13

## 2021-12-28 RX ADMIN — Medication 10 ML: at 06:23

## 2021-12-28 RX ADMIN — DOXYCYCLINE 100 MG: 100 INJECTION, POWDER, LYOPHILIZED, FOR SOLUTION INTRAVENOUS at 14:13

## 2021-12-28 RX ADMIN — WATER 1 G: 1 INJECTION INTRAMUSCULAR; INTRAVENOUS; SUBCUTANEOUS at 14:13

## 2021-12-28 RX ADMIN — ATORVASTATIN CALCIUM 40 MG: 20 TABLET, FILM COATED ORAL at 22:03

## 2021-12-28 NOTE — PROGRESS NOTES
12/28/2021  8:43 AM  TRANSFER - OUT REPORT:    Verbal report given to Jayjay Peter on Mirza Older being transferred to [facility] for change in patient condition(HR)   Report consisted of patients Situation, Background, Assessment and   Recommendations(SBAR).

## 2021-12-28 NOTE — PROGRESS NOTES
Music Therapy Assessment  [address] [address] [phone]     4/26/1924  80 y.o.  female    Patient Telephone Number: [phone] (home)   Holiness Affiliation: Scientology   Language: English   Patient Active Problem List    Diagnosis Date Noted    Acute metabolic encephalopathy 23/67/5938    Falls frequently 09/10/2019    CKD (chronic kidney disease) stage 3, GFR 30-59 ml/min ([facility] [address] 09/08/2019    Bacteriuria 09/08/2019    Frequent falls 09/08/2019    Neuropathy 09/08/2019    Rash 05/07/2015    UTI (urinary tract infection) 10/01/2014    GERD (gastroesophageal reflux disease) 09/30/2014    CAD (coronary artery disease)     HTN (hypertension), benign 01/11/2011    Mixed hyperlipidemia 01/11/2011        Date: 12/28/2021            Total Time (in minutes): 5          SFM 3 PROG CARE TELE 2    Mental Status:   [x] Alert [x] Forgetful [x]  Confused  [  ] Minimally responsive  [  ] Sleeping    Communication Status: [  ] Impaired Speech [  ] Nonverbal -N/A    Physical Status:   [  ] Oxygen in use  [  ] Hard of Hearing [  ] Vision Impaired  [  ] Ambulatory  [  ] Ambulatory with assistance [  ] Non-ambulatory -N/A    Music Preferences, Background: N/A: Please see Session Observations below. Clinical Problem to be addressed: Positive social interaction, emotional support. Goal(s) met in session: N/A: Please see Session Observations below.   Physical/Pain management (Scale of 1-10):    Pre-session rating ___________    Post-session rating __________  [  ] Increased relaxation   [  ] Affected breathing patterns  [  ] Decreased muscle tension   [  ] Decreased agitation  [  ] Affected heart rate    [  ] Increased alertness     Emotional/Psychological:  [  ] Increased self-expression   [  ] Decreased aggressive behavior   [  ] Decreased feelings of stress  [  ] Discussed healthy coping skills     [  ] Improved mood    [  ] Decreased withdrawn behavior     Social:  [  ] Decreased feelings of isolation/loneliness [  ] Positive social interaction   [  ] Provided support and/or comfort for family/friends    Spiritual:  [  ] Spiritual support    [  ] Expressed peace  [  ] Expressed carlita    [  ] Discussed beliefs    Techniques Utilized (Check all that apply): N/A: Please see Session Observations below. [  ] Procedural support MT [  ] Music for relaxation [  ] Patient preferred music  [  ] Ele analysis  [  ] Song choice  [  ] Music for validation  [  ] Entrainment  [  ] Movement to music [  ] Guided visualization  [  ] Carlos Manuel Luna  [  ] Patient instrument playing [  ] Romeonando Magan writing  [  ] Nargis Gill along   [  ] Lattie Vining  [  ] Sensory stimulation  [  ] Active Listening  [  ] Music for spiritual support [  ] Making of CDs as gifts    Session Observations:  Referral from PIPER Sanchez, Palliative . This music therapist (MT) spoke with the patient's (pt's) RN to ask if she would be able to help facilitate a Zoom music therapy session with the pt. Pt's nurse expressed being open to this, but couldn't do this at the moment, and shared that her unit was especially busy today. She also shared a brief update on the pt that she was confused and forgetful, tending to repeat questions, due to a Urinary Tract Infection. MT expressed understanding and gave name and phone number to the pt's nurse. Pt's nurse will contact MT later today when she has availability to facilitate a virtual music therapy session for the pt.     Bhavana Singh MT-BC (Music Therapist-Board Certified)  Spiritual Care Department  Referral-based service

## 2021-12-28 NOTE — PROGRESS NOTES
[facility] Dosing Services: 12/28 2021    Enoxaparin by Dr. Priyanka Garcia made for this 80 y.o. female, for treatment of  AF. Wt Readings from Last 1 Encounters:   12/28/21 87.1 kg (192 lb)       Ht Readings from Last 1 Encounters:   12/28/21 162.6 cm (64\")      Previous Dose 90 mg every 12 hours   Creatinine Clearance Estimated Creatinine Clearance: 28.4 mL/min (A) (based on SCr of 1.21 mg/dL (H)). Creatinine Lab Results   Component Value Date/Time    Creatinine 1.21 (H) 12/28/2021 05:33 AM       Platelet Lab Results   Component Value Date/Time    PLATELET 568 73/91/0981 05:33 AM      H/H Lab Results   Component Value Date/Time    HGB 14.3 12/28/2021 05:33 AM        Pharmacist made change to enoxaparin therapy based on:  [ X ] Renal function: dose changed to: 90 mg every 24 hours    Pharmacy to automatically make dose adjustment for renal dysfunction (creatinine clearance less than 30 mL/min)  Pharmacy to automatically make dose adjustment for obesity (BMI greater than 40)  Pharmacy to make dose rounding adjustments per [facility] dose adjustment scale. Pharmacy to monitor patients progress. Will make dose adjustment as needed per changing renal function. Will communicate further recommendations regarding patients anticoagulation therapy with prescriber. Signed Amalia Chan .  Contact information: [phone]

## 2021-12-28 NOTE — PROGRESS NOTES
Bedside and Verbal shift change report given to Gena (oncoming nurse) by Stacy Nichole (offgoing nurse). Report included the following information SBAR, Kardex, MAR, Accordion and Recent Results.

## 2021-12-28 NOTE — PROGRESS NOTES
Called about patient tachycardia. EKG shows A. fib with RVR. No history of prior A. fib. We will start the patient on amiodarone drip, changed to full dose Lovenox for anticoagulation  We will obtain echocardiogram and cardiology consult. Check TSH and troponin level. Transfer to telemetry.

## 2021-12-28 NOTE — PROGRESS NOTES
Chris Diggs [facility] [place] [address]  [address] [facility], [address] [place] [address]  [phone]      Medical Progress Note      NAME: Heraclio Morrell   :  1924  MRM:  [id number]    Date/Time: 2021        Assessment / Plan:     81 yo F w/ hx of CAD, HTN, HLD presenting w/ AMS, found to have UTI and suspected PNA. Hospital course complicated by problems as listed below:    AF with RVR: new onset, unclear precipitating factor. ?CHF. Reverted back to SR. Evaluated by cardiology, recommended against anticoagulation given age and fall risk. Start on metoprolol. CAD / HLD: elevated troponin, presumably 2/2 type 2 MI. Given therapeutic Lovenox. Repeat troponin to ensure peak. TTE. Cardiology recommends medical tx. Start ASA, statin, BB. Abnormal CXR: suspect pulmonary edema, less likely viral PNA. Check SARS-CoV-2 PCR. Give a dose of IV Bumex today. Strict I/Os. TTE    Acute metabolic encephalopathy: likely 2/2 UTI, possible PNA. Head CT negative. Blood cultures isolated CoNS which is  contaminant. Completed course of IV CTX and doxy. IV Haldol PRN however would monitor QTc     UTI (urinary tract infection): POA: urine cultures grew E coli. Completed IV CTX      HTN (hypertension), benign (2011) POA: BP fluctuates. Monitor on metoprolol     Hypernatremia / hypokalemia: due to poor oral intake. Monitor on IV diuresis       Total time spent:  35 minutes  Time spent in the care of this patient including reviewing records, discussing with nursing and/or other providers on the treatment team, obtaining history and examining the patient, and discussing treatment plans.                   Care Plan discussed with: Patient, Nursing Staff and >50% of time spent in counseling and coordination of care    Discussed:  Care Plan and D/C Planning    Prophylaxis:  Lovenox    Disposition:  SNF/LTC         Subjective:     Chief Complaint:  Follow up UTI    Chart/notes/labs/studies reviewed, patient examined. Feels okay. Weak. No CP. No fevers          Objective:       Vitals:        Last 24hrs VS reviewed since prior progress note. Most recent are:    Visit Vitals  BP (!) 174/84 (BP 1 Location: Right upper arm, BP Patient Position: At rest)   Pulse 76   Temp 97.7 °F (36.5 °C)   Resp 21   Ht 5' 4\" (1.626 m)   Wt 87.1 kg (192 lb)   SpO2 95%   BMI 32.96 kg/m²     SpO2 Readings from Last 6 Encounters:   12/28/21 95%   03/02/21 93%   02/13/20 98%   02/11/20 96%   02/06/20 97%   02/03/20 95%    O2 Flow Rate (L/min): 2 l/min       Intake/Output Summary (Last 24 hours) at 12/28/2021 1704  Last data filed at 12/28/2021 1536  Gross per 24 hour   Intake 701 ml   Output 600 ml   Net 101 ml          Exam:     Physical Exam:    Gen: elderly, chronically ill-appearing. NAD  HEENT:  Atraumatic, normocephalic. Sclerae nonicteric, hearing intact to voice  Neck:  Supple, without apparent masses. Resp:  No accessory muscle use, faint bibasilar rales  Card: RRR, without m/r/g. No LE edema  Abd:  +bowel sounds, soft, NTTP, nondistended. No HSM  Neuro:  Face symmetric, speech fluent, follows commands appropriately, generalized weakness  Psych:  Alert, oriented to self and hospital. Poor insight     Medications Reviewed: (see below)    Lab Data Reviewed: (see below)    ______________________________________________________________________    Medications:     Current Facility-Administered Medications   Medication Dose Route Frequency    metoprolol tartrate (LOPRESSOR) tablet 25 mg  25 mg Oral Q12H    aspirin chewable tablet 81 mg  81 mg Oral DAILY    atorvastatin (LIPITOR) tablet 40 mg  40 mg Oral QHS    [START ON 12/29/2021] enoxaparin (LOVENOX) injection 90 mg  1 mg/kg SubCUTAneous Q24H    traMADoL (ULTRAM) tablet 25 mg  25 mg Oral Q6H PRN    [Held by provider] dextrose 5% - 0.45% NaCl with KCl 40 mEq/L infusion   IntraVENous CONTINUOUS    haloperidol lactate (HALDOL) injection 2 mg  2 mg IntraVENous Q6H PRN    acetaminophen (TYLENOL) tablet 650 mg  650 mg Oral Q6H    [Held by provider] amiodarone (CORDARONE) 375 mg in dextrose 5% 250 mL infusion  0.5-1 mg/min IntraVENous TITRATE    sodium chloride (NS) flush 5-40 mL  5-40 mL IntraVENous Q8H    sodium chloride (NS) flush 5-40 mL  5-40 mL IntraVENous PRN    acetaminophen (TYLENOL) tablet 650 mg  650 mg Oral Q6H PRN    Or    acetaminophen (TYLENOL) suppository 650 mg  650 mg Rectal Q6H PRN    promethazine (PHENERGAN) tablet 12.5 mg  12.5 mg Oral Q6H PRN    Or    ondansetron (ZOFRAN) injection 4 mg  4 mg IntraVENous Q6H PRN    morphine injection 1 mg  1 mg IntraVENous Q4H PRN    doxycycline (VIBRAMYCIN) 100 mg in 0.9% sodium chloride (MBP/ADV) 100 mL MBP  100 mg IntraVENous Q12H    hydrALAZINE (APRESOLINE) 20 mg/mL injection 20 mg  20 mg IntraVENous Q6H PRN            Lab Review:     Recent Labs     12/28/21  0533 12/27/21 0334   WBC 9.1 9.8   HGB 14.3 13.9   HCT 44.7 44.1    186     Recent Labs     12/28/21  0533 12/27/21  0334 12/26/21  0749   * 150* 145   K 3.7 3.1* 3.7   * 122* 114*   CO2 25 20* 26   * 404* 111*   BUN 42* 30* 34*   CREA 1.21* 0.94 0.99   CA 9.3 8.0* 8.8   MG 2.2  --   --    PHOS 2.5*  --   --      No components found for: GLPOC  No results for input(s): PH, PCO2, PO2, HCO3, FIO2 in the last 72 hours. No results for input(s): INR, INREXT in the last 72 hours.   No results found for: SDES  Lab Results   Component Value Date/Time    Culture result: NO GROWTH 2 DAYS 12/26/2021 07:49 AM    Culture result: ESCHERICHIA COLI (A) 12/24/2021 10:30 AM    Culture result: (A) 12/24/2021 09:53 AM     STAPHYLOCOCCUS SPECIES, COAGULASE NEGATIVE GROWING IN 1 OF 4 BOTTLES DRAWN , SITE L FA    Culture result: REMAINING BOTTLE(S) HAS/HAVE NO GROWTH SO FAR 12/24/2021 09:53 AM              ___________________________________________________    Attending Physician: Magdalene Wiggins MD

## 2021-12-28 NOTE — PROGRESS NOTES
Problem: Mobility Impaired (Adult and Pediatric)  Goal: *Acute Goals and Plan of Care (Insert Text)  Description: FUNCTIONAL STATUS PRIOR TO ADMISSION:  Per chart, pt living alone until peter covid in March. Hospitalized x 6 weeks but pt did recover and was sent to [facility], then to [address] [place] [address]  on 5/7/2021, where she has been thriving, tries to participate in all the activities. Details unclear, but appears to use w/c for mobility. HOME SUPPORT PRIOR TO ADMISSION: Pt lives in [facility]; has supportive family who checks on pt regularly    Physical Therapy Goals  Initiated 12/28/2021  1. Patient will move from supine to sit and sit to supine  in bed with moderate assistance  within 7 day(s). 2.  Patient will transfer from bed to chair and chair to bed with maximal assistance using the least restrictive device within 7 day(s). 3.  Patient will perform sit to stand with maximal assistance within 7 day(s). Outcome: Not Met   PHYSICAL THERAPY EVALUATION  Patient: Pat Newberry (37 y.o. female)  Date: 12/28/2021  Primary Diagnosis: Acute metabolic encephalopathy [S19.04]        Precautions:   Fall,Bed Alarm,Skin    ASSESSMENT  Based on the objective data described below, the patient presents with general weakness, decreased activity tolerance, impaired balance, c/o discomfort L lower LE with bruise present s/p admit with AMS due to UTI. Pt cooperative and able to follow commands this date, but unable to provide thorough history. Pt agreed to sit EOB. Required max A and increased time to move supine<-> sit and SBA to maintain static sit EOB x several min. Will benefit from con't PT for mobility progression as tolerated. Recommend follow up SNF rehab at d/c. Current Level of Function Impacting Discharge (mobility/balance): supine<->sit max A, static sit SBA    Functional Outcome Measure: The patient scored 1/28 on the Tinetti outcome measure which is indicative of high risk for fall. Other factors to consider for discharge: from [facility]     Patient will benefit from skilled therapy intervention to address the above noted impairments. PLAN :  Recommendations and Planned Interventions: bed mobility training, transfer training, gait training, therapeutic exercises, patient and family training/education, and therapeutic activities      Frequency/Duration: Patient will be followed by physical therapy:  5 times a week to address goals. Recommendation for discharge: (in order for the patient to meet his/her long term goals)  Therapy up to 5 days/week in SNF setting    This discharge recommendation:  Has not yet been discussed the attending provider and/or case management    IF patient discharges home will need the following DME: to be determined (TBD)         SUBJECTIVE:   Patient stated I have an infection.     OBJECTIVE DATA SUMMARY:   HISTORY:    Past Medical History:   Diagnosis Date    CAD (coronary artery disease)     Femur fracture ([address]     right    GERD (gastroesophageal reflux disease)     HTN (hypertension), benign 1/11/2011    Mixed hyperlipidemia 1/11/2011     Past Surgical History:   Procedure Laterality Date    HX FEMUR FRACTURE TX      HX KNEE REPLACEMENT      bilateral    HX ORTHOPAEDIC      bilat knee replacements    HX VEIN STRIPPING         Personal factors and/or comorbidities impacting plan of care: good family support    Home Situation  Home Environment: Assisted living  Support Systems: [facility])  Patient Expects to be Discharged to[de-identified] Unknown  Current DME Used/Available at Home: [employer], rolling,Grab bars,Shower chair  Tub or Shower Type: Tub/Shower combination    EXAMINATION/PRESENTATION/DECISION MAKING:   Critical Behavior:  Neurologic State: Alert  Orientation Level: Oriented to person,Oriented to place  Cognition: Decreased attention/concentration,Decreased command following,Memory loss  Safety/Judgement: Awareness of environment,Decreased insight into deficits  Hearing: Auditory  Auditory Impairment: None  Skin:  bruising noted L lower LE and R forearm      AROM: Generally decreased, functional                       Strength:    Strength: Generally decreased, functional                    Tone & Sensation:   Tone: Normal              Sensation: Intact               Coordination:  Coordination: Generally decreased, functional  Vision:   Corrective Lenses: Reading glasses  Functional Mobility:  Bed Mobility:  Rolling: Maximum assistance; Total assistance;Assist x1;Additional time  Supine to Sit: Maximum assistance; Additional time;Bed Modified (HOB elevated)  Sit to Supine: Total assistance  Scooting: Maximum assistance (for scoot toward EOB)  Transfers:  Sit to Stand:  (unable)           Bed to Chair: Total assistance              Balance:   Sitting: Impaired  Sitting - Static: Good (unsupported)  Sitting - Dynamic: Fair (occasional)    Functional Measure:  Tinetti test:    Sitting Balance: 1  Arises: 0  Attempts to Rise: 0  Immediate Standing Balance: 0  Standing Balance: 0  Nudged: 0  Eyes Closed: 0  Turn 360 Degrees - Continuous/Discontinuous: 0  Turn 360 Degrees - Steady/Unsteady: 0  Sitting Down: 0  Balance Score: 1 Balance total score  Indication of Gait: 0  R Step Length/Height: 0  L Step Length/Height: 0  R Foot Clearance: 0  L Foot Clearance: 0  Step Symmetry: 0  Step Continuity: 0  Path: 0  Trunk: 0  Walking Time: 0  Gait Score: 0 Gait total score  Total Score: 1/28 Overall total score         Tinetti Tool Score Risk of Falls  <19 = High Fall Risk  19-24 = Moderate Fall Risk  25-28 = Low Fall Risk  Tinetti ME. Performance-Oriented Assessment of Mobility Problems in Elderly Patients. [facility] 66; [id number].  (Scoring Description: PT Bulletin Feb. 10, 1993)    Older adults: Eddy Abdi et al, 2009; n = 1000 [facility] elderly evaluated with ABC, JUN, ADL, and IADL)  · Mean JUN score for males aged 69-68 years = 26.21(3.40)  · Mean JUN score for females age 69-68 years = 25.16(4.30)  · Mean JUN score for males over 80 years = 23.29(6.02)  · Mean JUN score for females over 80 years = 17.20(8.32)           Physical Therapy Evaluation Charge Determination   History Examination Presentation Decision-Making   MEDIUM  Complexity : 1-2 comorbidities / personal factors will impact the outcome/ POC  LOW Complexity : 1-2 Standardized tests and measures addressing body structure, function, activity limitation and / or participation in recreation  LOW Complexity : Stable, uncomplicated  LOW Complexity : FOTO score of       Based on the above components, the patient evaluation is determined to be of the following complexity level: LOW     Pain Rating:  Pt reports lower L LE discomfort with movement, bruise present     Activity Tolerance:   Fair    After treatment patient left in no apparent distress:   Supine in bed, Heels elevated for pressure relief, Call bell within reach, Bed / chair alarm activated, and Side rails x 3    COMMUNICATION/EDUCATION:   The patients plan of care was discussed with: Registered nurse. Fall prevention education was provided and the patient/caregiver indicated understanding., Patient/family have participated as able in goal setting and plan of care. , and Patient/family agree to work toward stated goals and plan of care.     Thank you for this referral.  Jose Zhong, PT   Time Calculation: 26 mins

## 2021-12-28 NOTE — PROGRESS NOTES
2050:  MD Isaiah Mixon contacted regarding patient's MEWS of 3 with heart rate ranging from 120s-130s   Provided brief SBAR and current patient condition   Orders received:  EKG and to call with results    2058:  EKG completed     2105:  MD Isaiah Mixon contacted and provided results of EKG (atrial fibrillation with rapid ventricular response0  MD stated would assess chart of patient place orders for transfer and cardiac drip     TRANSFER - OUT REPORT:    Verbal report given to RN(name) on Mayra Hill  being transferred to [facility](unit) for change in patient condition(tachycardia )       Report consisted of patients Situation, Background, Assessment and   Recommendations(SBAR). Information from the following report(s) SBAR, Kardex, Intake/Output, MAR, Recent Results and Med Rec Status was reviewed with the receiving nurse. Lines:   Peripheral IV 12/25/21 Left;Posterior Wrist (Active)   Site Assessment Clean, dry, & intact 12/27/21 0300   Phlebitis Assessment 0 12/27/21 0300   Infiltration Assessment 0 12/27/21 0300   Dressing Status Clean, dry, & intact 12/27/21 0300   Dressing Type Tape 12/26/21 1940   Hub Color/Line Status Blue; Infusing 12/26/21 1940        Opportunity for questions and clarification was provided.       Patient transported with:   Monitor  Registered Nurse  Tech     [phone]:  Patient's daughter contacted and notified of transfer to [facility]   All questions answered at this time

## 2021-12-28 NOTE — ACP (ADVANCE CARE PLANNING)
Primary Decision MakeCassy Salgado - [phone]  Advance Care Planning 12/27/2021   Patient's Healthcare Decision Maker is: Legal Next of Kin   Confirm Advance Directive None   Does the patient have other document types Do Not Resuscitate     Pt does not have AMD on file. In absence of verified Medical POA, dtr Nicol Gale is legal  NOK and surrogate decision maker. Dtr was updated re: pt's current condition, expressed appreciation for mary communication. Dtr shared that pt has DNR in place, verbalized understanding that resuscitative efforts would likely prove more burdensome than beneficial in light of pt's advanced age. DDNR is on file, dated 9/13/2019.

## 2021-12-28 NOTE — CONSULTS
Palliative Medicine Consult  Reddy: [phone] ([phone])    Patient Name: Bryan Smith  YOB: 1924    Date of Initial Consult: 21  Reason for Consult: care decisions  Requesting Provider: Dr. Leahy   Primary Care Physician: Erika Arevalo MD     SUMMARY:   Bryan Smith is a 80 y.o. with a medical history of HTN, CAD, neuropathy and h/o a femur fracture who was admitted on 2021 with a diagnosis of acute metabolic encephalopathy after presenting with malaise from [facility] facility. Urine culture is growing >100,000 E. Coli. Chest film with mild interstitial prominence. Current medical issues leading to Palliative Medicine involvement include: persistent delirium, high risk ongoing decline. Patient met the palliative medicine team in  following a fall. She completed a DDNR with Dr. Blaine Beach at that time and affirmed that her legal NOK, daughter Tammi Murphy, would serve as her healthcare decision maker if need arose. She was living independently until peter covid-19 in 2021, was hospitalized for 6 weeks then went to SNF for rehab, then transitioned to [address] [place] [address] facility permanently where she has thrived. Social historypatient has been  since . Her   of cancer. She had 2 children but her son  in 56. She has 1 daughterSoila Arroyo and a daughter-in-lawSaadia who are very hopeful in caring for her. Her daughter-in-law has stayed very involved even after the death of the patient's son/her . She worked for [employer] for many years after the death of her . PALLIATIVE DIAGNOSES:   1. Encounter for palliative care  2. [address] discussion   3. Delirium due to UTI   4. Chronic debility- requires wheelchair       PLAN:   1. Ms. Jameson Burkitt was transferred to [facility] last evening due to new onset afib RVR. Now back in sinus with amio, beta blockade initiated.    CXR showed inc interstitial and patchy infiltrates, Bumex given. She remains HD stable this am.   2. Generalized pain- received one dose morphine 1 mg IV last night 22:00. Continue scheduled APAP. 3. On exam she is alert and more conversant than yesterday, is less confused, able to answer a few simple questions and follow simple commands. Hopefully po intake will follow and she will be able to work with therapy soon. 4. I called her daughter Myrtle Michelle to touch base, she was not available, I talked to Soila's son Chiquita Peter (patient's grandson) who visited along with Myrtle Michelle last night. He felt encouraged when he saw her, noted she was still confused but her color and overall appearance was better than on night of admission. I shared she looks better to me today as well. We reviewed the new afib diagnosis, he was aware. 5. Goals are currently to continue full restorative efforts and work towards rehabilitation. 6. Code status- DDNR on file. 7. Thank you for the opportunity to participate in the care of Mckenna Perkins  8. Will follow with you as her advanced age puts her at risk of decline. 9. Communicated plan of care with: Palliative IDTRyan 192 Team.       GOALS OF CARE / TREATMENT PREFERENCES:     GOALS OF CARE:  Patient/Health Care Proxy Stated Goals: Rehabilitation    TREATMENT PREFERENCES:   Code Status: DNR      Advance Care Planning:  [x] The [facility] Interdisciplinary Team has updated the ACP Navigator with Health Care Decision Maker and Patient Capacity      Primary Decision MakerLianet Stewart Child - [phone]     Advance Care Planning 12/27/2021   Patient's Healthcare Decision Maker is: Legal Next of Kin   Confirm Advance Directive None   Does the patient have other document types Do Not Resuscitate       Medical Interventions: Limited additional interventions       Other:    As far as possible, the palliative care team has discussed with patient / health care proxy about goals of care / treatment preferences for patient. HISTORY:     History obtained from: EMR, daughter    CHIEF COMPLAINT: confusion     HPI/SUBJECTIVE:    The patient is:   [x] Verbal and participatory  [] Non-participatory due to:     Pt alert and interactive, still c/o pain but today able to report it is in both of her legs. She doesn't feel well generally. Tolerating sips of water this am.      Clinical Pain Assessment (nonverbal scale for severity on nonverbal patients):   Clinical Pain Assessment  Severity: 5  Location: \"all over\"  Character: pt unable to describe  Duration: pt unable to describe- but had no pain prior to admission  Effect: pt unable to describe  Factors: pt unable to describe  Frequency: pt unable to describe     Activity (Movement): Lying quietly, normal position    Duration: for how long has pt been experiencing pain (e.g., 2 days, 1 month, years)  Frequency: how often pain is an issue (e.g., several times per day, once every few days, constant)     FUNCTIONAL ASSESSMENT:     Palliative Performance Scale (PPS):  PPS: 30       PSYCHOSOCIAL/SPIRITUAL SCREENING:     Palliative IDT has assessed this patient for cultural preferences / practices and a referral made as appropriate to needs (Cultural Services, Patient Advocacy, Ethics, etc.)    Any spiritual / Judaism concerns:  [] Yes /  [x] No   If \"Yes\" to discuss with pastoral care during IDT     Does caregiver feel burdened by caring for their loved one:   [] Yes /  [x] No /  [] No Caregiver Present    If \"Yes\" to discuss with social work during IDT    Anticipatory grief assessment:   [x] Normal  / [] Maladaptive     If \"Maladaptive\" to discuss with social work during IDT    ESAS Anxiety: Anxiety: 3    ESAS Depression:          REVIEW OF SYSTEMS:     Positive and pertinent negative findings in ROS are noted above in HPI. The following systems were [x] reviewed / [] unable to be reviewed as noted in HPI  Other findings are noted below.   Systems: constitutional, ears/nose/mouth/throat, respiratory, gastrointestinal, genitourinary, musculoskeletal, integumentary, neurologic, psychiatric, endocrine. Positive findings noted below. Modified ESAS Completed by: provider   Fatigue: 3       Pain: 5   Anxiety: 3 Nausea: 0     Dyspnea: 0                    PHYSICAL EXAM:     From RN flowsheet:  Wt Readings from Last 3 Encounters:   12/28/21 192 lb 9.6 oz (87.4 kg)   03/02/21 183 lb (83 kg)   01/19/20 179 lb 14.3 oz (81.6 kg)     Blood pressure (!) 145/88, pulse 78, temperature 97.3 °F (36.3 °C), resp. rate 20, height 5' 4\" (1.626 m), weight 192 lb 9.6 oz (87.4 kg), SpO2 95 %.     Pain Scale 1: Numeric (0 - 10)  Pain Intensity 1: 0  Pain Onset 1: acute  Pain Location 1: Hip  Pain Orientation 1: Left  Pain Description 1: Aching  Pain Intervention(s) 1: Medication (see MAR)  Last bowel movement, if known:     Constitutional: ill appearing woman, less distressed this am, still confused but improvement compared to yesterday  Eyes: sclerae anicteric  ENMT: no nasal discharge, moist mucous membranes  Cardiovascular: regular rhythm, distal pulses intact  Respiratory: breathing not labored, NC askew, symmetric bs anteriorly  Gastrointestinal: soft non-tender, +bowel sounds  Musculoskeletal: no deformity, no tenderness to palpation  Skin: warm, dry  Neurologic: A&Ox3, cognition slowed, able to follow simple commands, no tremor or rigidity  Psychiatric: worried facies, not restless or agitated       HISTORY:     Principal Problem:    Acute metabolic encephalopathy (90/76/5310)    Active Problems:    HTN (hypertension), benign (1/11/2011)      Mixed hyperlipidemia (1/11/2011)      GERD (gastroesophageal reflux disease) (9/30/2014)      CAD (coronary artery disease) ()      UTI (urinary tract infection) (10/1/2014)      Neuropathy (9/8/2019)      Past Medical History:   Diagnosis Date    CAD (coronary artery disease)     Femur fracture (HCC)     right    GERD (gastroesophageal reflux disease)     HTN (hypertension), benign 2011    Mixed hyperlipidemia 2011      Past Surgical History:   Procedure Laterality Date    HX FEMUR FRACTURE TX      HX KNEE REPLACEMENT      bilateral    HX ORTHOPAEDIC      bilat knee replacements    HX VEIN STRIPPING        Family History   Problem Relation Age of Onset    Other Mother          of renal failure, not sure what caused id    Other Father         something with throat, not sure if it was cancer    Heart Disease Sister       History reviewed, no pertinent family history.   Social History     Tobacco Use    Smoking status: Never Smoker    Smokeless tobacco: Never Used   Substance Use Topics    Alcohol use: No     Allergies   Allergen Reactions    Sulfa (Sulfonamide Antibiotics) Rash    Ampicillin Rash     Tolerates cefazolin    Lescol [Fluvastatin] Unknown (comments)      Current Facility-Administered Medications   Medication Dose Route Frequency    metoprolol tartrate (LOPRESSOR) tablet 25 mg  25 mg Oral Q12H    aspirin chewable tablet 81 mg  81 mg Oral DAILY    atorvastatin (LIPITOR) tablet 40 mg  40 mg Oral QHS    [Held by provider] dextrose 5% - 0.45% NaCl with KCl 40 mEq/L infusion   IntraVENous CONTINUOUS    haloperidol lactate (HALDOL) injection 2 mg  2 mg IntraVENous Q6H PRN    acetaminophen (TYLENOL) tablet 650 mg  650 mg Oral Q6H    enoxaparin (LOVENOX) injection 90 mg  1 mg/kg SubCUTAneous Q12H    amiodarone (CORDARONE) 375 mg in dextrose 5% 250 mL infusion  0.5-1 mg/min IntraVENous TITRATE    sodium chloride (NS) flush 5-40 mL  5-40 mL IntraVENous Q8H    sodium chloride (NS) flush 5-40 mL  5-40 mL IntraVENous PRN    acetaminophen (TYLENOL) tablet 650 mg  650 mg Oral Q6H PRN    Or    acetaminophen (TYLENOL) suppository 650 mg  650 mg Rectal Q6H PRN    promethazine (PHENERGAN) tablet 12.5 mg  12.5 mg Oral Q6H PRN    Or    ondansetron (ZOFRAN) injection 4 mg  4 mg IntraVENous Q6H PRN    morphine injection 1 mg  1 mg IntraVENous Q4H PRN    cefTRIAXone (ROCEPHIN) 1 g in sterile water (preservative free) 10 mL IV syringe  1 g IntraVENous Q24H    doxycycline (VIBRAMYCIN) 100 mg in 0.9% sodium chloride (MBP/ADV) 100 mL MBP  100 mg IntraVENous Q12H    hydrALAZINE (APRESOLINE) 20 mg/mL injection 20 mg  20 mg IntraVENous Q6H PRN          LAB AND IMAGING FINDINGS:     Lab Results   Component Value Date/Time    WBC 9.1 12/28/2021 05:33 AM    HGB 14.3 12/28/2021 05:33 AM    PLATELET 373 40/58/0230 05:33 AM     Lab Results   Component Value Date/Time    Sodium 149 (H) 12/28/2021 05:33 AM    Potassium 3.7 12/28/2021 05:33 AM    Chloride 119 (H) 12/28/2021 05:33 AM    CO2 25 12/28/2021 05:33 AM    BUN 42 (H) 12/28/2021 05:33 AM    Creatinine 1.21 (H) 12/28/2021 05:33 AM    Calcium 9.3 12/28/2021 05:33 AM    Magnesium 2.2 12/28/2021 05:33 AM    Phosphorus 2.5 (L) 12/28/2021 05:33 AM      Lab Results   Component Value Date/Time    Alk. phosphatase 138 (H) 12/25/2021 05:44 AM    Protein, total 7.4 12/25/2021 05:44 AM    Albumin 3.2 (L) 12/25/2021 05:44 AM    Globulin 4.2 (H) 12/25/2021 05:44 AM     Lab Results   Component Value Date/Time    INR 1.0 05/07/2015 08:40 PM    INR, External 1.1 05/20/2015 12:00 AM    Prothrombin time 10.0 05/07/2015 08:40 PM    aPTT 32.1 05/07/2015 08:40 PM      No results found for: IRON, FE, TIBC, IBCT, PSAT, FERR   Lab Results   Component Value Date/Time    pH 7.44 01/19/2020 05:52 AM    PCO2 30 (L) 01/19/2020 05:52 AM    PO2 51 (L) 01/19/2020 05:52 AM     No components found for: [place]   Lab Results   Component Value Date/Time    CK 38 04/05/2015 08:21 PM    CK - MB <0.5 (L) 04/05/2015 08:21 PM                Total time: 25m  Counseling / coordination time, spent as noted above: 20m  > 50% counseling / coordination?: y    Prolonged service was provided for  []30 min   []75 min in face to face time in the presence of the patient, spent as noted above.   Time Start:   Time End:   Note: this can only be billed with 34277 (initial) or 00343 (follow up). If multiple start / stop times, list each separately.

## 2021-12-28 NOTE — PROGRESS NOTES
Problem: Self Care Deficits Care Plan (Adult)  Goal: *Acute Goals and Plan of Care (Insert Text)  Description: FUNCTIONAL STATUS PRIOR TO ADMISSION: Patient was modified independent using a rolling walker and wheelchair for functional mobility. Poor historian, however reports she primarily uses her wheelchair at home, however also has a walker, patient reports she dressing and bathes herself without assist    HOME SUPPORT: The patient lived alone with ? Staff and daughter/daughter in-law to provide assistance. Occupational Therapy Goals  Initiated 12/28/2021  1. Patient will perform self-feeding with supervision/set-up within 7 day(s). 2.  Patient will perform bridge in supine with minimal assist within 7 day(s). 3.  Patient will perform grooming with minimal assistance/contact guard assist seated edge of bed within 7 day(s). 4.  Patient will perform sit to stand with moderate assistance and maintain static standing balance with bilateral UE support > or = 3 minutes within 7 day(s). 5.  Patient will call for assist with toileting needs consistently within 7 day(s). Outcome: Not Met    OCCUPATIONAL THERAPY EVALUATION  Patient: Sandra Stout (21 y.o. female)  Date: 12/28/2021  Primary Diagnosis: Acute metabolic encephalopathy [X49.04]        Precautions:   Fall,Bed Alarm,Skin    ASSESSMENT  Based on the objective data described below, the patient presents with decreased independence all basic ADL's and mobility at this time. She is oriented x's 2 however grossly weak and needing assist with all ADL's. Unable to sit edge of bed with assist of 1. Unaware of need for linen/gown change as pur-wic not working however did report feeling better after changed and dry. Will continue to follow. Current Level of Function Impacting Discharge (ADLs/self-care): total care at this time    Functional Outcome Measure:   The patient scored 10/100 on the Barthel Index outcome measure which is indicative of total dependence. Other factors to consider for discharge: was living in [place] and reported independence with ADL's     Patient will benefit from skilled therapy intervention to address the above noted impairments. PLAN :  Recommendations and Planned Interventions: self care training, functional mobility training, therapeutic exercise, balance training, therapeutic activities, cognitive retraining, endurance activities, patient education, home safety training, and family training/education    Frequency/Duration: Patient will be followed by occupational therapy 5 times a week to address goals. Recommendation for discharge: (in order for the patient to meet his/her long term goals)  Therapy up to 5 days/week in SNF setting    This discharge recommendation:  Has been made in collaboration with the attending provider and/or case management    IF patient discharges home will need the following DME: none       SUBJECTIVE:   Patient stated that feels better.  stated after linens and gown changed    OBJECTIVE DATA SUMMARY:   HISTORY:   Past Medical History:   Diagnosis Date    CAD (coronary artery disease)     Femur fracture ([facility] [address]     right    GERD (gastroesophageal reflux disease)     HTN (hypertension), benign 1/11/2011    Mixed hyperlipidemia 1/11/2011     Past Surgical History:   Procedure Laterality Date    HX FEMUR FRACTURE TX      HX KNEE REPLACEMENT      bilateral    HX ORTHOPAEDIC      bilat knee replacements    HX VEIN STRIPPING         Expanded or extensive additional review of patient history:     Home Situation  Home Environment: Assisted living  Support Systems: [facility])  Patient Expects to be Discharged to[de-identified] Unknown  Current DME Used/Available at Home: [employer], rolling,Grab bars,Shower chair  Tub or Shower Type: Tub/Shower combination    Hand dominance:     EXAMINATION OF PERFORMANCE DEFICITS:  Cognitive/Behavioral Status:  Neurologic State: Alert  Orientation Level: Oriented to person;Oriented to place  Cognition: Decreased attention/concentration;Decreased command following;Memory loss  Perception: Appears intact  Perseveration: No perseveration noted  Safety/Judgement: Awareness of environment;Decreased insight into deficits    Skin: intact, noted bruising UE's    Edema: none noted    Hearing: Auditory  Auditory Impairment: None    Vision/Perceptual:                                Corrective Lenses: Reading glasses    Range of Motion:  Limited bilateral shoulder flexion, left > right, arthritic changes        Strength:  Grossly decreased, non-functional                    Coordination:     Fine Motor Skills-Upper: Left Impaired;Right Impaired    Gross Motor Skills-Upper: Left Impaired;Right Impaired      Balance:  Sitting:  (unable)    Functional Mobility and Transfers for ADLs:  Bed Mobility:  Rolling: Maximum assistance; Total assistance;Assist x1;Additional time  Supine to Sit: Total assistance  Sit to Supine: Total assistance  Scooting: Total assistance    Transfers:  Sit to Stand:  (unable)  Bed to Chair: Total assistance  Bathroom Mobility: Dependent/total assistance  Toilet Transfer : Total assistance    ADL Assessment:  Feeding: Maximum assistance    Oral Facial Hygiene/Grooming: Total assistance    Bathing: Total assistance    Upper Body Dressing: Moderate assistance    Lower Body Dressing: Total assistance    Toileting: Total assistance                ADL Intervention and task modifications:        Educated on role of OT, instructed on use of call bell, instructed to call for assist if needs to go to the bathroom or wet as her evonne, gown and linens soiled on arrival.     Required total assist for rolling for pericare and linen change      Elevated LE's on pillows and educated on purpose for skin protection    Cognitive Retraining  Safety/Judgement: Awareness of environment;Decreased insight into deficits    Therapeutic Exercise:   Instructed to perform ankle pumps throughout day   Functional Measure:    Barthel Index:  Bathin  Bladder: 0  Bowels: 10  Groomin  Dressin  Feedin  Mobility: 0  Stairs: 0  Toilet Use: 0  Transfer (Bed to Chair and Back): 0  Total: 10/100      The Barthel ADL Index: Guidelines  1. The index should be used as a record of what a patient does, not as a record of what a patient could do. 2. The main aim is to establish degree of independence from any help, physical or verbal, however minor and for whatever reason. 3. The need for supervision renders the patient not independent. 4. A patient's performance should be established using the best available evidence. Asking the patient, friends/relatives and nurses are the usual sources, but direct observation and common sense are also important. However direct testing is not needed. 5. Usually the patient's performance over the preceding 24-48 hours is important, but occasionally longer periods will be relevant. 6. Middle categories imply that the patient supplies over 50 per cent of the effort. 7. Use of aids to be independent is allowed. Score Interpretation (from [address] [address])    Independent   60-79 Minimally independent   40-59 Partially dependent   20-39 Very dependent   <20 Totally dependent     -Chrystal Richards., Barthel, D.W. (1965). Functional evaluation: the Barthel Index. [address] [facility] ([address] (1997). The Barthel activities of daily living index: self-reporting versus actual performance in the old (> or = 75 years). Journal of [address] [place] [address] 45(7), [address] [facility], J.J.M.F, Kylee Mckenna., [facility] [place]. (1999). Measuring the change in disability after inpatient rehabilitation; comparison of the responsiveness of the Barthel Index and Functional [place] Measure. Journal of Neurology, Neurosurgery, and Psychiatry, 66(4), 616-313.   -Abdon Yu NMARQUISE, RACHEL Weaver, Nori Sparks M.A. (2004) Assessment of post-stroke quality of life in cost-effectiveness studies: The usefulness of the Barthel Index and the EuroQoL-5D. Quality of Life Research, 15, 416-20         Occupational Therapy Evaluation Charge Determination   History Examination Decision-Making   LOW Complexity : Brief history review  MEDIUM Complexity : 3-5 performance deficits relating to physical, cognitive , or psychosocial skils that result in activity limitations and / or participation restrictions MEDIUM Complexity : Patient may present with comorbidities that affect occupational performnce. Miniml to moderate modification of tasks or assistance (eg, physical or verbal ) with assesment(s) is necessary to enable patient to complete evaluation       Based on the above components, the patient evaluation is determined to be of the following complexity level: LOW   Pain Rating:    Complaint of left LE pain, at times at calf, then at thigh, RN informed and aware    Activity Tolerance:   Fair    After treatment patient left in no apparent distress:    Supine in bed, Heels elevated for pressure relief, Call bell within reach, Bed / chair alarm activated, and Side rails x 3    COMMUNICATION/EDUCATION:   The patients plan of care was discussed with: Physical therapist and Registered nurse. Patient is unable to participate in goal setting and plan of care. This patients plan of care is appropriate for delegation to [facility].     Thank you for this referral.  Onur Cole OTR/L  Time Calculation: 29 mins

## 2021-12-28 NOTE — PROGRESS NOTES
Cardiology Initial Care Encounter    Patient: Jose F Ruiz MRN: [id number]     YOB: 1924  Age: 80 y.o. Sex: female      Admit Date: 12/24/2021       Assessment/Plan     1. A-fib with RVR: placed on amio gtt, now converted to SR. Will start metoprolol 25 mg bid, BP stable. Likely no AC given age and fall risk. TTE ordered. No prior hx of this per her daughter     2. Hx of CAD: troponin elevated - 1098, medical management. Hx of in chart, daughter is unsure if this is accurate. Has seen Dr. Sean Vickers in past for HTN/HLD management. 3. Elevated pBNP: up to 10K, TTE ordered     4. HTN: on norvasc, start metoprolol     5. HLD: on lipitor    6. UTI: on abx    7. AMS, encephalopathy: hx of dementia, acute changes likely r/t UTI    CARDIOLOGY ATTENDING  Patient personally seen and examined. All the elements of history and examination were personally performed. Assessment and plan was discussed and agree. NAD. Stated age. Hrt RRR. Normal resp effort. 1) Atrial fib / flutter with RVR  - newly discovered   - possibly precipitated by Acute illness (UTI, PNA)  - she spontaneously converted to NSR (was on amio gtt)   - Given her advanced age, dementia / AMS, and fall risk, will hold off on starting [address] at this time. Can reevaluate risk / benefit of [address] going forward. - continue metoprolol     2) Elevated Troponin   - likely Type 2 MI in setting of Afib with RVR  - she has since converted to NSR with normal vitals   - She denies CP  - Echo with normal LVEF  - favor a conservative approach in a 81 yo with dementia   - continue metoprolol         Etelvina Barber MD, [facility]            HPI     Jose F Ruiz is a 80 y.o.  female  with PMH significant for HTN, HLD, CAD and GERD. Pt has hx of dementia, information obtained from records and pt's daughter. Pt presented [facility] the ED from [facility] with c/o AMS, had started morning of 12/24/21.  Family reported she is normally more alert and interactive, had become more confused the last couple of days prior to admit. Pt is not able to give much information. Yesterday evening, pt went into a-fib with RVR, converted to SR this morning. Currently she reports L leg pain and generally not feeling well. She denies any dizziness, palpitations, CP, SOB or edema. ECHO ordered    Onset was several days ago  Pain description and course: character of chest pain: n/a   Associated symptoms are: fatigue  Aggravating factors are: n/a   Alleviating factors are: n/a   The patient has been treated with amiodarone. The patient denies chest pain, dependent edema, diaphoresis, SHAH, orthopnea, palpitations, PND, shortness of breath, claudication or syncope. No bleeding or ICD shock     The patient has been referred to cardiology for on going management of a-fib with RVR. Review of Symptoms:  Constitutional: generally unwell  ENT: negative   Respiratory: negative  Gastrointestinal: negative  Genitourinary: dysuria, hematuria, frequency   Musculoskeletal:negative  Neurological: negative  Other systems reviewed and negative except as above. Previous cardiac hx  1. Echo  12/2/08 - EF 60-65%  10/1/14- EF 70%, grade 2 DD, LAE mod, AI/TR mild  1/19/20-EF 60 - 65%, LAE, Aortic valve sclerosis with no evidence of reduced excursion, Severe mitral annular calcification, mild MR, mild/mod pulm HTN    2. Myocardial perfusion study  12/20/00 - persantine, mild mid distal anterolateral wall ischemia, EF 62%    3.  Cholesterol  8/23/12 - , HDL 61, LDL 85,   8/2/13 - , HDL 61, LDL 86,   8/24/14 - , HDL 61, LDL 88,   2/24/17- , HDL 75, ,   9/21/17- , HDL 55, LDL 70,   3/14/18- , HDL 62, LDL 70,   11/29/18- , HDL 70, LDL 76,   1/6/20- , HDL 62, LDL 68,   6/17/20- , HDL 59, ,      Risk factors: age, HTN, HLD     Social History Tobacco Use    Smoking status: Never Smoker    Smokeless tobacco: Never Used   Substance Use Topics    Alcohol use: No     Family History   Problem Relation Age of Onset    Other Mother          of renal failure, not sure what caused id    Other Father         something with throat, not sure if it was cancer    Heart Disease Sister        Current Facility-Administered Medications   Medication Dose Route Frequency    [Held by provider] dextrose 5% - 0.45% NaCl with KCl 40 mEq/L infusion   IntraVENous CONTINUOUS    haloperidol lactate (HALDOL) injection 2 mg  2 mg IntraVENous Q6H PRN    acetaminophen (TYLENOL) tablet 650 mg  650 mg Oral Q6H    enoxaparin (LOVENOX) injection 90 mg  1 mg/kg SubCUTAneous Q12H    amiodarone (CORDARONE) 375 mg in dextrose 5% 250 mL infusion  0.5-1 mg/min IntraVENous TITRATE    sodium chloride (NS) flush 5-40 mL  5-40 mL IntraVENous Q8H    sodium chloride (NS) flush 5-40 mL  5-40 mL IntraVENous PRN    acetaminophen (TYLENOL) tablet 650 mg  650 mg Oral Q6H PRN    Or    acetaminophen (TYLENOL) suppository 650 mg  650 mg Rectal Q6H PRN    promethazine (PHENERGAN) tablet 12.5 mg  12.5 mg Oral Q6H PRN    Or    ondansetron (ZOFRAN) injection 4 mg  4 mg IntraVENous Q6H PRN    morphine injection 1 mg  1 mg IntraVENous Q4H PRN    cefTRIAXone (ROCEPHIN) 1 g in sterile water (preservative free) 10 mL IV syringe  1 g IntraVENous Q24H    doxycycline (VIBRAMYCIN) 100 mg in 0.9% sodium chloride (MBP/ADV) 100 mL MBP  100 mg IntraVENous Q12H    hydrALAZINE (APRESOLINE) 20 mg/mL injection 20 mg  20 mg IntraVENous Q6H PRN       Objective:     Vitals:    21 2300 21 2339 21 0321 21 0741   BP:  134/72 135/77 (!) 145/88   Pulse: (!) 121 (!) 121 (!) 126 78   Resp:  20 20 20   Temp:  98.1 °F (36.7 °C) 97.4 °F (36.3 °C) 97.3 °F (36.3 °C)   SpO2:  93% 93% 95%   Weight:   87.4 kg (192 lb 9.6 oz)    Height:            Intake and Output:  Current Shift: No intake/output data recorded. Last three shifts: 12/26 1901 - 12/28 0700  In: 200 [P.O.:581]  Out: 600 [Urine:600]      Gen: Well-developed, well-nourished, in no acute distress  Neck: Supple,No JVD, No Carotid Bruit   Resp: No accessory muscle use, Clear breath sounds, No rales or rhonchi  Card: Regular Rate,Rythm,Normal S1, S2, No murmurs, rubs or gallop. No thrills. Abd:  Soft, non-tender, non-distended, BS+,   MSK: No cyanosis  Skin: No rashes    Neuro: moving all four extremities , follows commands appropriately  Psych:  Good insight, oriented to person, alert, Nml Affect. Confused to timing, place  LE: No edema    EKG: atrial fibrillation, rate 120-130's. TELEMETRY currently in SR    Lab/Data Review: All lab results for the last 24 hours reviewed.      Signed By: Sheila De La Rosa NP     December 28, 2021        Lab Results   Component Value Date/Time    TSH 2.61 12/28/2021 05:33 AM

## 2021-12-28 NOTE — PROGRESS NOTES
CM Note:  PT/OT recommending snf for  rehab. Pt had been at [facility] earlier this year. TRANSFER - IN REPORT:    Verbal report received from Reshma Caicedo (name) on Tracy Lamar (patient name) being transferred to [facility] (unit) for change in patient condition(A-fib with RVR)   Report consisted of patients Situation, Background, Assessment and   Recommendations(SBAR). Transition of Care Plan from [facility](name)    RUR 13% (Score %) low   Is This a Readmission NO  Is this a Bundle NO    1. Lives at [facility] return there  2. Palliative following  3. Pt confined to w/c  MARKUS Borrero

## 2021-12-29 LAB
ALBUMIN SERPL-MCNC: 1.4 G/DL (ref 3.5–5)
ALBUMIN SERPL-MCNC: 2.4 G/DL (ref 3.5–5)
ALBUMIN/GLOB SERPL: 0.6 {RATIO} (ref 1.1–2.2)
ALP SERPL-CCNC: 98 U/L (ref 45–117)
ALT SERPL-CCNC: 37 U/L (ref 12–78)
ANION GAP SERPL CALC-SCNC: 14 MMOL/L (ref 5–15)
ANION GAP SERPL CALC-SCNC: 6 MMOL/L (ref 5–15)
AST SERPL-CCNC: 48 U/L (ref 15–37)
BASOPHILS # BLD: 0 K/UL (ref 0–0.1)
BASOPHILS # BLD: 0 K/UL (ref 0–0.1)
BASOPHILS NFR BLD: 0 % (ref 0–1)
BASOPHILS NFR BLD: 1 % (ref 0–1)
BILIRUB SERPL-MCNC: 0.8 MG/DL (ref 0.2–1)
BUN SERPL-MCNC: 27 MG/DL (ref 6–20)
BUN SERPL-MCNC: 35 MG/DL (ref 6–20)
BUN/CREAT SERPL: 34 (ref 12–20)
BUN/CREAT SERPL: 40 (ref 12–20)
CALCIUM SERPL-MCNC: 5.2 MG/DL (ref 8.5–10.1)
CALCIUM SERPL-MCNC: 8.4 MG/DL (ref 8.5–10.1)
CHLORIDE SERPL-SCNC: 118 MMOL/L (ref 97–108)
CHLORIDE SERPL-SCNC: 128 MMOL/L (ref 97–108)
CO2 SERPL-SCNC: 13 MMOL/L (ref 21–32)
CO2 SERPL-SCNC: 24 MMOL/L (ref 21–32)
CREAT SERPL-MCNC: 0.67 MG/DL (ref 0.55–1.02)
CREAT SERPL-MCNC: 1.03 MG/DL (ref 0.55–1.02)
DIFFERENTIAL METHOD BLD: ABNORMAL
DIFFERENTIAL METHOD BLD: ABNORMAL
EOSINOPHIL # BLD: 0.1 K/UL (ref 0–0.4)
EOSINOPHIL # BLD: 0.2 K/UL (ref 0–0.4)
EOSINOPHIL NFR BLD: 2 % (ref 0–7)
EOSINOPHIL NFR BLD: 2 % (ref 0–7)
ERYTHROCYTE [DISTWIDTH] IN BLOOD BY AUTOMATED COUNT: 14.9 % (ref 11.5–14.5)
ERYTHROCYTE [DISTWIDTH] IN BLOOD BY AUTOMATED COUNT: 15 % (ref 11.5–14.5)
GLOBULIN SER CALC-MCNC: 3.9 G/DL (ref 2–4)
GLUCOSE SERPL-MCNC: 105 MG/DL (ref 65–100)
GLUCOSE SERPL-MCNC: 72 MG/DL (ref 65–100)
HCT VFR BLD AUTO: 31.8 % (ref 35–47)
HCT VFR BLD AUTO: 41.3 % (ref 35–47)
HGB BLD-MCNC: 12.7 G/DL (ref 11.5–16)
HGB BLD-MCNC: 9.2 G/DL (ref 11.5–16)
IMM GRANULOCYTES # BLD AUTO: 0.1 K/UL (ref 0–0.04)
IMM GRANULOCYTES # BLD AUTO: 0.1 K/UL (ref 0–0.04)
IMM GRANULOCYTES NFR BLD AUTO: 1 % (ref 0–0.5)
IMM GRANULOCYTES NFR BLD AUTO: 1 % (ref 0–0.5)
LYMPHOCYTES # BLD: 1 K/UL (ref 0.8–3.5)
LYMPHOCYTES # BLD: 1.3 K/UL (ref 0.8–3.5)
LYMPHOCYTES NFR BLD: 15 % (ref 12–49)
LYMPHOCYTES NFR BLD: 17 % (ref 12–49)
MAGNESIUM SERPL-MCNC: 1.4 MG/DL (ref 1.6–2.4)
MAGNESIUM SERPL-MCNC: 2 MG/DL (ref 1.6–2.4)
MCH RBC QN AUTO: 30 PG (ref 26–34)
MCH RBC QN AUTO: 30.4 PG (ref 26–34)
MCHC RBC AUTO-ENTMCNC: 28.9 G/DL (ref 30–36.5)
MCHC RBC AUTO-ENTMCNC: 30.8 G/DL (ref 30–36.5)
MCV RBC AUTO: 105 FL (ref 80–99)
MCV RBC AUTO: 97.6 FL (ref 80–99)
MONOCYTES # BLD: 0.8 K/UL (ref 0–1)
MONOCYTES # BLD: 1.1 K/UL (ref 0–1)
MONOCYTES NFR BLD: 13 % (ref 5–13)
MONOCYTES NFR BLD: 13 % (ref 5–13)
NEUTS SEG # BLD: 4.1 K/UL (ref 1.8–8)
NEUTS SEG # BLD: 5.8 K/UL (ref 1.8–8)
NEUTS SEG NFR BLD: 67 % (ref 32–75)
NEUTS SEG NFR BLD: 68 % (ref 32–75)
NRBC # BLD: 0 K/UL (ref 0–0.01)
NRBC # BLD: 0 K/UL (ref 0–0.01)
NRBC BLD-RTO: 0 PER 100 WBC
NRBC BLD-RTO: 0 PER 100 WBC
PHOSPHATE SERPL-MCNC: 2.1 MG/DL (ref 2.6–4.7)
PLATELET # BLD AUTO: 189 K/UL (ref 150–400)
PLATELET # BLD AUTO: 78 K/UL (ref 150–400)
PMV BLD AUTO: 11.1 FL (ref 8.9–12.9)
PMV BLD AUTO: 11.1 FL (ref 8.9–12.9)
POTASSIUM SERPL-SCNC: 3.4 MMOL/L (ref 3.5–5.1)
POTASSIUM SERPL-SCNC: 3.5 MMOL/L (ref 3.5–5.1)
PROT SERPL-MCNC: 6.3 G/DL (ref 6.4–8.2)
RBC # BLD AUTO: 3.03 M/UL (ref 3.8–5.2)
RBC # BLD AUTO: 4.23 M/UL (ref 3.8–5.2)
RBC MORPH BLD: ABNORMAL
RBC MORPH BLD: ABNORMAL
SARS-COV-2, XPLCVT: NOT DETECTED
SODIUM SERPL-SCNC: 148 MMOL/L (ref 136–145)
SODIUM SERPL-SCNC: 155 MMOL/L (ref 136–145)
SOURCE, COVRS: NORMAL
TROPONIN-HIGH SENSITIVITY: 599 NG/L (ref 0–51)
WBC # BLD AUTO: 6.1 K/UL (ref 3.6–11)
WBC # BLD AUTO: 8.5 K/UL (ref 3.6–11)

## 2021-12-29 PROCEDURE — 74011250636 HC RX REV CODE- 250/636: Performed by: INTERNAL MEDICINE

## 2021-12-29 PROCEDURE — 83735 ASSAY OF MAGNESIUM: CPT

## 2021-12-29 PROCEDURE — 74011250637 HC RX REV CODE- 250/637: Performed by: INTERNAL MEDICINE

## 2021-12-29 PROCEDURE — 80069 RENAL FUNCTION PANEL: CPT

## 2021-12-29 PROCEDURE — 65660000000 HC RM CCU STEPDOWN

## 2021-12-29 PROCEDURE — 99231 SBSQ HOSP IP/OBS SF/LOW 25: CPT | Performed by: SPECIALIST

## 2021-12-29 PROCEDURE — 74011250637 HC RX REV CODE- 250/637: Performed by: SPECIALIST

## 2021-12-29 PROCEDURE — 85025 COMPLETE CBC W/AUTO DIFF WBC: CPT

## 2021-12-29 PROCEDURE — APPSS30 APP SPLIT SHARED TIME 16-30 MINUTES: Performed by: NURSE PRACTITIONER

## 2021-12-29 PROCEDURE — 97530 THERAPEUTIC ACTIVITIES: CPT

## 2021-12-29 PROCEDURE — 97110 THERAPEUTIC EXERCISES: CPT

## 2021-12-29 PROCEDURE — 84484 ASSAY OF TROPONIN QUANT: CPT

## 2021-12-29 PROCEDURE — 74011250637 HC RX REV CODE- 250/637: Performed by: NURSE PRACTITIONER

## 2021-12-29 PROCEDURE — 80053 COMPREHEN METABOLIC PANEL: CPT

## 2021-12-29 PROCEDURE — 97535 SELF CARE MNGMENT TRAINING: CPT

## 2021-12-29 PROCEDURE — 74011000258 HC RX REV CODE- 258: Performed by: INTERNAL MEDICINE

## 2021-12-29 PROCEDURE — 36415 COLL VENOUS BLD VENIPUNCTURE: CPT

## 2021-12-29 RX ORDER — AMLODIPINE BESYLATE 5 MG/1
10 TABLET ORAL DAILY
Status: DISCONTINUED | OUTPATIENT
Start: 2021-12-30 | End: 2021-12-30 | Stop reason: HOSPADM

## 2021-12-29 RX ORDER — POTASSIUM CHLORIDE 750 MG/1
20 TABLET, FILM COATED, EXTENDED RELEASE ORAL
Status: COMPLETED | OUTPATIENT
Start: 2021-12-29 | End: 2021-12-29

## 2021-12-29 RX ORDER — AMLODIPINE BESYLATE 5 MG/1
5 TABLET ORAL ONCE
Status: COMPLETED | OUTPATIENT
Start: 2021-12-29 | End: 2021-12-29

## 2021-12-29 RX ORDER — AMLODIPINE BESYLATE 5 MG/1
5 TABLET ORAL DAILY
Status: DISCONTINUED | OUTPATIENT
Start: 2021-12-29 | End: 2021-12-29

## 2021-12-29 RX ADMIN — ENOXAPARIN SODIUM 40 MG: 100 INJECTION SUBCUTANEOUS at 10:18

## 2021-12-29 RX ADMIN — ACETAMINOPHEN 650 MG: 325 TABLET ORAL at 15:14

## 2021-12-29 RX ADMIN — ACETAMINOPHEN 650 MG: 325 TABLET ORAL at 21:55

## 2021-12-29 RX ADMIN — AMLODIPINE BESYLATE 5 MG: 5 TABLET ORAL at 09:38

## 2021-12-29 RX ADMIN — Medication 10 ML: at 23:00

## 2021-12-29 RX ADMIN — ACETAMINOPHEN 650 MG: 325 TABLET ORAL at 09:38

## 2021-12-29 RX ADMIN — ATORVASTATIN CALCIUM 40 MG: 20 TABLET, FILM COATED ORAL at 21:55

## 2021-12-29 RX ADMIN — METOPROLOL TARTRATE 25 MG: 25 TABLET, FILM COATED ORAL at 21:55

## 2021-12-29 RX ADMIN — ASPIRIN 81 MG: 81 TABLET, CHEWABLE ORAL at 09:38

## 2021-12-29 RX ADMIN — Medication 10 ML: at 15:15

## 2021-12-29 RX ADMIN — METOPROLOL TARTRATE 25 MG: 25 TABLET, FILM COATED ORAL at 09:38

## 2021-12-29 RX ADMIN — TRAMADOL HYDROCHLORIDE 25 MG: 50 TABLET, COATED ORAL at 01:49

## 2021-12-29 RX ADMIN — ACETAMINOPHEN 650 MG: 325 TABLET ORAL at 03:26

## 2021-12-29 RX ADMIN — POTASSIUM CHLORIDE 20 MEQ: 750 TABLET, FILM COATED, EXTENDED RELEASE ORAL at 09:38

## 2021-12-29 RX ADMIN — AMLODIPINE BESYLATE 5 MG: 5 TABLET ORAL at 10:18

## 2021-12-29 RX ADMIN — DOXYCYCLINE 100 MG: 100 INJECTION, POWDER, LYOPHILIZED, FOR SOLUTION INTRAVENOUS at 01:40

## 2021-12-29 RX ADMIN — Medication 10 ML: at 05:15

## 2021-12-29 NOTE — PROGRESS NOTES
Problem: Self Care Deficits Care Plan (Adult)  Goal: *Acute Goals and Plan of Care (Insert Text)  Description: FUNCTIONAL STATUS PRIOR TO ADMISSION: Patient was modified independent using a rolling walker and wheelchair for functional mobility. Poor historian, however reports she primarily uses her wheelchair at home, however also has a walker, patient reports she dressing and bathes herself without assist    HOME SUPPORT: The patient lived alone with ? Staff and daughter/daughter in-law to provide assistance. Occupational Therapy Goals  Initiated 12/28/2021  1. Patient will perform self-feeding with supervision/set-up within 7 day(s). 2.  Patient will perform bridge in supine with minimal assist within 7 day(s). 3.  Patient will perform grooming with minimal assistance/contact guard assist seated edge of bed within 7 day(s). 4.  Patient will perform sit to stand with moderate assistance and maintain static standing balance with bilateral UE support > or = 3 minutes within 7 day(s). 5.  Patient will call for assist with toileting needs consistently within 7 day(s). Outcome: Progressing Towards Goal   OCCUPATIONAL THERAPY TREATMENT  Patient: Myron Soni (89 y.o. female)  Date: 12/29/2021  Diagnosis: Acute metabolic encephalopathy [S00.74] Acute metabolic encephalopathy       Precautions: Fall,Bed Alarm,Skin  Chart, occupational therapy assessment, plan of care, and goals were reviewed. ASSESSMENT  Patient continues with skilled OT services and is progressing towards goals. Pt supine to sit max assist x 2 in prep for EOB ADl's. She washes her face with set-up and was able to feed herself peaches. Pt worked on reaching anterior, shoulder shrugs and ankle pumps. Max assist x 2 for sit to supine, pt verbalizing pain LE's.     Current Level of Function Impacting Discharge (ADLs): set-up face washing and self feeding, max assist LB bathe/dress    Other factors to consider for discharge:          PLAN :  Patient continues to benefit from skilled intervention to address the above impairments. Continue treatment per established plan of care to address goals. Recommend with staff: bed in chair position for ADl's, there ex, there act    Recommend next OT session: cont towards goals    Recommendation for discharge: (in order for the patient to meet his/her long term goals)  Therapy up to 5 days/week in SNF setting    This discharge recommendation:  Has not yet been discussed the attending provider and/or case management    IF patient discharges home will need the following DME:        SUBJECTIVE:   Patient stated This needs to go in the refrigerator    OBJECTIVE DATA SUMMARY:   Cognitive/Behavioral Status:  Neurologic State: Alert  Orientation Level: Oriented to place;Oriented to person  Cognition: Follows commands             Functional Mobility and Transfers for ADLs:  Bed Mobility:  Rolling: Maximum assistance;Assist x2; Additional time  Supine to Sit: Maximum assistance;Assist x2; Additional time  Sit to Supine: Total assistance;Assist x2  Scooting: Total assistance    Transfers:             Balance:  Sitting: Impaired  Sitting - Static: Fair (occasional); Good (unsupported)  Sitting - Dynamic: Fair (occasional)    ADL Intervention:       Grooming  Grooming Assistance: Set-up  Position Performed: Seated edge of bed  Washing Face: Set-up          Therapeutic Exercises:   Pt worked on reaching anterior as well as shoulder shrugs 5 x. Activity Tolerance:   Fair    After treatment patient left in no apparent distress:   Supine in bed    COMMUNICATION/COLLABORATION:   The patients plan of care was discussed with: Physical therapy assistant, Occupational therapist, and Registered nurse.      JOSE Trujillo  Time Calculation: 24 mins

## 2021-12-29 NOTE — CARDIO/PULMONARY
[facility] Cardiopulmonary Rehab: 81 yo female admitted from nursing home with AMS & UTI (12/24). Troponin peaked at 1098 (12/28). Per Dr Inga Draper, dx includes: AFib/RVR. Plan for medical management of CAD. Pt is not a candidate for outpatient cardiac rehab, due to debility.

## 2021-12-29 NOTE — PROGRESS NOTES
Chris Fosterelsen [facility] [address]  [address] [place] [address]  [phone]      Medical Progress Note      NAME: Bryanna Bliss   :  1924  MRM:  [id number]    Date/Time: 2021        Assessment / Plan:     79 yo F w/ hx of CAD, HTN, HLD presenting w/ AMS, found to have UTI and suspected PNA. Hospital course complicated by problems as listed below:    AF with RVR: new onset, unclear precipitating factor. ?CHF. Reverted back to SR. Evaluated by cardiology, recommended against anticoagulation given age and fall risk. Cont metoprolol     HTN (hypertension), benign (2011) POA: BP high, added Norvasc, increase dose. Cont metoprolol    CAD / HLD: elevated troponin, 2/2 type 2 MI. Given therapeutic Lovenox. Troponin peaked. TTE showed preserved LVEF. Cardiology recommends medical tx. Cont ASA, statin, BB. Abnormal CXR: suspect pulmonary edema, less likely viral PNA. SARS-CoV-2 PCR negative. IV diuretics PRN. Strict I/Os. TTE    Acute metabolic encephalopathy: likely 2/2 UTI, possible PNA. Head CT negative. Blood cultures isolated CoNS which is  contaminant. Completed course of IV CTX and doxy. IV Haldol PRN however would monitor QTc     UTI (urinary tract infection): POA: urine cultures grew E coli. Completed IV CTX      Hypernatremia / hypokalemia: due to poor oral intake. Monitor on IV diuresis     Debility / generalized weakness: PT/OT, anticipate need for SNF placement. Discussed w/ CM      Total time spent: 35 minutes  Time spent in the care of this patient including reviewing records, discussing with nursing and/or other providers on the treatment team, obtaining history and examining the patient, and discussing treatment plans.                   Care Plan discussed with: Patient, Nursing Staff and >50% of time spent in counseling and coordination of care    Discussed:  Care Plan and D/C Planning    Prophylaxis:  Lovenox    Disposition:  SNF/LTC         Subjective: Chief Complaint:  Follow up UTI    Chart/notes/labs/studies reviewed, patient examined. Feeling okay. No CP, SOB, F/C        Objective:       Vitals:        Last 24hrs VS reviewed since prior progress note. Most recent are:    Visit Vitals  BP (!) 153/74 (BP 1 Location: Left upper arm, BP Patient Position: At rest)   Pulse 73   Temp 98.7 °F (37.1 °C)   Resp 16   Ht 5' 4\" (1.626 m)   Wt 88.5 kg (195 lb)   SpO2 96%   BMI 33.47 kg/m²     SpO2 Readings from Last 6 Encounters:   12/29/21 96%   03/02/21 93%   02/13/20 98%   02/11/20 96%   02/06/20 97%   02/03/20 95%    O2 Flow Rate (L/min): 0 l/min       Intake/Output Summary (Last 24 hours) at 12/29/2021 1741  Last data filed at 12/29/2021 1522  Gross per 24 hour   Intake 910 ml   Output 1400 ml   Net -490 ml          Exam:     Physical Exam:    Gen: Elderly, chronically ill-appearing. NAD  HEENT:  Atraumatic, normocephalic. Sclerae nonicteric, hearing intact to voice  Neck:  Supple, without apparent masses. Resp:  No accessory muscle use, faint bibasilar rales  Card: RRR, without m/r/g. No LE edema  Abd:  +bowel sounds, soft, NTTP, nondistended. No HSM  Neuro:  Face symmetric, speech fluent, follows commands appropriately, generalized weakness  Psych:  Alert, oriented to self and hospital. Poor insight     Medications Reviewed: (see below)    Lab Data Reviewed: (see below)    ______________________________________________________________________    Medications:     Current Facility-Administered Medications   Medication Dose Route Frequency    [START ON 12/30/2021] amLODIPine (NORVASC) tablet 10 mg  10 mg Oral DAILY    metoprolol tartrate (LOPRESSOR) tablet 25 mg  25 mg Oral Q12H    aspirin chewable tablet 81 mg  81 mg Oral DAILY    atorvastatin (LIPITOR) tablet 40 mg  40 mg Oral QHS    traMADoL (ULTRAM) tablet 25 mg  25 mg Oral Q6H PRN    enoxaparin (LOVENOX) injection 40 mg  40 mg SubCUTAneous Q24H    haloperidol lactate (HALDOL) injection 1 mg  1 mg IntraVENous Q6H PRN    acetaminophen (TYLENOL) tablet 650 mg  650 mg Oral Q6H    sodium chloride (NS) flush 5-40 mL  5-40 mL IntraVENous Q8H    sodium chloride (NS) flush 5-40 mL  5-40 mL IntraVENous PRN    acetaminophen (TYLENOL) tablet 650 mg  650 mg Oral Q6H PRN    Or    acetaminophen (TYLENOL) suppository 650 mg  650 mg Rectal Q6H PRN    ondansetron (ZOFRAN) injection 4 mg  4 mg IntraVENous Q6H PRN    morphine injection 1 mg  1 mg IntraVENous Q4H PRN    hydrALAZINE (APRESOLINE) 20 mg/mL injection 20 mg  20 mg IntraVENous Q6H PRN            Lab Review:     Recent Labs     12/29/21  0630 12/29/21  0337 12/28/21  0533   WBC 8.5 6.1 9.1   HGB 12.7 9.2* 14.3   HCT 41.3 31.8* 44.7    78* 220     Recent Labs     12/29/21  0630 12/29/21  0337 12/28/21  0533   * 155* 149*   K 3.5 3.4* 3.7   * 128* 119*   CO2 24 13* 25   * 72 116*   BUN 35* 27* 42*   CREA 1.03* 0.67 1.21*   CA 8.4* 5.2* 9.3   MG 2.0 1.4* 2.2   PHOS  --  2.1* 2.5*   ALB 2.4* 1.4*  --    ALT 37  --   --      No components found for: GLPOC  No results for input(s): PH, PCO2, PO2, HCO3, FIO2 in the last 72 hours. No results for input(s): INR, INREXT, INREXT in the last 72 hours.   No results found for: SDES  Lab Results   Component Value Date/Time    Culture result: NO GROWTH 3 DAYS 12/26/2021 07:49 AM    Culture result: ESCHERICHIA COLI (A) 12/24/2021 10:30 AM    Culture result: (A) 12/24/2021 09:53 AM     STAPHYLOCOCCUS SPECIES, COAGULASE NEGATIVE GROWING IN 1 OF 4 BOTTLES DRAWN , SITE L FA    Culture result: REMAINING BOTTLE(S) HAS/HAVE NO GROWTH SO FAR 12/24/2021 09:53 AM              ___________________________________________________    Attending Physician: Kaylene Burns MD

## 2021-12-29 NOTE — PROGRESS NOTES
Problem: Pressure Injury - Risk of  Goal: *Prevention of pressure injury  Description: Document John Scale and appropriate interventions in the flowsheet. Outcome: Progressing Towards Goal  Note: Pressure Injury Interventions:  Sensory Interventions: Assess changes in LOC    Moisture Interventions: Minimize layers,Internal/External urinary devices    Activity Interventions: Increase time out of bed    Mobility Interventions: Float heels    Nutrition Interventions: Document food/fluid/supplement intake    Friction and Shear Interventions: HOB 30 degrees or less                Problem: Patient Education: Go to Patient Education Activity  Goal: Patient/Family Education  Outcome: Progressing Towards Goal     Problem: Falls - Risk of  Goal: *Absence of Falls  Description: Document Jesse Fall Risk and appropriate interventions in the flowsheet.   Outcome: Progressing Towards Goal  Note: Fall Risk Interventions:  Mobility Interventions: Bed/chair exit alarm    Mentation Interventions: Bed/chair exit alarm    Medication Interventions: Bed/chair exit alarm    Elimination Interventions: Bed/chair exit alarm,Call light in reach    History of Falls Interventions: Bed/chair exit alarm         Problem: Patient Education: Go to Patient Education Activity  Goal: Patient/Family Education  Outcome: Progressing Towards Goal     Problem: Patient Education: Go to Patient Education Activity  Goal: Patient/Family Education  Outcome: Progressing Towards Goal     Problem: Patient Education: Go to Patient Education Activity  Goal: Patient/Family Education  Outcome: Progressing Towards Goal     Problem: Patient Education: Go to Patient Education Activity  Goal: Patient/Family Education  Outcome: Progressing Towards Goal

## 2021-12-29 NOTE — PROGRESS NOTES
2:50 PM  CM placed patient on will call with [facility] in case insurance authorization is received on 12/30/21. [facility]    1:44 PM  CM spoke to admissions at the [facility], they are able to accept patient and will start insurance authorization today. Called and informed patients Henry roe. [facility]    11:50 AM  CM spoke to patients Henry roe and she is agreeable to a referral to [facility] and [employer]. Sent referrals via [employer] [facility] [employer] and [address] [place] [address]. CM spoke to Nicole Wilson with the [address] [facility] and [employer] and she is reviewing the referral and will call back if they are able to accept. [facility]    10:41 AM  Left a 2nd VM for patients daughter to discuss SNF recommendation. [facility]    12/29/21  9:58 AM    Care Management Transition of Care:    Patient reviewed in IDR- Will be ready for dc tomorrow. CM notes PT recommending SNF for rehab at dc. CM called and left a VM for patients Henry roe to discuss the recommendation and obtain choice for SNF. Patient's insurance requires authorization. RUR 13% (Score %) low     LOS: 5D       Is This a Readmission NO  Is this a Bundle NO     1. Patient continues to require medical management  2. PT/OT consulted- recommending SNF  3. Palliative following  4. Transportation TBD  5.  Pt confined to w/c    RACH Sullivan  Care Manager

## 2021-12-29 NOTE — PROGRESS NOTES
1000: Pt BP elevated at 192/84. Notified Dr. Leopold Maduro. He will place orders for additional dose of norvasc. Recheck BP.     1900: Bedside shift change report given to Christina Rajan (oncoming nurse) by Sridevi Key (offgoing nurse). Report included the following information SBAR, Kardex, Intake/Output, MAR, Recent Results and Cardiac Rhythm NSR.

## 2021-12-29 NOTE — PROGRESS NOTES
CARDIOLOGY PROGRESS NOTE        [address], [place], [address] [facility]  Phone [phone]; Fax [phone]          2021 8:15 AM       Admit Date:           2021  Admit Diagnosis:  Acute metabolic encephalopathy [X32.08]  :          1924   MRN:          [id number]        Assessment/Plan  1. A-fib with RVR: placed on amio gtt, converted to SR. Cont metoprolol 25 mg bid. Likely no AC given age and fall risk. TTE ordered. No prior hx of this per her daughter      2. Hx of CAD: troponin elevated - 1098, down to 599, likely Type 2 MI, medical/conservative management. Hx of in chart, daughter is unsure if this is accurate. Has seen Dr. Boone Fox in past for HTN/HLD management.      3. Elevated pBNP: up to 10K, TTE w/ EF 55-60%      4. HTN: resume norvasc, cont metoprolol      5. HLD: on lipitor     6. UTI: on abx     7. AMS, encephalopathy: hx of dementia, acute changes likely r/t UTI    CARDIOLOGY ATTENDING  Patient personally seen and examined. All the elements of history and examination were personally performed. Assessment and plan was discussed and agree. NAD. Hrt RRR - normal resp effort   Tele with sinus     1) Atrial fib / flutter with RVR  - newly discovered   - possibly precipitated by Acute illness (UTI, PNA)  - she spontaneously converted to NSR (was on amio gtt)   - Given her advanced age, dementia / AMS, and fall risk, will hold off on starting [facility] at this time. Can reevaluate risk / benefit of [employer] going forward. - continue metoprolol      2) Elevated Troponin   - likely Type 2 MI in setting of Afib with RVR  - she has since converted to NSR with normal vitals   - She denies CP  - Echo with normal LVEF  - favor a conservative approach in a 81 yo with dementia   - continue metoprolol        Will sign off and see her back in clinic. Please call if any questions.      Rudi Boyce MD, [facility]              We discussed the expected course, resolution and complications of the diagnosis(es) in detail. Medication risks, benefits, costs, interactions, and alternatives were discussed as indicated. No intake/output data recorded. Last 3 Recorded Weights in this Encounter    12/28/21 0321 12/28/21 1209 12/29/21 0415   Weight: 87.4 kg (192 lb 9.6 oz) 87.1 kg (192 lb) 88.5 kg (195 lb)         12/27 1901 - 12/29 0700  In: 8588 [P.O.:951; I.V.:190]  Out: 1500 [Urine:1500]    SUBJECTIVE      Bibi Paz is a 80 y.o.  female  with PMH significant for HTN, HLD, CAD and GERD. Pt has hx of dementia, information obtained from records and pt's daughter. Presented to ED w/ AMS, new onset a-fib evening of 12/27/21. Bibi Paz reports cont leg pain.       Current Facility-Administered Medications   Medication Dose Route Frequency    potassium chloride SR (KLOR-CON 10) tablet 20 mEq  20 mEq Oral NOW    metoprolol tartrate (LOPRESSOR) tablet 25 mg  25 mg Oral Q12H    aspirin chewable tablet 81 mg  81 mg Oral DAILY    atorvastatin (LIPITOR) tablet 40 mg  40 mg Oral QHS    traMADoL (ULTRAM) tablet 25 mg  25 mg Oral Q6H PRN    enoxaparin (LOVENOX) injection 40 mg  40 mg SubCUTAneous Q24H    haloperidol lactate (HALDOL) injection 1 mg  1 mg IntraVENous Q6H PRN    acetaminophen (TYLENOL) tablet 650 mg  650 mg Oral Q6H    sodium chloride (NS) flush 5-40 mL  5-40 mL IntraVENous Q8H    sodium chloride (NS) flush 5-40 mL  5-40 mL IntraVENous PRN    acetaminophen (TYLENOL) tablet 650 mg  650 mg Oral Q6H PRN    Or    acetaminophen (TYLENOL) suppository 650 mg  650 mg Rectal Q6H PRN    ondansetron (ZOFRAN) injection 4 mg  4 mg IntraVENous Q6H PRN    morphine injection 1 mg  1 mg IntraVENous Q4H PRN    hydrALAZINE (APRESOLINE) 20 mg/mL injection 20 mg  20 mg IntraVENous Q6H PRN      OBJECTIVE               Intake/Output Summary (Last 24 hours) at 12/29/2021 0815  Last data filed at 12/29/2021 0446  Gross per 24 hour   Intake 680 ml Output 1300 ml   Net -620 ml       Review of Systems - History obtained from the patient AS PER  HPI        PHYSICAL EXAM        Visit Vitals  BP (!) 159/78 (BP 1 Location: Left lower arm, BP Patient Position: At rest)   Pulse 70   Temp 97.9 °F (36.6 °C)   Resp 18   Ht 5' 4\" (1.626 m)   Wt 88.5 kg (195 lb)   SpO2 93%   BMI 33.47 kg/m²       PE deferred today as pt is r/o for COVID, will re evaluate when negative test result         DATA REVIEW      1. Echo  12/2/08 - EF 60-65%  10/1/14- EF 70%, grade 2 DD, LAE mod, AI/TR mild  1/19/20-EF 60 - 65%, LAE, Aortic valve sclerosis with no evidence of reduced excursion, Severe mitral annular calcification, mild MR, mild/mod pulm HTN    2. Myocardial perfusion study  12/20/00 - persantine, mild mid distal anterolateral wall ischemia, EF 62%    3. Cholesterol  8/23/12 - , HDL 61, LDL 85,   8/2/13 - , HDL 61, LDL 86,   8/24/14 - , HDL 61, LDL 88,   2/24/17- , HDL 75, ,   9/21/17- , HDL 55, LDL 70,   3/14/18- , HDL 62, LDL 70,   11/29/18- , HDL 70, LDL 76,   1/6/20- , HDL 62, LDL 68,   6/17/20- , HDL 59, ,     Cardiac monitor: SR w/ PACs    Laboratory and Imaging have been reviewed by me and are notable for  No results for input(s): CPK, CKMB, TROIQ in the last 72 hours.   Recent Labs     12/29/21  0630 12/29/21  0337 12/28/21  0533   * 155* 149*   K 3.5 3.4* 3.7   CO2 24 13* 25   BUN 35* 27* 42*   CREA 1.03* 0.67 1.21*   * 72 116*   PHOS  --  2.1* 2.5*   MG 2.0 1.4* 2.2   WBC 8.5 6.1 9.1   HGB 12.7 9.2* 14.3   HCT 41.3 31.8* 44.7    78* 220         Rosalinda Quant, NP

## 2021-12-29 NOTE — ROUTINE PROCESS
2352  Received critical lab values for troponin- 86. Notified Dr. Bam Guardado, no new orders received. 7346  Received critical lab values for C02-13, and Calcium- 5.2, Notified Dr. Bam Guardado. Also informed him of Hgb trending downward, 14.3 12/28/21 and 9.2 12/29/21. Bedside and Verbal shift change report given to Giovanni Saenz (oncoming nurse) by Kim Mercer (offgoing nurse). Report included the following information SBAR, Kardex, ED Summary, Procedure Summary, Intake/Output, MAR, Recent Results and Cardiac Rhythm NSR with some A-fib.

## 2021-12-29 NOTE — PROGRESS NOTES
Problem: Mobility Impaired (Adult and Pediatric)  Goal: *Acute Goals and Plan of Care (Insert Text)  Description: FUNCTIONAL STATUS PRIOR TO ADMISSION:  Per chart, pt living alone until peter covid in March. Hospitalized x 6 weeks but pt did recover and was sent [facility] [place] then to [address] [place] [address]  on 5/7/2021, where she has been thriving, tries to participate in all the activities. Details unclear, but appears to use w/c for mobility. HOME SUPPORT PRIOR TO ADMISSION: Pt lives in [facility]; has supportive family who checks on pt regularly    Physical Therapy Goals  Initiated 12/28/2021  1. Patient will move from supine to sit and sit to supine  in bed with moderate assistance  within 7 day(s). 2.  Patient will transfer from bed to chair and chair to bed with maximal assistance using the least restrictive device within 7 day(s). 3.  Patient will perform sit to stand with maximal assistance within 7 day(s). Outcome: Progressing Towards Goal  Note:   PHYSICAL THERAPY TREATMENT  Patient: Rubio Menjivar (56 y.o. female)  Date: 12/29/2021  Diagnosis: Acute metabolic encephalopathy [A33.74] Acute metabolic encephalopathy       Precautions: Fall,Bed Alarm,Skin  Chart, physical therapy assessment, plan of care and goals were reviewed. ASSESSMENT  Patient continues with skilled PT services and progressing towards goals. Decreased initiation for mobility tasks required max A x 2 to come to sitting EOB tolerated x 10min, fatigues quickly with dynamic sitting activities and requires frequent rest breaks. Decreased fwd lean with attempt to scoot toward HOB, little excursion with max A x 2. Assisted back to bed, placed in chair position, alarm in place     Current Level of Function Impacting Discharge (mobility/balance): max A     Other factors to consider for discharge:          PLAN :  Patient continues to benefit from skilled intervention to address the above impairments.   Continue treatment per established plan of care. to address goals. Recommendation for discharge: (in order for the patient to meet his/her long term goals)  Therapy up to 5 days/week in SNF setting    This discharge recommendation:  Has been made in collaboration with the attending provider and/or case management    IF patient discharges home will need the following DME: to be determined (TBD)       SUBJECTIVE:   Patient stated i am in bad shape.     OBJECTIVE DATA SUMMARY:   Critical Behavior:  Neurologic State: Alert  Orientation Level: Oriented to place,Oriented to person  Cognition: Follows commands  Safety/Judgement: Awareness of environment,Decreased insight into deficits  Functional Mobility Training:  Bed Mobility:  Rolling: Maximum assistance;Assist x2; Additional time  Supine to Sit: Maximum assistance;Assist x2; Additional time  Sit to Supine: Total assistance;Assist x2  Scooting: Total assistance        Transfers:                                   Balance:  Sitting: Impaired  Sitting - Static: Fair (occasional); Good (unsupported)  Sitting - Dynamic: Fair (occasional)  Ambulation/Gait Training:                                                        Stairs: Therapeutic Exercises: Ankle pumps, bicep curls, AA heel slides  Pain Rating:  Global pain    Activity Tolerance:   Fair    After treatment patient left in no apparent distress:   Supine in bed, Call bell within reach, and Bed / chair alarm activated    COMMUNICATION/COLLABORATION:   The patients plan of care was discussed with: Occupational therapy assistant and Registered nurse.      Ventura Winston   Time Calculation: 28 mins

## 2021-12-30 ENCOUNTER — APPOINTMENT (OUTPATIENT)
Dept: GENERAL RADIOLOGY | Age: 86
DRG: 689 | End: 2021-12-30
Attending: INTERNAL MEDICINE
Payer: MEDICARE

## 2021-12-30 VITALS
HEART RATE: 63 BPM | SYSTOLIC BLOOD PRESSURE: 139 MMHG | DIASTOLIC BLOOD PRESSURE: 67 MMHG | WEIGHT: 196.2 LBS | TEMPERATURE: 98.4 F | BODY MASS INDEX: 33.49 KG/M2 | OXYGEN SATURATION: 96 % | HEIGHT: 64 IN | RESPIRATION RATE: 21 BRPM

## 2021-12-30 LAB
ALBUMIN SERPL-MCNC: 2.2 G/DL (ref 3.5–5)
ANION GAP SERPL CALC-SCNC: 5 MMOL/L (ref 5–15)
ARTERIAL PATENCY WRIST A: YES
BACTERIA SPEC CULT: ABNORMAL
BASE DEFICIT BLDA-SCNC: 2.6 MMOL/L
BASOPHILS # BLD: 0 K/UL (ref 0–0.1)
BASOPHILS NFR BLD: 0 % (ref 0–1)
BDY SITE: ABNORMAL
BUN SERPL-MCNC: 28 MG/DL (ref 6–20)
BUN/CREAT SERPL: 32 (ref 12–20)
CALCIUM SERPL-MCNC: 8.1 MG/DL (ref 8.5–10.1)
CHLORIDE SERPL-SCNC: 113 MMOL/L (ref 97–108)
CO2 SERPL-SCNC: 26 MMOL/L (ref 21–32)
CREAT SERPL-MCNC: 0.87 MG/DL (ref 0.55–1.02)
DIFFERENTIAL METHOD BLD: ABNORMAL
EOSINOPHIL # BLD: 0.3 K/UL (ref 0–0.4)
EOSINOPHIL NFR BLD: 3 % (ref 0–7)
ERYTHROCYTE [DISTWIDTH] IN BLOOD BY AUTOMATED COUNT: 14.6 % (ref 11.5–14.5)
GLUCOSE SERPL-MCNC: 148 MG/DL (ref 65–100)
HCO3 BLDA-SCNC: 20 MMOL/L (ref 22–26)
HCT VFR BLD AUTO: 40.5 % (ref 35–47)
HGB BLD-MCNC: 13 G/DL (ref 11.5–16)
IMM GRANULOCYTES # BLD AUTO: 0.1 K/UL (ref 0–0.04)
IMM GRANULOCYTES NFR BLD AUTO: 1 % (ref 0–0.5)
LYMPHOCYTES # BLD: 1.5 K/UL (ref 0.8–3.5)
LYMPHOCYTES NFR BLD: 16 % (ref 12–49)
MAGNESIUM SERPL-MCNC: 2 MG/DL (ref 1.6–2.4)
MCH RBC QN AUTO: 30.9 PG (ref 26–34)
MCHC RBC AUTO-ENTMCNC: 32.1 G/DL (ref 30–36.5)
MCV RBC AUTO: 96.2 FL (ref 80–99)
MONOCYTES # BLD: 1.1 K/UL (ref 0–1)
MONOCYTES NFR BLD: 12 % (ref 5–13)
NEUTS SEG # BLD: 6.5 K/UL (ref 1.8–8)
NEUTS SEG NFR BLD: 68 % (ref 32–75)
NRBC # BLD: 0 K/UL (ref 0–0.01)
NRBC BLD-RTO: 0 PER 100 WBC
PCO2 BLDA: 29 MMHG (ref 35–45)
PH BLDA: 7.45 [PH] (ref 7.35–7.45)
PHOSPHATE SERPL-MCNC: 2.7 MG/DL (ref 2.6–4.7)
PLATELET # BLD AUTO: 188 K/UL (ref 150–400)
PMV BLD AUTO: 11.5 FL (ref 8.9–12.9)
PO2 BLDA: 83 MMHG (ref 80–100)
POTASSIUM SERPL-SCNC: 4 MMOL/L (ref 3.5–5.1)
RBC # BLD AUTO: 4.21 M/UL (ref 3.8–5.2)
SAO2 % BLD: 97 % (ref 92–97)
SAO2% DEVICE SAO2% SENSOR NAME: ABNORMAL
SERVICE CMNT-IMP: ABNORMAL
SERVICE CMNT-IMP: ABNORMAL
SODIUM SERPL-SCNC: 144 MMOL/L (ref 136–145)
SPECIMEN SITE: ABNORMAL
WBC # BLD AUTO: 9.3 K/UL (ref 3.6–11)

## 2021-12-30 PROCEDURE — 36415 COLL VENOUS BLD VENIPUNCTURE: CPT

## 2021-12-30 PROCEDURE — 74011250637 HC RX REV CODE- 250/637: Performed by: INTERNAL MEDICINE

## 2021-12-30 PROCEDURE — 73560 X-RAY EXAM OF KNEE 1 OR 2: CPT

## 2021-12-30 PROCEDURE — 92526 ORAL FUNCTION THERAPY: CPT

## 2021-12-30 PROCEDURE — 74011250637 HC RX REV CODE- 250/637: Performed by: NURSE PRACTITIONER

## 2021-12-30 PROCEDURE — 97530 THERAPEUTIC ACTIVITIES: CPT

## 2021-12-30 PROCEDURE — 85025 COMPLETE CBC W/AUTO DIFF WBC: CPT

## 2021-12-30 PROCEDURE — 74011250636 HC RX REV CODE- 250/636: Performed by: INTERNAL MEDICINE

## 2021-12-30 PROCEDURE — 83735 ASSAY OF MAGNESIUM: CPT

## 2021-12-30 PROCEDURE — 73552 X-RAY EXAM OF FEMUR 2/>: CPT

## 2021-12-30 PROCEDURE — 80069 RENAL FUNCTION PANEL: CPT

## 2021-12-30 RX ORDER — ATORVASTATIN CALCIUM 40 MG/1
40 TABLET, FILM COATED ORAL
Qty: 30 TABLET | Refills: 0 | Status: SHIPPED | OUTPATIENT
Start: 2021-12-30

## 2021-12-30 RX ORDER — TRAMADOL HYDROCHLORIDE 50 MG/1
25 TABLET ORAL
Qty: 15 TABLET | Refills: 0 | Status: SHIPPED | OUTPATIENT
Start: 2021-12-30 | End: 2022-01-02

## 2021-12-30 RX ORDER — METOPROLOL TARTRATE 25 MG/1
25 TABLET, FILM COATED ORAL EVERY 12 HOURS
Qty: 60 TABLET | Refills: 0 | Status: SHIPPED | OUTPATIENT
Start: 2021-12-30

## 2021-12-30 RX ORDER — NALOXONE HYDROCHLORIDE 4 MG/.1ML
SPRAY NASAL
Qty: 1 EACH | Refills: 0 | Status: SHIPPED | OUTPATIENT
Start: 2021-12-30

## 2021-12-30 RX ORDER — AMLODIPINE BESYLATE 10 MG/1
10 TABLET ORAL DAILY
Qty: 30 TABLET | Refills: 0 | Status: SHIPPED | OUTPATIENT
Start: 2021-12-30

## 2021-12-30 RX ADMIN — ACETAMINOPHEN 650 MG: 325 TABLET ORAL at 15:43

## 2021-12-30 RX ADMIN — ASPIRIN 81 MG: 81 TABLET, CHEWABLE ORAL at 09:30

## 2021-12-30 RX ADMIN — ENOXAPARIN SODIUM 40 MG: 100 INJECTION SUBCUTANEOUS at 09:31

## 2021-12-30 RX ADMIN — Medication 10 ML: at 13:00

## 2021-12-30 RX ADMIN — Medication 10 ML: at 05:49

## 2021-12-30 RX ADMIN — METOPROLOL TARTRATE 25 MG: 25 TABLET, FILM COATED ORAL at 09:30

## 2021-12-30 RX ADMIN — ACETAMINOPHEN 650 MG: 325 TABLET ORAL at 02:41

## 2021-12-30 RX ADMIN — AMLODIPINE BESYLATE 10 MG: 5 TABLET ORAL at 09:30

## 2021-12-30 RX ADMIN — ACETAMINOPHEN 650 MG: 325 TABLET ORAL at 09:30

## 2021-12-30 NOTE — DISCHARGE SUMMARY
Physician Discharge Summary     Patient ID:  Daylin Villalta  [id number]  76 y.o.  4/26/1924    Admit date: 12/24/2021    Discharge date: 12/30/2021    Admission Diagnoses: Acute metabolic encephalopathy [I75.11]    Principal Discharge Diagnoses:     Acute metabolic encephalopathy   UTI    OTHER PROBLEMS ADDRESSEDS  Principal Diagnosis Acute metabolic encephalopathy                                            Principal Problem:    Acute metabolic encephalopathy (07/86/4746)    Active Problems:    HTN (hypertension), benign (1/11/2011)      Mixed hyperlipidemia (1/11/2011)      GERD (gastroesophageal reflux disease) (9/30/2014)      CAD (coronary artery disease) ()      UTI (urinary tract infection) (10/1/2014)      Neuropathy (9/8/2019)       Patient Active Problem List   Diagnosis Code    HTN (hypertension), benign I10    Mixed hyperlipidemia E78.2    GERD (gastroesophageal reflux disease) K21.9    CAD (coronary artery disease) I25.10    UTI (urinary tract infection) N39.0    Rash R21    CKD (chronic kidney disease) stage 3, GFR 30-59 ml/min ([facility]) N18.30    Bacteriuria R82.71    Frequent falls R29.6    Neuropathy G62.9    Falls frequently R29.6    Acute metabolic encephalopathy R41.12         Hospital Course:   Ms. Karma Olivera is a 81 yo F w/ hx of CAD, HTN, HLD presenting w/ AMS, found to have UTI and suspected PNA. Hospital course complicated by problems as listed below:     AF with RVR: new onset, unclear precipitating factor. ?CHF. Reverted back to SR. Evaluated by cardiology, recommended against anticoagulation given age and fall risk. Cont metoprolol      HTN (hypertension), benign (1/11/2011) POA: BP better, added Norvasc, increased dose. Cont metoprolol     CAD / HLD: elevated troponin, 2/2 type 2 MI. Given therapeutic Lovenox. Troponin peaked. TTE showed preserved LVEF. Cardiology recommends medical tx. Cont ASA, statin, BB.      Abnormal CXR: suspect pulmonary edema, less likely viral PNA. SARS-CoV-2 PCR negative. Treated w/ IV diuretics PRN. Strict I/Os. TTE as below     Acute metabolic encephalopathy: likely 2/2 UTI, possible PNA. Improved. Head CT negative. Blood cultures isolated CoNS which is  contaminant. Completed course of IV CTX and doxy.     UTI (urinary tract infection): POA: urine cultures grew E coli. Completed IV CTX      Hypernatremia / hypokalemia: due to poor oral intake. Resolved     Debility / generalized weakness: PT/OT, for SNF placement. Discussed w/ CM    Pt discharged in improved and stable condition. Procedures performed: see above    Imaging studies: see above  XR HUMERUS RT    Result Date: 12/24/2021  No acute abnormality. XR HIP LT W OR WO PELV 2-3 VWS    Result Date: 12/28/2021  Evidence of mild degenerative change involving the left hip. CT HEAD WO CONT    Result Date: 12/24/2021  No acute process or change compared to the prior exam.     XR CHEST PORT    Result Date: 12/28/2021  Increased interstitial and patchy airspace opacities bilaterally. This could be due to developing atypical or viral pneumonia. XR CHEST PORT    Result Date: 12/24/2021  Minimal interstitial prominence is new and could represent minimal edema, bronchitis, or atypical viral pneumonia. TTE 12/28    Left Ventricle: Left ventricle size is normal. Moderately increased wall thickness. Unable to assess wall motion. Normal left ventricular systolic function with a visually estimated EF of 55 - 60%. Diastolic function present with increased LAP with normal LV EF.   Aortic Valve: Valve structure is normal. Mildly thickened cusps.   Mitral Valve: Severely thickened leaflets. Severe mitral annular calcification. Mild transvalvular regurgitation. Mild stenosis.   Left Atrium: Left atrium is dilated.   Right Atrium: Right atrium is mildly dilated.           PCP: Ondina Angel MD    Consults: Cardiology and Palliative Care    Discharge Exam:  Visit Vitals  /67 (BP 1 Location: Left upper arm, BP Patient Position: At rest)   Pulse 63   Temp 98.4 °F (36.9 °C)   Resp 21   Ht 5' 4.02\" (1.626 m)   Wt 89 kg (196 lb 3.2 oz)   SpO2 96%   BMI 33.66 kg/m²     GEN: NAD  CV: RRR  RESP: CTAB  ABD: NTTP    Disposition: SNF    Patient Instructions:   Current Discharge Medication List      START taking these medications    Details   metoprolol tartrate (LOPRESSOR) 25 mg tablet Take 1 Tablet by mouth every twelve (12) hours. Qty: 60 Tablet, Refills: 0  Start date: 12/30/2021      traMADoL (ULTRAM) 50 mg tablet Take 0.5 Tablets by mouth every six (6) hours as needed for Pain for up to 3 days. Max Daily Amount: 100 mg. Qty: 15 Tablet, Refills: 0  Start date: 12/30/2021, End date: 1/2/2022    Associated Diagnoses: Generalized pain      naloxone (Narcan) 4 mg/actuation nasal spray Use 1 spray intranasally, then discard. Repeat with new spray every 2 min as needed for opioid overdose symptoms, alternating nostrils. Qty: 1 Each, Refills: 0  Start date: 12/30/2021         CONTINUE these medications which have CHANGED    Details   amLODIPine (Norvasc) 10 mg tablet Take 1 Tablet by mouth daily. Qty: 30 Tablet, Refills: 0  Start date: 12/30/2021    Associated Diagnoses: HTN (hypertension), benign; Mixed hyperlipidemia      atorvastatin (Lipitor) 40 mg tablet Take 1 Tablet by mouth nightly. Qty: 30 Tablet, Refills: 0  Start date: 12/30/2021         CONTINUE these medications which have NOT CHANGED    Details   acetaminophen (TYLENOL) 325 mg tablet Take 2 Tabs by mouth every four (4) hours as needed for Pain. Qty: 20 Tab, Refills: 0      ondansetron (Zofran ODT) 4 mg disintegrating tablet Take 1 Tab by mouth every eight (8) hours as needed for Nausea. Qty: 15 Tab, Refills: 0      omega-3 fatty acids-fish oil (FISH OIL) 360-1,200 mg cap Take 2 Tabs by mouth daily. calcium-cholecalciferol, D3, (CALTRATE 600+D) tablet Take 2 Tabs by mouth daily. pregabalin (LYRICA) 50 mg capsule Take 50 mg by mouth nightly. Indications: neuropathy    Associated Diagnoses: HTN (hypertension), benign; Mixed hyperlipidemia      folic acid/multivit-min/lutein (CENTRUM SILVER PO) Take 1 Tab by mouth daily. Associated Diagnoses: HTN (hypertension), benign; Mixed hyperlipidemia      aspirin delayed-release 81 mg tablet Take 81 mg by mouth nightly. Activity: See discharge instructions  Diet: See discharge instructions  Wound Care: See discharge instructions    Follow-up Information     Follow up With Specialties Details Why Brittanie Alvarenga 39. [facility] [address], [address] [place] [address] Internal Medicine   [place] [address]      Tommy Lorenzo MD Cardiology   [id number]  [phone]            I spent 35 minutes on this discharge. Signed:  Ronda Chavez MD  12/30/2021  1:21 PM    CC: PCP Dr. Steffi Espinoza   .

## 2021-12-30 NOTE — PROGRESS NOTES
Transition of Care Plan to SNF/Rehab    Communication to Patient/Family:  Met with patient and family and they are agreeable to the transition plan. The Plan for Transition of Care is related to the following treatment goals: The Patient and/or patient representative was provided with a choice of provider and agrees  with the discharge plan. Yes [x] No []    A Freedom of choice list was provided with basic dialogue that supports the patient's individualized plan of care/goals and shares the quality data associated with the providers. Yes [x] No []    SNF/Rehab Transition:  Patient has been accepted to [facility] SNF/Rehab and meets criteria for admission. Patient will transported by [address] and expected to leave at 6 pm.    Communication to SNF/Rehab:  Bedside RN, Albania Romero, has been notified to update the transition plan to the facility and call report ([phone]). Patient will go to room 151. Discharge information has been updated on the AVS. And communicated to facility via [employer]/All Scripts, or CC link. Nursing Please include all hard scripts for controlled substances, med rec and dc summary, and AVS in packet. Reviewed and confirmed with facility, [facility], can manage the patient care needs for the following:     Gatito Ear with (X) only those applicable:  Medication:  [x]Medications are available at the facility  []IV Antibiotics    [x]Controlled Substance  hard copies available sent.   []Weekly Labs    Equipment:  []CPAP/BiPAP  []Wound Vacuum  []Torrez or Urinary Device  []PICC/Central Line  []Nebulizer  []Ventilator    Treatment:  []Isolation (for MRSA, VRE, etc.)  []Surgical Drain Management  []Tracheostomy Care  []Dressing Changes  []Dialysis with transportation  []PEG Care  []Oxygen  []Daily Weights for Heart Failure    Dietary:  []Any diet limitations  []Tube Feedings   []Total Parenteral Management (TPN)    Financial Resources:  []Medicaid Application Completed    []UAI Completed and copy given to pt/family  and copy given to pt/family  []A screening has previously been completed. []Level II Completed    [] Private pay individual who will not become   financially eligible for Medicaid within 6 months from admission to a [address] [place] [address] facility. [] Individual refused to have screening conducted. []Medicaid Application Completed    []The screening denied because it was determined individual did not need/did not qualify for nursing facility level of care. [] Out of state residents seeking direct admission to a [address] facility. [] Individuals who are inpatients of an out of state hospital, or in state or out of state veterans/ hospital and seek direct admission to a [address] facility  [] Individuals who are pateints or residents of a state owned/operated facility that is licensed by [employer] and seek direct admission to [address] [place] [address]  [] A screening not required for enrollment in [facility] services as set out in [address] [place] [address] 81-  [] [facility] staff shall perform screenings of the [facility] clients. Advanced Care Plan:  []Surrogate Decision Maker of Care  []POA  []Communicated Code Status and copy sent. Other: MARKUS Adam

## 2021-12-30 NOTE — PROGRESS NOTES
Comprehensive Nutrition Assessment      Type and Reason for Visit: Initial,RD nutrition re-screen/LOS    Nutrition Recommendations/Plan:   1. Continue Easy to [employer] per SLP  2. Add Ensure Enlive BID for ease of intake. 3. Monitor PO, weight. 4. Add bowel regimen. Nutrition Assessment:       Pt admitted for Acute metabolic encephalopathy [A28.33]. Pt  has a past medical history of CAD (coronary artery disease), Femur fracture ([address] GERD (gastroesophageal reflux disease), HTN (hypertension), benign (1/11/2011), and Mixed hyperlipidemia (1/11/2011). .    Pt screened for LOS. Noted pt awaiting auth for placement. Pt discussed in IDR. SLP advanced diet to Easy to chew 12/28. Tolerating PO without s/s aspiration. PO ~25-50% on average. Pt weight mostly stable over last 5 years. Current weight higher than 9 months ago. No reported n/v. No BM since admission. Recommend bowel regimen to promote BM. Noted pt to d/c later today. Will follow up if pt does not discharge. Wt Readings from Last 10 Encounters:   12/30/21 89 kg (196 lb 3.2 oz)   03/02/21 83 kg (183 lb)   01/19/20 81.6 kg (179 lb 14.3 oz)   09/08/19 83 kg (183 lb)   05/15/19 83.5 kg (184 lb)   05/09/18 84.2 kg (185 lb 9.6 oz)   11/08/17 83.3 kg (183 lb 11.2 oz)   05/10/17 82.1 kg (181 lb)   11/09/16 81.6 kg (180 lb)   03/17/16 86.1 kg (189 lb 12.8 oz)       Malnutrition Assessment:  Malnutrition Status:  No malnutrition      Estimated Daily Nutrient Needs:  Energy (kcal): 1512; Weight Used for Energy Requirements: Current  Protein (g): 71;  Weight Used for Protein Requirements: Current  Fluid (ml/day): 1500; Method Used for Fluid Requirements: 1 ml/kcal    Documented meal intake:   Patient Vitals for the past 168 hrs:   % Diet Eaten   12/29/21 1522 26 - 50%   12/29/21 1042 26 - 50%   12/28/21 1810 0%   12/28/21 1433 1 - 25%   12/28/21 0321 0%   12/27/21 1746 1 - 25%   12/27/21 1338 1 - 25%   12/27/21 0900 0%   12/26/21 1257 0%   12/26/21 0800 0%       Documented Supplement intake:  No data found. Nutrition Related Findings:     Last BM PTA  Edema: 1+ all extremities. Nutritionally Significant Medications:   Lipitor, Lovenox. Wounds:    None       Current Nutrition Therapies:  ADULT DIET Easy to Chew    Anthropometric Measures:  · Height:  5' 4.02\" (162.6 cm)  · Current Body Wt:  89 kg (196 lb 3.4 oz)   · Admission Body Wt:   196 lbs    · Usual Body Wt:   180 lbs     · Ideal Body Wt:  120 lbs:  163.5 %   · BMI Category:  Obese class 1 (BMI 30.0-34. 9)       Nutrition Diagnosis:   · Inadequate oral intake related to early satiety as evidenced by intake 26-50%      Nutrition Interventions:   Food and/or Nutrient Delivery: Continue current diet,Start oral nutrition supplement  Nutrition Education and Counseling: No recommendations at this time  Coordination of Nutrition Care: Continue to monitor while inpatient,Interdisciplinary rounds    Goals:  PO >50% of meals and 1-2 ONS per day with wt maintenance within 3-5 days       Nutrition Monitoring and Evaluation:   Behavioral-Environmental Outcomes: None identified  Food/Nutrient Intake Outcomes: Food and nutrient intake,Supplement intake  Physical Signs/Symptoms Outcomes: Biochemical data,Weight    Discharge Planning:    Continue current diet     Electronically signed by Deanna Arteaga, [address]     Contact: [phone]

## 2021-12-30 NOTE — PALLIATIVE CARE DISCHARGE
The Palliative Medicine team was consulted as part of your / your loved one's care in the hospital. Our team is a supportive service; we strive to relieve suffering and improve quality of life. You identified the following goal(s) as your main focus for healthcare: Patient/Health Care Proxy Stated Goals: Rehabilitation    We reviewed advance care planning information, which includes the following:  Advance Care Planning 12/27/2021   Patient's Healthcare Decision Maker is: Legal Next of Kin   Confirm Advance Directive None   Does the patient have other document types Do Not Resuscitate       We reviewed / discussed your code status as: DNR     Full Code means perform CPR in the event of cardiac arrest     [place] means do NOT perform CPR in the event of cardiac arrest     Partial Code means you have specific preferences, please discuss with your health care team     No Order means this issue was not addressed / resolved during your stay    You have a Durable Do Not Resuscitate Order in place, which should travel with you. When you are in a facility, this form should be placed on your chart. Once you are home, it is recommended that the [facility] form be placed in a visible location such as on the refrigerator or bedroom door. Because of the importance of this information, we are providing you with a printed copy to share with other healthcare providers after this hospitalization is complete. If any of the above information is incomplete or incorrect, please contact the Palliative Medicine team at [phone].

## 2021-12-30 NOTE — PROGRESS NOTES
1450:  Pt had x-rays of femur & knee. [facility] snf called and said pt is assigned to room 151.    1323:  Pt has auth to go to [facility] today. Transition of Care Plan: RUR-12%; LOS 6 days  1. Patient continues to require medical management  2. PT/OT following  3. Palliative following  [facility]. [facility] pending  5. Pt confined to w/c; pt on a will call for [facility]; packet on chart  6. CM following  MARKUS Francisco

## 2021-12-30 NOTE — PROGRESS NOTES
Problem: Dysphagia (Adult)  Goal: *Acute Goals and Plan of Care (Insert Text)  Description: Swallowing goals initaited 12-27-21:  1) tolerate full liquids and pills without s/s aspiration by 12-30-21 MET  2) SLP to re-eval for diet upgrade. By 12-30-21  New goal 12/28/2021  1. Patient will tolerate easy to chew diet free of sequelae of aspiration within 7 days-met  Outcome: Resolved/Met   SPEECH LANGUAGE PATHOLOGY DYSPHAGIA TREATMENT/DISCHARGE  Patient: Rubio Menjivar (33 y.o. female)  Date: 12/30/2021  Diagnosis: Acute metabolic encephalopathy [D53.55] Acute metabolic encephalopathy       Precautions: aspiration Fall,Bed Alarm,Skin    ASSESSMENT:  Patient tolerating Easy to chew diet without s/s aspiration. PLAN:  Continue Easy to [employer]. Patient will be discharged from acute skilled speech therapy at this time. Rationale for discharge:  Goals achieved    Discharge Recommendations:  None     SUBJECTIVE:   Patient stated Za Flair you already eaten? . OBJECTIVE:   Cognitive and Communication Status:  Neurologic State: Alert,Confused  Orientation Level: Oriented to person,Oriented to place  Cognition: Memory loss    Perception: Appears intact    Perseveration: No perseveration noted    Safety/Judgement: Awareness of environment,Decreased insight into deficits  Dysphagia Treatment:  Oral Assessment:     P.O. Trials:  Patient Position: upright in bed  Vocal quality prior to P.O.: No impairment  Consistency Presented: Solid; Thin liquid  How Presented: Self-fed/presented;Spoon;Straw;Successive swallows   ORAL PHASE:   Bolus Acceptance: No impairment  Bolus Formation/Control: No impairment     Propulsion: No impairment  Oral Residue: None  PHARYNGEAL PHASE:   Initiation of Swallow: No impairment  Laryngeal Elevation: Functional  Aspiration Signs/Symptoms: None                    Exercises:  Laryngeal Exercises: NOMS:   The NOMS functional outcome measure was used to quantify this patient's level of swallowing impairment. Based on the NOMS, the patient was determined to be at level 6 for swallow function     NOMS Swallowing Levels:  Level 1 (CN): NPO  Level 2 (CM): NPO but takes consistency in therapy  Level 3 (CL): Takes less than 50% of nutrition p.o. and continues with nonoral feedings; and/or safe with mod cues; and/or max diet restriction  Level 4 (CK): Safe swallow but needs mod cues; and/or mod diet restriction; and/or still requires some nonoral feeding/supplements  Level 5 (CJ): Safe swallow with min diet restriction; and/or needs min cues  Level 6 (CI): Independent with p.o.; rare cues; usually self cues; may need to avoid some foods or needs extra time  Level 7 ([address] [place] [address]): Independent for all p.o.  MADYSON. (2003). National Outcomes Measurement System (NOMS): Adult Speech-Language Pathology User's Guide. Pain:  Pain Scale 1: Numeric (0 - 10)  Pain Intensity 1: 0       After treatment:   Patient left in no apparent distress in bed    COMMUNICATION/EDUCATION:   Patient was educated regarding her deficit(s) of MINIMAL dysphagia as this relates to her diagnosis of encephalopathy. She demonstrated Fair understanding as evidenced by reduced memory. .    The patient's plan of care including recommendations, planned interventions, and recommended diet changes were discussed with:  none .      Melissa Schulte SLP  Time Calculation: 18 mins

## 2021-12-30 NOTE — PROGRESS NOTES
Problem: Pressure Injury - Risk of  Goal: *Prevention of pressure injury  Description: Document John Scale and appropriate interventions in the flowsheet. Outcome: Progressing Towards Goal  Note: Pressure Injury Interventions:  Sensory Interventions: Assess changes in LOC    Moisture Interventions: Internal/External urinary devices    Activity Interventions: Increase time out of bed    Mobility Interventions: HOB 30 degrees or less    Nutrition Interventions: Document food/fluid/supplement intake    Friction and Shear Interventions: HOB 30 degrees or less                Problem: Patient Education: Go to Patient Education Activity  Goal: Patient/Family Education  Outcome: Progressing Towards Goal     Problem: Falls - Risk of  Goal: *Absence of Falls  Description: Document Jesse Fall Risk and appropriate interventions in the flowsheet.   Outcome: Progressing Towards Goal  Note: Fall Risk Interventions:  Mobility Interventions: Bed/chair exit alarm    Mentation Interventions: Bed/chair exit alarm    Medication Interventions: Bed/chair exit alarm    Elimination Interventions: Call light in reach,Bed/chair exit alarm    History of Falls Interventions: Bed/chair exit alarm         Problem: Patient Education: Go to Patient Education Activity  Goal: Patient/Family Education  Outcome: Progressing Towards Goal     Problem: Patient Education: Go to Patient Education Activity  Goal: Patient/Family Education  Outcome: Progressing Towards Goal     Problem: Patient Education: Go to Patient Education Activity  Goal: Patient/Family Education  Outcome: Progressing Towards Goal     Problem: Patient Education: Go to Patient Education Activity  Goal: Patient/Family Education  Outcome: Progressing Towards Goal

## 2021-12-30 NOTE — PROGRESS NOTES
Report called to Rachel Rutledge RN at [facility]. Patient to be transported by [facility] at 1800 this evening.

## 2021-12-30 NOTE — DISCHARGE INSTRUCTIONS
HOSPITALIST DISCHARGE INSTRUCTIONS  NAME: Bibi Paz   :  1924   MRN:  [id number]     Date/Time:  2021 1:20 PM    ADMIT DATE: 2021     DISCHARGE DATE: 2021     PRINCIPAL DISCHARGE DIAGNOSES:  Urinary tract infection with confusion  Atrial fibrillation    MEDICATIONS:  · It is important that you take the medication exactly as they are prescribed. Note the changes and additions to your medications. Be sure you understand these changes before you are discharged today. · Keep your medication in the bottles provided by the pharmacist and keep a list of the medication names, dosages, and times to be taken in your wallet. · Do not take other medications without consulting your doctor. Pain Management: per above medications    What to do at Home    Recommended diet:  Cardiac Diet    Recommended activity: PT/OT Eval and Treat    If you experience any of the following symptoms then please call your primary care physician or return to the emergency room if you cannot get hold of your doctor:  Fever, chills, severe abdominal pain, nausea, vomiting, diarrhea, confusion, weakness, falling, bleeding, severe chest pain, shortness of breath, or other severe concerning symptoms    Follow Up: Follow-up Information     Follow up With Specialties Details Why Brittanie Alvarenga 39. [facility] 225 [facility] 45 [facility] [place] Internal Medicine   [facility] [address] [place] [address]  [phone]      Amalia Sher MD Cardiology   [address] [place] [address]  [facility]  [phone]              Information obtained by :  I understand that if any problems occur once I am at home I am to contact my physician. I understand and acknowledge receipt of the instructions indicated above. Physician's or R.N.'s Signature                                                                  Date/Time                                                                                                                                              Patient or Representative Signature                                                          Date/Time

## 2021-12-30 NOTE — PROGRESS NOTES
Problem: Mobility Impaired (Adult and Pediatric)  Goal: *Acute Goals and Plan of Care (Insert Text)  Description: FUNCTIONAL STATUS PRIOR TO ADMISSION:  Per chart, pt living alone until peter covid in March. Hospitalized x 6 weeks but pt did recover and was sent to [facility], then to [address] [place] [address]  on 5/7/2021, where she has been thriving, tries to participate in all the activities. Details unclear, but appears to use w/c for mobility. HOME SUPPORT PRIOR TO ADMISSION: Pt lives in [facility]; has supportive family who checks on pt regularly    Physical Therapy Goals  Initiated 12/28/2021  1. Patient will move from supine to sit and sit to supine  in bed with moderate assistance  within 7 day(s). 2.  Patient will transfer from bed to chair and chair to bed with maximal assistance using the least restrictive device within 7 day(s). 3.  Patient will perform sit to stand with maximal assistance within 7 day(s). Outcome: Progressing Towards Goal  Note:   PHYSICAL THERAPY TREATMENT  Patient: Karen Saavedra (30 y.o. female)  Date: 12/30/2021  Diagnosis: Acute metabolic encephalopathy [Q33.44] Acute metabolic encephalopathy       Precautions: Fall,Bed Alarm,Skin  Chart, physical therapy assessment, plan of care and goals were reviewed. ASSESSMENT  Patient continues with skilled PT services and is progressing towards goals. Patient reporting increased pain. Patient reporting leg pain, bruise noted on Left lateral shin- nursing present and perfect serve MD.  Review of x-ray shows left hip x-ray negative for acute fracture. Patient was incontinent of urine, multiple direction rolling for linen change and hygiene. She then was repositioned and performed AAROM to bilateral LE. Reqeuires max verbal cuing and increased time for completion. .     Current Level of Function Impacting Discharge (mobility/balance): MAX- Total A x2    Other factors to consider for discharge:          PLAN :  Patient continues to benefit from skilled intervention to address the above impairments. Continue treatment per established plan of care. to address goals. Recommendation for discharge: (in order for the patient to meet his/her long term goals)  Therapy up to 5 days/week in SNF setting    This discharge recommendation:  A follow-up discussion with the attending provider and/or case management is planned    IF patient discharges home will need the following DME: to be determined (TBD)       SUBJECTIVE:   Patient stated i hurt all over.     OBJECTIVE DATA SUMMARY:   Critical Behavior:  Neurologic State: Alert,Confused  Orientation Level: Oriented to person,Oriented to place  Cognition: Memory loss  Safety/Judgement: Awareness of environment,Decreased insight into deficits  Functional Mobility Training:  Bed Mobility:  Rolling: Maximum assistance;Assist x2        Scooting: Total assistance;Assist x2        Transfers:                                   Balance:  Sitting: Impaired          Therapeutic Exercises:   Supine: AAROM; hip abd, ankle pumps, heel slides  2 sets of 10  Pain Rating:  Reports \"pain all over\" bruise noted to Left lateral shin  Nursing notified  MD contancted via perfect serve by nursing    Activity Tolerance:   Fair    After treatment patient left in no apparent distress:   Supine in bed, Call bell within reach, and Bed / chair alarm activated    COMMUNICATION/COLLABORATION:   The patients plan of care was discussed with: Registered nurse.      Brandin Whittington PT, DPT   Time Calculation: 27 mins

## 2021-12-30 NOTE — ROUTINE PROCESS
Bedside and Verbal shift change report given to Ai Jacobsen (oncoming nurse) by Yane Boyce (offgoing nurse). Report included the following information SBAR, Kardex, ED Summary, Procedure Summary, Intake/Output, MAR, Recent Results and Cardiac Rhythm NSR.

## 2021-12-30 NOTE — PROGRESS NOTES
CM Note:  Called [facility] to schedule a  time for pt. The first available was 2130 this evening. [facility] was called and they are able to  pt at 6 pm.  Referral with [facility] was canceled.   SAILAJA Hutchison

## 2022-01-01 LAB
BACTERIA SPEC CULT: NORMAL
SERVICE CMNT-IMP: NORMAL

## 2022-03-18 PROBLEM — R29.6 FREQUENT FALLS: Status: ACTIVE | Noted: 2019-09-08

## 2022-03-18 PROBLEM — R29.6 FALLS FREQUENTLY: Status: ACTIVE | Noted: 2019-09-10

## 2022-03-19 PROBLEM — G93.41 ACUTE METABOLIC ENCEPHALOPATHY: Status: ACTIVE | Noted: 2021-12-24

## 2022-03-19 PROBLEM — R82.71 BACTERIURIA: Status: ACTIVE | Noted: 2019-09-08

## 2022-03-19 PROBLEM — N18.30 CKD (CHRONIC KIDNEY DISEASE) STAGE 3, GFR 30-59 ML/MIN (HCC): Status: ACTIVE | Noted: 2019-09-08

## 2022-03-20 PROBLEM — G62.9 NEUROPATHY: Status: ACTIVE | Noted: 2019-09-08

## 2023-01-03 NOTE — PROGRESS NOTES
CARDIOLOGY OFFICE NOTE    Floyd Carlin MD, [address], [place], [address] [facility]  Phone [phone]; Fax [phone]  Mobile [phone]   Voice Mail [phone]    Primary care: Zina Rubin MD       ATTENTION:   This medical record was transcribed using an electronic medical records/speech recognition system. Although proofread, it may and can contain electronic, spelling and other errors. Corrections may be executed at a later time. Please feel free to contact us for any clarifications as needed. Los Tucker is a 80 y.o. female with HTN and dyslipidemia referred for 6 month follow up. Cardiac risk factors: dyslipidemia, hypertension, post-menopausal  I have personally obtained the history from the patient. HISTORY OF PRESENTING ILLNESS    Ms./Mr. Los Tucker  80 y.o. is  ***   She states she is doing well with no chest pain or shortness of breath. She was admitted to the hospital earlier in the year with episode of shortness of breath and her proBNP was only 1000. Her cardiac enzymes were negative. She underwent a echocardiogram with normal heart function since then she is been feeling good. She was in the hospital for SOB . No etiology.      ACTIVE PROBLEM LIST     Patient Active Problem List    Diagnosis Date Noted    Acute metabolic encephalopathy 70/65/9435    Falls frequently 09/10/2019    CKD (chronic kidney disease) stage 3, GFR 30-59 ml/min ([facility]) 09/08/2019    Bacteriuria 09/08/2019    Frequent falls 09/08/2019    Neuropathy 09/08/2019    Rash 05/07/2015    UTI (urinary tract infection) 10/01/2014    GERD (gastroesophageal reflux disease) 09/30/2014    CAD (coronary artery disease)     HTN (hypertension), benign 01/11/2011    Mixed hyperlipidemia 01/11/2011           PAST MEDICAL HISTORY     Past Medical History:   Diagnosis Date    CAD (coronary artery disease)     Femur fracture ([facility])     right    GERD (gastroesophageal reflux disease)     HTN (hypertension), benign 2011    Mixed hyperlipidemia 2011           PAST SURGICAL HISTORY     Past Surgical History:   Procedure Laterality Date    HX FEMUR FRACTURE TX      HX KNEE REPLACEMENT      bilateral    HX ORTHOPAEDIC      bilat knee replacements    HX VEIN STRIPPING            ALLERGIES     Allergies   Allergen Reactions    Sulfa (Sulfonamide Antibiotics) Rash    Ampicillin Rash     Tolerates cefazolin    Lescol [Fluvastatin] Unknown (comments)          FAMILY HISTORY     Family History   Problem Relation Age of Onset    Other Mother          of renal failure, not sure what caused id    Other Father         something with throat, not sure if it was cancer    Heart Disease Sister     negative for cardiac disease       SOCIAL HISTORY     Social History     Socioeconomic History    Marital status:    Tobacco Use    Smoking status: Never    Smokeless tobacco: Never   Substance and Sexual Activity    Alcohol use: No    Drug use: No         MEDICATIONS     Current Outpatient Medications   Medication Sig    amLODIPine (Norvasc) 10 mg tablet Take 1 Tablet by mouth daily. atorvastatin (Lipitor) 40 mg tablet Take 1 Tablet by mouth nightly. metoprolol tartrate (LOPRESSOR) 25 mg tablet Take 1 Tablet by mouth every twelve (12) hours. naloxone (Narcan) 4 mg/actuation nasal spray Use 1 spray intranasally, then discard. Repeat with new spray every 2 min as needed for opioid overdose symptoms, alternating nostrils. acetaminophen (TYLENOL) 325 mg tablet Take 2 Tabs by mouth every four (4) hours as needed for Pain. ondansetron (Zofran ODT) 4 mg disintegrating tablet Take 1 Tab by mouth every eight (8) hours as needed for Nausea. omega-3 fatty acids-fish oil (FISH OIL) 360-1,200 mg cap Take 2 Tabs by mouth daily. calcium-cholecalciferol, D3, (CALTRATE 600+D) tablet Take 2 Tabs by mouth daily.     pregabalin (LYRICA) 50 mg capsule Take 50 mg by mouth nightly. Indications: neuropathy    folic acid/multivit-min/lutein (CENTRUM SILVER PO) Take 1 Tab by mouth daily. aspirin delayed-release 81 mg tablet Take 81 mg by mouth nightly. No current facility-administered medications for this visit. I have reviewed the nurses notes, vitals, problem list, allergy list, medical history, family, social history and medications. REVIEW OF SYMPTOMS    As per HPI  General: Pt denies excessive weight gain or loss. Pt is able to conduct ADL's  HEENT: Denies blurred vision, headaches, hearing loss, epistaxis and difficulty swallowing. Respiratory: Denies cough, congestion, shortness of breath, SHAH, wheezing or stridor. Cardiovascular: Denies precordial pain, palpitations, edema or PND  Gastrointestinal: Denies poor appetite, indigestion, abdominal pain or blood in stool  Genitourinary: Denies hematuria, dysuria, increased urinary frequency  Musculoskeletal: Denies joint pain or swelling from muscles or joints  Neurologic: Denies tremor, paresthesias, headache, or sensory motor disturbance  Psychiatric: Denies confusion, insomnia, depression  Integumentray: Denies rash, itching or ulcers. Hematologic: Denies easy bruising, bleeding     PHYSICAL EXAMINATION      There were no vitals filed for this visit. General: Well developed, in no acute distress. HEENT: No jaundice, oral mucosa moist, no oral ulcers  Neck: Supple, no stiffness, no lymphadenopathy, supple  Heart:  Normal S1/S2 negative S3 or S4. Regular, no murmur, gallop or rub, no jugular venous distention  Respiratory: Clear bilaterally x 4, no wheezing or rales  Extremities:  No edema, normal cap refill, no cyanosis. Musculoskeletal: No clubbing, no deformities  Neuro: A&Ox3, speech clear, gait stable, cooperative, no focal neurologic deficits  Skin: Skin color is normal. No rashes or lesions.  Non diaphoretic, moist.  Vascular: 2+ pulses symmetric in all extremities  Abdomen:   Soft, non-tender, bowel sounds are active. EKG: Date: (***)     DIAGNOSTIC DATA     1. Echo  12/2/08 - EF 60-65%  10/1/14- EF 70%, grade 2 DD, LAE mod, AI/TR mild  1/19/20-EF 60 - 65%, LAE, Aortic valve sclerosis with no evidence of reduced excursion, Severe mitral annular calcification, mild MR, mild/mod pulm HTN    2. Myocardial perfusion study  12/20/00 - persantine, mild mid distal anterolateral wall ischemia, EF 62%    3. Cholesterol  8/23/12 - , HDL 61, LDL 85,   8/2/13 - , HDL 61, LDL 86,   8/24/14 - , HDL 61, LDL 88,   2/24/17- , HDL 75, ,   9/21/17- , HDL 55, LDL 70,   3/14/18- , HDL 62, LDL 70,   11/29/18- , HDL 70, LDL 76,   1/6/20- , HDL 62, LDL 68,   6/17/20- , HDL 59, ,          LABORATORY DATA       Lab Results   Component Value Date/Time    WBC 9.3 12/30/2021 10:06 AM    HGB 13.0 12/30/2021 10:06 AM    HCT 40.5 12/30/2021 10:06 AM    PLATELET 874 27/85/1445 10:06 AM    MCV 96.2 12/30/2021 10:06 AM      Lab Results   Component Value Date/Time    Sodium 144 12/30/2021 10:06 AM    Potassium 4.0 12/30/2021 10:06 AM    Chloride 113 (H) 12/30/2021 10:06 AM    CO2 26 12/30/2021 10:06 AM    Anion gap 5 12/30/2021 10:06 AM    Glucose 148 (H) 12/30/2021 10:06 AM    BUN 28 (H) 12/30/2021 10:06 AM    Creatinine 0.87 12/30/2021 10:06 AM    BUN/Creatinine ratio 32 (H) 12/30/2021 10:06 AM    GFR est AA >60 12/30/2021 10:06 AM    GFR est non-AA >60 12/30/2021 10:06 AM    Calcium 8.1 (L) 12/30/2021 10:06 AM    Bilirubin, total 0.8 12/29/2021 06:30 AM    Alk. phosphatase 98 12/29/2021 06:30 AM    Protein, total 6.3 (L) 12/29/2021 06:30 AM    Albumin 2.2 (L) 12/30/2021 10:06 AM    Globulin 3.9 12/29/2021 06:30 AM    A-G Ratio 0.6 (L) 12/29/2021 06:30 AM    ALT (SGPT) 37 12/29/2021 06:30 AM            ICD-10-CM ICD-9-CM   1. HTN (hypertension), benign  I10 401.1   2. Mixed hyperlipidemia  E78.2 272.2   3. Coronary artery disease involving native coronary artery of native heart without angina pectoris  I25.10 414.01        ASSESSMENT/RECOMMENDATIONS:.        1. HTN  -She is not checking her blood pressure at the house but occasionally they try to but she had no numbers at this time      2. Dyslipidemia  -Remains on Lipitor 10 mg daily. Followed by Tamela Arora MD .  I see no recent cholesterol so I ordered a fasting lipid profile    3. Left foot swelling  -she is scheduled to see her primary care for this. No calf tenderness. Instructed her should this get worse she needs to go the emergency room     3. Return in 4 months, sooner PRN    No orders of the defined types were placed in this encounter. We discussed the expected course, resolution and complications of the diagnosis(es) in detail. Medication risks, benefits, costs, interactions, and alternatives were discussed as indicated. I advised him to contact the office if his condition worsens, changes or fails to improve as anticipated. He expressed understanding with the diagnosis(es) and plan          Follow-up and Dispositions    Return in about 8 weeks (around 3/2/2023). I have discussed the diagnosis with  Casper Frankel and the intended plan as seen in the above orders. Questions were answered concerning future plans. I have discussed medication side effects and warnings with the patient as well. Thank you,  Tamela Arora MD for involving me in the care of  Casper Frankel. Please do not hesitate to contact me for further questions/concerns. Floyd Pradhan MD, [address] [place] [address] Rd[address]      [facility], [address] [place] [address] Drive      [phone] / [phone] Fax

## 2023-01-03 NOTE — PATIENT INSTRUCTIONS

## 2023-01-05 ENCOUNTER — OFFICE VISIT (OUTPATIENT)
Dept: CARDIOLOGY CLINIC | Age: 88
End: 2023-01-05
Payer: MEDICARE

## 2023-01-05 ENCOUNTER — OFFICE VISIT (OUTPATIENT)
Dept: CARDIOLOGY CLINIC | Age: 88
End: 2023-01-05

## 2023-01-05 VITALS
OXYGEN SATURATION: 96 % | WEIGHT: 199 LBS | BODY MASS INDEX: 33.97 KG/M2 | HEART RATE: 47 BPM | HEIGHT: 64 IN | DIASTOLIC BLOOD PRESSURE: 60 MMHG | SYSTOLIC BLOOD PRESSURE: 150 MMHG

## 2023-01-05 DIAGNOSIS — I25.10 CORONARY ARTERY DISEASE INVOLVING NATIVE CORONARY ARTERY OF NATIVE HEART WITHOUT ANGINA PECTORIS: ICD-10-CM

## 2023-01-05 DIAGNOSIS — E78.2 MIXED HYPERLIPIDEMIA: ICD-10-CM

## 2023-01-05 DIAGNOSIS — I10 HTN (HYPERTENSION), BENIGN: Primary | ICD-10-CM

## 2023-01-05 RX ORDER — TRAMADOL HYDROCHLORIDE 50 MG/1
50 TABLET ORAL
COMMUNITY

## 2023-01-05 RX ORDER — POLYETHYLENE GLYCOL 3350
POWDER (GRAM) MISCELLANEOUS
COMMUNITY

## 2023-01-05 RX ORDER — LOPERAMIDE HCL 2 MG
2 TABLET ORAL
COMMUNITY

## 2023-01-05 RX ORDER — GUAIFENESIN 100 MG/5ML
200 SOLUTION ORAL
COMMUNITY

## 2023-01-05 RX ORDER — CLONIDINE HYDROCHLORIDE 0.1 MG/1
TABLET ORAL 2 TIMES DAILY
COMMUNITY

## 2023-01-05 RX ORDER — HYDRALAZINE HYDROCHLORIDE 10 MG/1
TABLET, FILM COATED ORAL
COMMUNITY

## 2023-01-05 RX ORDER — PANTOPRAZOLE SODIUM 40 MG/1
40 TABLET, DELAYED RELEASE ORAL DAILY
COMMUNITY

## 2023-01-05 NOTE — LETTER
1/9/2023    Patient: Emerald Amanda   YOB: 1924   Date of Visit: 1/5/2023     Gay Hodgkins, MD  [facility] [address] [phone]  Via Fax: [phone]    Dear Gay Hodgkins, MD,      Thank you for referring Ms. Jessica Burdick to [address] [place] [address] for evaluation. My notes for this consultation are attached. If you have questions, please do not hesitate to call me. I look forward to following your patient along with you.       Sincerely,    Jose Antonio Shah MD

## 2023-01-05 NOTE — PATIENT INSTRUCTIONS
1) I would like her blood pressure checked sitting and standing for the next 10 days and the readings sent to me to make adjustments in her blood pressure medicines. It is  my pleasure to have the opportunity to be involved in taking care of you and to provide you the best cardiac care. At the end of every visit I  encourage you to eat healthy and clean and reduce your red meat intake as well as exercise 30 minutes 5 days a week to ensure a healthy heart. If you are a smoker , it will be essential that you stop smoking to reduce your risk of heart disease. Part of providing you the best heart care possible IS AN ACCURATE KNOWLEDGE OF YOUR MEDICINE. It  will be  essential at each office visit that you provide me with an accurate list of your medicines. When you come into the office you should have that list or another option is lining up your pill bottles  and taking a snapshot with your cell phone of all the medicines you are taking currently and show it to the nurse in the examining room. Inaccurate reporting of your medication to the nurse may lead to adverse events and medical errors. Thank you again for giving me the opportunity to be part of your heart care and it is my pleasure. All the best,    Floyd Mcgowan MD

## 2023-01-05 NOTE — LETTER
1/5/2023    Patient: Bryan Smith   YOB: 1924   Date of Visit: 1/5/2023     Arabella To MD  [facility] [address] [phone]  Via Fax: [phone]    Dear Arabella To MD,      Thank you for referring Ms. Merlin Posner to [address] [place] [address] for evaluation. My notes for this consultation are attached. If you have questions, please do not hesitate to call me. I look forward to following your patient along with you.       Sincerely,    Gorge Eng MD

## 2023-01-05 NOTE — PROGRESS NOTES
Rad Coffey is a 80 y.o. female    Vitals:    01/05/23 1009 01/05/23 1024   BP: (!) 150/60 (!) 150/60   BP 1 Location: Left upper arm Left upper arm   BP Patient Position: Sitting Sitting   BP Cuff Size: Adult Adult   Pulse: (!) 47    Height: 5' 4\" (1.626 m)    Weight: 199 lb (90.3 kg)    SpO2: 96%        Chief Complaint   Patient presents with    Hypertension    Cholesterol Problem    Coronary Artery Disease       Chest pain NO  SOB NO  Dizziness YES  Swelling FEET  Recent hospital visit [facility], UTI  Refills NO  COVID VACCINE YES  HAD COVID?  COVID

## 2023-01-05 NOTE — PROGRESS NOTES
CARDIOLOGY OFFICE NOTE    Floyd Ervin MD, [address], [place], [address] [facility]  Phone [phone]; Fax [phone]  Mobile [phone]   Voice Mail [phone]    Primary care: Thuy Muir MD       ATTENTION:   This medical record was transcribed using an electronic medical records/speech recognition system. Although proofread, it may and can contain electronic, spelling and other errors. Corrections may be executed at a later time. Please feel free to contact us for any clarifications as needed. Joseph Thomas is a 80 y.o. female with HTN and dyslipidemia referred for 6 month follow up. Cardiac risk factors: dyslipidemia, hypertension, post-menopausal  I have personally obtained the history from the patient. HISTORY OF PRESENTING ILLNESS    Ms./Mr. Joseph Thomas  80 y.o.   had followed up with me in the past.  Has been quite sometime since I last saw her. She is in a nursing facility and doing really well there for riving according to her grandson who comes in with her. She denies any chest pain or shortness of breath. Her cardiac enzymes in December 2021 were elevated when she went in for UTI and was sent to [facility].  They basically did nothing at that time and did not feel like she needed a work-up. Her grandson who is here from [facility] says that she will get dizzy at times. I did tell him that her blood pressure goal should be 150/90 or less.      ACTIVE PROBLEM LIST     Patient Active Problem List    Diagnosis Date Noted    Acute metabolic encephalopathy 25/09/4547    Falls frequently 09/10/2019    CKD (chronic kidney disease) stage 3, GFR 30-59 ml/min ([facility]) 09/08/2019    Bacteriuria 09/08/2019    Frequent falls 09/08/2019    Neuropathy 09/08/2019    Rash 05/07/2015    UTI (urinary tract infection) 10/01/2014    GERD (gastroesophageal reflux disease) 09/30/2014    CAD (coronary artery disease)     HTN (hypertension), benign 2011    Mixed hyperlipidemia 2011           PAST MEDICAL HISTORY     Past Medical History:   Diagnosis Date    CAD (coronary artery disease)     Femur fracture (HCC)     right    GERD (gastroesophageal reflux disease)     HTN (hypertension), benign 2011    Mixed hyperlipidemia 2011           PAST SURGICAL HISTORY     Past Surgical History:   Procedure Laterality Date    HX FEMUR FRACTURE TX      HX KNEE REPLACEMENT      bilateral    HX ORTHOPAEDIC      bilat knee replacements    HX VEIN STRIPPING            ALLERGIES     Allergies   Allergen Reactions    Sulfa (Sulfonamide Antibiotics) Rash    Ampicillin Rash     Tolerates cefazolin    Lescol [Fluvastatin] Unknown (comments)          FAMILY HISTORY     Family History   Problem Relation Age of Onset    Other Mother          of renal failure, not sure what caused id    Other Father         something with throat, not sure if it was cancer    Heart Disease Sister     negative for cardiac disease       SOCIAL HISTORY     Social History     Socioeconomic History    Marital status:    Tobacco Use    Smoking status: Never    Smokeless tobacco: Never   Substance and Sexual Activity    Alcohol use: No    Drug use: No         MEDICATIONS     Current Outpatient Medications   Medication Sig    cloNIDine HCL (CATAPRES) 0.1 mg tablet Take  by mouth two (2) times a day. Ferrous Fumarate 325 mg (106 mg iron) tab Take  by mouth.    pantoprazole (PROTONIX) 40 mg tablet Take 40 mg by mouth daily. Polyethylene Glycol 3350 powd by Does Not Apply route. hydrALAZINE (APRESOLINE) 10 mg tablet Take  by mouth. loperamide (IMMODIUM) 2 mg tablet Take 2 mg by mouth four (4) times daily as needed for Diarrhea.    guaiFENesin (ROBITUSSIN) 100 mg/5 mL liquid Take 200 mg by mouth three (3) times daily as needed for Cough. traMADoL (ULTRAM) 50 mg tablet Take 50 mg by mouth every six (6) hours as needed for Pain.     atorvastatin (Lipitor) 40 mg tablet Take 1 Tablet by mouth nightly. acetaminophen (TYLENOL) 325 mg tablet Take 2 Tabs by mouth every four (4) hours as needed for Pain. ondansetron (Zofran ODT) 4 mg disintegrating tablet Take 1 Tab by mouth every eight (8) hours as needed for Nausea. omega-3 fatty acids-fish oil 360-1,200 mg cap Take 2 Tabs by mouth daily. pregabalin (LYRICA) 50 mg capsule Take 50 mg by mouth nightly. Indications: neuropathy    aspirin delayed-release 81 mg tablet Take 81 mg by mouth nightly. No current facility-administered medications for this visit. I have reviewed the nurses notes, vitals, problem list, allergy list, medical history, family, social history and medications. REVIEW OF SYMPTOMS    As per HPI  General: Pt denies excessive weight gain or loss. Pt is able to conduct ADL's  HEENT: Denies blurred vision, headaches, hearing loss, epistaxis and difficulty swallowing. Respiratory: Denies cough, congestion, shortness of breath, SHAH, wheezing or stridor. Cardiovascular: Denies precordial pain, palpitations, edema or PND  Gastrointestinal: Denies poor appetite, indigestion, abdominal pain or blood in stool  Genitourinary: Denies hematuria, dysuria, increased urinary frequency  Musculoskeletal: Denies joint pain or swelling from muscles or joints  Neurologic: Denies tremor, paresthesias, headache, or sensory motor disturbance  Psychiatric: Denies confusion, insomnia, depression  Integumentray: Denies rash, itching or ulcers. Hematologic: Denies easy bruising, bleeding     PHYSICAL EXAMINATION      Vitals:    01/05/23 1009 01/05/23 1024   BP: (!) 150/60 (!) 150/60   Pulse: (!) 47    SpO2: 96%    Weight: 199 lb (90.3 kg)    Height: 5' 4\" (1.626 m)      General: Well developed, in no acute distress.   Looks younger than stated age  [de-identified]: No jaundice  Neck: Supple, no stiffness  Heart: Irregularly irregular  Respiratory: Clear bilaterally x 4, no wheezing or rales  Extremities:  No edema, normal cap refill, no cyanosis. Musculoskeletal: No clubbing, no deformities  Neuro: A&Ox3, speech clear, in a wheelchair cooperative, no focal neurologic deficits  Skin: Skin color is normal. No rashes or lesions. Non diaphoretic, moist.  Abdomen:   Soft, non-tender, bowel sounds are active. DIAGNOSTIC DATA     1. Echo  12/2/08 - EF 60-65%  10/1/14- EF 70%, grade 2 DD, LAE mod, AI/TR mild  1/19/20-EF 60 - 65%, LAE, Aortic valve sclerosis with no evidence of reduced excursion, Severe mitral annular calcification, mild MR, mild/mod pulm HTN  12/28/21-EF 55 - 60%. Diastolic function present with increased LAP with normal LV EF, Mildly thickened AV cusps,Severely thickened MV leaflets. Severe mitral annular calcification,Mild transvalvular regurgitation,Mild stenosis, RUBIN    2. Myocardial perfusion study  12/20/00 - persantine, mild mid distal anterolateral wall ischemia, EF 62%    3. Cholesterol  1/6/20- , HDL 62, LDL 68,   6/17/20- , HDL 59, ,   12/28/21- , HDL 84, LDL 86.2, TG 69         LABORATORY DATA       Lab Results   Component Value Date/Time    WBC 9.3 12/30/2021 10:06 AM    HGB 13.0 12/30/2021 10:06 AM    HCT 40.5 12/30/2021 10:06 AM    PLATELET 018 52/55/1992 10:06 AM    MCV 96.2 12/30/2021 10:06 AM      Lab Results   Component Value Date/Time    Sodium 144 12/30/2021 10:06 AM    Potassium 4.0 12/30/2021 10:06 AM    Chloride 113 (H) 12/30/2021 10:06 AM    CO2 26 12/30/2021 10:06 AM    Anion gap 5 12/30/2021 10:06 AM    Glucose 148 (H) 12/30/2021 10:06 AM    BUN 28 (H) 12/30/2021 10:06 AM    Creatinine 0.87 12/30/2021 10:06 AM    BUN/Creatinine ratio 32 (H) 12/30/2021 10:06 AM    GFR est AA >60 12/30/2021 10:06 AM    GFR est non-AA >60 12/30/2021 10:06 AM    Calcium 8.1 (L) 12/30/2021 10:06 AM    Bilirubin, total 0.8 12/29/2021 06:30 AM    Alk.  phosphatase 98 12/29/2021 06:30 AM    Protein, total 6.3 (L) 12/29/2021 06:30 AM    Albumin 2.2 (L) 12/30/2021 10:06 AM Globulin 3.9 12/29/2021 06:30 AM    A-G Ratio 0.6 (L) 12/29/2021 06:30 AM    ALT (SGPT) 37 12/29/2021 06:30 AM            ICD-10-CM ICD-9-CM   1. HTN (hypertension), benign  I10 401.1   2. Mixed hyperlipidemia  E78.2 272.2   3. Coronary artery disease involving native coronary artery of native heart without angina pectoris  I25.10 414.01          ASSESSMENT/RECOMMENDATIONS:.        1. HTN  -Blood pressure here is slightly elevated but my goal with her would be 150/90  -Regarding dizziness and concern that when she is standing she now needs 1 person assistance with getting that she may be dropping her blood pressure  -We will asked that she have her blood pressure checked at the nursing and standing for the next 10 days and we will review her numbers      2. Dyslipidemia  -Remains on Lipitor 10 mg daily. Followed by Anibal Gan MD   -LDL has been at goal    3. History of atrial fibrillation  -Currently not anticoagulated just on aspirin I believe secondary to risk greater than benefits  -Rate seems under good control       3. Return in 6 months    Orders Placed This Encounter    cloNIDine HCL (CATAPRES) 0.1 mg tablet     Sig: Take  by mouth two (2) times a day. Ferrous Fumarate 325 mg (106 mg iron) tab     Sig: Take  by mouth.    pantoprazole (PROTONIX) 40 mg tablet     Sig: Take 40 mg by mouth daily. Polyethylene Glycol 3350 powd     Sig: by Does Not Apply route. hydrALAZINE (APRESOLINE) 10 mg tablet     Sig: Take  by mouth. loperamide (IMMODIUM) 2 mg tablet     Sig: Take 2 mg by mouth four (4) times daily as needed for Diarrhea.    guaiFENesin (ROBITUSSIN) 100 mg/5 mL liquid     Sig: Take 200 mg by mouth three (3) times daily as needed for Cough. traMADoL (ULTRAM) 50 mg tablet     Sig: Take 50 mg by mouth every six (6) hours as needed for Pain. We discussed the expected course, resolution and complications of the diagnosis(es) in detail.   Medication risks, benefits, costs, interactions, and alternatives were discussed as indicated. I advised him to contact the office if his condition worsens, changes or fails to improve as anticipated. He expressed understanding with the diagnosis(es) and plan          Follow-up and Dispositions    Return in about 6 months (around 7/5/2023). I have discussed the diagnosis with  Rad Coffey and the intended plan as seen in the above orders. Questions were answered concerning future plans. I have discussed medication side effects and warnings with the patient as well. Thank you,  Mackenzie Vallejo MD for involving me in the care of  Rad Coffey. Please do not hesitate to contact me for further questions/concerns. Floyd Pradhan MD, [facility] later [facility] [address]      [address] [place] [address], [address] [place] [address] [address]      [phone] / [phone] Fax

## 2023-01-10 ENCOUNTER — APPOINTMENT (OUTPATIENT)
Dept: CT IMAGING | Age: 88
End: 2023-01-10
Attending: EMERGENCY MEDICINE
Payer: MEDICARE

## 2023-01-10 ENCOUNTER — HOSPITAL ENCOUNTER (OUTPATIENT)
Age: 88
Setting detail: OBSERVATION
Discharge: HOME | End: 2023-01-19
Attending: EMERGENCY MEDICINE | Admitting: INTERNAL MEDICINE
Payer: MEDICARE

## 2023-01-10 DIAGNOSIS — I10 HTN (HYPERTENSION), BENIGN: Chronic | ICD-10-CM

## 2023-01-10 DIAGNOSIS — R56.9 SEIZURE (HCC): ICD-10-CM

## 2023-01-10 DIAGNOSIS — R56.9 SEIZURE-LIKE ACTIVITY (HCC): Primary | ICD-10-CM

## 2023-01-10 DIAGNOSIS — I49.5 SSS (SICK SINUS SYNDROME) (HCC): ICD-10-CM

## 2023-01-10 DIAGNOSIS — R00.1 BRADYCARDIA: ICD-10-CM

## 2023-01-10 DIAGNOSIS — I25.10 CORONARY ARTERY DISEASE INVOLVING NATIVE HEART WITHOUT ANGINA PECTORIS, UNSPECIFIED VESSEL OR LESION TYPE: ICD-10-CM

## 2023-01-10 DIAGNOSIS — R54 ADVANCED AGE: ICD-10-CM

## 2023-01-10 PROBLEM — R82.71 BACTERIURIA: Status: RESOLVED | Noted: 2019-09-08 | Resolved: 2023-01-10

## 2023-01-10 PROBLEM — G93.41 ACUTE METABOLIC ENCEPHALOPATHY: Status: RESOLVED | Noted: 2021-12-24 | Resolved: 2023-01-10

## 2023-01-10 PROBLEM — R29.6 FALLS FREQUENTLY: Status: RESOLVED | Noted: 2019-09-10 | Resolved: 2023-01-10

## 2023-01-10 LAB
ALBUMIN SERPL-MCNC: 3.4 G/DL (ref 3.5–5)
ALBUMIN/GLOB SERPL: 0.9 (ref 1.1–2.2)
ALP SERPL-CCNC: 132 U/L (ref 45–117)
ALT SERPL-CCNC: 21 U/L (ref 12–78)
ANION GAP SERPL CALC-SCNC: 4 MMOL/L (ref 5–15)
AST SERPL-CCNC: 19 U/L (ref 15–37)
ATRIAL RATE: 56 BPM
BASOPHILS # BLD: 0.1 K/UL (ref 0–0.1)
BASOPHILS NFR BLD: 1 % (ref 0–1)
BILIRUB SERPL-MCNC: 0.4 MG/DL (ref 0.2–1)
BUN SERPL-MCNC: 30 MG/DL (ref 6–20)
BUN/CREAT SERPL: 23 (ref 12–20)
CALCIUM SERPL-MCNC: 8.9 MG/DL (ref 8.5–10.1)
CALCULATED R AXIS, ECG10: -39 DEGREES
CALCULATED T AXIS, ECG11: 17 DEGREES
CHLORIDE SERPL-SCNC: 114 MMOL/L (ref 97–108)
CO2 SERPL-SCNC: 25 MMOL/L (ref 21–32)
COMMENT, HOLDF: NORMAL
CREAT SERPL-MCNC: 1.29 MG/DL (ref 0.55–1.02)
DIAGNOSIS, 93000: NORMAL
DIFFERENTIAL METHOD BLD: NORMAL
EOSINOPHIL # BLD: 0.1 K/UL (ref 0–0.4)
EOSINOPHIL NFR BLD: 2 % (ref 0–7)
ERYTHROCYTE [DISTWIDTH] IN BLOOD BY AUTOMATED COUNT: 13.2 % (ref 11.5–14.5)
GLOBULIN SER CALC-MCNC: 3.9 G/DL (ref 2–4)
GLUCOSE SERPL-MCNC: 111 MG/DL (ref 65–100)
HCT VFR BLD AUTO: 44.5 % (ref 35–47)
HGB BLD-MCNC: 13.9 G/DL (ref 11.5–16)
IMM GRANULOCYTES # BLD AUTO: 0 K/UL (ref 0–0.04)
IMM GRANULOCYTES NFR BLD AUTO: 0 % (ref 0–0.5)
LYMPHOCYTES # BLD: 1.7 K/UL (ref 0.8–3.5)
LYMPHOCYTES NFR BLD: 31 % (ref 12–49)
MAGNESIUM SERPL-MCNC: 1.9 MG/DL (ref 1.6–2.4)
MCH RBC QN AUTO: 30.3 PG (ref 26–34)
MCHC RBC AUTO-ENTMCNC: 31.2 G/DL (ref 30–36.5)
MCV RBC AUTO: 97.2 FL (ref 80–99)
MONOCYTES # BLD: 0.4 K/UL (ref 0–1)
MONOCYTES NFR BLD: 7 % (ref 5–13)
NEUTS SEG # BLD: 3.1 K/UL (ref 1.8–8)
NEUTS SEG NFR BLD: 59 % (ref 32–75)
NRBC # BLD: 0 K/UL (ref 0–0.01)
NRBC BLD-RTO: 0 PER 100 WBC
PLATELET # BLD AUTO: 212 K/UL (ref 150–400)
PMV BLD AUTO: 11.6 FL (ref 8.9–12.9)
POTASSIUM SERPL-SCNC: 5 MMOL/L (ref 3.5–5.1)
PROT SERPL-MCNC: 7.3 G/DL (ref 6.4–8.2)
Q-T INTERVAL, ECG07: 444 MS
QRS DURATION, ECG06: 98 MS
QTC CALCULATION (BEZET), ECG08: 428 MS
RBC # BLD AUTO: 4.58 M/UL (ref 3.8–5.2)
SAMPLES BEING HELD,HOLD: NORMAL
SODIUM SERPL-SCNC: 143 MMOL/L (ref 136–145)
TROPONIN-HIGH SENSITIVITY: 11 NG/L (ref 0–51)
TROPONIN-HIGH SENSITIVITY: 12 NG/L (ref 0–51)
TSH SERPL DL<=0.05 MIU/L-ACNC: 2.31 UIU/ML (ref 0.36–3.74)
VENTRICULAR RATE, ECG03: 56 BPM
WBC # BLD AUTO: 5.3 K/UL (ref 3.6–11)

## 2023-01-10 PROCEDURE — 99285 EMERGENCY DEPT VISIT HI MDM: CPT

## 2023-01-10 PROCEDURE — 36415 COLL VENOUS BLD VENIPUNCTURE: CPT

## 2023-01-10 PROCEDURE — 93005 ELECTROCARDIOGRAM TRACING: CPT

## 2023-01-10 PROCEDURE — 74011250637 HC RX REV CODE- 250/637: Performed by: INTERNAL MEDICINE

## 2023-01-10 PROCEDURE — 96374 THER/PROPH/DIAG INJ IV PUSH: CPT

## 2023-01-10 PROCEDURE — 74011250636 HC RX REV CODE- 250/636: Performed by: EMERGENCY MEDICINE

## 2023-01-10 PROCEDURE — 80053 COMPREHEN METABOLIC PANEL: CPT

## 2023-01-10 PROCEDURE — 70450 CT HEAD/BRAIN W/O DYE: CPT

## 2023-01-10 PROCEDURE — 95706 EEG WO VID 2-12HR INTMT MNTR: CPT | Performed by: EMERGENCY MEDICINE

## 2023-01-10 PROCEDURE — 74011250636 HC RX REV CODE- 250/636: Performed by: INTERNAL MEDICINE

## 2023-01-10 PROCEDURE — 84484 ASSAY OF TROPONIN QUANT: CPT

## 2023-01-10 PROCEDURE — G0378 HOSPITAL OBSERVATION PER HR: HCPCS

## 2023-01-10 PROCEDURE — 95706 EEG WO VID 2-12HR INTMT MNTR: CPT | Performed by: INTERNAL MEDICINE

## 2023-01-10 PROCEDURE — 84443 ASSAY THYROID STIM HORMONE: CPT

## 2023-01-10 PROCEDURE — 83735 ASSAY OF MAGNESIUM: CPT

## 2023-01-10 PROCEDURE — 85025 COMPLETE CBC W/AUTO DIFF WBC: CPT

## 2023-01-10 RX ORDER — ONDANSETRON 2 MG/ML
4 INJECTION INTRAMUSCULAR; INTRAVENOUS
Status: DISCONTINUED | OUTPATIENT
Start: 2023-01-10 | End: 2023-01-19 | Stop reason: HOSPADM

## 2023-01-10 RX ORDER — PREGABALIN 50 MG/1
50 CAPSULE ORAL
Status: DISCONTINUED | OUTPATIENT
Start: 2023-01-10 | End: 2023-01-19 | Stop reason: HOSPADM

## 2023-01-10 RX ORDER — TRAMADOL HYDROCHLORIDE 50 MG/1
50 TABLET ORAL
Status: DISCONTINUED | OUTPATIENT
Start: 2023-01-10 | End: 2023-01-19 | Stop reason: HOSPADM

## 2023-01-10 RX ORDER — ASPIRIN 81 MG/1
81 TABLET ORAL
Status: DISCONTINUED | OUTPATIENT
Start: 2023-01-10 | End: 2023-01-19 | Stop reason: HOSPADM

## 2023-01-10 RX ORDER — ATROPINE SULFATE 0.4 MG/ML
1 INJECTION, SOLUTION ENDOTRACHEAL; INTRAMEDULLARY; INTRAMUSCULAR; INTRAVENOUS; SUBCUTANEOUS AS NEEDED
Status: DISCONTINUED | OUTPATIENT
Start: 2023-01-10 | End: 2023-01-19 | Stop reason: HOSPADM

## 2023-01-10 RX ORDER — ACETAMINOPHEN 325 MG/1
650 TABLET ORAL
Status: DISCONTINUED | OUTPATIENT
Start: 2023-01-10 | End: 2023-01-19 | Stop reason: HOSPADM

## 2023-01-10 RX ORDER — PANTOPRAZOLE SODIUM 40 MG/1
40 TABLET, DELAYED RELEASE ORAL
Status: DISCONTINUED | OUTPATIENT
Start: 2023-01-11 | End: 2023-01-19 | Stop reason: HOSPADM

## 2023-01-10 RX ORDER — SODIUM CHLORIDE 0.9 % (FLUSH) 0.9 %
5-40 SYRINGE (ML) INJECTION AS NEEDED
Status: DISCONTINUED | OUTPATIENT
Start: 2023-01-10 | End: 2023-01-19 | Stop reason: HOSPADM

## 2023-01-10 RX ORDER — SODIUM CHLORIDE 0.9 % (FLUSH) 0.9 %
5-40 SYRINGE (ML) INJECTION EVERY 8 HOURS
Status: DISCONTINUED | OUTPATIENT
Start: 2023-01-10 | End: 2023-01-19 | Stop reason: HOSPADM

## 2023-01-10 RX ORDER — HYDRALAZINE HYDROCHLORIDE 20 MG/ML
10 INJECTION INTRAMUSCULAR; INTRAVENOUS
Status: DISCONTINUED | OUTPATIENT
Start: 2023-01-10 | End: 2023-01-19 | Stop reason: HOSPADM

## 2023-01-10 RX ORDER — ATORVASTATIN CALCIUM 20 MG/1
40 TABLET, FILM COATED ORAL
Status: DISCONTINUED | OUTPATIENT
Start: 2023-01-10 | End: 2023-01-19 | Stop reason: HOSPADM

## 2023-01-10 RX ORDER — SODIUM CHLORIDE, SODIUM LACTATE, POTASSIUM CHLORIDE, CALCIUM CHLORIDE 600; 310; 30; 20 MG/100ML; MG/100ML; MG/100ML; MG/100ML
50 INJECTION, SOLUTION INTRAVENOUS CONTINUOUS
Status: DISCONTINUED | OUTPATIENT
Start: 2023-01-10 | End: 2023-01-12

## 2023-01-10 RX ORDER — CLONIDINE HYDROCHLORIDE 0.1 MG/1
0.1 TABLET ORAL 2 TIMES DAILY
Status: DISCONTINUED | OUTPATIENT
Start: 2023-01-10 | End: 2023-01-12

## 2023-01-10 RX ORDER — HYDRALAZINE HYDROCHLORIDE 25 MG/1
25 TABLET, FILM COATED ORAL 3 TIMES DAILY
Status: DISCONTINUED | OUTPATIENT
Start: 2023-01-10 | End: 2023-01-13

## 2023-01-10 RX ORDER — NALOXONE HYDROCHLORIDE 0.4 MG/ML
0.4 INJECTION, SOLUTION INTRAMUSCULAR; INTRAVENOUS; SUBCUTANEOUS AS NEEDED
Status: DISCONTINUED | OUTPATIENT
Start: 2023-01-10 | End: 2023-01-19 | Stop reason: HOSPADM

## 2023-01-10 RX ADMIN — CLONIDINE HYDROCHLORIDE 0.1 MG: 0.1 TABLET ORAL at 16:50

## 2023-01-10 RX ADMIN — SODIUM CHLORIDE, POTASSIUM CHLORIDE, SODIUM LACTATE AND CALCIUM CHLORIDE 50 ML/HR: 600; 310; 30; 20 INJECTION, SOLUTION INTRAVENOUS at 18:30

## 2023-01-10 RX ADMIN — HYDRALAZINE HYDROCHLORIDE 25 MG: 25 TABLET, FILM COATED ORAL at 23:18

## 2023-01-10 RX ADMIN — ASPIRIN 81 MG: 81 TABLET, COATED ORAL at 23:18

## 2023-01-10 RX ADMIN — HYDRALAZINE HYDROCHLORIDE 10 MG: 20 INJECTION INTRAMUSCULAR; INTRAVENOUS at 16:50

## 2023-01-10 RX ADMIN — SODIUM CHLORIDE 500 ML: 9 INJECTION, SOLUTION INTRAVENOUS at 16:30

## 2023-01-10 RX ADMIN — PREGABALIN 50 MG: 50 CAPSULE ORAL at 23:18

## 2023-01-10 NOTE — ED PROVIDER NOTES
Rick Winter is a 79 yo F with h/o HTN GERD and CAD who presents to the ED by EMS for evaluation following episode of seizure like activity. EMS reports that episode was witnessed by the patients granddaughter who reports that patient was sitting in her wheel chair and became unresponsive, shaking with head arched back. Lasted less than a minute and then was confused afterwards. No prior history of seizures. Patient states that about a month ago she had dizziness but has otherwise felt well.             Past Medical History:   Diagnosis Date    CAD (coronary artery disease)     Femur fracture (HCC)     right    GERD (gastroesophageal reflux disease)     HTN (hypertension), benign 2011    Mixed hyperlipidemia 2011       Past Surgical History:   Procedure Laterality Date    HX FEMUR FRACTURE TX      HX KNEE REPLACEMENT      bilateral    HX ORTHOPAEDIC      bilat knee replacements    HX VEIN STRIPPING           Family History:   Problem Relation Age of Onset    Other Mother          of renal failure, not sure what caused id    Other Father         something with throat, not sure if it was cancer    Heart Disease Sister        Social History     Socioeconomic History    Marital status:      Spouse name: Not on file    Number of children: Not on file    Years of education: Not on file    Highest education level: Not on file   Occupational History    Not on file   Tobacco Use    Smoking status: Never    Smokeless tobacco: Never   Substance and Sexual Activity    Alcohol use: No    Drug use: No    Sexual activity: Not on file   Other Topics Concern    Not on file   Social History Narrative    Not on file     Social Determinants of Health     Financial Resource Strain: Not on file   Food Insecurity: Not on file   Transportation Needs: Not on file   Physical Activity: Not on file   Stress: Not on file   Social Connections: Not on file   Intimate Partner Violence: Not on file   Housing Stability: Not on file         ALLERGIES: Sulfa (sulfonamide antibiotics), Ampicillin, and Lescol [fluvastatin]    Review of Systems   Constitutional:  Negative for fever. HENT:  Negative for sore throat. Eyes:  Negative for visual disturbance. Respiratory:  Negative for cough. Cardiovascular:  Negative for chest pain. Gastrointestinal:  Negative for abdominal pain. Genitourinary:  Negative for dysuria. Musculoskeletal:  Negative for back pain. Skin:  Negative for rash. Neurological:  Positive for seizures. Negative for headaches. Vitals:    01/10/23 1301   BP: (!) 179/81   Pulse: 72   Resp: 16   Temp: 97.6 °F (36.4 °C)   SpO2: 95%   Weight: 90.7 kg (200 lb)   Height: 5' 4\" (1.626 m)            Physical Exam  Vitals and nursing note reviewed. Constitutional:       General: She is not in acute distress. Appearance: She is well-developed. HENT:      Head: Normocephalic and atraumatic. Mouth/Throat:      Mouth: Mucous membranes are moist.   Eyes:      Extraocular Movements: Extraocular movements intact. Conjunctiva/sclera: Conjunctivae normal.   Neck:      Trachea: Phonation normal.   Cardiovascular:      Rate and Rhythm: Normal rate and regular rhythm. Pulmonary:      Effort: Pulmonary effort is normal. No respiratory distress. Breath sounds: No wheezing or rales. Abdominal:      General: There is no distension. Musculoskeletal:         General: No tenderness. Normal range of motion. Cervical back: Normal range of motion. Right lower leg: No edema. Left lower leg: No edema. Skin:     General: Skin is warm and dry. Neurological:      General: No focal deficit present. Mental Status: She is alert. She is not disoriented. Motor: No abnormal muscle tone. ED EKG interpretation:  Rhythm: normal sinus rhythm and 1st degree block; and regular .  Rate (approx.): 56; Axis: normal; P wave: diminished with prolonged NJ; QRS interval: LVH; ST/T wave: non-specific changes; Other findings: abnormal ekg. This EKG was interpreted by Fabrizio Randle MD,ED Provider. Medical Decision Making  Amount and/or Complexity of Data Reviewed  Labs: ordered. Radiology: ordered. ECG/medicine tests: ordered. Perfect Serve Consult for Admission  4:06 PM    ED Room Number: ERJ/J  Patient Name and age:  Brooklyn Simpson 80 y.o.  female  Working Diagnosis:   1. Seizure-like activity ([address]        COVID-19 Suspicion:  no  Sepsis present:  no  Reassessment needed: N/A  Code Status:  Full Code  Readmission: no  Isolation Requirements:  no  Recommended Level of Care:  telemetry  Department:[facility] ED - [phone]  Other:  h/o HTN, GERD, CAD and hyperlipidemia with episode of seizure like activity today vs syncope. Event witnessed by daughter. PAtient ws sitting in her wheel chair and became unresponsive with her head pulled back and her arms flexed up and shaking. Lasted about a minute and seemed confused when it stopped. No prior history of seizures. Troponin normal.  Mild elevation in creatinine, 1.29. Have ordered 500ml NS bolus. CT head normal.  Rapid EEG and UA  pending but patient has remained alert throughout ED observation.         Procedures

## 2023-01-10 NOTE — ED TRIAGE NOTES
Granddaughter reports witnessed seizure. Reports upper body shaking, postictal after seizure. No hx of seizures per family. Pt baseline a&ox4, pt reports feeling drowzy    Pt denies chest pain and SOB at this time.        Blood glucose for

## 2023-01-10 NOTE — H&P
[facility]  [address], [place] [address]  [phone]    Admission History and Physical      NAME:  Yesenia Higginbotham   :   1924   MRN:  [id number]     PCP:  Day Quick MD     Date/Time:  1/10/2023         Subjective:     CHIEF COMPLAINT: \"I'm okay\"     HISTORY OF PRESENT ILLNESS:     Ms. Berna Villalta is a 80 y.o.  female with PMH of CAD, HTN, XOL admitted for syncope v. Seizure. Per family member pt was sitting in her chair conversing when suddenly became unresponsive and per family member began shaking. Lasted several seconds and when came to was staring into space. When she \"came too\" stated she felt \"off in her head\". No CP, SOB. No loss of bowel or bladder continence. Past Medical History:   Diagnosis Date    CAD (coronary artery disease)     Femur fracture (HCC)     right    GERD (gastroesophageal reflux disease)     HTN (hypertension), benign 2011    Mixed hyperlipidemia 2011        Past Surgical History:   Procedure Laterality Date    HX FEMUR FRACTURE TX      HX KNEE REPLACEMENT      bilateral    HX ORTHOPAEDIC      bilat knee replacements    HX VEIN STRIPPING         Social History     Tobacco Use    Smoking status: Never    Smokeless tobacco: Never   Substance Use Topics    Alcohol use: No        Family History   Problem Relation Age of Onset    Other Mother          of renal failure, not sure what caused id    Other Father         something with throat, not sure if it was cancer    Heart Disease Sister         Allergies   Allergen Reactions    Sulfa (Sulfonamide Antibiotics) Rash    Ampicillin Rash     Tolerates cefazolin    Lescol [Fluvastatin] Unknown (comments)        Prior to Admission medications    Medication Sig Start Date End Date Taking? Authorizing Provider   cloNIDine HCL (CATAPRES) 0.1 mg tablet Take  by mouth two (2) times a day. Provider, Historical   Ferrous Fumarate 325 mg (106 mg iron) tab Take  by mouth. Provider, Historical   pantoprazole (PROTONIX) 40 mg tablet Take 40 mg by mouth daily. Provider, Historical   Polyethylene Glycol 3350 powd by Does Not Apply route. Provider, Historical   hydrALAZINE (APRESOLINE) 10 mg tablet Take  by mouth. Provider, Historical   loperamide (IMMODIUM) 2 mg tablet Take 2 mg by mouth four (4) times daily as needed for Diarrhea. Provider, Historical   guaiFENesin (ROBITUSSIN) 100 mg/5 mL liquid Take 200 mg by mouth three (3) times daily as needed for Cough. Provider, Historical   traMADoL (ULTRAM) 50 mg tablet Take 50 mg by mouth every six (6) hours as needed for Pain. Provider, Historical   atorvastatin (Lipitor) 40 mg tablet Take 1 Tablet by mouth nightly. 12/30/21   Portillo Mejia MD   acetaminophen (TYLENOL) 325 mg tablet Take 2 Tabs by mouth every four (4) hours as needed for Pain. 3/2/21   Guerline Persaud MD   ondansetron (Zofran ODT) 4 mg disintegrating tablet Take 1 Tab by mouth every eight (8) hours as needed for Nausea. 3/2/21   Guerline Persaud MD   omega-3 fatty acids-fish oil 360-1,200 mg cap Take 2 Tabs by mouth daily. Srini Sumner MD   pregabalin (LYRICA) 50 mg capsule Take 50 mg by mouth nightly. Indications: neuropathy    Provider, Historical   aspirin delayed-release 81 mg tablet Take 81 mg by mouth nightly.     Provider, Historical         Review of Systems:  (bold if positive, if negative)    Gen:  Eyes:  ENT:  CVS:  Pulm:  GI:  :  MS:  Skin:  Psych:  Endo:  Hem:  Renal:  Neuro:     ?seizure v. Syncope        Objective:      VITALS:    Vital signs reviewed; most recent are:    Visit Vitals  BP (!) 179/81   Pulse 72   Temp 97.6 °F (36.4 °C)   Resp 16   Ht 5' 4\" (1.626 m)   Wt 90.7 kg (200 lb)   SpO2 95%   BMI 34.33 kg/m²     SpO2 Readings from Last 6 Encounters:   01/10/23 95%   01/05/23 96%   12/30/21 96%   03/02/21 93%   02/13/20 98%   02/11/20 96%        No intake or output data in the 24 hours ending 01/10/23 9558         Exam: Physical Exam:    Gen:  Well-developed, well-nourished, in no acute distress  HEENT:  Pink conjunctivae, PERRL, hearing intact to voice, moist mucous membranes  Neck:  Supple, without masses, thyroid non-tender  Resp:  No accessory muscle use, clear breath sounds without wheezes rales or rhonchi  Card:  No murmurs, normal S1, S2 without thrills, bruits or peripheral edema  Abd:  Soft, non-tender, non-distended, normoactive bowel sounds are present, no palpable organomegaly  Lymph:  No cervical adenopathy  Musc:  No cyanosis or clubbing  Skin:  No rashes or ulcers, skin turgor is good  Neuro: UE symmetric and stronger than LE's which are also symmetric  Psych:  Alert with good insight. Oriented to person, place, and time       Labs:    Recent Labs     01/10/23  1500   WBC 5.3   HGB 13.9   HCT 44.5        Recent Labs     01/10/23  1500      K 5.0   *   CO2 25   *   BUN 30*   CREA 1.29*   CA 8.9   MG 1.9   ALB 3.4*   ALT 21     No components found for: GLPOC  No results for input(s): PH, PCO2, PO2, HCO3, FIO2 in the last 72 hours. No results for input(s): INR, INREXT in the last 72 hours.     Head CT => no acute process        Assessment/Plan:     Seizure - suspect more likely syncopal event than seizure as no prior h/o seizures and no profound electrolyte disturbances or overt head CT pathology to lead to new onset seizures   -trend CE's   -check TTE   -monitor on tele   -check TSH, ammonia   -check UA   -gently hydrate   -rapid EEG   -neuro eval; defer need for MRI to neuro; no focal finding on exam   -check orthostatics       HTN (hypertension), benign (1/11/2011)  -continue clonidine  -unclear of hydralazine dose; will need clarification       Mixed hyperlipidemia (1/11/2011)  -not on meds      GERD (gastroesophageal reflux disease) (9/30/2014)  -on PPI       CAD (coronary artery disease) ()  -trend CE's       CKD (chronic kidney disease) stage 3, GFR 30-59 ml/min ([facility] (9/8/2019) -gently hydrate       Frequent falls (9/8/2019)  -check orthostatics      Neuropathy (9/8/2019)  -not on meds    Surrogate decision maker: Daughter; pt is DNR     Total time spent with patient: 79 [address] [place] [address] discussed with: Patient and Family    Discussed:  Care Plan    Prophylaxis:  Lovenox    Probable Disposition:  Home w/Family           ___________________________________________________    Attending Physician: Bruna Major MD

## 2023-01-11 ENCOUNTER — APPOINTMENT (OUTPATIENT)
Dept: NON INVASIVE DIAGNOSTICS | Age: 88
End: 2023-01-11
Attending: INTERNAL MEDICINE
Payer: MEDICARE

## 2023-01-11 ENCOUNTER — APPOINTMENT (OUTPATIENT)
Dept: MRI IMAGING | Age: 88
End: 2023-01-11
Attending: PSYCHIATRY & NEUROLOGY
Payer: MEDICARE

## 2023-01-11 LAB
AMMONIA PLAS-SCNC: <10 UMOL/L
AMPHET UR QL SCN: NEGATIVE
ANION GAP SERPL CALC-SCNC: 3 MMOL/L (ref 5–15)
APPEARANCE UR: CLEAR
BACTERIA URNS QL MICRO: ABNORMAL /HPF
BARBITURATES UR QL SCN: NEGATIVE
BASOPHILS # BLD: 0 K/UL (ref 0–0.1)
BASOPHILS NFR BLD: 0 % (ref 0–1)
BENZODIAZ UR QL: NEGATIVE
BILIRUB UR QL: NEGATIVE
BUN SERPL-MCNC: 29 MG/DL (ref 6–20)
BUN/CREAT SERPL: 26 (ref 12–20)
CALCIUM SERPL-MCNC: 8.9 MG/DL (ref 8.5–10.1)
CANNABINOIDS UR QL SCN: NEGATIVE
CHLORIDE SERPL-SCNC: 115 MMOL/L (ref 97–108)
CO2 SERPL-SCNC: 25 MMOL/L (ref 21–32)
COCAINE UR QL SCN: NEGATIVE
COLOR UR: ABNORMAL
CREAT SERPL-MCNC: 1.12 MG/DL (ref 0.55–1.02)
DIFFERENTIAL METHOD BLD: ABNORMAL
DRUG SCRN COMMENT,DRGCM: NORMAL
ECHO AO ASC DIAM: 3.2 CM
ECHO AO ASCENDING AORTA INDEX: 1.63 CM/M2
ECHO AV AREA PEAK VELOCITY: 4.1 CM2
ECHO AV AREA VTI: 3.7 CM2
ECHO AV AREA/BSA PEAK VELOCITY: 2.1 CM2/M2
ECHO AV AREA/BSA VTI: 1.9 CM2/M2
ECHO AV MEAN GRADIENT: 3 MMHG
ECHO AV MEAN VELOCITY: 0.8 M/S
ECHO AV PEAK GRADIENT: 5 MMHG
ECHO AV PEAK VELOCITY: 1.1 M/S
ECHO AV VELOCITY RATIO: 1.18
ECHO AV VTI: 32.8 CM
ECHO LA DIAMETER INDEX: 2.09 CM/M2
ECHO LA DIAMETER: 4.1 CM
ECHO LA VOL 2C: 39 ML (ref 22–52)
ECHO LA VOL 4C: 46 ML (ref 22–52)
ECHO LA VOL BP: 44 ML (ref 22–52)
ECHO LA VOL/BSA BIPLANE: 22 ML/M2 (ref 16–34)
ECHO LA VOLUME AREA LENGTH: 47 ML
ECHO LA VOLUME INDEX A2C: 20 ML/M2 (ref 16–34)
ECHO LA VOLUME INDEX A4C: 23 ML/M2 (ref 16–34)
ECHO LA VOLUME INDEX AREA LENGTH: 24 ML/M2 (ref 16–34)
ECHO LV E' LATERAL VELOCITY: 5 CM/S
ECHO LV E' SEPTAL VELOCITY: 5 CM/S
ECHO LV FRACTIONAL SHORTENING: 33 % (ref 28–44)
ECHO LV INTERNAL DIMENSION DIASTOLE INDEX: 1.84 CM/M2
ECHO LV INTERNAL DIMENSION DIASTOLIC: 3.6 CM (ref 3.9–5.3)
ECHO LV INTERNAL DIMENSION SYSTOLIC INDEX: 1.22 CM/M2
ECHO LV INTERNAL DIMENSION SYSTOLIC: 2.4 CM
ECHO LV IVSD: 1.1 CM (ref 0.6–0.9)
ECHO LV MASS 2D: 124.1 G (ref 67–162)
ECHO LV MASS INDEX 2D: 63.3 G/M2 (ref 43–95)
ECHO LV POSTERIOR WALL DIASTOLIC: 1.1 CM (ref 0.6–0.9)
ECHO LV RELATIVE WALL THICKNESS RATIO: 0.61
ECHO LVOT AREA: 3.5 CM2
ECHO LVOT AV VTI INDEX: 1.07
ECHO LVOT DIAM: 2.1 CM
ECHO LVOT MEAN GRADIENT: 4 MMHG
ECHO LVOT PEAK GRADIENT: 7 MMHG
ECHO LVOT PEAK VELOCITY: 1.3 M/S
ECHO LVOT STROKE VOLUME INDEX: 61.8 ML/M2
ECHO LVOT SV: 121.2 ML
ECHO LVOT VTI: 35 CM
ECHO MV A VELOCITY: 1.05 M/S
ECHO MV AREA VTI: 2.5 CM2
ECHO MV E DECELERATION TIME (DT): 282.6 MS
ECHO MV E VELOCITY: 1.32 M/S
ECHO MV E/A RATIO: 1.26
ECHO MV E/E' LATERAL: 26.4
ECHO MV E/E' RATIO (AVERAGED): 26.4
ECHO MV E/E' SEPTAL: 26.4
ECHO MV LVOT VTI INDEX: 1.41
ECHO MV MAX VELOCITY: 1.5 M/S
ECHO MV MEAN GRADIENT: 2 MMHG
ECHO MV MEAN VELOCITY: 0.6 M/S
ECHO MV PEAK GRADIENT: 9 MMHG
ECHO MV VTI: 49.3 CM
ECHO PULMONARY ARTERY END DIASTOLIC PRESSURE: 2 MMHG
ECHO PV MAX VELOCITY: 1.3 M/S
ECHO PV PEAK GRADIENT: 6 MMHG
ECHO PV REGURGITANT MAX VELOCITY: 0.8 M/S
ECHO RV INTERNAL DIMENSION: 3.6 CM
ECHO RV TAPSE: 1.9 CM (ref 1.7–?)
ECHO RVOT PEAK GRADIENT: 3 MMHG
ECHO RVOT PEAK VELOCITY: 0.9 M/S
ECHO TV REGURGITANT MAX VELOCITY: 2.73 M/S
ECHO TV REGURGITANT PEAK GRADIENT: 30 MMHG
EOSINOPHIL # BLD: 0.1 K/UL (ref 0–0.4)
EOSINOPHIL NFR BLD: 1 % (ref 0–7)
EPITH CASTS URNS QL MICRO: ABNORMAL /LPF
ERYTHROCYTE [DISTWIDTH] IN BLOOD BY AUTOMATED COUNT: 13.5 % (ref 11.5–14.5)
GLUCOSE SERPL-MCNC: 125 MG/DL (ref 65–100)
GLUCOSE UR STRIP.AUTO-MCNC: NEGATIVE MG/DL
HCT VFR BLD AUTO: 42.8 % (ref 35–47)
HGB BLD-MCNC: 13.8 G/DL (ref 11.5–16)
HGB UR QL STRIP: NEGATIVE
HYALINE CASTS URNS QL MICRO: ABNORMAL /LPF (ref 0–2)
IMM GRANULOCYTES # BLD AUTO: 0 K/UL (ref 0–0.04)
IMM GRANULOCYTES NFR BLD AUTO: 0 % (ref 0–0.5)
KETONES UR QL STRIP.AUTO: NEGATIVE MG/DL
LEUKOCYTE ESTERASE UR QL STRIP.AUTO: ABNORMAL
LYMPHOCYTES # BLD: 1.4 K/UL (ref 0.8–3.5)
LYMPHOCYTES NFR BLD: 15 % (ref 12–49)
MAGNESIUM SERPL-MCNC: 1.8 MG/DL (ref 1.6–2.4)
MCH RBC QN AUTO: 30.6 PG (ref 26–34)
MCHC RBC AUTO-ENTMCNC: 32.2 G/DL (ref 30–36.5)
MCV RBC AUTO: 94.9 FL (ref 80–99)
METHADONE UR QL: NEGATIVE
MONOCYTES # BLD: 0.8 K/UL (ref 0–1)
MONOCYTES NFR BLD: 8 % (ref 5–13)
NEUTS SEG # BLD: 7.1 K/UL (ref 1.8–8)
NEUTS SEG NFR BLD: 76 % (ref 32–75)
NITRITE UR QL STRIP.AUTO: POSITIVE
NRBC # BLD: 0 K/UL (ref 0–0.01)
NRBC BLD-RTO: 0 PER 100 WBC
OPIATES UR QL: NEGATIVE
PCP UR QL: NEGATIVE
PH UR STRIP: 8 (ref 5–8)
PLATELET # BLD AUTO: 206 K/UL (ref 150–400)
PMV BLD AUTO: 11.1 FL (ref 8.9–12.9)
POTASSIUM SERPL-SCNC: 4.5 MMOL/L (ref 3.5–5.1)
PROT UR STRIP-MCNC: ABNORMAL MG/DL
RBC # BLD AUTO: 4.51 M/UL (ref 3.8–5.2)
RBC #/AREA URNS HPF: ABNORMAL /HPF (ref 0–5)
SODIUM SERPL-SCNC: 143 MMOL/L (ref 136–145)
SP GR UR REFRACTOMETRY: 1.01 (ref 1–1.03)
TROPONIN-HIGH SENSITIVITY: 17 NG/L (ref 0–51)
TROPONIN-HIGH SENSITIVITY: 25 NG/L (ref 0–51)
UR CULT HOLD, URHOLD: NORMAL
UROBILINOGEN UR QL STRIP.AUTO: 0.2 EU/DL (ref 0.2–1)
WBC # BLD AUTO: 9.4 K/UL (ref 3.6–11)
WBC URNS QL MICRO: ABNORMAL /HPF (ref 0–4)

## 2023-01-11 PROCEDURE — 87186 SC STD MICRODIL/AGAR DIL: CPT

## 2023-01-11 PROCEDURE — 97165 OT EVAL LOW COMPLEX 30 MIN: CPT

## 2023-01-11 PROCEDURE — 87086 URINE CULTURE/COLONY COUNT: CPT

## 2023-01-11 PROCEDURE — 99223 1ST HOSP IP/OBS HIGH 75: CPT | Performed by: PSYCHIATRY & NEUROLOGY

## 2023-01-11 PROCEDURE — 96375 TX/PRO/DX INJ NEW DRUG ADDON: CPT

## 2023-01-11 PROCEDURE — 97116 GAIT TRAINING THERAPY: CPT

## 2023-01-11 PROCEDURE — 70551 MRI BRAIN STEM W/O DYE: CPT

## 2023-01-11 PROCEDURE — 74011250636 HC RX REV CODE- 250/636: Performed by: FAMILY MEDICINE

## 2023-01-11 PROCEDURE — 95816 EEG AWAKE AND DROWSY: CPT | Performed by: PSYCHIATRY & NEUROLOGY

## 2023-01-11 PROCEDURE — 96376 TX/PRO/DX INJ SAME DRUG ADON: CPT

## 2023-01-11 PROCEDURE — 80048 BASIC METABOLIC PNL TOTAL CA: CPT

## 2023-01-11 PROCEDURE — 74011000250 HC RX REV CODE- 250: Performed by: FAMILY MEDICINE

## 2023-01-11 PROCEDURE — 97161 PT EVAL LOW COMPLEX 20 MIN: CPT

## 2023-01-11 PROCEDURE — 81001 URINALYSIS AUTO W/SCOPE: CPT

## 2023-01-11 PROCEDURE — 74011250636 HC RX REV CODE- 250/636: Performed by: INTERNAL MEDICINE

## 2023-01-11 PROCEDURE — 83735 ASSAY OF MAGNESIUM: CPT

## 2023-01-11 PROCEDURE — 36415 COLL VENOUS BLD VENIPUNCTURE: CPT

## 2023-01-11 PROCEDURE — 74011000250 HC RX REV CODE- 250: Performed by: INTERNAL MEDICINE

## 2023-01-11 PROCEDURE — 74011250637 HC RX REV CODE- 250/637: Performed by: INTERNAL MEDICINE

## 2023-01-11 PROCEDURE — 99223 1ST HOSP IP/OBS HIGH 75: CPT | Performed by: INTERNAL MEDICINE

## 2023-01-11 PROCEDURE — 80307 DRUG TEST PRSMV CHEM ANLYZR: CPT

## 2023-01-11 PROCEDURE — 97535 SELF CARE MNGMENT TRAINING: CPT

## 2023-01-11 PROCEDURE — 87077 CULTURE AEROBIC IDENTIFY: CPT

## 2023-01-11 PROCEDURE — 93306 TTE W/DOPPLER COMPLETE: CPT | Performed by: STUDENT IN AN ORGANIZED HEALTH CARE EDUCATION/TRAINING PROGRAM

## 2023-01-11 PROCEDURE — 82140 ASSAY OF AMMONIA: CPT

## 2023-01-11 PROCEDURE — G0378 HOSPITAL OBSERVATION PER HR: HCPCS

## 2023-01-11 PROCEDURE — 93306 TTE W/DOPPLER COMPLETE: CPT

## 2023-01-11 PROCEDURE — 85025 COMPLETE CBC W/AUTO DIFF WBC: CPT

## 2023-01-11 PROCEDURE — 84484 ASSAY OF TROPONIN QUANT: CPT

## 2023-01-11 RX ADMIN — HYDRALAZINE HYDROCHLORIDE 25 MG: 25 TABLET, FILM COATED ORAL at 08:08

## 2023-01-11 RX ADMIN — CEFTRIAXONE 1 G: 1 INJECTION, POWDER, FOR SOLUTION INTRAMUSCULAR; INTRAVENOUS at 10:18

## 2023-01-11 RX ADMIN — HYDRALAZINE HYDROCHLORIDE 10 MG: 20 INJECTION INTRAMUSCULAR; INTRAVENOUS at 00:40

## 2023-01-11 RX ADMIN — HYDRALAZINE HYDROCHLORIDE 25 MG: 25 TABLET, FILM COATED ORAL at 16:33

## 2023-01-11 RX ADMIN — ATORVASTATIN CALCIUM 40 MG: 20 TABLET, FILM COATED ORAL at 22:45

## 2023-01-11 RX ADMIN — PANTOPRAZOLE SODIUM 40 MG: 40 TABLET, DELAYED RELEASE ORAL at 08:08

## 2023-01-11 RX ADMIN — HYDRALAZINE HYDROCHLORIDE 25 MG: 25 TABLET, FILM COATED ORAL at 22:45

## 2023-01-11 RX ADMIN — ASPIRIN 81 MG: 81 TABLET, COATED ORAL at 22:45

## 2023-01-11 RX ADMIN — SODIUM CHLORIDE, PRESERVATIVE FREE 10 ML: 5 INJECTION INTRAVENOUS at 22:46

## 2023-01-11 RX ADMIN — PREGABALIN 50 MG: 50 CAPSULE ORAL at 22:00

## 2023-01-11 RX ADMIN — SODIUM CHLORIDE, PRESERVATIVE FREE 10 ML: 5 INJECTION INTRAVENOUS at 16:34

## 2023-01-11 NOTE — ED NOTES
Pt is having episodes of bradycardia down to 30s and 40s but is asymptomatic during the episodes.  Provider notified

## 2023-01-11 NOTE — PROGRESS NOTES
Medicare Outpatient Observation Notice (MOON)/ [place] Outpatient Observation Notice ([place]) provided to patient/representative with verbal explanation of the notice. Time allotted for questions regarding the notice. Patient /representative provided a completed copy of the MOON/VOON notice. Copy placed on bedside chart.   Efren Menjivar CMS

## 2023-01-11 NOTE — CONSULTS
[facility] and [address] at [facility] at [address] [facility], [address] [place] [id number] [address] [facility] [address]  [phone] Office  [phone] Facsimile           Referring: Dr. Kajal Monk    Chief Complaint   Patient presents with    Seizure     We are asked to see this 51-year-old lady in neurologic consultation regarding an acute change in her neurologic status manifest as seizure. The patient presented to the emergency department following an episode of seizure-like activity. Apparently the granddaughter noted the patient was sitting in her wheelchair became unresponsive shaking head was parched. This activity lasted about a minute patient was confused afterward. In the emergency department her examination was unremarkable. Evaluation thus far has included  CT of the head dry unremarkable  CBC unremarkable  Metabolic panel with BUN and creatinine 30 and 1.29 respectively  Magnesium normal  Troponin unremarkable  TSH unremarkable  Urinalysis with proteinuria, nitrites leukocyte Estrace and pyuria  Ammonia unremarkable    This morning patient tells me she was visiting her daughter and her daughter told her that she threw her hands up and was confused and did not know where she was. She says she is never had anything like this before. She does not think any medicine has been changed. She recounts that she lives on her own and she has aides that come in a few times a week to help her bathe etc.  She notes that she has not been quite herself since she had COVID but she manages to get by. She says normally she uses a wheelchair to get around. There is no family here for collaborative history.   Past Medical History:   Diagnosis Date    CAD (coronary artery disease)     Femur fracture ([address]     right    GERD (gastroesophageal reflux disease)     HTN (hypertension), benign 1/11/2011 Mixed hyperlipidemia 1/11/2011       Past Surgical History:   Procedure Laterality Date    HX FEMUR FRACTURE TX      HX KNEE REPLACEMENT      bilateral    HX ORTHOPAEDIC      bilat knee replacements    HX VEIN STRIPPING         Current Facility-Administered Medications   Medication Dose Route Frequency Provider Last Rate Last Admin    sodium chloride (NS) flush 5-40 mL  5-40 mL IntraVENous Q8H Diana Mccloud MD        sodium chloride (NS) flush 5-40 mL  5-40 mL IntraVENous PRN Thomas Siegel MD        acetaminophen (TYLENOL) tablet 650 mg  650 mg Oral Q4H PRN Diana Mccloud MD        [facility]) injection 0.4 mg  0.4 mg IntraVENous PRN Thomas Siegel MD        ondansetron [facility]) injection 4 mg  4 mg IntraVENous Q4H PRN Diana Mccloud MD        lactated Ringers infusion  50 mL/hr IntraVENous CONTINUOUS Lori Nina ZAIDI MD 50 mL/hr at 01/10/23 1830 50 mL/hr at 01/10/23 1830    hydrALAZINE (APRESOLINE) 20 mg/mL injection 10 mg  10 mg IntraVENous Q6H PRN Thomas Siegel MD   10 mg at 01/11/23 0040    aspirin delayed-release tablet 81 mg  81 mg Oral QHS Thomas Siegel MD   81 mg at 01/10/23 2318    atorvastatin (LIPITOR) tablet 40 mg  40 mg Oral QHS Thomas Siegel MD        [Held by provider] cloNIDine HCL (CATAPRES) tablet 0.1 mg  0.1 mg Oral BID Sivan Mccloud MD   0.1 mg at 01/10/23 1650    pantoprazole (PROTONIX) tablet 40 mg  40 mg Oral ACB Thomas Siegel MD        pregabalin (LYRICA) capsule 50 mg  50 mg Oral QHS Thomas Siegel MD   50 mg at 01/10/23 2318    traMADoL (ULTRAM) tablet 50 mg  50 mg Oral Q6H PRN Thomas Siegel MD        hydrALAZINE (APRESOLINE) tablet 25 mg  25 mg Oral TID Thomas Siegel MD   25 mg at 01/10/23 2318    atropine 0.4 mg/mL injection 1 mg  1 mg IntraVENous PRN Thomas Siegel MD         Current Outpatient Medications   Medication Sig Dispense Refill    cloNIDine HCL (CATAPRES) 0.1 mg tablet Take  by mouth two (2) times a day. Ferrous Fumarate 325 mg (106 mg iron) tab Take  by mouth.      pantoprazole (PROTONIX) 40 mg tablet Take 40 mg by mouth daily. Polyethylene Glycol 3350 powd by Does Not Apply route. hydrALAZINE (APRESOLINE) 10 mg tablet Take  by mouth. loperamide (IMMODIUM) 2 mg tablet Take 2 mg by mouth four (4) times daily as needed for Diarrhea.      guaiFENesin (ROBITUSSIN) 100 mg/5 mL liquid Take 200 mg by mouth three (3) times daily as needed for Cough. traMADoL (ULTRAM) 50 mg tablet Take 50 mg by mouth every six (6) hours as needed for Pain. atorvastatin (Lipitor) 40 mg tablet Take 1 Tablet by mouth nightly. 30 Tablet 0    acetaminophen (TYLENOL) 325 mg tablet Take 2 Tabs by mouth every four (4) hours as needed for Pain. 20 Tab 0    ondansetron (Zofran ODT) 4 mg disintegrating tablet Take 1 Tab by mouth every eight (8) hours as needed for Nausea. 15 Tab 0    omega-3 fatty acids-fish oil 360-1,200 mg cap Take 2 Tabs by mouth daily. pregabalin (LYRICA) 50 mg capsule Take 50 mg by mouth nightly. Indications: neuropathy      aspirin delayed-release 81 mg tablet Take 81 mg by mouth nightly. Allergies   Allergen Reactions    Sulfa (Sulfonamide Antibiotics) Rash    Ampicillin Rash     Tolerates cefazolin    Lescol [Fluvastatin] Unknown (comments)       Social History     Tobacco Use    Smoking status: Never    Smokeless tobacco: Never   Substance Use Topics    Alcohol use: No    Drug use: No       Family History   Problem Relation Age of Onset    Other Mother          of renal failure, not sure what caused id    Other Father         something with throat, not sure if it was cancer    Heart Disease Sister        Review of Systems  Pertinent positives and negatives as noted.       Examination  Visit Vitals  BP (!) 150/71   Pulse 73   Temp 98.3 °F (36.8 °C)   Resp 12   Ht 5' 4\" (1.626 m)   Wt 90.7 kg (200 lb)   SpO2 93%   BMI 34.33 kg/m² She is lying in bed comfortable appearing. She is awake alert oriented to [facility] says that is February 2023. President Dinh Branham. She says were in [facility]. She follows commands. Full versions. No nystagmus. Symmetric face. Tongue and palate midline. She has age-related paratonia. No pronation and no drift. She resists fully in the upper and lower extremities normal prescription today testing. Reflexes globally depressed but symmetrical.  No attack      Impression/Plan  80year-old lady with an episode of what is described as seizure-like activity  Discussed that about 10% of the population will have 1 isolated seizure and not another however her age makes concerning anatomic abnormalities more pressing  We will get an MRI of the brain and EEG  If those are unremarkable I would defer institution of an antiseizure medicine at this point  We will follow-up her tests    Domingo Hayes MD          This note was created using voice recognition software. Despite editing, there may be syntax errors.

## 2023-01-11 NOTE — PROGRESS NOTES
PHYSICAL THERAPY EVALUATION/DISCHARGE  Patient: Kathleen Garcia (47 y.o. female)  Date: 1/11/2023  Primary Diagnosis: Seizure [facility]) [R56.9]       Precautions:   Fall      ASSESSMENT  Based on the objective data described below, the patient presents with generalized weakness, impaired standing balance, decreased activity tolerance, and gait dysfunction generally consistent with baseline functional mobility level s/p admission for seizure/syncope workup. Patient only oriented to self but followed commands well and very pleasant, grandson present to provide history. Patient lives at [facility] and is fully dependent on staff for all mobility, transfers in/out of w/c, as well as ADLs. Today, patient requiring MOD A x 1-2 for bed mob, transfer to standing with RW and 3-4 short steps to [facility] and then back to bed. Ebb Adryan confirms this is very close or at baseline for patient. Encoaurged patient to request nursing to assist up to chair and BSC while in the acute setting but recommend d/c back to half-way when medically appropriate. Functional Outcome Measure: The patient scored 15 on the Barthel outcome measure which is indicative of complete dependence for mobility and self care. Other factors to consider for discharge: none     Further skilled acute physical therapy is not indicated at this time. PLAN :  Recommendation for discharge: (in order for the patient to meet his/her long term goals)  No skilled physical therapy/ follow up rehabilitation needs identified at this time. This discharge recommendation:  Has been made in collaboration with the attending provider and/or case management    IF patient discharges home will need the following DME: patient owns DME required for discharge       SUBJECTIVE:   Patient stated It's been cold in here.     OBJECTIVE DATA SUMMARY:   HISTORY:    Past Medical History:   Diagnosis Date    CAD (coronary artery disease)     Fem[address] fracture ([facility] [address]     right    GERD (gastroesophageal reflux disease)     HTN (hypertension), benign 1/11/2011    Mixed hyperlipidemia 1/11/2011     Past Surgical History:   Procedure Laterality Date    HX FEMUR FRACTURE TX      HX KNEE REPLACEMENT      bilateral    HX ORTHOPAEDIC      bilat knee replacements    HX VEIN STRIPPING         Prior level of function: MIN/MOD A for transfers and bed mob, ALDs at [facility]  Personal factors and/or comorbidities impacting plan of care:     Home Situation  Home Environment: [address] [place] [address] Road Name:  [facility])  One/Two Story Residence: One story  Living Alone: No  Support Systems: Assisted Living  Patient Expects to be Discharged to[de-identified] Assisted Living  Current DME Used/Available at Home: Wheelchair, Walker, rolling    EXAMINATION/PRESENTATION/DECISION MAKING:   Critical Behavior:  Neurologic State: Alert  Orientation Level: Oriented to person, Oriented to place, Oriented to time, Disoriented to situation  Cognition: Follows commands     Hearing:       Range Of Motion:  AROM: Generally decreased, functional (R shoulder AROM limited)                       Strength:    Strength: Generally decreased, functional (R shoulder weak)                    Tone & Sensation:   Tone: Normal              Sensation: Intact               Coordination:  Coordination: Generally decreased, functional  Vision:      Functional Mobility:  Bed Mobility:  Rolling: Minimum assistance  Supine to Sit: Moderate assistance     Scooting: Moderate assistance  Transfers:  Sit to Stand: Moderate assistance;Assist x2  Stand to Sit: Moderate assistance;Assist x2        Bed to Chair: Moderate assistance;Assist x2              Balance:   Sitting: Intact  Standing: Impaired  Standing - Static: Fair;Constant support  Standing - Dynamic : Fair;Constant support  Ambulation/Gait Training:  Distance (ft): 2 Feet (ft)  Assistive Device: Gait belt;Walker, rolling  Ambulation - Level of Assistance:  Moderate assistance;Assist x2        Gait Abnormalities: Decreased step clearance;Shuffling gait        Base of Support: Widened;Center of gravity altered     Speed/Meghan: Slow;Shuffled                     Functional Measure:  Barthel Index:    Bathin  Bladder: 0  Bowels: 5  Groomin  Dressin  Feedin  Mobility: 0  Stairs: 0  Toilet Use: 0  Transfer (Bed to Chair and Back): 5  Total: 15/100       The Barthel ADL Index: Guidelines  1. The index should be used as a record of what a patient does, not as a record of what a patient could do. 2. The main aim is to establish degree of independence from any help, physical or verbal, however minor and for whatever reason. 3. The need for supervision renders the patient not independent. 4. A patient's performance should be established using the best available evidence. Asking the patient, friends/relatives and nurses are the usual sources, but direct observation and common sense are also important. However direct testing is not needed. 5. Usually the patient's performance over the preceding 24-48 hours is important, but occasionally longer periods will be relevant. 6. Middle categories imply that the patient supplies over 50 per cent of the effort. 7. Use of aids to be independent is allowed. Score Interpretation (from [address] [facility])    Independent   60-79 Minimally independent   40-59 Partially dependent   20-39 Very dependent   <20 Totally dependent     -Chrystal Richards., Barthel, D.W. (1965). Functional evaluation: the Barthel Index. [address] [facility] ([address] [facility] (1997). The Barthel activities of daily living index: self-reporting versus actual performance in the old (> or = 75 years). Journal of [address] [place] [address] 45(7), [facility], J.JRIVKAF, Nicole Sandy., Finn Ng. (1999). Measuring the change in disability after inpatient rehabilitation; comparison of the responsiveness of the Barthel Index and Functional Labette Measure. Journal of Neurology, Neurosurgery, and Psychiatry, 66(4), 500-846. LETICIA Yang, RACHEL Weaver, & Duc Medellin M.A. (2004) Assessment of post-stroke quality of life in cost-effectiveness studies: The usefulness of the Barthel Index and the EuroQoL-5D. Quality of Life Research, 15, 344-47            Physical Therapy Evaluation Charge Determination   History Examination Presentation Decision-Making   HIGH Complexity :3+ comorbidities / personal factors will impact the outcome/ POC  LOW Complexity : 1-2 Standardized tests and measures addressing body structure, function, activity limitation and / or participation in recreation  LOW Complexity : Stable, uncomplicated  Other outcome measures Barthel 15  HIGH       Based on the above components, the patient evaluation is determined to be of the following complexity level: LOW     Pain Rating:  None    Activity Tolerance:   Fair and VSS      After treatment patient left in no apparent distress:   Supine in bed, Call bell within reach, and Caregiver / family present    COMMUNICATION/EDUCATION:   The patients plan of care was discussed with: Occupational therapist and Registered nurse. Fall prevention education was provided and the patient/caregiver indicated understanding. and Patient/family agree to work toward stated goals and plan of care.     Thank you for this referral.  aFm Landry, PT   Time Calculation: 25 mins

## 2023-01-11 NOTE — PROGRESS NOTES
Chris Fosterelsen [facility] 79  15[address] [facility], [place], [address] [place] [address]  [phone] [address] Adult  Hospitalist Group                                                                                          Hospitalist Progress Note  Raul Troy MD        Date of Service:  2023  NAME:  Blossom Weaver  :  1924  MRN:  [id number]      Admission Summary:   66-year-old female is admitted for evaluation of syncope and questionable seizure    Interval history / Subjective:   She denies any complaints currently     Assessment & Plan:     Seizure versus syncope  -Unremarkable head CT  -Neurology evaluated and recommends MRI and EEG  -Troponins negative, TSH in normal range  -Echo pending    Bradycardia  -Frequent episodes of bradycardia down to 30s on monitor  -Cardiology consulted    UTI  -Follow culture  -Start Rocephin    Hypertension  -Continue hydralazine  -Clonidine held    CKD stage III  -Stable, continue to hydrate    Neuropathy  -On Lyrica        Code status: DNR  DVT prophylaxis: [facility]  Date Reviewed: 1/10/2023            Codes Class Noted POA    Seizure ([facility] [address] ICD-10-CM: R56.9  ICD-9-CM: 780.39  1/10/2023 Yes        CKD (chronic kidney disease) stage 3, GFR 30-59 ml/min (HCC) (Chronic) ICD-10-CM: N18.30  ICD-9-CM: 585.3  2019 Yes        Frequent falls ICD-10-CM: R29.6  ICD-9-CM: V15.88  2019 Yes        Neuropathy (Chronic) ICD-10-CM: G62.9  ICD-9-CM: 355.9  2019 Yes        GERD (gastroesophageal reflux disease) (Chronic) ICD-10-CM: K21.9  ICD-9-CM: 530.81  2014 Yes        CAD (coronary artery disease) (Chronic) ICD-10-CM: I25.10  ICD-9-CM: 414.00  Unknown Yes        HTN (hypertension), benign (Chronic) ICD-10-CM: I10  ICD-9-CM: 401.1  2011 Yes        Mixed hyperlipidemia (Chronic) ICD-10-CM: A43.1  ICD-9-CM: 272.2  2011 Yes             Review of Systems:   A comprehensive review of systems was negative except for that written in the HPI. Vital Signs:    Last 24hrs VS reviewed since prior progress note. Most recent are:  Visit Vitals  BP (!) 154/83   Pulse 70   Temp 97.6 °F (36.4 °C)   Resp 19   Ht 5' 4\" (1.626 m)   Wt 90.7 kg (200 lb)   SpO2 94%   BMI 34.33 kg/m²       No intake or output data in the 24 hours ending 01/11/23 0954     Physical Examination:             Constitutional:  No acute distress, frail   ENT:  Oral mucosa moist, oropharynx benign. Resp:  CTA bilaterally. No wheezing/rhonchi/rales. No accessory muscle use   CV:  Regular rhythm, normal rate, no murmurs, gallops, rubs    GI:  Soft, non distended, non tender. normoactive bowel sounds, no hepatosplenomegaly     Musculoskeletal:  No edema, warm, 2+ pulses throughout    Neurologic:  Moves all extremities. AAOx3, CN II-XII reviewed     Psych:  Good insight, Not anxious nor agitated. Data Review:    Review and/or order of clinical lab test      Labs:     Recent Labs     01/11/23  0435 01/10/23  1500   WBC 9.4 5.3   HGB 13.8 13.9   HCT 42.8 44.5    212     Recent Labs     01/11/23  0435 01/10/23  1500    143   K 4.5 5.0   * 114*   CO2 25 25   BUN 29* 30*   CREA 1.12* 1.29*   * 111*   CA 8.9 8.9   MG 1.8 1.9     Recent Labs     01/10/23  1500   ALT 21   *   TBILI 0.4   TP 7.3   ALB 3.4*   GLOB 3.9     No results for input(s): INR, PTP, APTT, INREXT in the last 72 hours. No results for input(s): FE, TIBC, PSAT, FERR in the last 72 hours. No results found for: FOL, RBCF   No results for input(s): PH, PCO2, PO2 in the last 72 hours. No results for input(s): CPK, CKNDX, TROIQ in the last 72 hours.     No lab exists for component: CPKMB  Lab Results   Component Value Date/Time    Cholesterol, total 184 12/28/2021 05:33 AM    HDL Cholesterol 84 12/28/2021 05:33 AM    LDL, calculated 86.2 12/28/2021 05:33 AM    Triglyceride 69 12/28/2021 05:33 AM    CHOL/HDL Ratio 2.2 12/28/2021 05:33 AM     No results found for: [facility]  Lab Results   Component Value Date/Time    Color YELLOW/STRAW 01/11/2023 12:44 AM    Appearance CLEAR 01/11/2023 12:44 AM    Specific gravity 1.010 01/11/2023 12:44 AM    pH (UA) 8.0 01/11/2023 12:44 AM    Protein TRACE (A) 01/11/2023 12:44 AM    Glucose Negative 01/11/2023 12:44 AM    Ketone Negative 01/11/2023 12:44 AM    Bilirubin Negative 01/11/2023 12:44 AM    Urobilinogen 0.2 01/11/2023 12:44 AM    Nitrites Positive (A) 01/11/2023 12:44 AM    Leukocyte Esterase LARGE (A) 01/11/2023 12:44 AM    Epithelial cells FEW 01/11/2023 12:44 AM    Bacteria TOO NUMEROUS TO COUNT (A) 01/11/2023 12:44 AM    WBC  01/11/2023 12:44 AM    RBC 0-5 01/11/2023 12:44 AM         Medications Reviewed:     Current Facility-Administered Medications   Medication Dose Route Frequency    cefTRIAXone (ROCEPHIN) 1 g in 0.9% sodium chloride 10 mL IV syringe  1 g IntraVENous Q24H    sodium chloride (NS) flush 5-40 mL  5-40 mL IntraVENous Q8H    sodium chloride (NS) flush 5-40 mL  5-40 mL IntraVENous PRN    acetaminophen (TYLENOL) tablet 650 mg  650 mg Oral Q4H PRN    naloxone (NARCAN) injection 0.4 mg  0.4 mg IntraVENous PRN    ondansetron (ZOFRAN) injection 4 mg  4 mg IntraVENous Q4H PRN    lactated Ringers infusion  50 mL/hr IntraVENous CONTINUOUS    hydrALAZINE (APRESOLINE) 20 mg/mL injection 10 mg  10 mg IntraVENous Q6H PRN    aspirin delayed-release tablet 81 mg  81 mg Oral QHS    atorvastatin (LIPITOR) tablet 40 mg  40 mg Oral QHS    [Held by provider] cloNIDine HCL (CATAPRES) tablet 0.1 mg  0.1 mg Oral BID    pantoprazole (PROTONIX) tablet 40 mg  40 mg Oral ACB    pregabalin (LYRICA) capsule 50 mg  50 mg Oral QHS    traMADoL (ULTRAM) tablet 50 mg  50 mg Oral Q6H PRN    hydrALAZINE (APRESOLINE) tablet 25 mg  25 mg Oral TID    atropine 0.4 mg/mL injection 1 mg  1 mg IntraVENous PRN     Current Outpatient Medications   Medication Sig    cloNIDine HCL (CATAPRES) 0.1 mg tablet Take  by mouth two (2) times a day.     Ferrous Fumarate 325 mg (106 mg iron) tab Take  by mouth.    pantoprazole (PROTONIX) 40 mg tablet Take 40 mg by mouth daily. Polyethylene Glycol 3350 powd by Does Not Apply route. hydrALAZINE (APRESOLINE) 10 mg tablet Take  by mouth. loperamide (IMMODIUM) 2 mg tablet Take 2 mg by mouth four (4) times daily as needed for Diarrhea.    guaiFENesin (ROBITUSSIN) 100 mg/5 mL liquid Take 200 mg by mouth three (3) times daily as needed for Cough. traMADoL (ULTRAM) 50 mg tablet Take 50 mg by mouth every six (6) hours as needed for Pain. atorvastatin (Lipitor) 40 mg tablet Take 1 Tablet by mouth nightly. acetaminophen (TYLENOL) 325 mg tablet Take 2 Tabs by mouth every four (4) hours as needed for Pain. ondansetron (Zofran ODT) 4 mg disintegrating tablet Take 1 Tab by mouth every eight (8) hours as needed for Nausea. omega-3 fatty acids-fish oil 360-1,200 mg cap Take 2 Tabs by mouth daily. pregabalin (LYRICA) 50 mg capsule Take 50 mg by mouth nightly.  Indications: neuropathy    aspirin delayed-release 81 mg tablet Take 81 mg by mouth nightly.     ______________________________________________________________________  EXPECTED LENGTH OF STAY: - - -  ACTUAL LENGTH OF STAY:          0                 Elizabeth River MD

## 2023-01-11 NOTE — CONSULTS
[address] [facility]                    Cardiology Care Note     [x]Initial Encounter     []Follow-up    Patient Name: Washington Rivers - :1924 - ZWD:[id number]  Primary Cardiologist: [facility] Physicians: Chance Puentes MD  Consulting Cardiologist: [facility] Physicians: Woo Burton MD     Reason for encounter: Bradycardia    HPI:       Washington Rivers is a 80 y.o. female with PMH significant for CAD, HTN, HLD admitted for possible pre syncope/siezures. As per chart/pt poor historian - pt was sitting in a chair and suddenly became unresponsive/staring into space/shaking for few seconds. Not sure per chart if pt lost consciousness. No hx of bowel or bladder incontinence. No CP/dyspnea  HR noted to dip into high 30's/40's     Subjective:      Washington Rivers reports syncope. Assessment and Plan     Bradycardia  Presyncope/? Syncope/? Seizures  Hx of CAD  HTN  HLD      Plan:  Cont Tele  Not on B blockers. Would DC clonidine/- norvasc if needed for BP  ECHO  D/w grandson - Alexx about bradycardia - possible need for PPM. He states that pt and family would probably not want to proceed with PPM if needed. No emergent need for temp wire and also if pt/family does not want PPM - would get palliative care consult  Keep atropine at bedside / keep pacer pads on                 ____________________________________________________________    Cardiac testing  21    ECHO ADULT COMPLETE 2021    Interpretation Summary    Left Ventricle: Left ventricle size is normal. Moderately increased wall thickness. Unable to assess wall motion. Normal left ventricular systolic function with a visually estimated EF of 55 - 60%. Diastolic function present with increased LAP with normal LV EF. Aortic Valve: Valve structure is normal. Mildly thickened cusps. Mitral Valve: Severely thickened leaflets.  Severe mitral annular calcification. Mild transvalvular regurgitation. Mild stenosis. Left Atrium: Left atrium is dilated. Right Atrium: Right atrium is mildly dilated. Signed by: Katelynn Abdi DO on 12/28/2021  4:22 PM              Most recent HS troponins:  Recent Labs     01/11/23  0435 01/11/23  0056 01/10/23  1644   TROPHS 25 17 12       ECG: SR/incomplete RBBB/NSST    Review of Systems:    [x]All other systems reviewed and all negative except as written in HPI    [] Patient unable to provide secondary to condition    Past Medical History:   Diagnosis Date    CAD (coronary artery disease)     Femur fracture ([address]     right    GERD (gastroesophageal reflux disease)     HTN (hypertension), benign 1/11/2011    Mixed hyperlipidemia 1/11/2011     Past Surgical History:   Procedure Laterality Date    HX FEMUR FRACTURE TX      HX KNEE REPLACEMENT      bilateral    HX ORTHOPAEDIC      bilat knee replacements    HX VEIN STRIPPING       Social Hx:  reports that she has never smoked. She has never used smokeless tobacco. She reports that she does not drink alcohol and does not use drugs. Family Hx: family history includes Heart Disease in her sister; Other in her father and mother.   Allergies   Allergen Reactions    Sulfa (Sulfonamide Antibiotics) Rash    Ampicillin Rash     Tolerates cefazolin    Lescol [Fluvastatin] Unknown (comments)          OBJECTIVE:  Wt Readings from Last 3 Encounters:   01/10/23 200 lb (90.7 kg)   01/05/23 199 lb (90.3 kg)   12/30/21 196 lb 3.2 oz (89 kg)     No intake or output data in the 24 hours ending 01/11/23 1004    Physical Exam:    Vitals:   Vitals:    01/11/23 0559 01/11/23 0629 01/11/23 0700 01/11/23 0800   BP: (!) 137/42   (!) 154/83   Pulse: (!) 44 72 (!) 55 70   Resp: 19 17  19   Temp:    97.6 °F (36.4 °C)   SpO2: 96% 91%  94%   Weight:       Height:         Telemetry:  Sinus Bradycardia    Gen: Well-developed,  elderly  Neck: Supple, No JVD,   Resp: No accessory muscle use, Clear breath sounds, No rales or rhonchi  Card: Regular Rate,Rythm, Normal S1, S2, 2/6 sys murmur+  Abd:   Soft, BS+   MSK: No cyanosis  Skin: No rashes    Neuro: Moving all four extremities, follows commands appropriately  Psych: Poor insight, oriented to person, place, alert,   LE: Trace edema    Data Review:     Radiology:   XR Results (most recent):  Results from [place] encounter on 12/24/21    XR KNEE LT MAX 2 VWS    Narrative  EXAM: XR KNEE LT MAX 2 VWS    INDICATION: pain. COMPARISON: None. FINDINGS: AP and lateral views of the left knee were obtained. A knee prosthesis  is present. There is no evidence of loosening of the prosthetic components. No  knee joint effusion is evident. Impression  Presence of a left knee prosthesis as described above. Recent Labs     01/11/23  0435 01/10/23  1500    143   K 4.5 5.0   * 114*   CO2 25 25   BUN 29* 30*   CREA 1.12* 1.29*   * 111*   CA 8.9 8.9     Recent Labs     01/11/23  0435 01/10/23  1500   WBC 9.4 5.3   HGB 13.8 13.9   HCT 42.8 44.5    212     Recent Labs     01/10/23  1500   *     No results for input(s): CHOL, LDLC in the last 72 hours.     No lab exists for component: TGL, HDLC,  HBA1C      Current meds:    Current Facility-Administered Medications:     cefTRIAXone (ROCEPHIN) 1 g in 0.9% sodium chloride 10 mL IV syringe, 1 g, IntraVENous, Q24H, Parisa Abrams MD    sodium chloride (NS) flush 5-40 mL, 5-40 mL, IntraVENous, Q8H, Consuelo Mccloud MD    sodium chloride (NS) flush 5-40 mL, 5-40 mL, IntraVENous, PRN, Consuelo Mccloud MD    acetaminophen (TYLENOL) tablet 650 mg, 650 mg, Oral, Q4H PRN, Consuelo Mccloud MD    naloxone [facility]) injection 0.4 mg, 0.4 mg, IntraVENous, PRN, Consuelo Mccloud MD    ondansetron [facility]) injection 4 mg, 4 mg, IntraVENous, Q4H PRN, Consuelo Mccloud MD    lactated Ringers infusion, 50 mL/hr, IntraVENous, CONTINUOUS, Consuelo Mccloud MD, Last Rate: 50 mL/hr at 01/10/23 1830, 50 mL/hr at 01/10/23 1830    hydrALAZINE (APRESOLINE) 20 mg/mL injection 10 mg, 10 mg, IntraVENous, Q6H PRN, Tiffanie Florian MD, 10 mg at 01/11/23 0040    aspirin delayed-release tablet 81 mg, 81 mg, Oral, QHS, Aniceto Mccloud MD, 81 mg at 01/10/23 2318    atorvastatin (LIPITOR) tablet 40 mg, 40 mg, Oral, QHS, Aniceto Mccloud MD    [Held by provider] cloNIDine HCL (CATAPRES) tablet 0.1 mg, 0.1 mg, Oral, BID, Tiffanie Florian MD, 0.1 mg at 01/10/23 1650    pantoprazole (PROTONIX) tablet 40 mg, 40 mg, Oral, ACB, Aniceto Mccloud MD, 40 mg at 01/11/23 6401    pregabalin (LYRICA) capsule 50 mg, 50 mg, Oral, QHS, Aniceto Mccloud MD, 50 mg at 01/10/23 2318    traMADoL (ULTRAM) tablet 50 mg, 50 mg, Oral, Q6H PRN, Aniceto Mccloud MD    hydrALAZINE (APRESOLINE) tablet 25 mg, 25 mg, Oral, TID, Tiffanie Florian MD, 25 mg at 01/11/23 8288    atropine 0.4 mg/mL injection 1 mg, 1 mg, IntraVENous, PRN, Aniceto Mccloud MD    Current Outpatient Medications:     cloNIDine HCL (CATAPRES) 0.1 mg tablet, Take  by mouth two (2) times a day., Disp: , Rfl:     Ferrous Fumarate 325 mg (106 mg iron) tab, Take  by mouth., Disp: , Rfl:     pantoprazole (PROTONIX) 40 mg tablet, Take 40 mg by mouth daily. , Disp: , Rfl:     Polyethylene Glycol 3350 powd, by Does Not Apply route., Disp: , Rfl:     hydrALAZINE (APRESOLINE) 10 mg tablet, Take  by mouth., Disp: , Rfl:     loperamide (IMMODIUM) 2 mg tablet, Take 2 mg by mouth four (4) times daily as needed for Diarrhea., Disp: , Rfl:     guaiFENesin (ROBITUSSIN) 100 mg/5 mL liquid, Take 200 mg by mouth three (3) times daily as needed for Cough. , Disp: , Rfl:     traMADoL (ULTRAM) 50 mg tablet, Take 50 mg by mouth every six (6) hours as needed for Pain., Disp: , Rfl:     atorvastatin (Lipitor) 40 mg tablet, Take 1 Tablet by mouth nightly., Disp: 30 Tablet, Rfl: 0    acetaminophen (TYLENOL) 325 mg tablet, Take 2 Tabs by mouth every four (4) hours as needed for Pain., Disp: 20 Tab, Rfl: 0    ondansetron (Zofran ODT) 4 mg disintegrating tablet, Take 1 Tab by mouth every eight (8) hours as needed for Nausea., Disp: 15 Tab, Rfl: 0    omega-3 fatty acids-fish oil 360-1,200 mg cap, Take 2 Tabs by mouth daily. , Disp: , Rfl:     pregabalin (LYRICA) 50 mg capsule, Take 50 mg by mouth nightly.  Indications: neuropathy, Disp: , Rfl:     aspirin delayed-release 81 mg tablet, Take 81 mg by mouth nightly., Disp: , Rfl:     Sarah Copeland MD    [facility] Cardiology  Call center: [phone] (m) [phone]      Juan Simmons MD

## 2023-01-11 NOTE — ED NOTES
Bedside and Verbal shift change report given to [place] KADIE MARQUES RN (oncoming nurse) by Ramon Renner RN (offgoing nurse). Report included the following information SBAR, ED Summary, and MAR.

## 2023-01-11 NOTE — CONSULTS
Palliative team attempted to see patient this afternoon. Off floor for MRI. Has further testing pending that may impact goals of care discussions. Noted that she is DNR and has DDNR in chart. Palliative team will see tomorrow, 1/12.

## 2023-01-11 NOTE — PROGRESS NOTES
OCCUPATIONAL THERAPY EVALUATION/DISCHARGE  Patient: London Hayes (12 y.o. female)  Date: 1/11/2023  Primary Diagnosis: Seizure [facility]) [R56.9]       Precautions:  Fall    ASSESSMENT  Based on the objective data described below, the patient presents with near baseline ADL performance and mobility following admission for witnessed seizure by family. Pt today is seen in ED, is pleasant, alert, oriented x 3 and agreeable to participate. She is able to perform SPT [facility] for toileting with overall mod A x 1-2 and requires max/total A for toileting hygiene and clothing mgmt. She requires total A for LB dressing task but demonstrates fair sitting balance to participate in seated UB ADLs. Supportive grandson present who reports pt is functioning very near or at her baseline and has support from [facility] staff for all ADLs and mobility. Vitals monitored and stable but intermittent bradycardia noted  on monitor. No further skilled acute OT services indicated at this time. Recommend continued level of support at discharge. Current Level of Function (ADLs/self-care): up to max/total A for ADLs; mod A x 1-2 for close SPT    Functional Outcome Measure: The patient scored 15/100 on the Barthel outcome measure which is indicative of severe functional impairment.       Other factors to consider for discharge: very near baseline, both physically and cognitively per family     PLAN :  Recommend with staff: up OOB to chair for meals with A x 2; BSC for toileting; encourage participation in ADLs    Recommendation for discharge: (in order for the patient to meet his/her long term goals)  Return to [facility] with continued staff support for all ADLs and mobility    This discharge recommendation:  Has been made in collaboration with the attending provider and/or case management    IF patient discharges home will need the following DME: patient owns DME required for discharge       SUBJECTIVE:   Patient stated I think I'm going to have a bowel movement.  -once seated on BSC    OBJECTIVE DATA SUMMARY:   HISTORY:   Past Medical History:   Diagnosis Date    CAD (coronary artery disease)     Femur fracture ([address]     right    GERD (gastroesophageal reflux disease)     HTN (hypertension), benign 1/11/2011    Mixed hyperlipidemia 1/11/2011     Past Surgical History:   Procedure Laterality Date    HX FEMUR FRACTURE TX      HX KNEE REPLACEMENT      bilateral    HX ORTHOPAEDIC      bilat knee replacements    HX VEIN STRIPPING         Prior Level of Function/Environment/Context: resident at [facility]; requires assistance for all ADLs and mobility  Expanded or extensive additional review of patient history:   Home Situation  Home Environment: [address] [place] [address] Name:  [facility]: One story  Living Alone: No  Support Systems: Assisted Living  Patient Expects to be Discharged to[de-identified] Assisted Living  Current DME Used/Available at Home: Wheelchair, Walker, rolling    Hand dominance: Right    EXAMINATION OF PERFORMANCE DEFICITS:  Cognitive/Behavioral Status:  Neurologic State: Alert  Orientation Level: Oriented to person;Oriented to place;Oriented to time;Disoriented to situation  Cognition: Follows commands  Perception: Cues to maintain midline in standing; Tactile;Verbal;Visual  Perseveration: No perseveration noted    Range of Motion:  AROM: Generally decreased, functional (R shoulder AROM limited)    Strength:  Strength: Generally decreased, functional (R shoulder weak)    Coordination:  Coordination: Generally decreased, functional  Fine Motor Skills-Upper: Left Impaired;Right Impaired    Gross Motor Skills-Upper: Left Intact; Right Intact    Tone & Sensation:  Tone: Normal  Sensation: Intact    Balance:  Sitting: Intact  Standing: Impaired  Standing - Static: Fair;Constant support  Standing - Dynamic : Fair;Constant support    Functional Mobility and Transfers for ADLs:  Bed Mobility:  Rolling: Minimum assistance  Supine to Sit: Moderate assistance  Scooting: Moderate assistance    Transfers:  Sit to Stand: Moderate assistance;Assist x2  Stand to Sit: Moderate assistance;Assist x2  Bed to Chair: Moderate assistance;Assist x2  Toilet Transfer : Moderate assistance;Assist x2 (for [facility] To [facility])    ADL Assessment:  Feeding: Setup    Oral Facial Hygiene/Grooming: Minimum assistance    Bathing: Maximum assistance    Upper Body Dressing: Maximum assistance    Lower Body Dressing: Maximum assistance; Total assistance    Toileting: Total assistance    ADL Intervention and task modifications:  Lower Body Dressing Assistance  Socks: Total assistance (dependent)  Leg Crossed Method Used: No  Position Performed: Seated edge of bed  Cues: Don;Physical assistance    Toileting  Bladder Hygiene: Maximum assistance  Bowel Hygiene: Total assistance (dependent)  Clothing Management: Total assistance (dependent)  Cues: Physical assistance for pants down;Physical assistance for pants up; Tactile cues provided;Verbal cues provided;Visual cues provided  Adaptive Equipment: [place]; Other (comment) ([facility])    Functional Measure:    Barthel Index:  Bathin  Bladder: 0  Bowels: 5  Groomin  Dressin  Feedin  Mobility: 0  Stairs: 0  Toilet Use: 0  Transfer (Bed to Chair and Back): 5  Total: 15/100      The Barthel ADL Index: Guidelines  1. The index should be used as a record of what a patient does, not as a record of what a patient could do. 2. The main aim is to establish degree of independence from any help, physical or verbal, however minor and for whatever reason. 3. The need for supervision renders the patient not independent. 4. A patient's performance should be established using the best available evidence. Asking the patient, friends/relatives and nurses are the usual sources, but direct observation and common sense are also important. However direct testing is not needed.   5. Usually the patient's performance over the preceding 24-48 hours is important, but occasionally longer periods will be relevant. 6. Middle categories imply that the patient supplies over 50 per cent of the effort. 7. Use of aids to be independent is allowed. Score Interpretation (from [address])    Independent   60-79 Minimally independent   40-59 Partially dependent   20-39 Very dependent   <20 Totally dependent     -Chrystal Richards., Barthel, D.W. (1965). Functional evaluation: the Barthel Index. [address] ([address] (1997). The Barthel activities of daily living index: self-reporting versus actual performance in the old (> or = 75 years). Journal of [address] [place] [address] 45(7), [facility], YENI, León Licona., Chon Beltrán. (1999). Measuring the change in disability after inpatient rehabilitation; comparison of the responsiveness of the Barthel Index and Functional [place] Measure. Journal of Neurology, Neurosurgery, and Psychiatry, 66(4), 971-765. Chiquita Eagle, N.J.A, RACHEL Weaver, & Mckenna Whalen M.A. (2004) Assessment of post-stroke quality of life in cost-effectiveness studies: The usefulness of the Barthel Index and the EuroQoL-5D. Quality of Life Research, 15, 977-63     Occupational Therapy Evaluation Charge Determination   History Examination Decision-Making   MEDIUM Complexity : Expanded review of history including physical, cognitive and psychosocial  history  HIGH Complexity : 5 or more performance deficits relating to physical, cognitive , or psychosocial skils that result in activity limitations and / or participation restrictions HIGH Complexity : Patient presents with comorbidities that affect occupational performance.  Signifigant modification of tasks or assistance (eg, physical or verbal) with assessment (s) is necessary to enable patient to complete evaluation       Based on the above components, the patient evaluation is determined to be of the following complexity level: MEDIUM  Pain Rating:  Pt denied pain    Activity Tolerance:   Fair    After treatment patient left in no apparent distress:    Supine in bed, Call bell within reach, Caregiver / family present, and Side rails x 3    COMMUNICATION/EDUCATION:   The patients plan of care was discussed with: Physical therapist and Registered nurse.      Thank you for this referral.  Marie Pitts, OT  Time Calculation: 25 mins

## 2023-01-11 NOTE — PROGRESS NOTES
Spiritual Care Assessment/Progress Note  [address]      NAME: Rika Moore      MRN: [id number]  AGE: 80 y.o. SEX: female  Yazidism Affiliation: Mormonism   Language: English     1/11/2023     Total Time (in minutes): 20     Spiritual Assessment begun in [facility] EMERGENCY DEPT through conversation with:         [x]Patient        [] Family    [] Friend(s)        Reason for Consult: Palliative Care, Initial/Spiritual Assessment     Spiritual beliefs: (Please include comment if needed)     [x] Identifies with a carlita tradition:  Kike        [x] Supported by a carlita community:            [] Claims no spiritual orientation:           [] Seeking spiritual identity:                [] Adheres to an individual form of spirituality:           [] Not able to assess:                           Identified resources for coping:      [x] Prayer                               [] Music                  [] Guided Imagery     [] Family/friends                 [] Pet visits     [] Devotional reading                         [] Unknown     [] Other:                                               Interventions offered during this visit: (See comments for more details)    Patient Interventions: Initial visit (Attempted)           Plan of Care:     [] Support spiritual and/or cultural needs    [] Support AMD and/or advance care planning process      [] Support grieving process   [] Coordinate Rites and/or Rituals    [] Coordination with community clergy   [] No spiritual needs identified at this time   [] Detailed Plan of Care below (See Comments)  [] Make referral to Music Therapy  [] Make referral to Pet Therapy     [] Make referral to Addiction services  [] Make referral to [facility]  [] Make referral to Spiritual Care Partner  [] No future visits requested        [x] Contact Spiritual Care for further referrals     Comments: Initial spiritual assessment in ER. Reviewed chart prior to visit.   Miss Rob Norris rodriguez returned from tests. She indicated she belongs to the [employer] on [address] though she has been unable to attend for a couple of years and does not know how to view services on line. She indicated she would like prayer to feel better. The nurse came into the room, provided alber space for her to work with Miss Oriana Mock. Returned to her room and provided prayer and assurance of prayer. Contact Spiritual Care for any further referrals.    Michael Jaramillo., MS., [address]  [address]  [phone])

## 2023-01-11 NOTE — PROCEDURES
[address]   Electroencephalogram  Report    Procedure ID: [id number] Procedure Date: 1/11/2023   Patient Name: Muriel Cortes YOB: 1924   Procedure Type: Routine Medical Record No: [id number]       An EEG is requested in this 80-year-old lady with seizure to evaluate for epileptiform and Presidio Mcgee. Medication such will include Catapres, Protonix, Apresoline and Ultram    This tracing is obtained during the awake state. During this state the tracing is degraded in part by patient generated movement and muscle artifact. Through the discernible portions the background consists of diffuse low voltage fast frequency beta wave activity. Hyperventilation is not performed. Intermittent photic stimulation little alters the tracing. Sleep is not attained. Interpretation  This EEG recorded during the awake state is normal.  No epileptiform abnormalities are seen.       Elkin Espino MD

## 2023-01-12 LAB
ANION GAP SERPL CALC-SCNC: 5 MMOL/L (ref 5–15)
BASOPHILS # BLD: 0 K/UL (ref 0–0.1)
BASOPHILS NFR BLD: 0 % (ref 0–1)
BUN SERPL-MCNC: 24 MG/DL (ref 6–20)
BUN/CREAT SERPL: 23 (ref 12–20)
CALCIUM SERPL-MCNC: 8.8 MG/DL (ref 8.5–10.1)
CHLORIDE SERPL-SCNC: 114 MMOL/L (ref 97–108)
CO2 SERPL-SCNC: 25 MMOL/L (ref 21–32)
CREAT SERPL-MCNC: 1.06 MG/DL (ref 0.55–1.02)
DIFFERENTIAL METHOD BLD: NORMAL
EOSINOPHIL # BLD: 0.1 K/UL (ref 0–0.4)
EOSINOPHIL NFR BLD: 2 % (ref 0–7)
ERYTHROCYTE [DISTWIDTH] IN BLOOD BY AUTOMATED COUNT: 13.6 % (ref 11.5–14.5)
GLUCOSE SERPL-MCNC: 104 MG/DL (ref 65–100)
HCT VFR BLD AUTO: 43 % (ref 35–47)
HGB BLD-MCNC: 13.7 G/DL (ref 11.5–16)
IMM GRANULOCYTES # BLD AUTO: 0 K/UL (ref 0–0.04)
IMM GRANULOCYTES NFR BLD AUTO: 0 % (ref 0–0.5)
LYMPHOCYTES # BLD: 2 K/UL (ref 0.8–3.5)
LYMPHOCYTES NFR BLD: 28 % (ref 12–49)
MCH RBC QN AUTO: 30.6 PG (ref 26–34)
MCHC RBC AUTO-ENTMCNC: 31.9 G/DL (ref 30–36.5)
MCV RBC AUTO: 96 FL (ref 80–99)
MONOCYTES # BLD: 0.8 K/UL (ref 0–1)
MONOCYTES NFR BLD: 12 % (ref 5–13)
NEUTS SEG # BLD: 4.2 K/UL (ref 1.8–8)
NEUTS SEG NFR BLD: 58 % (ref 32–75)
NRBC # BLD: 0 K/UL (ref 0–0.01)
NRBC BLD-RTO: 0 PER 100 WBC
PLATELET # BLD AUTO: 203 K/UL (ref 150–400)
PMV BLD AUTO: 11.5 FL (ref 8.9–12.9)
POTASSIUM SERPL-SCNC: 4.3 MMOL/L (ref 3.5–5.1)
RBC # BLD AUTO: 4.48 M/UL (ref 3.8–5.2)
SODIUM SERPL-SCNC: 144 MMOL/L (ref 136–145)
WBC # BLD AUTO: 7.2 K/UL (ref 3.6–11)

## 2023-01-12 PROCEDURE — 74011000250 HC RX REV CODE- 250: Performed by: FAMILY MEDICINE

## 2023-01-12 PROCEDURE — 74011250636 HC RX REV CODE- 250/636: Performed by: FAMILY MEDICINE

## 2023-01-12 PROCEDURE — 74011000250 HC RX REV CODE- 250: Performed by: INTERNAL MEDICINE

## 2023-01-12 PROCEDURE — 74011250637 HC RX REV CODE- 250/637: Performed by: INTERNAL MEDICINE

## 2023-01-12 PROCEDURE — 99232 SBSQ HOSP IP/OBS MODERATE 35: CPT | Performed by: INTERNAL MEDICINE

## 2023-01-12 PROCEDURE — 74011250636 HC RX REV CODE- 250/636: Performed by: INTERNAL MEDICINE

## 2023-01-12 PROCEDURE — 99232 SBSQ HOSP IP/OBS MODERATE 35: CPT | Performed by: PSYCHIATRY & NEUROLOGY

## 2023-01-12 PROCEDURE — 85025 COMPLETE CBC W/AUTO DIFF WBC: CPT

## 2023-01-12 PROCEDURE — 96376 TX/PRO/DX INJ SAME DRUG ADON: CPT

## 2023-01-12 PROCEDURE — 36415 COLL VENOUS BLD VENIPUNCTURE: CPT

## 2023-01-12 PROCEDURE — 80048 BASIC METABOLIC PNL TOTAL CA: CPT

## 2023-01-12 PROCEDURE — 99223 1ST HOSP IP/OBS HIGH 75: CPT | Performed by: STUDENT IN AN ORGANIZED HEALTH CARE EDUCATION/TRAINING PROGRAM

## 2023-01-12 PROCEDURE — 96374 THER/PROPH/DIAG INJ IV PUSH: CPT

## 2023-01-12 PROCEDURE — G0378 HOSPITAL OBSERVATION PER HR: HCPCS

## 2023-01-12 RX ADMIN — SODIUM CHLORIDE, POTASSIUM CHLORIDE, SODIUM LACTATE AND CALCIUM CHLORIDE 50 ML/HR: 600; 310; 30; 20 INJECTION, SOLUTION INTRAVENOUS at 18:37

## 2023-01-12 RX ADMIN — SODIUM CHLORIDE, PRESERVATIVE FREE 10 ML: 5 INJECTION INTRAVENOUS at 16:52

## 2023-01-12 RX ADMIN — PREGABALIN 50 MG: 50 CAPSULE ORAL at 21:12

## 2023-01-12 RX ADMIN — SODIUM CHLORIDE, PRESERVATIVE FREE 10 ML: 5 INJECTION INTRAVENOUS at 21:00

## 2023-01-12 RX ADMIN — PANTOPRAZOLE SODIUM 40 MG: 40 TABLET, DELAYED RELEASE ORAL at 06:28

## 2023-01-12 RX ADMIN — ONDANSETRON 4 MG: 2 INJECTION INTRAMUSCULAR; INTRAVENOUS at 23:55

## 2023-01-12 RX ADMIN — CEFTRIAXONE 1 G: 1 INJECTION, POWDER, FOR SOLUTION INTRAMUSCULAR; INTRAVENOUS at 09:56

## 2023-01-12 RX ADMIN — ATORVASTATIN CALCIUM 40 MG: 20 TABLET, FILM COATED ORAL at 21:12

## 2023-01-12 RX ADMIN — SODIUM CHLORIDE, PRESERVATIVE FREE 10 ML: 5 INJECTION INTRAVENOUS at 06:28

## 2023-01-12 RX ADMIN — ASPIRIN 81 MG: 81 TABLET, COATED ORAL at 21:12

## 2023-01-12 RX ADMIN — HYDRALAZINE HYDROCHLORIDE 25 MG: 25 TABLET, FILM COATED ORAL at 16:52

## 2023-01-12 RX ADMIN — ACETAMINOPHEN 650 MG: 325 TABLET ORAL at 23:56

## 2023-01-12 NOTE — PROGRESS NOTES
This patient was assisted with Intentional Toileting every 2 hours during this shift as appropriate. Documentation of ambulation and output reflected on Flowsheet as appropriate. Purposeful hourly rounding was completed using AIDET and 5Ps. Outcomes of PHR documented as they occurred. Bed alarm in use as appropriate.   Dual Suction and ambubag in place.     -Tootie He, PCT

## 2023-01-12 NOTE — PROGRESS NOTES
Chris Diggs [facility] 79  [address]  [phone] [address] Adult  Hospitalist Group                                                                                          Hospitalist Progress Note  Shlomo Pace MD        Date of Service:  2023  NAME:  Malorie Freitas  :  1924  MRN:  [id number]      Admission Summary:   35-year-old female is admitted for evaluation of syncope and questionable seizure    Interval history / Subjective:   She denies any complaints currently     Assessment & Plan:     Seizure versus syncope  -Unremarkable head CT  -Neurology evaluated and recommends MRI and EEG  -Troponins negative, TSH in normal range  -Echo shows normal EF with some diastolic dysfunction    Bradycardia/second-degree AV block  -Frequent episodes of bradycardia down to 30s on monitor  -Cardiology consulted and ideally patient would need a pacemaker however she would not be amenable to this.  -Palliative care evaluated and placed consult to hospice which patient agreed to    UTI  -Preliminary urine cultures growing gram-negative rods  -Continue Rocephin    Hypertension  -Continue hydralazine  -Clonidine held per cardiology recommendations    CKD stage III  -Stable, continue to hydrate    Neuropathy  -On Lyrica        Code status: DNR  DVT prophylaxis: [facility] Problems  Date Reviewed: 1/10/2023            Codes Class Noted POA    Seizure ([facility] [address] ICD-10-CM: R56.9  ICD-9-CM: 780.39  1/10/2023 Yes        CKD (chronic kidney disease) stage 3, GFR 30-59 ml/min (HCC) (Chronic) ICD-10-CM: N18.30  ICD-9-CM: 585.3  2019 Yes        Frequent falls ICD-10-CM: R29.6  ICD-9-CM: V15.88  2019 Yes        Neuropathy (Chronic) ICD-10-CM: G62.9  ICD-9-CM: 355.9  2019 Yes        GERD (gastroesophageal reflux disease) (Chronic) ICD-10-CM: K21.9  ICD-9-CM: 530.81  2014 Yes        CAD (coronary artery disease) (Chronic) ICD-10-CM: I25.10  ICD-9-CM: 414.00  Unknown Yes        HTN (hypertension), benign (Chronic) ICD-10-CM: I10  ICD-9-CM: 401.1  1/11/2011 Yes        Mixed hyperlipidemia (Chronic) ICD-10-CM: W40.3  ICD-9-CM: 272.2  1/11/2011 Yes           Review of Systems:   A comprehensive review of systems was negative except for that written in the HPI. Vital Signs:    Last 24hrs VS reviewed since prior progress note. Most recent are:  Visit Vitals  BP (!) 161/73   Pulse 78   Temp 97.5 °F (36.4 °C)   Resp 14   Ht 5' 4.02\" (1.626 m)   Wt 90.7 kg (200 lb)   SpO2 92%   BMI 34.31 kg/m²         Intake/Output Summary (Last 24 hours) at 1/12/2023 1446  Last data filed at 1/12/2023 0024  Gross per 24 hour   Intake 0 ml   Output 0 ml   Net 0 ml          Physical Examination:             Constitutional:  No acute distress, frail   ENT:  Oral mucosa moist, oropharynx benign. Resp:  CTA bilaterally. No wheezing/rhonchi/rales. No accessory muscle use   CV:  Regular rhythm, normal rate, no murmurs, gallops, rubs    GI:  Soft, non distended, non tender. normoactive bowel sounds, no hepatosplenomegaly     Musculoskeletal:  No edema, warm, 2+ pulses throughout    Neurologic:  Moves all extremities. AAOx3, CN II-XII reviewed     Psych:  Good insight, Not anxious nor agitated. Data Review:    Review and/or order of clinical lab test      Labs:     Recent Labs     01/12/23  0356 01/11/23 0435   WBC 7.2 9.4   HGB 13.7 13.8   HCT 43.0 42.8    206       Recent Labs     01/12/23  0356 01/11/23  0435 01/10/23  1500    143 143   K 4.3 4.5 5.0   * 115* 114*   CO2 25 25 25   BUN 24* 29* 30*   CREA 1.06* 1.12* 1.29*   * 125* 111*   CA 8.8 8.9 8.9   MG  --  1.8 1.9       Recent Labs     01/10/23  1500   ALT 21   *   TBILI 0.4   TP 7.3   ALB 3.4*   GLOB 3.9       No results for input(s): INR, PTP, APTT, INREXT, INREXT in the last 72 hours. No results for input(s): FE, TIBC, PSAT, FERR in the last 72 hours.    No results found for: FOL, RBCF   No results for input(s): PH, PCO2, PO2 in the last 72 hours. No results for input(s): CPK, CKNDX, TROIQ in the last 72 hours.     No lab exists for component: CPKMB  Lab Results   Component Value Date/Time    Cholesterol, total 184 12/28/2021 05:33 AM    HDL Cholesterol 84 12/28/2021 05:33 AM    LDL, calculated 86.2 12/28/2021 05:33 AM    Triglyceride 69 12/28/2021 05:33 AM    CHOL/HDL Ratio 2.2 12/28/2021 05:33 AM     No results found for: [facility]  Lab Results   Component Value Date/Time    Color YELLOW/STRAW 01/11/2023 12:44 AM    Appearance CLEAR 01/11/2023 12:44 AM    Specific gravity 1.010 01/11/2023 12:44 AM    pH (UA) 8.0 01/11/2023 12:44 AM    Protein TRACE (A) 01/11/2023 12:44 AM    Glucose Negative 01/11/2023 12:44 AM    Ketone Negative 01/11/2023 12:44 AM    Bilirubin Negative 01/11/2023 12:44 AM    Urobilinogen 0.2 01/11/2023 12:44 AM    Nitrites Positive (A) 01/11/2023 12:44 AM    Leukocyte Esterase LARGE (A) 01/11/2023 12:44 AM    Epithelial cells FEW 01/11/2023 12:44 AM    Bacteria TOO NUMEROUS TO COUNT (A) 01/11/2023 12:44 AM    WBC  01/11/2023 12:44 AM    RBC 0-5 01/11/2023 12:44 AM         Medications Reviewed:     Current Facility-Administered Medications   Medication Dose Route Frequency    cefTRIAXone (ROCEPHIN) 1 g in 0.9% sodium chloride 10 mL IV syringe  1 g IntraVENous Q24H    sodium chloride (NS) flush 5-40 mL  5-40 mL IntraVENous Q8H    sodium chloride (NS) flush 5-40 mL  5-40 mL IntraVENous PRN    acetaminophen (TYLENOL) tablet 650 mg  650 mg Oral Q4H PRN    naloxone (NARCAN) injection 0.4 mg  0.4 mg IntraVENous PRN    ondansetron (ZOFRAN) injection 4 mg  4 mg IntraVENous Q4H PRN    lactated Ringers infusion  50 mL/hr IntraVENous CONTINUOUS    hydrALAZINE (APRESOLINE) 20 mg/mL injection 10 mg  10 mg IntraVENous Q6H PRN    aspirin delayed-release tablet 81 mg  81 mg Oral QHS    atorvastatin (LIPITOR) tablet 40 mg  40 mg Oral QHS    [Held by provider] cloNIDine HCL (CATAPRES) tablet 0.1 mg  0.1 mg Oral BID    pantoprazole (PROTONIX) tablet 40 mg  40 mg Oral ACB    pregabalin (LYRICA) capsule 50 mg  50 mg Oral QHS    traMADoL (ULTRAM) tablet 50 mg  50 mg Oral Q6H PRN    hydrALAZINE (APRESOLINE) tablet 25 mg  25 mg Oral TID    atropine 0.4 mg/mL injection 1 mg  1 mg IntraVENous PRN     ______________________________________________________________________  EXPECTED LENGTH OF STAY: - - -  ACTUAL LENGTH OF STAY:          0                 Christiano Garcia MD [facility]

## 2023-01-12 NOTE — PROGRESS NOTES
[facility] and [address] at [facility] at [address] [address] [address]   [phone]              Chief Complaint   Patient presents with    Seizure     Current Facility-Administered Medications   Medication Dose Route Frequency Provider Last Rate Last Admin    cefTRIAXone (ROCEPHIN) 1 g in 0.9% sodium chloride 10 mL IV syringe  1 g IntraVENous Q24H Jackie Ricks MD   1 g at 01/11/23 1018    sodium chloride (NS) flush 5-40 mL  5-40 mL IntraVENous Q8H Ivana Layton MD   10 mL at 01/12/23 6392    sodium chloride (NS) flush 5-40 mL  5-40 mL IntraVENous PRN Ivana Layton MD        acetaminophen (TYLENOL) tablet 650 mg  650 mg Oral Q4H PRN Ivana Layton MD        [facility]) injection 0.4 mg  0.4 mg IntraVENous PRN Ivana Layton MD        ondansetron [facility]) injection 4 mg  4 mg IntraVENous Q4H PRN Ivana Layton MD        lactated Ringers infusion  50 mL/hr IntraVENous CONTINUOUS Ivana Layton MD 50 mL/hr at 01/10/23 1830 50 mL/hr at 01/10/23 1830    hydrALAZINE (APRESOLINE) 20 mg/mL injection 10 mg  10 mg IntraVENous Q6H PRN Ivana Layton MD   10 mg at 01/11/23 0040    aspirin delayed-release tablet 81 mg  81 mg Oral QHS Ivana Layton MD   81 mg at 01/11/23 2245    atorvastatin (LIPITOR) tablet 40 mg  40 mg Oral QHS Ivana Layton MD   40 mg at 01/11/23 2245    [Held by provider] cloNIDine HCL (CATAPRES) tablet 0.1 mg  0.1 mg Oral BID Ivana Layton MD   0.1 mg at 01/10/23 1650    pantoprazole (PROTONIX) tablet 40 mg  40 mg Oral ACB Ivana Layton MD   40 mg at 01/12/23 4161    pregabalin (LYRICA) capsule 50 mg  50 mg Oral QHS Isabel Mccloud MD   50 mg at 01/11/23 2200    traMADoL (ULTRAM) tablet 50 mg  50 mg Oral Q6H PRN Ivana Layton MD hydrALAZINE (APRESOLINE) tablet 25 mg  25 mg Oral TID Isaias Gilbert MD   25 mg at 01/11/23 4869    atropine 0.4 mg/mL injection 1 mg  1 mg IntraVENous PRN Isaias Gilbert MD          Allergies   Allergen Reactions    Sulfa (Sulfonamide Antibiotics) Rash    Ampicillin Rash     Tolerates cefazolin    Lescol [Fluvastatin] Unknown (comments)     Social History     Tobacco Use    Smoking status: Never    Smokeless tobacco: Never   Substance Use Topics    Alcohol use: No    Drug use: No     We are following up on this 69-year-old lady who was admitted with seizure-like activity. EEG was unremarkable  MRI with age-related changes and atrophy but nothing acute and film was personally reviewed  She was seen by cardiology yesterday was noted to have heart rate dipping into the 30s or 40s. Discussion was had regarding potential pacemaker placement and the family was not too enthused about putting her through that. Palliative care has been consulted. Review of nursing notes finds no further events of seizure-like activity. Metabolic panel this morning with BUN and creatinine 24 and 1.06 otherwise unremarkable  CBC is unremarkable  Urine culture in process  Troponin unremarkable    This morning she has no new complaints. She denies any further spells. She tells me she is eating well. She slept well. She denies any chest pain. No cough. Remainder of ROS negative    Examination  Visit Vitals  /76 (BP 1 Location: Left lower arm, BP Patient Position: Semi fowlers)   Pulse 69   Temp 97.6 °F (36.4 °C)   Resp 14   Ht 5' 4\" (1.626 m)   Wt 90.7 kg (200 lb)   SpO2 92%   BMI 34.33 kg/m²   She is awake and alert. She tells me we are in [place] and the month is January and the president is [place]. She follows commands. No pronation or drift. No ataxia.       Impression/Plan  80year-old lady with an episode of seizure-like activity and this was most likely an episode of loss of consciousness/alteration in consciousness secondary to profound bradycardia  No evidence for any ictal nidus i.e. acute stroke etc. although she does have prominent atrophy but she is 80years old  EEG was unremarkable  No need to start any antiseizure medications at this point  We will be happy to return as needed    Nathalie Osullivan MD        This note was created using voice recognition software. Despite editing, there may be syntax errors.

## 2023-01-12 NOTE — PROGRESS NOTES
This patient was assisted with Intentional Toileting every 2 hours during this shift as appropriate. Documentation of ambulation and output reflected on Flowsheet as appropriate. Purposeful hourly rounding was completed using AIDET and 5Ps. Outcomes of PHR documented as they occurred. Bed alarm in use as appropriate.   Dual Suction and ambubag in place.     -Joo Garcia, JEWELL

## 2023-01-12 NOTE — PROGRESS NOTES
Palliative Medicine      Code Status: DNR    Advance Care Planning:  Advance Care Planning 2021   Patient's Healthcare Decision Maker is: Legal Next of Kin   Confirm Advance Directive None   Does the patient have other document types Do Not Resuscitate       Patient / Family Encounter Documentation    Participants (names):  Pt, Palliative Medicine (Dr. Festus Lake, Janice Godoy)    Narrative:  Pt was awake in bed, no family currently present. Pt was tired but denied other symptoms, readily engaged in conversation. Pt has been  since , son  in , dtr Angela Cordero is currently at SNF recovering from two broken bones. Pt lived alone until peter covid in 2021, now resides at [facility].. Pt needs some assistance with ADLs but stays active at facility, enjoys bingo, socializing with other residents. Pt does not have AMD on file. In absence of verified Medical POA, dtr Mickie Smiley is legal  NOK and surrogate decision maker. DDNR is on file  dated 2019. Pt was able to share circumstances leading to current hospitalization, expressed that she is not really interested in pursuing pacemaker placement, would prefer to die naturally when it's her time. Hospice was discussed as a possible option to support pt at [facility] if goal is to prioritize quality of life rather than prolongation of life. Psychosocial Issues Identified/ Resilience Factors: Pt shared that she has seen a lot over her 80 yrs, is not pleased with the current state of our country but is thankful for her many blessings, including her dtr and adult grandchildren and the [employer]. No spiritual concerns identified;  made visit on . Caregiver [place]: N/a, pt resides in a facility   Does the caregiver feel confident administering medication? N/a  Does the caregiver need any help connecting with community resources?  Pt was receptive to hospice consult  Does the caregiver feel confident assisting with activities of daily living? N/a    Goals of Care / Plan: Pt expressed that she is not interested in pursuing pacemaker at this time, hopes to return to [facility]. Pt was receptive to having hospice support in place at [facility]. Attempted to reach dtr Soila to update her re: above; Dr. Festus Lake left  requesting return call. Discussed with Care Manager. Thank you for including Palliative Medicine in the care of Ms. Tien Lopez.      Pete  DIEGO Henao, [facility]-  [phone])

## 2023-01-12 NOTE — PROGRESS NOTES
[address]                    Cardiology Care Note     []Initial Encounter     [x]Follow-up    Patient Name: Ozzie Maria - :1924 - MPI:[id number]  Primary Cardiologist: [facility]: Adrian Magdaleno MD  Consulting Cardiologist: [facility]: Claritza Schaffer MD     Reason for encounter: Bradycardia    HPI:       Ozzie Maria is a 80 y.o. female with PMH significant for CAD, HTN, HLD admitted for possible pre syncope/siezures. As per chart/pt poor historian - pt was sitting in a chair and suddenly became unresponsive/staring into space/shaking for few seconds. Not sure per chart if pt lost consciousness. No hx of bowel or bladder incontinence. No CP/dyspnea  HR noted to dip into high 30's/40's     Subjective:      Ozzie Maria reports none, mildly confused but redirects. Assessment and Plan     Bradycardia  Presyncope/? Syncope/? Seizures  Hx of CAD  HTN  HLD      Plan:  Cont Tele  Not on B blockers. Would DC clonidine/- norvasc if needed for BP  HR maintaining > 60, but now with second degree block and intermittent pauses  TTE w/ normal EF  D/w grandson - Alexx about bradycardia - possible need for PPM. He states that pt and family would probably not want to proceed with PPM if needed. No emergent need for temp wire and also if pt/family does not want PPM - would get palliative care consult  Keep atropine at bedside / keep pacer pads on      **Update: Palliative met with pt, noted plans that she does not want PPM or any other life prolonging measures. Palliative will place hospice consult, pt wishes to return to her prior living arrangements. Will sign off. Please re consult if needed. Pt personally seen and examined. Chart reviewed. Agree with advanced NP's history, exam and  A/P with changes/additons.     Blood pressure 137/65, pulse 69, temperature 97.5 °F (36.4 °C), resp. rate 18, height 5' 4.02\" (1.626 m), weight 200 lb (90.7 kg), SpO2 91 %. Elderly/confused  Neck - no JVD/  CVS - S1 S2 +; Irreg;   RS : Dec AE bilat  Abd: Soft/BS+  LE - Trace edema    A/P :    Bradycardia/Sinus Pauses/Intermittent 2nd Deg AV block - type 1 - HR in 50-60's/BP OK. Palliative care seeing pt      Nancy Lazo MD, [facility] [place]      ____________________________________________________________    Cardiac testing    01/10/23    ECHO ADULT COMPLETE 01/11/2023 1/11/2023    Interpretation Summary    Left Ventricle: Normal left ventricular systolic function with a visually estimated EF of 65 - 70%. Not well visualized. Left ventricle is smaller than normal. Mildly increased wall thickness. Findings consistent with concentric hypertrophy. Unable to assess wall motion. Diastolic dysfunction present with increased LAP with normal LV EF. Right Ventricle: Not well visualized. Right ventricle size is normal. Normal wall thickness. Aortic Valve: Tricuspid valve. Mild sclerosis of the aortic valve cusp. Mitral Valve: Not well visualized. Calcified leaflet. Mild annular calcification of the mitral valve. Signed by: Pat Tay DO on 1/11/2023  4:31 PM    12/24/21    ECHO ADULT COMPLETE 12/28/2021 12/28/2021    Interpretation Summary    Left Ventricle: Left ventricle size is normal. Moderately increased wall thickness. Unable to assess wall motion. Normal left ventricular systolic function with a visually estimated EF of 55 - 60%. Diastolic function present with increased LAP with normal LV EF. Aortic Valve: Valve structure is normal. Mildly thickened cusps. Mitral Valve: Severely thickened leaflets. Severe mitral annular calcification. Mild transvalvular regurgitation. Mild stenosis. Left Atrium: Left atrium is dilated. Right Atrium: Right atrium is mildly dilated.     Signed by: Pat Tay DO on 12/28/2021  4:22 PM    Most recent HS troponins:  Recent Labs 01/11/23  0435 01/11/23  0056 01/10/23  1644   TROPHS 25 17 12       ECG: SR/incomplete RBBB/NSST    Review of Systems:    []All other systems reviewed and all negative except as written in HPI    [x] Patient unable to provide secondary to condition    Past Medical History:   Diagnosis Date    CAD (coronary artery disease)     Femur fracture ([address]     right    GERD (gastroesophageal reflux disease)     HTN (hypertension), benign 1/11/2011    Mixed hyperlipidemia 1/11/2011     Past Surgical History:   Procedure Laterality Date    HX FEMUR FRACTURE TX      HX KNEE REPLACEMENT      bilateral    HX ORTHOPAEDIC      bilat knee replacements    HX VEIN STRIPPING       Social Hx:  reports that she has never smoked. She has never used smokeless tobacco. She reports that she does not drink alcohol and does not use drugs. Family Hx: family history includes Heart Disease in her sister; Other in her father and mother.   Allergies   Allergen Reactions    Sulfa (Sulfonamide Antibiotics) Rash    Ampicillin Rash     Tolerates cefazolin    Lescol [Fluvastatin] Unknown (comments)          OBJECTIVE:  Wt Readings from Last 3 Encounters:   01/11/23 90.7 kg (200 lb)   01/05/23 90.3 kg (199 lb)   12/30/21 89 kg (196 lb 3.2 oz)       Intake/Output Summary (Last 24 hours) at 1/12/2023 0948  Last data filed at 1/12/2023 0024  Gross per 24 hour   Intake 0 ml   Output 0 ml   Net 0 ml       Physical Exam:    Vitals:   Vitals:    01/12/23 0002 01/12/23 0024 01/12/23 0345 01/12/23 0804   BP:  (!) 132/52 (!) 110/53 132/76   Pulse: 73 71 75 69   Resp:  16 14 14   Temp:  98.5 °F (36.9 °C) 98.2 °F (36.8 °C) 97.6 °F (36.4 °C)   SpO2:  90% 96% 92%   Weight:       Height:         Telemetry:  Second degree w/ pauses     Gen: Well-developed, elderly  Neck: Supple, No JVD,   Resp: No accessory muscle use, Clear breath sounds, No rales or rhonchi  Card: Regular Rate,Rythm, Normal S1, S2, 2/6 sys murmur+  Abd:   Soft, BS+   MSK: No cyanosis  Skin: No rashes Neuro: Moving all four extremities, follows commands appropriately  Psych: Poor insight, oriented to person, place, alert,   LE: Trace edema    Data Review:     Radiology:   XR Results (most recent):  Results from Hospital Encounter encounter on 12/24/21    XR KNEE LT MAX 2 VWS    Narrative  EXAM: XR KNEE LT MAX 2 VWS    INDICATION: pain. COMPARISON: None. FINDINGS: AP and lateral views of the left knee were obtained. A knee prosthesis  is present. There is no evidence of loosening of the prosthetic components. No  knee joint effusion is evident. Impression  Presence of a left knee prosthesis as described above. Recent Labs     01/12/23  0356 01/11/23  0435    143   K 4.3 4.5   * 115*   CO2 25 25   BUN 24* 29*   CREA 1.06* 1.12*   * 125*   CA 8.8 8.9     Recent Labs     01/12/23  0356 01/11/23  0435   WBC 7.2 9.4   HGB 13.7 13.8   HCT 43.0 42.8    206     Recent Labs     01/10/23  1500   *     No results for input(s): CHOL, LDLC in the last 72 hours.     No lab exists for component: TGL, HDLC,  HBA1C      Current meds:    Current Facility-Administered Medications:     cefTRIAXone (ROCEPHIN) 1 g in 0.9% sodium chloride 10 mL IV syringe, 1 g, IntraVENous, Q24H, Janusz Resendez MD, 1 g at 01/11/23 1018    sodium chloride (NS) flush 5-40 mL, 5-40 mL, IntraVENous, Q8H, Jerrell Mccloud MD, 10 mL at 01/12/23 3712    sodium chloride (NS) flush 5-40 mL, 5-40 mL, IntraVENous, PRN, Jerrell Mccloud MD    acetaminophen (TYLENOL) tablet 650 mg, 650 mg, Oral, Q4H PRN, Jerrell Mccloud MD    [facility]) injection 0.4 mg, 0.4 mg, IntraVENous, PRN, Jerrell Mccloud MD    ondansetron [facility]) injection 4 mg, 4 mg, IntraVENous, Q4H PRN, Jerrell Mccloud MD    lactated Ringers infusion, 50 mL/hr, IntraVENous, CONTINUOUS, Jerrell Mccloud MD, Last Rate: 50 mL/hr at 01/10/23 1830, 50 mL/hr at 01/10/23 1830    hydrALAZINE (APRESOLINE) 20 mg/mL injection 10 mg, 10 mg, IntraVENous, Q6H PRN, Keisha Mccloud MD, 10 mg at 01/11/23 0040    aspirin delayed-release tablet 81 mg, 81 mg, Oral, QHS, Keisha Mccloud MD, 81 mg at 01/11/23 2245    atorvastatin (LIPITOR) tablet 40 mg, 40 mg, Oral, QHS, Keisha Mccloud MD, 40 mg at 01/11/23 2245    [Held by provider] cloNIDine HCL (CATAPRES) tablet 0.1 mg, 0.1 mg, Oral, BID, Keisha Mccloud MD, 0.1 mg at 01/10/23 1650    pantoprazole (PROTONIX) tablet 40 mg, 40 mg, Oral, ACB, eKisha Mccloud MD, 40 mg at 01/12/23 0166    pregabalin (LYRICA) capsule 50 mg, 50 mg, Oral, QHS, Keisha Mccloud MD, 50 mg at 01/11/23 2200    traMADoL (ULTRAM) tablet 50 mg, 50 mg, Oral, Q6H PRN, Keisha Mccloud MD    hydrALAZINE (APRESOLINE) tablet 25 mg, 25 mg, Oral, TID, Keisha Mccloud MD, 25 mg at 01/11/23 2245    atropine 0.4 mg/mL injection 1 mg, 1 mg, IntraVENous, PRN, MD Lori Rodriguez NP    [facility]  Call center: [phone]  (Lars Blake MD

## 2023-01-12 NOTE — WOUND CARE
Wound Consult:  New Patient Visit. Chart reviewed. Consulted for redness in gluteal cleft. In with PCT while bathing and we were able to turn/assess and provide care. Patient is resting on a Versacare bed with accumax mattress. Heels off loaded with boots at conclusion of visit. Patient is alert, oriented x 4; requires 2 person assist to move side to side in bed. John score 14, pure wick in place, heavily incontinent of urine at time of visit. Assessment:  Gluteal cleft - faintly pink to no pink, blanching, no injury. Buttocks macerated intact skin; no redness. Heels with redness bilaterally, blanching, off loaded with boots during visit. Perineal area - some areas of intact redness noted, no rash currently. Treatment:  Incontinence skin care, zinc ointment applied, brief and purewick, off loading boots, turned and repositioned. Skin Care / PI Prevention Recommendations:  1. Minimize friction/shear: minimize layers of linen/pads under patient. 2. Off load pressure/reposition:  turn and reposition approximately every 2 hours; float heels with pillows or use off loading heel boots; waffle cushion for sitting; position wedge/pillows. 3. Manage Moisture - keep skin folds dry; incontinence skin care with incontinence wipes; zinc barrier ointment; appropriate sized briefs as needed; Purewick in use to help contain urine. 4. Continue to monitor nutrition, pain, and skin risk scale, and skin assessment. Plan:  Please re-consult should concerns arise despite continued skin/PI prevention measures.     Radha Carrion, MSN, RN, [address] [facility], [facility] / [address] [facility] Office [phone]

## 2023-01-12 NOTE — ED NOTES
TRANSFER - OUT REPORT:    Verbal report given to Maldonado Hernandez RN (name) on Suad Hines  being transferred to  (unit) for routine progression of care       Report consisted of patients Situation, Background, Assessment and   Recommendations(SBAR). Information from the following report(s) SBAR, Kardex, ED Summary, MAR, Recent Results and Cardiac Rhythm Irregular Sinus Rhythm was reviewed with the receiving nurse. Lines:   Peripheral IV 01/10/23 Right Forearm (Active)   Site Assessment Clean, dry, & intact 01/11/23 1352   Phlebitis Assessment 0 01/11/23 1352   Infiltration Assessment 0 01/11/23 1352   Dressing Status Clean, dry, & intact 01/11/23 1352   Dressing Type Transparent 01/11/23 1352   Hub Color/Line Status Pink 01/11/23 1352   Alcohol Cap Used Yes 01/11/23 1352        Opportunity for questions and clarification was provided.       Patient transported with:  Monitor  Registered Nurse  Tech

## 2023-01-12 NOTE — ACP (ADVANCE CARE PLANNING)
Chris Diggs [facility]  [address] [facility], [address] [place] [address]  [phone] [address] Adult  Hospitalist Group      Advance Care Planning Note    Name: Pineda Meade  YOB: 1924  MRN: [id number]  Admission Date: 1/10/2023  1:10 PM    Date of discussion: 1/12/2023    Active Diagnoses:    Hospital Problems  Date Reviewed: 1/10/2023            Codes Class Noted POA    Seizure ([address] ICD-10-CM: R56.9  ICD-9-CM: 780.39  1/10/2023 Yes        CKD (chronic kidney disease) stage 3, GFR 30-59 ml/min (HCC) (Chronic) ICD-10-CM: N18.30  ICD-9-CM: 585.3  9/8/2019 Yes        Frequent falls ICD-10-CM: R29.6  ICD-9-CM: V15.88  9/8/2019 Yes        Neuropathy (Chronic) ICD-10-CM: G62.9  ICD-9-CM: 355.9  9/8/2019 Yes        GERD (gastroesophageal reflux disease) (Chronic) ICD-10-CM: K21.9  ICD-9-CM: 530.81  9/30/2014 Yes        CAD (coronary artery disease) (Chronic) ICD-10-CM: I25.10  ICD-9-CM: 414.00  Unknown Yes        HTN (hypertension), benign (Chronic) ICD-10-CM: I10  ICD-9-CM: 401.1  1/11/2011 Yes        Mixed hyperlipidemia (Chronic) ICD-10-CM: O26.8  ICD-9-CM: 272.2  1/11/2011 Yes           These active diagnoses are of sufficient risk that focused discussion on advance care planning is indicated in order to allow the patient to thoughtfully consider personal goals of care, and if situations arise that prevent the ability to personally give input, to ensure appropriate representation of their personal desires for different levels and aggressiveness of care. Discussion:     Persons present and participating in discussion: Odalys Muñiz MD    Discussion: Patient is currently alert and oriented and has decision-making capacity. She understands why she is in the hospital and that she needs a pacemaker however she would not want this treatment.   She states that her daughter Shavon Panda is usually the one who helps her make decisions however she is currently in a rehab facility recovering from an injury. She is okay with us communicating with her grandson Erica Guillaume. She states that she would not want any life prolonging treatments including mechanical ventilation or CPR. Her goals are to be comfortable and spend time with her family and friends. Palliative care spoke with her earlier today as well and she is amenable to receiving hospice services at her assisted living facility. Time Spent:     Total time spent face-to-face in education and discussion: 17 minutes.      Ihsan Drake MD  Date of Service:  1/12/2023  2:48 PM

## 2023-01-12 NOTE — CONSULTS
Comprehensive Nutrition Assessment    Type and Reason for Visit: Initial, Wound, Consult    Nutrition Recommendations/Plan:   Consider Easy to Chew diet - based on previous admission needs. Noted hopsice consult. Will follow PRN. Please document PO intake. Malnutrition Assessment:  Malnutrition Status:  No malnutrition (01/12/23 1315)        Nutrition Assessment:         Pt admitted with Seizure [facility]) [R56.9]    Past Medical History:   Diagnosis Date    CAD (coronary artery disease)     Femur fracture ([address]     right    GERD (gastroesophageal reflux disease)     HTN (hypertension), benign 1/11/2011    Mixed hyperlipidemia 1/11/2011       Consult received for wounds. Wound c/s pending. Noted heels - red/boggy. Likely no need for additional protein at this time. No documented pressure injuries. Pt familiar to service. Pt with mostly stable weight with gradual wt gain over the years. Pt reports eating well. No n/v reported. No c/d. Monitor plan of care. Add ONS if requested. No known food allergies. Last BM: 01/12/23, Soft    PO intake: No data found.     Wt Readings from Last 30 Encounters:   01/11/23 90.7 kg (200 lb)   01/05/23 90.3 kg (199 lb)   12/30/21 89 kg (196 lb 3.2 oz)   03/02/21 83 kg (183 lb)   01/19/20 81.6 kg (179 lb 14.3 oz)   09/08/19 83 kg (183 lb)   05/15/19 83.5 kg (184 lb)   05/09/18 84.2 kg (185 lb 9.6 oz)   11/08/17 83.3 kg (183 lb 11.2 oz)   05/10/17 82.1 kg (181 lb)   11/09/16 81.6 kg (180 lb)   03/17/16 86.1 kg (189 lb 12.8 oz)   02/07/16 82.6 kg (182 lb 3.2 oz)   09/10/15 83.9 kg (185 lb)   06/18/15 78.6 kg (173 lb 3.2 oz)   06/09/15 78.5 kg (173 lb)   05/07/15 77.1 kg (170 lb)   04/05/15 77.6 kg (171 lb)   11/30/14 81.6 kg (180 lb)   10/28/14 83 kg (183 lb)   09/30/14 82.7 kg (182 lb 6.4 oz)   09/04/14 82.6 kg (182 lb)   02/18/14 81.5 kg (179 lb 9.6 oz)   09/03/13 78.5 kg (173 lb)   08/02/13 77.1 kg (170 lb)   07/25/13 80.3 kg (177 lb)   01/24/13 78.4 kg (172 lb 12.8 oz) 07/19/12 75.3 kg (166 lb)   01/19/12 77.6 kg (171 lb)   07/19/11 80.5 kg (177 lb 6.4 oz)           Nutrition Related Findings:      Wound Type: boggy/red heels    Edema: No data recorded      Current Nutrition Intake & Therapies:  Average Meal Intake: 51-75%  Average Supplement Intake: None ordered  ADULT DIET Regular    Anthropometric Measures:  Height: 5' 4.02\" (162.6 cm)  Ideal Body Weight (IBW): 120 lbs (55 kg)     Current Body Wt:  90.7 kg (199 lb 15.3 oz), 166.6 % IBW. Not specified  Current BMI (kg/m2): 34.3        Weight Adjustment: No adjustment                 BMI Category: Obese class 1 (BMI 30.0-34. 9)    Estimated Daily Nutrient Needs:  Energy Requirements Based On: Formula  Weight Used for Energy Requirements: Current  Energy (kcal/day): 1528  Weight Used for Protein Requirements: Current  Protein (g/day): 91  Method Used for Fluid Requirements: 1 ml/kcal  Fluid (ml/day): 1525    Nutrition Diagnosis:   Increased nutrient needs related to increased demand for energy/nutrients as evidenced by wounds    Nutrition Interventions:   Food and/or Nutrient Delivery: Continue current diet, Start oral nutrition supplement  Nutrition Education/Counseling: Education not indicated  Coordination of Nutrition Care: Continue to monitor while inpatient, Interdisciplinary rounds       Goals:     Goals: Meet at least 75% of estimated needs, by next RD assessment       Nutrition Monitoring and Evaluation:   Behavioral-Environmental Outcomes: None identified  Food/Nutrient Intake Outcomes: Food and nutrient intake, Supplement intake, IVF intake  Physical Signs/Symptoms Outcomes: Biochemical data, Weight    Discharge Planning:    Continue current diet    Matthew Thomas, [address]: [phone]

## 2023-01-12 NOTE — CONSULTS
Palliative Medicine Consult  Reddy: [phone] ([phone])    Patient Name: Rika Moore  YOB: 1924    Date of Initial Consult: 1/11/2023  Reason for Consult: Care Decisions  Requesting Provider: Jones Allen MD   Primary Care Physician: Ashley Dang MD     SUMMARY:   Rika Moore is a 80 y.o. female with a past history of CAD, HTN, and HLD who was admitted on 1/10/2023 from [address] [place] [address]  with a diagnosis of possible seziure/syncope. Was at her facility and had an episode of apparent shaking and unresponsiveness lasting less than a minute and seemed confused afterwards. Patient recalls episode but not immediate aftermath, felt in normal state of health prior to event. No recollection of symptoms prior to episode and no bowel/bladder incontinence after. Subsequently brought to [facility] for evaluation. Found to have UTI and being treated for this. Neurology consulted with MRI and EEG performed showing no acute abnormalities. Has been noted to have intermittent bradycardia, Cardiology consulted and evaluating for PPM placement. Current medical issues leading to Palliative Medicine involvement include: Care Decisions. Social Hx:  since 1969,  passed from cancer. 2 children: daughter Otoniel Roman and son who passed away 1996 from cancer. Son's wife/DIL Otoniel Roman remains involved in care. Several grandchildren also assist with care. Previously worked for [employer]. Lives at [address] [place] [address] . Requires assistance with dressing and bathing. Enjoys playing [employer] [address] and spending time with friends and family at her facility. PALLIATIVE DIAGNOSES:   Bradycardia  Seizure vs Syncope  Physical Debility  Goals of Care  Palliative Care Encounter       PLAN:   Goals of Care  Met with patient at bedside, she is awake and conversant. Understands she came to hospital after an episode where she lost consciousness.   Discussed current workup including Neuro consult with no acute findings on MRI or EEG and Cards consult with bradycardia. She understands that pacemaker was discussed but does not want this or other artifical things in her body. Discussed what this means and that it could lead syncopal episodes with potential injury and could even be life ending which patient acknowledges and is able to communicate back to me, stating \"I guess it would be my time to go then. \"  Voices main goal is just to get back to her facility, be with friends there, visit with family, and be comfortable. Discussed potential role of Hospice to provide additional support at her facility and assist with care going forward. She is amenable to this. Patient does voice she makes her own decision and cognition does seem intact. She asks that we call her daughter to discuss decisions made today though notes daughter is in rehab after an injury. Attempted to call daughter/NOK but no answer. Certainly would like family to be aware of decisions made but patient appears to have capacity for this decision. Will go ahead and place hospice consult. Continue attempts to reach family. Surrogate:  No AMD on file. Daughter Sarah Nielsen is legal NOK. Code Status:  DNR. DDNR on file  Initial consult note routed to primary continuity provider and/or primary health care team members  Communicated plan of care with: Palliative Ryan CAROLINA [facility] Team    Addendum:  Remain unable to reach patient's daughter. Called and communicated above information to patient's grandson Wali. He voices that he would wish to talk to Cardiology again with his aunt/patient's daughter present along with patient to discuss details of procedure, worrying patient does not have the ability to make this decision. I communicated that her decision and reasoning do seem appropriate to me but I will certainly relay message to Cardiology.        GOALS OF CARE / TREATMENT PREFERENCES:     GOALS OF CARE:  Patient/Health Care Proxy Stated Goals:  (Get back to facility, spend time with family.)    TREATMENT PREFERENCES:   Code Status: DNR    Patient and family's personal goals include: return to facility, be comfortable, time with family/friends    Important upcoming milestones or family events: none reported    The patient identifies the following as important for living well: none reported      Advance Care Planning:  [x] [facility] Interdisciplinary Team has updated the ACP Navigator with [employer] and Patient Capacity      Primary Decision Maker: Adan Fulton Child - [phone]  Advance Care Planning 1/12/2023   Patient's Healthcare Decision Maker is: Legal Next of Jason [address] Directive None   Does the patient have other document types Do Not Resuscitate               Other:    As far as possible, the palliative care team has discussed with patient / health care proxy about goals of care / treatment preferences for patient.      HISTORY:     History obtained from: medical chart, patient    CHIEF COMPLAINT: syncope    HPI/SUBJECTIVE:    The patient is:   [x] Verbal and participatory  [] Non-participatory due to:      Clinical Pain Assessment (nonverbal scale for severity on nonverbal patients):   Clinical Pain Assessment  Severity: 0          Duration: for how long has pt been experiencing pain (e.g., 2 days, 1 month, years)  Frequency: how often pain is an issue (e.g., several times per day, once every few days, constant)     FUNCTIONAL ASSESSMENT:     Palliative Performance Scale (PPS):  PPS: 50       PSYCHOSOCIAL/SPIRITUAL SCREENING:     Palliative IDT has assessed this patient for cultural preferences / practices and a referral made as appropriate to needs (Cultural Services, Patient Advocacy, Ethics, etc.)    Any spiritual / Amish concerns:  [] Yes /  [x] No   If \"Yes\" to discuss with pastoral care during IDT     Does caregiver feel burdened by caring for their loved one:   [] Yes /  [x] No /  [] No Caregiver Present/Available [] No Caregiver [] Pt Lives at Facility  If \"Yes\" to discuss with social work during IDT    Anticipatory grief assessment:   [x] Normal  / [] Maladaptive     If \"Maladaptive\" to discuss with social work during IDT    ESAS Anxiety: Anxiety: 0    ESAS Depression:          REVIEW OF SYSTEMS:     Positive and pertinent negative findings in ROS are noted above in HPI. The following systems were [x] reviewed / [] unable to be reviewed as noted in HPI  Other findings are noted below. Systems: constitutional, ears/nose/mouth/throat, respiratory, gastrointestinal, genitourinary, musculoskeletal, integumentary, neurologic, psychiatric, endocrine. Positive findings noted below. Modified ESAS Completed by: provider           Pain: 0   Anxiety: 0     Anorexia: 0 Dyspnea: 0     Constipation: No              PHYSICAL EXAM:     From RN flowsheet:  Wt Readings from Last 3 Encounters:   01/11/23 200 lb (90.7 kg)   01/05/23 199 lb (90.3 kg)   12/30/21 196 lb 3.2 oz (89 kg)     Blood pressure 125/60, pulse 64, temperature 97.5 °F (36.4 °C), resp. rate 14, height 5' 4\" (1.626 m), weight 200 lb (90.7 kg), SpO2 92 %.     Pain Scale 1: Numeric (0 - 10)  Pain Intensity 1: 0                 Last bowel movement, if known:     Constitutional: sitting up in bed, no distress  Eyes: pupils equal, anicteric  ENMT: no nasal discharge, moist mucous membranes  Cardiovascular: regular rate, distal pulses intact, episode of bradycardia to 40s during visit  Respiratory: breathing not labored, symmetric chest rise  Gastrointestinal: soft non-tender, +bowel sounds  Musculoskeletal: no deformity, no tenderness to palpation  Skin: warm, dry  Neurologic: AAOx4, following commands, moving all extremities, interactive, thinking logical, speech coherent  Psychiatric: full affect, no hallucinations, pleasant     HISTORY:     Active Problems:    HTN (hypertension), benign (1/11/2011)      Mixed hyperlipidemia (1/11/2011)      GERD (gastroesophageal reflux disease) (2014)      CAD (coronary artery disease) ()      CKD (chronic kidney disease) stage 3, GFR 30-59 ml/min ([facility] (2019)      Frequent falls (2019)      Neuropathy (2019)      Seizure ([facility] (1/10/2023)    Past Medical History:   Diagnosis Date    CAD (coronary artery disease)     Femur fracture (HCC)     right    GERD (gastroesophageal reflux disease)     HTN (hypertension), benign 2011    Mixed hyperlipidemia 2011      Past Surgical History:   Procedure Laterality Date    HX FEMUR FRACTURE TX      HX KNEE REPLACEMENT      bilateral    HX ORTHOPAEDIC      bilat knee replacements    HX VEIN STRIPPING        Family History   Problem Relation Age of Onset    Other Mother          of renal failure, not sure what caused id    Other Father         something with throat, not sure if it was cancer    Heart Disease Sister       History reviewed, no pertinent family history.   Social History     Tobacco Use    Smoking status: Never    Smokeless tobacco: Never   Substance Use Topics    Alcohol use: No     Allergies   Allergen Reactions    Sulfa (Sulfonamide Antibiotics) Rash    Ampicillin Rash     Tolerates cefazolin    Lescol [Fluvastatin] Unknown (comments)      Current Facility-Administered Medications   Medication Dose Route Frequency    cefTRIAXone (ROCEPHIN) 1 g in 0.9% sodium chloride 10 mL IV syringe  1 g IntraVENous Q24H    sodium chloride (NS) flush 5-40 mL  5-40 mL IntraVENous Q8H    sodium chloride (NS) flush 5-40 mL  5-40 mL IntraVENous PRN    acetaminophen (TYLENOL) tablet 650 mg  650 mg Oral Q4H PRN    naloxone (NARCAN) injection 0.4 mg  0.4 mg IntraVENous PRN    ondansetron (ZOFRAN) injection 4 mg  4 mg IntraVENous Q4H PRN    lactated Ringers infusion  50 mL/hr IntraVENous CONTINUOUS    hydrALAZINE (APRESOLINE) 20 mg/mL injection 10 mg  10 mg IntraVENous Q6H PRN    aspirin delayed-release tablet 81 mg  81 mg Oral QHS    atorvastatin (LIPITOR) tablet 40 mg  40 mg Oral QHS    [Held by provider] cloNIDine HCL (CATAPRES) tablet 0.1 mg  0.1 mg Oral BID    pantoprazole (PROTONIX) tablet 40 mg  40 mg Oral ACB    pregabalin (LYRICA) capsule 50 mg  50 mg Oral QHS    traMADoL (ULTRAM) tablet 50 mg  50 mg Oral Q6H PRN    hydrALAZINE (APRESOLINE) tablet 25 mg  25 mg Oral TID    atropine 0.4 mg/mL injection 1 mg  1 mg IntraVENous PRN          LAB AND IMAGING FINDINGS:     Lab Results   Component Value Date/Time    WBC 7.2 01/12/2023 03:56 AM    HGB 13.7 01/12/2023 03:56 AM    PLATELET 193 48/25/5169 03:56 AM     Lab Results   Component Value Date/Time    Sodium 144 01/12/2023 03:56 AM    Potassium 4.3 01/12/2023 03:56 AM    Chloride 114 (H) 01/12/2023 03:56 AM    CO2 25 01/12/2023 03:56 AM    BUN 24 (H) 01/12/2023 03:56 AM    Creatinine 1.06 (H) 01/12/2023 03:56 AM    Calcium 8.8 01/12/2023 03:56 AM    Magnesium 1.8 01/11/2023 04:35 AM    Phosphorus 2.7 12/30/2021 10:06 AM      Lab Results   Component Value Date/Time    Alk. phosphatase 132 (H) 01/10/2023 03:00 PM    Protein, total 7.3 01/10/2023 03:00 PM    Albumin 3.4 (L) 01/10/2023 03:00 PM    Globulin 3.9 01/10/2023 03:00 PM     Lab Results   Component Value Date/Time    INR 1.0 05/07/2015 08:40 PM    INR, External 1.1 05/20/2015 12:00 AM    Prothrombin time 10.0 05/07/2015 08:40 PM    aPTT 32.1 05/07/2015 08:40 PM      No results found for: IRON, FE, TIBC, IBCT, PSAT, FERR   Lab Results   Component Value Date/Time    pH 7.45 12/24/2021 11:01 AM    PCO2 29 (L) 12/24/2021 11:01 AM    PO2 83 12/24/2021 11:01 AM     No components found for: [place]   Lab Results   Component Value Date/Time    CK 38 04/05/2015 08:21 PM    CK - MB <0.5 (L) 04/05/2015 08:21 PM                Total time:   Counseling / coordination time, spent as noted above:   > 50% counseling / coordination?:     Prolonged service was provided for  []30 min   []75 min in face to face time in the presence of the patient, spent as noted above.   Time Start: Time End:   Note: this can only be billed with 09813 (initial) or 79609 (follow up). If multiple start / stop times, list each separately.

## 2023-01-12 NOTE — PROGRESS NOTES
Bedside and Verbal shift change report given to [facility] (oncoming nurse) by Keara (offgoing nurse). Report included the following information SBAR, Kardex, ED Summary, Intake/Output, MAR, Recent Results, and Cardiac Rhythm NSR/SB . No restrictions

## 2023-01-12 NOTE — ROUTINE PROCESS
Bedside and Verbal shift change report given to Chaim Vallejo RN (oncoming nurse) by Hal Halsted, RN (offgoing nurse). Report included the following information Kardex and MAR.

## 2023-01-12 NOTE — PROGRESS NOTES
1426:  Order for hospice noted. [facility] was called. The preferred hospice is [facility]. A referral was sent in [facility]. Reason for Admission: Seizure  Patient has a past medical history of CAD, HTN, XOL admitted for syncope v. Seizure. RUR Score:         N/A            Plan for utilizing home health:     none     PCP: First and Last name:  Day Quick MD     Name of Practice:    Are you a current patient: Yes/No: yes   Approximate date of last visit: >1 year ago   Can you participate in a virtual visit with your PCP:  yes                    Current Advanced Directive/Advance Care Plan: DNR  Yes, verified. Healthcare Decision Maker:   Click here to complete [address] including selection of the Healthcare Decision Maker Relationship (ie \"Primary\")             Primary Decision Maker: Mariaelena Angulo - Ying - [phone]                  Transition of Care Plan:                    Met with pt for d/c planning. Pt lives at [facility] on 1 level with no steps. She gave up driving at 80 and now relies on her family or the [employer] for transport. She is largely confined to a w/c although she stated she is able to take a few steps. Pt needs assistance with ADL's. Other DME: RW, shower seat, grab bars. Medications are from [facility] [facility] on [employer]. PT/OT-no needs  Nutrition consult  Wound care consult  Palliative care following  Neurology following  Cardiology following--possible pacemaker  CM following for d/c needs  Family to transport home at d/c    Care Management Interventions  Mode of Transport at Discharge: Other (see comment)  Physical Therapy Consult: Yes  Occupational Therapy Consult: Yes  Support Systems: Child(anish), [employer], Assisted Living, Other Family Member(s)  Confirm Follow Up Transport: Family  Discharge Location  Patient Expects to be Discharged to[de-identified] Home with hospice  MARKUS Lanza

## 2023-01-12 NOTE — PROGRESS NOTES
0700 Bedside and Verbal shift change report given to [address] (oncoming nurse) by Carmelita Riddle RN (offgoing nurse). Report included the following information SBAR, Kardex, ED Summary, Intake/Output, MAR, Recent Results, and Cardiac Rhythm SB/NSR . This patient was assisted with Intentional Toileting every 2 hours during this shift as appropriate. Documentation of ambulation and output reflected on Flowsheet as appropriate. Purposeful hourly rounding was completed using AIDET and 5Ps. Outcomes of PHR documented as they occurred. Bed alarm in use as appropriate. Dual Suction and ambubag in place. [address] Updated Pt Gustavo Keyes about Pt plan of care, all questions answered. 1800 Unable to obtain PTA Med Rec due to Pt unable to recall specifics about medications. Will notify Night shift RN. 1930 Bedside and Verbal shift change report given to [address] (oncoming nurse) by Ishan Tomlin RN (offgoing nurse). Report included the following information SBAR, Kardex, ED Summary, Intake/Output, MAR, Recent Results, and Cardiac Rhythm SB/NSR .

## 2023-01-12 NOTE — PROGRESS NOTES
TRANSFER - IN REPORT:    Verbal report received from Deedee(ward) on Clarice Isaac  being received from ED(unit) for routine progression of care      Report consisted of patients Situation, Background, Assessment and   Recommendations(SBAR). Information from the following report(s) SBAR, Kardex, ED Summary, MAR, and Cardiac Rhythm SR/SB  was reviewed with the receiving nurse. Opportunity for questions and clarification was provided. Assessment completed upon patients arrival to unit and care assumed.

## 2023-01-12 NOTE — PROGRESS NOTES
Problem: Falls - Risk of  Goal: *Absence of Falls  Description: Document Bertrum Mast Fall Risk and appropriate interventions in the flowsheet. Outcome: Progressing Towards Goal  Note: Fall Risk Interventions:  Mobility Interventions: PT Consult for assist device competence, PT Consult for mobility concerns         Medication Interventions: Bed/chair exit alarm    Elimination Interventions: Call light in reach              Problem: Patient Education: Go to Patient Education Activity  Goal: Patient/Family Education  Outcome: Progressing Towards Goal     Problem: Pressure Injury - Risk of  Goal: *Prevention of pressure injury  Description: Document John Scale and appropriate interventions in the flowsheet.   Outcome: Progressing Towards Goal  Note: Pressure Injury Interventions:  Sensory Interventions: Assess changes in LOC, Keep linens dry and wrinkle-free, Minimize linen layers    Moisture Interventions: Absorbent underpads, Minimize layers    Activity Interventions: Assess need for specialty bed, PT/OT evaluation    Mobility Interventions: HOB 30 degrees or less, Float heels    Nutrition Interventions: Document food/fluid/supplement intake    Friction and Shear Interventions: Minimize layers                Problem: Patient Education: Go to Patient Education Activity  Goal: Patient/Family Education  Outcome: Progressing Towards Goal

## 2023-01-12 NOTE — ACP (ADVANCE CARE PLANNING)
Primary Decision MakerTomkwaku De La Rosa - [phone]  Advance Care Planning 1/12/2023   Patient's Healthcare Decision Maker is: Legal Next of Kin   Confirm Advance Directive None   Does the patient have other document types Do Not Resuscitate      Pt does not have AMD on file. In absence of verified Medical POA, dtr Eder Suarez is legal  NOK and surrogate decision maker. DDNR is on file  dated 9/13/2019.

## 2023-01-13 ENCOUNTER — TELEPHONE (OUTPATIENT)
Dept: CARDIOLOGY CLINIC | Age: 88
End: 2023-01-13

## 2023-01-13 PROBLEM — G93.41 ACUTE METABOLIC ENCEPHALOPATHY: Status: ACTIVE | Noted: 2023-01-13

## 2023-01-13 PROBLEM — I44.1 HEART BLOCK AV SECOND DEGREE: Status: ACTIVE | Noted: 2023-01-13

## 2023-01-13 PROCEDURE — 74011250637 HC RX REV CODE- 250/637: Performed by: INTERNAL MEDICINE

## 2023-01-13 PROCEDURE — APPSS30 APP SPLIT SHARED TIME 16-30 MINUTES: Performed by: NURSE PRACTITIONER

## 2023-01-13 PROCEDURE — 74011250636 HC RX REV CODE- 250/636: Performed by: FAMILY MEDICINE

## 2023-01-13 PROCEDURE — 99231 SBSQ HOSP IP/OBS SF/LOW 25: CPT | Performed by: INTERNAL MEDICINE

## 2023-01-13 PROCEDURE — G0378 HOSPITAL OBSERVATION PER HR: HCPCS

## 2023-01-13 PROCEDURE — 96376 TX/PRO/DX INJ SAME DRUG ADON: CPT

## 2023-01-13 PROCEDURE — 65270000029 HC RM PRIVATE

## 2023-01-13 PROCEDURE — 74011000250 HC RX REV CODE- 250: Performed by: FAMILY MEDICINE

## 2023-01-13 RX ORDER — AMLODIPINE BESYLATE 5 MG/1
5 TABLET ORAL DAILY
Qty: 30 TABLET | Refills: 0 | Status: SHIPPED
Start: 2023-01-13

## 2023-01-13 RX ORDER — CEFDINIR 300 MG/1
300 CAPSULE ORAL 2 TIMES DAILY
Qty: 10 CAPSULE | Refills: 0 | Status: SHIPPED
Start: 2023-01-13 | End: 2023-01-19

## 2023-01-13 RX ORDER — AMLODIPINE BESYLATE 5 MG/1
5 TABLET ORAL DAILY
Status: DISCONTINUED | OUTPATIENT
Start: 2023-01-13 | End: 2023-01-19 | Stop reason: HOSPADM

## 2023-01-13 RX ADMIN — ASPIRIN 81 MG: 81 TABLET, COATED ORAL at 22:59

## 2023-01-13 RX ADMIN — CEFTRIAXONE 1 G: 1 INJECTION, POWDER, FOR SOLUTION INTRAMUSCULAR; INTRAVENOUS at 09:39

## 2023-01-13 RX ADMIN — PANTOPRAZOLE SODIUM 40 MG: 40 TABLET, DELAYED RELEASE ORAL at 09:43

## 2023-01-13 RX ADMIN — PREGABALIN 50 MG: 50 CAPSULE ORAL at 22:59

## 2023-01-13 RX ADMIN — ATORVASTATIN CALCIUM 40 MG: 20 TABLET, FILM COATED ORAL at 22:59

## 2023-01-13 RX ADMIN — AMLODIPINE BESYLATE 5 MG: 5 TABLET ORAL at 14:27

## 2023-01-13 RX ADMIN — HYDRALAZINE HYDROCHLORIDE 25 MG: 25 TABLET, FILM COATED ORAL at 09:35

## 2023-01-13 NOTE — PROGRESS NOTES
Bedside and Verbal shift change report given to [address] (oncoming nurse) by Claudette Gust (offgoing nurse). Report included the following information SBAR, Kardex, ED Summary, OR Summary, Procedure Summary, Intake/Output, MAR, Recent Results, Procedure Verification, and Quality Measures. Pt has bradycardia. Hr rate ranges as low as 30-60s while resting. When she is awake, itll range 60-70s    Around Midnight patient stated she felt nauseated and had back pain from the way she was laying. Medication was given and helpful.

## 2023-01-13 NOTE — ADT AUTH CERT NOTES
Seizure - Care Day 4 (1/13/2023) by Kylah Melendrez RN       Review Status Review Entered   Completed 1/13/2023 1451       Created By   Kylah Melendrez RN      Criteria Review      Care Day: 4 Care Date: 1/13/2023 Level of Care: Inpatient Floor    Guideline Day 2    Level Of Care    (X) Neurologic unit or floor    1/13/2023 14:51:14 EST by Geovanna Obregon      medical bed    Clinical Status    (X) * Hypotension absent    1/13/2023 14:51:14 EST by Geovanna Obregon      VS: T: 97.6, B/P: 121/48, HR 66, RR 18, SPO2 91 RA    (X) * Seizures absent or reduced    1/13/2023 14:51:14 EST by Geovanna Obregon      not noted    ( ) * Mental status at baseline    1/13/2023 14:51:14 EST by Geovanna Obregon      poor insight, oriented to person, place    (X) * No new neurologic deficits    1/13/2023 14:51:14 EST by Geovanna Obregon      not noted    (X) * Hypoglycemia absent    1/13/2023 14:51:14 EST by Geovanna Obregon      not noted    (X) * Electrolyte abnormality absent or improved    1/13/2023 14:51:14 EST by Geovanna Obregon      no labs today  not noted    ( ) * No evidence of seizure etiology requiring inpatient care    Activity    (X) Activity as tolerated    1/13/2023 14:51:14 EST by Geovanna Obregon      ambulate w/ assist    Routes    (X) Oral hydration as tolerated    1/13/2023 14:51:14 EST by Geovanna Obregon      adult diet regular    (X) Oral medications as tolerated    1/13/2023 14:51:14 EST by Geovanna Obregon      norvasc 5mg po daily,asa 81mg po qhs,lipitor 410mg po qhs,protonix 40mg po daily,lyrica 50mg po qhs,hydralazine 25mg po 3x/day,    (X) Oral diet as tolerated    1/13/2023 14:51:14 EST by Geovanna Obregon      adult diet regular    1/13/2023 14:51:14 EST by Geovanna Obregon    Subject: Additional Clinical Information    Other meds      rocephin 1gm iv q 24hrs       * Milestone   Additional Notes   1/13/2023      ELECTROPHYSIOLOGY   Assessment and Plan   Bradycardia   Presentation was unclear as patient is a poor historian. Not clear if she truly lost consciousness. On clonidine at home with labile blood pressure. EKG independently reviewed by me demonstrated sinus bradycardia at 56 bpm, MA interval around 200 ms, left anterior fascicular block with QRS duration of 98 ms. Telemetry independently reviewed by me demonstrated no evidence of heart block. She she had a few sinoexit block with pauses all less than 2 seconds occurring during sleeping hours. She has occasional sinus bradycardia but heart rate trend has been around 60 bpm.  There was an EKG isolated which was called junctional rhythm but in fact it sinus bradycardia. -Echocardiogram demonstrated normal LVEF   - Patient at this point shows no evidence of persistent heart block. I have not seen any tracings of heart block. No clear correlation of initial event relating to arrhythmia. Because she is on clonidine, this could have caused transient hypotension and or bradycardia. Given her age and labile blood pressure and slow baseline heart rate, I would recommend maintaining off of clonidine and/or any sherif blocking agents in the future   - Avoid sherif blocking agents, stop clonidine, use other blood pressure agents if necessary although conservative treatment of her blood pressure is reasonable given her advanced age. - Intermittent sinoexit pauses all less than 2 seconds occurring during sleeping hours. Avoid offending agents. She has declined pacemaker and does not want invasive therapies which I think is appropriate as there is no definitive need for pacemaker implantation at this time. Sick sinus syndrome   Slow baseline heart rate. Likely combination of advanced age as well as being on home clonidine.   - Telemetry demonstrated no evidence of prolonged pause greater than 3 to 5 seconds.    - Bradycardia typically occurs during sleeping hours in the setting of elevated vagal tone      Hypertension   - Stop clonidine   - Avoid beta-blocker or sherif blocking agents   - Conservative management as hypotension could be a cause of her symptoms given her blood pressure runs on the soft side at times       I tried calling her grandson but only got voicemail. Patient does not want to proceed with pacemaker. Given she only has sinus bradycardia with pauses less than 2 seconds and presentation could be due to being on clonidine with transient hypotension/bradycardia, her heart rate remains in the 60s at this point. I see no definitive need for pacemaker implantation at this time particularly when she is against the idea of invasive procedure. EP will sign off at this time, please call if we have any further questions or concerns. CARDIOLOGY   Assessment and Plan   Bradycardia likely r/t SSS    Hx of CAD   HTN   HLD        Plan:   Do NOT resume clonidine, avoid BB/AV sherif agents   Pt met w/ palliative and did not want pacer but family wished to have further discussion   Discussed w/ Dr. Kelle Lim from EP and she is not a candidate for pacer at this time (see note)      INTERNAL MED   Subjective:   Chief Complaint:  Patient was personally seen and examined by me during this time period. Chart reviewed. No chest pain, SOB.   Spoke to granddaughter at bedside       Physical Exam:   Gen:    Elderly, ill-appearing, frail, NAD   HEENT:  Pink conjunctivae, PERRL, hearing intact to voice, moist mucous membranes   Neck:  Supple, without masses, thyroid non-tender   Resp:  No accessory muscle use, clear breath sounds without wheezes rales or rhonchi   Card:   No murmurs, normal S1, S2 without thrills, bruits or peripheral edema   Abd:  Soft, non-tender, non-distended, normoactive bowel sounds are present   Lymph:  No cervical or inguinal adenopathy   Musc:  No cyanosis or clubbing   Skin:   No rashes   Neuro:  moves all ext   Psych:  poor insight, oriented to person, place        Assessment / Plan:   79 yo hx of HTN, CAD, CKD 3, presented w/ AMS, seizure-like activity, bradycardia, 2nd AV-block       1) Bradycardia/2nd degree AV block: now stable. Patient and Family declined pacer due to advanced age. Cards recommended stopping clonidine. CM to set up SNF. Might need hospice soon       2) Acute met encephalopathy: likely from bradycardia. No evidence of seizures on EEG. 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 No further management per Neuro       3) UTI: urine Cx w/ gram neg. S/p IV CTX. Will complete a course of omnicef on discharge       4) HTN: stopped hydralazine, clonidine. Started norvasc        Code: DNR                                                                                                      Care Plan discussed with: Patient, nursing, family        Discussed:  Care Plan       Prophylaxis:  SCD's       Disposition:  SNF/LTC                 PA RECOMMENDATION by Ej Ann RN       Review Status Review Entered   In Primary 2023 0824       Created By   Ej Ann RN      Criteria Review   obs list upgrade            We recommend that the following pt's hospitalization under OBSERVATION [104] status is upgraded to INPATIENT If you agree, please place a new ADMIT order in [facility] as recommended.       Name: Carmel Schirmer   : 1924   [facility]# : [id number]  Insurance: [facility]     Clinical summary patient presented with loss of consciousness  Vitals bradycardia  Labs and Imaging baseline  MCG criteria applies YES  Comments patient presented with loss of consciousness, concern for seizure versus syncope, MRI of the brain with no acute CVA but concern for seizures, patient with severe bradycardia, second-degree AV block, palliative care involved, patient decided to be on hospice, work-up in progress, needing medical optimization      This chart was reviewed at 6:47 AM 2023    Stefanie Casey MD   Physician Sergio Coffey [address] [place] [address] [phone]

## 2023-01-13 NOTE — PALLIATIVE CARE DISCHARGE
The Palliative Medicine team was consulted as part of your / your loved one's care in the hospital. Our team is a supportive service; we strive to relieve suffering and improve quality of life. You identified the following goal(s) as your main focus for healthcare: Patient/Health Care Proxy Stated Goals: Return to [facility], spend time with family    We reviewed advance care planning information, which includes the following:  Advance Care Planning 1/12/2023   Patient's Healthcare Decision Maker is: Legal Next of Jason 296 Directive None   Does the patient have other document types Do Not Resuscitate       We reviewed / discussed your code status as: DNR     Full Code means perform CPR in the event of cardiac arrest     [facility] means do NOT perform CPR in the event of cardiac arrest     Partial Code means you have specific preferences, please discuss with your health care team     No Order means this issue was not addressed / resolved during your stay    You have a Durable Do Not Resuscitate Order in place, which should travel with you. When you are in a facility, this form should be placed on your chart. Once you are home, it is recommended that the [facility] form be placed in a visible location such as on the refrigerator or bedroom door. Because of the importance of this information, we are providing you with a printed copy to share with other healthcare providers after this hospitalization is complete. If any of the above information is incomplete or incorrect, please contact the Palliative Medicine team at [phone].

## 2023-01-13 NOTE — PROGRESS NOTES
Chris Diggs gianni Eugene [address]  [address] [place] [address], [address] [place] [address]  [phone]      Hospitalist Progress Note      NAME: Alli Jacobsen   :  1924  MRM:  [id number]    Date/Time of service: 2023  10:22 AM       Subjective:     Chief Complaint:  Patient was personally seen and examined by me during this time period. Chart reviewed. No chest pain, SOB. Spoke to granddaughter at bedside       Objective:       Vitals:       Last 24hrs VS reviewed since prior progress note.  Most recent are:    Visit Vitals  BP (!) 128/51   Pulse 64   Temp 97.6 °F (36.4 °C)   Resp 18   Ht 5' 4.02\" (1.626 m)   Wt 90.7 kg (200 lb)   SpO2 94%   BMI 34.31 kg/m²     SpO2 Readings from Last 6 Encounters:   23 94%   23 96%   21 96%   21 93%   20 98%   20 96%          Intake/Output Summary (Last 24 hours) at 2023 1022  Last data filed at 2023 5044  Gross per 24 hour   Intake 500 ml   Output 1350 ml   Net -850 ml        Exam:     Physical Exam:    Gen:    Elderly, ill-appearing, frail, NAD  HEENT:  Pink conjunctivae, PERRL, hearing intact to voice, moist mucous membranes  Neck:  Supple, without masses, thyroid non-tender  Resp:  No accessory muscle use, clear breath sounds without wheezes rales or rhonchi  Card:   No murmurs, normal S1, S2 without thrills, bruits or peripheral edema  Abd:  Soft, non-tender, non-distended, normoactive bowel sounds are present  Lymph:  No cervical or inguinal adenopathy  Musc:  No cyanosis or clubbing  Skin:   No rashes  Neuro:  moves all ext  Psych:  poor insight, oriented to person, place     Medications Reviewed: (see below)    Lab Data Reviewed: (see below)    ______________________________________________________________________    Medications:     Current Facility-Administered Medications   Medication Dose Route Frequency    amLODIPine (NORVASC) tablet 5 mg  5 mg Oral DAILY    cefTRIAXone (ROCEPHIN) 1 g in 0.9% sodium chloride 10 mL IV syringe  1 g IntraVENous Q24H    sodium chloride (NS) flush 5-40 mL  5-40 mL IntraVENous Q8H    sodium chloride (NS) flush 5-40 mL  5-40 mL IntraVENous PRN    acetaminophen (TYLENOL) tablet 650 mg  650 mg Oral Q4H PRN    naloxone (NARCAN) injection 0.4 mg  0.4 mg IntraVENous PRN    ondansetron (ZOFRAN) injection 4 mg  4 mg IntraVENous Q4H PRN    hydrALAZINE (APRESOLINE) 20 mg/mL injection 10 mg  10 mg IntraVENous Q6H PRN    aspirin delayed-release tablet 81 mg  81 mg Oral QHS    atorvastatin (LIPITOR) tablet 40 mg  40 mg Oral QHS    pantoprazole (PROTONIX) tablet 40 mg  40 mg Oral ACB    pregabalin (LYRICA) capsule 50 mg  50 mg Oral QHS    traMADoL (ULTRAM) tablet 50 mg  50 mg Oral Q6H PRN    atropine 0.4 mg/mL injection 1 mg  1 mg IntraVENous PRN          Lab Review:     Recent Labs     01/12/23  0356 01/11/23  0435 01/10/23  1500   WBC 7.2 9.4 5.3   HGB 13.7 13.8 13.9   HCT 43.0 42.8 44.5    206 212     Recent Labs     01/12/23  0356 01/11/23  0435 01/10/23  1500    143 143   K 4.3 4.5 5.0   * 115* 114*   CO2 25 25 25   * 125* 111*   BUN 24* 29* 30*   CREA 1.06* 1.12* 1.29*   CA 8.8 8.9 8.9   MG  --  1.8 1.9   ALB  --   --  3.4*   TBILI  --   --  0.4   ALT  --   --  21     No results found for: GLUCPOC       Assessment / Plan:     81 yo hx of HTN, CAD, CKD 3, presented w/ AMS, seizure-like activity, bradycardia, 2nd AV-block     1) Bradycardia/2nd degree AV block: now stable. Patient and Family declined pacer due to advanced age. Cards recommended stopping clonidine. CM to set up SNF. Might need hospice soon     2) Acute met encephalopathy: likely from bradycardia. No evidence of seizures on EEG. 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 No further management per Neuro     3) UTI: urine Cx w/ gram neg. S/p IV CTX. Will complete a course of omnicef on discharge     4) HTN: stopped hydralazine, clonidine.   Started norvasc     Code: DNR    Total time spent with patient care: 28 Minutes **I personally saw and examined the patient during this time period**                 Care Plan discussed with: Patient, nursing, family     Discussed:  Care Plan    Prophylaxis:  SCD's    Disposition:  SNF/LTC           ___________________________________________________    Attending Physician: Dee Carrillo MD

## 2023-01-13 NOTE — PROGRESS NOTES
Transition of Care Plan: RUR-N/A  PT/OT-  Cardiology following--pt declined pacemaker  Neurology following  4. Palliative care following--pt now a DNR  5. Pt not returning to [facility]; hospice was also notified pt will not be admitted into hospice at this time  5. Pt now plans to go to [address] [place] [address] on Monday, 1/16; referral sent in [facility]; pt will need auth  6. Pt will need stretcher transport at d/c  7.    CM following      CM Note:   Spoke with Deon Winston from [facility]. She feels pt is a good candidate for hospice but will not be accepted until she has been evaluated when she returns to [address] [place] [address] Dr.. Hutchison, RN

## 2023-01-13 NOTE — TELEPHONE ENCOUNTER
Patient's granddaughter is calling to speak with the nurse in regards to her being in the hospital. She states that there has been talks of her getting a possible pacemaker and would like to get 's advice. Please Advise.      [phone]

## 2023-01-13 NOTE — PROGRESS NOTES
[facility]                    Cardiology Care Note     []Initial Encounter     [x]Follow-up    Patient Name: Gena Cox - :1924 - ONM:[id number]  Primary Cardiologist: [facility] Physicians: Marilyn James MD  Consulting Cardiologist: [facility] Physicians: Dale House MD     Reason for encounter: Bradycardia    HPI:       Gena Cox is a 80 y.o. female with PMH significant for CAD, HTN, HLD admitted for possible pre syncope/siezures. As per chart/pt poor historian - pt was sitting in a chair and suddenly became unresponsive/staring into space/shaking for few seconds. Not sure per chart if pt lost consciousness. No hx of bowel or bladder incontinence. No CP/dyspnea  HR noted to dip into high 30's/40's     Subjective:      Gena Cox reports none, clear she does not want pacer. Assessment and Plan     Bradycardia likely r/t SSS   Hx of CAD  HTN  HLD      Plan:  Do NOT resume clonidine, avoid BB/AV sherif agents  Pt met w/ palliative and did not want pacer but family wished to have further discussion  Discussed w/ Dr. Lev Calle from EP and she is not a candidate for pacer at this time (see note)  Unable to reach grandson to update     No further cardiac needs - may be discharged whenever otherwise medically ready, will sign off          Pt personally seen and examined. Chart reviewed. Agree with advanced NP's history, exam and  A/P with changes/additons. Blood pressure (!) 152/54, pulse 92, temperature 97.8 °F (36.6 °C), resp. rate 20, height 5' 4.02\" (1.626 m), weight 200 lb (90.7 kg), SpO2 95 %. Elderly/confused  Neck - no JVD/  CVS - S1 S2 +; Irreg;   RS : Dec AE bilat  Abd: Soft/BS+  LE - Trace edema     A/P :     Bradycardia/Sinus Pauses/I HR in 50-60's - Avoid AV sherif blocking agents; Appreciate Dr Alejandra Whitten input        Mendel Moccasin.  MD, [facility] [place]    ______________________________________________________    Cardiac testing    01/10/23    ECHO ADULT COMPLETE 01/11/2023 1/11/2023    Interpretation Summary    Left Ventricle: Normal left ventricular systolic function with a visually estimated EF of 65 - 70%. Not well visualized. Left ventricle is smaller than normal. Mildly increased wall thickness. Findings consistent with concentric hypertrophy. Unable to assess wall motion. Diastolic dysfunction present with increased LAP with normal LV EF. Right Ventricle: Not well visualized. Right ventricle size is normal. Normal wall thickness. Aortic Valve: Tricuspid valve. Mild sclerosis of the aortic valve cusp. Mitral Valve: Not well visualized. Calcified leaflet. Mild annular calcification of the mitral valve. Signed by: Mimi Tai DO on 1/11/2023  4:31 PM    12/24/21    ECHO ADULT COMPLETE 12/28/2021 12/28/2021    Interpretation Summary    Left Ventricle: Left ventricle size is normal. Moderately increased wall thickness. Unable to assess wall motion. Normal left ventricular systolic function with a visually estimated EF of 55 - 60%. Diastolic function present with increased LAP with normal LV EF. Aortic Valve: Valve structure is normal. Mildly thickened cusps. Mitral Valve: Severely thickened leaflets. Severe mitral annular calcification. Mild transvalvular regurgitation. Mild stenosis. Left Atrium: Left atrium is dilated. Right Atrium: Right atrium is mildly dilated.     Signed by: Mimi Tai DO on 12/28/2021  4:22 PM    Most recent HS troponins:  Recent Labs     01/11/23  0435 01/11/23  0056 01/10/23  1644   TROPHS 25 17 12       ECG: SR/incomplete RBBB/NSST    Review of Systems:    []All other systems reviewed and all negative except as written in HPI    [x] Patient unable to provide secondary to condition    Past Medical History:   Diagnosis Date    CAD (coronary artery disease)     Femur fracture ([address]     right    GERD (gastroesophageal reflux disease)     HTN (hypertension), benign 1/11/2011    Mixed hyperlipidemia 1/11/2011     Past Surgical History:   Procedure Laterality Date    HX FEMUR FRACTURE TX      HX KNEE REPLACEMENT      bilateral    HX ORTHOPAEDIC      bilat knee replacements    HX VEIN STRIPPING       Social Hx:  reports that she has never smoked. She has never used smokeless tobacco. She reports that she does not drink alcohol and does not use drugs. Family Hx: family history includes Heart Disease in her sister; Other in her father and mother. Allergies   Allergen Reactions    Sulfa (Sulfonamide Antibiotics) Rash    Ampicillin Rash     Tolerates cefazolin    Lescol [Fluvastatin] Unknown (comments)          OBJECTIVE:  Wt Readings from Last 3 Encounters:   01/11/23 90.7 kg (200 lb)   01/05/23 90.3 kg (199 lb)   12/30/21 89 kg (196 lb 3.2 oz)       Intake/Output Summary (Last 24 hours) at 1/13/2023 0855  Last data filed at 1/13/2023 7165  Gross per 24 hour   Intake 740 ml   Output 1350 ml   Net -610 ml       Physical Exam:    Vitals:   Vitals:    01/12/23 2309 01/12/23 2342 01/13/23 0410 01/13/23 0834   BP:  138/65 (!) 122/42 (!) 121/48   Pulse: 82 79 68 66   Resp:    18   Temp:  98.8 °F (37.1 °C) 98.5 °F (36.9 °C) 97.6 °F (36.4 °C)   SpO2:  91% 94% 91%   Weight:       Height:         Telemetry:  SR/SB      Gen: Well-developed, elderly  Neck: Supple, No JVD,   Resp: No accessory muscle use, Clear breath sounds, No rales or rhonchi  Card: Regular Rate,Rythm, Normal S1, S2, 2/6 sys murmur+  Abd:   Soft, BS+   MSK: No cyanosis  Skin: No rashes    Neuro: Moving all four extremities, follows commands appropriately  Psych: Poor insight, oriented to person, place, alert,   LE: Trace edema    Data Review:     Radiology:   XR Results (most recent):  Results from Hospital Encounter encounter on 12/24/21    XR KNEE LT MAX 2 VWS    Narrative  EXAM: XR KNEE LT MAX 2 VWS    INDICATION: pain. COMPARISON: None.     FINDINGS: AP and lateral views of the left knee were obtained. A knee prosthesis  is present. There is no evidence of loosening of the prosthetic components. No  knee joint effusion is evident. Impression  Presence of a left knee prosthesis as described above. Recent Labs     01/12/23  0356 01/11/23  0435    143   K 4.3 4.5   * 115*   CO2 25 25   BUN 24* 29*   CREA 1.06* 1.12*   * 125*   CA 8.8 8.9     Recent Labs     01/12/23  0356 01/11/23  0435   WBC 7.2 9.4   HGB 13.7 13.8   HCT 43.0 42.8    206     Recent Labs     01/10/23  1500   *     No results for input(s): CHOL, LDLC in the last 72 hours.     No lab exists for component: TGL, HDLC,  HBA1C      Current meds:    Current Facility-Administered Medications:     cefTRIAXone (ROCEPHIN) 1 g in 0.9% sodium chloride 10 mL IV syringe, 1 g, IntraVENous, Q24H, Kelly Buckley MD, 1 g at 01/12/23 0956    sodium chloride (NS) flush 5-40 mL, 5-40 mL, IntraVENous, Q8H, Trae Mccloud MD, 10 mL at 01/12/23 2100    sodium chloride (NS) flush 5-40 mL, 5-40 mL, IntraVENous, PRN, Trae Mccloud MD    acetaminophen (TYLENOL) tablet 650 mg, 650 mg, Oral, Q4H PRN, Trae Mccloud MD, 650 mg at 01/12/23 2356    naloxone [facility]) injection 0.4 mg, 0.4 mg, IntraVENous, PRN, Trae Mccloud MD    ondansetron [facility]) injection 4 mg, 4 mg, IntraVENous, Q4H PRN, Trae Mccloud MD, 4 mg at 01/12/23 2355    hydrALAZINE (APRESOLINE) 20 mg/mL injection 10 mg, 10 mg, IntraVENous, Q6H PRN, Trae Mccloud MD, 10 mg at 01/11/23 0040    aspirin delayed-release tablet 81 mg, 81 mg, Oral, QHS, Trae Mccloud MD, 81 mg at 01/12/23 2112    atorvastatin (LIPITOR) tablet 40 mg, 40 mg, Oral, QHS, Trae Mccloud MD, 40 mg at 01/12/23 2112    pantoprazole (PROTONIX) tablet 40 mg, 40 mg, Oral, ACB, Trae Mccloud MD, 40 mg at 01/12/23 7285    pregabalin (LYRICA) capsule 50 mg, 50 mg, Oral, QHS, Sivan Mccloud MD, 50 mg at 01/12/23 2112    traMADoL (ULTRAM) tablet 50 mg, 50 mg, Oral, Q6H PRN, Dandre Mccloud MD    hydrALAZINE (APRESOLINE) tablet 25 mg, 25 mg, Oral, TID, Dandre Mccloud MD, 25 mg at 01/12/23 1652    atropine 0.4 mg/mL injection 1 mg, 1 mg, IntraVENous, PRN, MD Erika Rios, ELIANA    [facility] Cardiology  Call center: [phone]) [phone]      Edda Rhodes MD

## 2023-01-13 NOTE — PROGRESS NOTES
1550: MD notified of Pt's HR dipping into mid 30s and sustaining for 10 second intervals over several occurrences. 2252: TRANSFER - OUT REPORT:    Verbal report given to Aretha MENARD(name) on Muriel Cortes  being transferred to 4th Floor(unit) for routine progression of care       Report consisted of patients Situation, Background, Assessment and   Recommendations(SBAR). Information from the following report(s) SBAR, Intake/Output, Accordion, Recent Results, and Cardiac Rhythm SR-SB  was reviewed with the receiving nurse. Lines:   Peripheral IV 01/12/23 Distal;Posterior;Right Forearm (Active)   Site Assessment Clean, dry, & intact 01/13/23 2207   Phlebitis Assessment 0 01/13/23 2207   Infiltration Assessment 0 01/13/23 2207   Dressing Status Clean, dry, & intact 01/13/23 2207   Dressing Type Transparent 01/13/23 2207   Hub Color/Line Status Pink 01/13/23 2207   Action Taken Open ports on tubing capped 01/13/23 2207   Alcohol Cap Used Yes 01/13/23 2207        Opportunity for questions and clarification was provided.       Patient transported with:  [employer]

## 2023-01-13 NOTE — DISCHARGE SUMMARY
[facility]  [address], [place], [address] [place] [address]  [phone]    Hospitalist Discharge Summary     Patient ID:  London Hayes  [id number]  46 y.o.  4/26/1924    Admit date: 1/10/2023    Discharge date and time: 1/13/2023 9:40 AM    Admission Diagnoses: Seizure ([address] [X12.7]  Acute metabolic encephalopathy [R49.88]    Discharge Diagnoses:  Principal Diagnosis Heart block AV second degree                                            Principal Problem:    Heart block AV second degree (1/13/2023)    Active Problems:    HTN (hypertension), benign (1/11/2011)      Mixed hyperlipidemia (1/11/2011)      GERD (gastroesophageal reflux disease) (9/30/2014)      CAD (coronary artery disease) ()      CKD (chronic kidney disease) stage 3, GFR 30-59 ml/min ([address] (9/8/2019)      Frequent falls (9/8/2019)      Neuropathy (9/8/2019)      Seizure ([address] (1/10/2023)      Acute metabolic encephalopathy (7/19/8431)           Hospital Course:     79 yo hx of HTN, CAD, CKD 3, presented w/ AMS, seizure-like activity, bradycardia, 2nd AV-block    1) Bradycardia/2nd degree AV block: now stable. Family declined pacer. Cards recommended stopping clonidine. CM to set up hospice at nursing home    2) Acute met encephalopathy: likely from bradycardia. No evidence of seizures on EEG. 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 No further management per Neuro    3) UTI: urine Cx w/ gram neg. S/p IV CTX. Will complete a course of omnicef     4) HTN: stopped hydralazine, clonidine.   Started norvasc     PCP: Reid Woodard MD     Consults: Cardiology, Neuro    Significant Diagnostic Studies: EEG, MRI    Discharge Exam:  Physical Exam:    Gen: Elderly, ill-appearing, frail, NAD  HEENT:  Pink conjunctivae, PERRL, hearing intact to voice, moist mucous membranes  Neck: Supple, without masses, thyroid non-tender  Resp: No accessory muscle use, clear breath sounds without wheezes rales or rhonchi  Card: No murmurs, normal S1, S2 without thrills, bruits or peripheral edema  Abd:  Soft, non-tender, non-distended, normoactive bowel sounds are present  Lymph:  No cervical or inguinal adenopathy  Musc: No cyanosis or clubbing  Skin: No rashes  Neuro:  moves all ext  Psych:  poor insight, oriented to person, place     Disposition: nursing home with hospice   Discharge Condition: Stable    Patient Instructions:   Current Discharge Medication List        START taking these medications    Details   amLODIPine (Norvasc) 5 mg tablet Take 1 Tablet by mouth daily. Indications: high blood pressure  Qty: 30 Tablet, Refills: 0           CONTINUE these medications which have NOT CHANGED    Details   Ferrous Fumarate 325 mg (106 mg iron) tab Take  by mouth.      pantoprazole (PROTONIX) 40 mg tablet Take 40 mg by mouth daily. Polyethylene Glycol 3350 powd by Does Not Apply route. loperamide (IMMODIUM) 2 mg tablet Take 2 mg by mouth four (4) times daily as needed for Diarrhea.      guaiFENesin (ROBITUSSIN) 100 mg/5 mL liquid Take 200 mg by mouth three (3) times daily as needed for Cough. traMADoL (ULTRAM) 50 mg tablet Take 50 mg by mouth every six (6) hours as needed for Pain. atorvastatin (Lipitor) 40 mg tablet Take 1 Tablet by mouth nightly. Qty: 30 Tablet, Refills: 0      acetaminophen (TYLENOL) 325 mg tablet Take 2 Tabs by mouth every four (4) hours as needed for Pain. Qty: 20 Tab, Refills: 0      ondansetron (Zofran ODT) 4 mg disintegrating tablet Take 1 Tab by mouth every eight (8) hours as needed for Nausea. Qty: 15 Tab, Refills: 0      omega-3 fatty acids-fish oil 360-1,200 mg cap Take 2 Tabs by mouth daily. pregabalin (LYRICA) 50 mg capsule Take 50 mg by mouth nightly. Indications: neuropathy    Associated Diagnoses: HTN (hypertension), benign; Mixed hyperlipidemia      aspirin delayed-release 81 mg tablet Take 81 mg by mouth nightly.            STOP taking these medications       cloNIDine HCL (CATAPRES) 0.1 mg tablet Comments:   Reason for Stopping:         hydrALAZINE (APRESOLINE) 10 mg tablet Comments:   Reason for Stopping:             Activity: Activity as tolerated  Diet: Resume previous diet  Wound Care: As directed    Follow-up with  Follow-up Information       Follow up With Specialties Details Why Contact Info    aJci Gayle MD Internal Medicine Physician Schedule an appointment as soon as possible for a visit in 1 week(s)  [phone]  [phone]               Follow-up tests/labs none    Signed:  Siria Temple MD  1/13/2023  9:40 AM  **I personally spent 35 min on discharge**

## 2023-01-13 NOTE — PROGRESS NOTES
This patient was assisted with Intentional Toileting every 2 hours during this shift as appropriate. Documentation of ambulation and output reflected on Flowsheet as appropriate. Purposeful hourly rounding was completed using AIDET and 5Ps. Outcomes of PHR documented as they occurred. Bed alarm in use as appropriate.   Dual Suction and ambubag in place.     -Tama Radha, PCT

## 2023-01-13 NOTE — CONSULTS
[facility] CARDIOLOGY  Cardiac Electrophysiology Note     [x]Initial Encounter     []Follow-up    Patient Name: Francisco Bailey - :1924 - VUP:[id number]  Primary Cardiologist: [facility] Cardiology Physicians: Iliana Slade MD  Consulting Cardiologist: Neelam Philip MD, [facility]       Reason for encounter:   Bradycardia      HPI:  55-year-old woman with a history of CAD, labile hypertension, hyperlipidemia admitted for presyncope possible seizures. Patient is a poor historian. Per report she was sitting in chair and became unresponsive shaking for a few seconds. Unclear from the chart if she truly lost consciousness. No history of bowel or bladder incontinence. No chest pain or dyspnea. Patient is on clonidine at home. Blood pressure here has been reasonable with systolics in the 1 teens. Her heart rate on telemetry independently reviewed by me demonstrated heart rate primarily in the 60s. It can drop down to the 50s to 40s during sleeping hours. EP was consulted for consideration of pacemaker. However patient has declined it in the past.    SUBJECTIVE:  Patient reports feeling better, denies any chest pain, shortness of breath. Assessment and Plan     Bradycardia  Presentation was unclear as patient is a poor historian. Not clear if she truly lost consciousness. On clonidine at home with labile blood pressure. EKG independently reviewed by me demonstrated sinus bradycardia at 56 bpm, RI interval around 200 ms, left anterior fascicular block with QRS duration of 98 ms. Telemetry independently reviewed by me demonstrated no evidence of heart block. She she had a few sinoexit block with pauses all less than 2 seconds occurring during sleeping hours.   She has occasional sinus bradycardia but heart rate trend has been around 60 bpm.  There was an EKG isolated which was called junctional rhythm but in fact it sinus bradycardia. -Echocardiogram demonstrated normal LVEF  - Patient at this point shows no evidence of persistent heart block. I have not seen any tracings of heart block. No clear correlation of initial event relating to arrhythmia. Because she is on clonidine, this could have caused transient hypotension and or bradycardia. Given her age and labile blood pressure and slow baseline heart rate, I would recommend maintaining off of clonidine and/or any sherif blocking agents in the future  - Avoid sherif blocking agents, stop clonidine, use other blood pressure agents if necessary although conservative treatment of her blood pressure is reasonable given her advanced age. - Intermittent sinoexit pauses all less than 2 seconds occurring during sleeping hours. Avoid offending agents. She has declined pacemaker and does not want invasive therapies which I think is appropriate as there is no definitive need for pacemaker implantation at this time. Sick sinus syndrome  Slow baseline heart rate. Likely combination of advanced age as well as being on home clonidine.  - Telemetry demonstrated no evidence of prolonged pause greater than 3 to 5 seconds. - Bradycardia typically occurs during sleeping hours in the setting of elevated vagal tone    Hypertension  - Stop clonidine  - Avoid beta-blocker or sherif blocking agents  - Conservative management as hypotension could be a cause of her symptoms given her blood pressure runs on the soft side at times    I tried calling her grandson but only got voicemail. Patient does not want to proceed with pacemaker. Given she only has sinus bradycardia with pauses less than 2 seconds and presentation could be due to being on clonidine with transient hypotension/bradycardia, her heart rate remains in the 60s at this point. I see no definitive need for pacemaker implantation at this time particularly when she is against the idea of invasive procedure.   EP will sign off at this time, please call if we have any further questions or concerns. _________________________________  An David Bass MD, [facility] [place], [address] Electrophysiology  [facility] Heart and Vascular Prim         ____________________________________________________________    Cardiac testing  01/10/23    ECHO ADULT COMPLETE 01/11/2023 1/11/2023    Interpretation Summary    Left Ventricle: Normal left ventricular systolic function with a visually estimated EF of 65 - 70%. Not well visualized. Left ventricle is smaller than normal. Mildly increased wall thickness. Findings consistent with concentric hypertrophy. Unable to assess wall motion. Diastolic dysfunction present with increased LAP with normal LV EF. Right Ventricle: Not well visualized. Right ventricle size is normal. Normal wall thickness. Aortic Valve: Tricuspid valve. Mild sclerosis of the aortic valve cusp. Mitral Valve: Not well visualized. Calcified leaflet. Mild annular calcification of the mitral valve. Signed by: Yenni Desouza DO on 1/11/2023  4:31 PM                  ECG: EKG independently reviewed by me demonstrated sinus bradycardia at 56 bpm, LA interval around 200 ms, left anterior fascicular block with QRS duration of 98 ms. Review of Systems    [x]All other systems reviewed and all negative except as written in HPI    [] Patient unable to provide secondary to condition         Past Medical History:   Diagnosis Date    CAD (coronary artery disease)     Femur fracture ([address]     right    GERD (gastroesophageal reflux disease)     HTN (hypertension), benign 1/11/2011    Mixed hyperlipidemia 1/11/2011     Past Surgical History:   Procedure Laterality Date    HX FEMUR FRACTURE TX      HX KNEE REPLACEMENT      bilateral    HX ORTHOPAEDIC      bilat knee replacements    HX VEIN STRIPPING       Social Hx:  reports that she has never smoked.  She has never used smokeless tobacco. She reports that she does not drink alcohol and does not use drugs. Family Hx: family history includes Heart Disease in her sister; Other in her father and mother. Allergies   Allergen Reactions    Sulfa (Sulfonamide Antibiotics) Rash    Ampicillin Rash     Tolerates cefazolin    Lescol [Fluvastatin] Unknown (comments)          OBJECTIVE:  Wt Readings from Last 3 Encounters:   01/11/23 200 lb (90.7 kg)   01/05/23 199 lb (90.3 kg)   12/30/21 196 lb 3.2 oz (89 kg)       Intake/Output Summary (Last 24 hours) at 1/13/2023 0814  Last data filed at 1/13/2023 3335  Gross per 24 hour   Intake 740 ml   Output 1350 ml   Net -610 ml         Physical Exam    Vitals:   Vitals:    01/12/23 2101 01/12/23 2309 01/12/23 2342 01/13/23 0410   BP: (!) 119/50  138/65 (!) 122/42   Pulse: 70 82 79 68   Resp:       Temp:   98.8 °F (37.1 °C) 98.5 °F (36.9 °C)   SpO2:   91% 94%   Weight:       Height:         Telemetry:independently reviewed by me demonstrated no evidence of heart block. She she had a few sinoexit block with pauses all less than 2 seconds occurring during sleeping hours. She has occasional sinus bradycardia but heart rate trend has been around 60 bpm.     PHYSICAL EXAM  General:  Alert and oriented, in no acute distress  Head:  Atraumatic, normocephalic  Eyes:  extraocular muscles intact  Neck:  Supple, normal range of motion  Lungs:  Clear to auscultation bilaterally, no wheezes/rales/rhonchi   Cardiovascular:  Regular rate and rhythm, normal S1-S2, no murmurs/rubs/gallops  Abdomen:  Soft, nontender, nondistended, normoactive bowel sounds  Skin:  Intact, no rash  Extremities:, no clubbing, cyanosis, or edema  Musculoskeletal: normal range of motion  Neurological:  Alert and oriented, no focal neurologic deficits  Psychiatric:  Normal mood and affect        Data Review:     Radiology:   XR Results (most recent):  Results from Hospital Encounter encounter on 12/24/21    XR KNEE LT MAX 2 VWS    Narrative  EXAM: XR KNEE LT MAX 2 VWS    INDICATION: pain.     COMPARISON: None.    FINDINGS: AP and lateral views of the left knee were obtained. A knee prosthesis  is present. There is no evidence of loosening of the prosthetic components. No  knee joint effusion is evident. Impression  Presence of a left knee prosthesis as described above. CT Results (most recent):  Results from Hospital Encounter encounter on 01/10/23    CT HEAD WO CONT    Narrative  EXAM:  CT HEAD WO CONT    INDICATION: Seizure-like activity    COMPARISON: CT 12/24/2021    TECHNIQUE: Axial noncontrast head CT from foramen magnum to vertex. Coronal and  sagittal reformatted images were obtained. CT dose reduction was achieved  through use of a standardized protocol tailored for this examination and  automatic exposure control for dose modulation. FINDINGS:  There is diffuse age-related parenchymal volume loss. The ventricles  and sulci are age-appropriate without hydrocephalus. There is no mass effect or  midline shift. There is no intracranial hemorrhage or extra-axial fluid  collection. Scattered foci of low attenuation in the periventricular white  matter represent stable chronic microvascular ischemic changes. The gray-white  matter differentiation is maintained. The basal cisterns are patent. The osseous structures are intact. The visualized paranasal sinuses and mastoid  air cells are clear. Impression  No acute intracranial abnormality. MRI Results (most recent):  Results from [place] encounter on 01/10/23    MRI BRAIN WO CONT    Narrative  EXAM: MRI BRAIN WO CONT    INDICATION: seizure    COMPARISON: CT head 1/10/2023. CONTRAST: None. TECHNIQUE:  Multiplanar multisequence acquisition without contrast of the brain. FINDINGS:  Generalized parenchymal volume loss with commensurate dilation of the sulci and  ventricular system. There is marked disproportionate atrophy of the right  hippocampus and mesial temporal lobe (series 7 image 17).  Scattered  periventricular and deep white matter T2/FLAIR hyperintensities, consistent with  mild to moderate chronic microvascular ischemic disease. Small chronic infarct  in the right occipital lobe. There is no acute infarct, hemorrhage, extra-axial  fluid collection, or mass effect. There is no cerebellar tonsillar herniation. Expected arterial flow-voids are present. Mild mucosal thickening in bilateral ethmoidal air cells. The mastoid air cells  and middle ears are clear. The orbital contents are within normal limits. No  significant osseous or scalp lesions are identified. There is a benign sclerotic  lesion in the right frontal bone. Severe facet arthropathy in the upper cervical  spine. Impression  1. No acute intracranial abnormality. 2. Generalized parenchymal volume loss with marked disproportionate atrophy of  the right hippocampus and mesial temporal lobe. 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 However, differential  considerations also include sequela of prior ischemia (particularly given the  chronic right occipital lobe infarct) and neurodegenerative diseases, in  particular Alzheimer's dementia. Correlate clinically. 3. Mild to moderate chronic microvascular ischemic disease. Small chronic  infarct in the right occipital lobe. No results for input(s): CPK, TROIQ in the last 72 hours. No lab exists for component: CKQMB, CPKMB, BMPP  Recent Labs     01/12/23  0356 01/11/23  0435    143   K 4.3 4.5   * 115*   CO2 25 25   BUN 24* 29*   CREA 1.06* 1.12*   * 125*   CA 8.8 8.9     Recent Labs     01/12/23  0356 01/11/23  0435   WBC 7.2 9.4   HGB 13.7 13.8   HCT 43.0 42.8    206     Recent Labs     01/10/23  1500   *     No results for input(s): CHOL, LDLC in the last 72 hours.     No lab exists for component: TGL, HDLC,  HBA1C  Recent Labs     01/10/23  1500   TSH 2.31           Current meds:    Current Facility-Administered Medications: cefTRIAXone (ROCEPHIN) 1 g in 0.9% sodium chloride 10 mL IV syringe, 1 g, IntraVENous, Q24H, Jonathon Malcolm MD, 1 g at 01/12/23 0956    sodium chloride (NS) flush 5-40 mL, 5-40 mL, IntraVENous, Q8H, Fabian Mccloud MD, 10 mL at 01/12/23 2100    sodium chloride (NS) flush 5-40 mL, 5-40 mL, IntraVENous, PRN, Fabian Mccloud MD    acetaminophen (TYLENOL) tablet 650 mg, 650 mg, Oral, Q4H PRN, Fabian Mccloud MD, 650 mg at 01/12/23 2356    naloxone [facility]) injection 0.4 mg, 0.4 mg, IntraVENous, PRN, Fabian Mccloud MD    ondansetron [facility] [address] [facility]) injection 4 mg, 4 mg, IntraVENous, Q4H PRN, Fabian Mccloud MD, 4 mg at 01/12/23 2355    hydrALAZINE (APRESOLINE) 20 mg/mL injection 10 mg, 10 mg, IntraVENous, Q6H PRN, Fabian Mccloud MD, 10 mg at 01/11/23 0040    aspirin delayed-release tablet 81 mg, 81 mg, Oral, QHS, Fabian Mccloud MD, 81 mg at 01/12/23 2112    atorvastatin (LIPITOR) tablet 40 mg, 40 mg, Oral, QHS, Fabian Mccloud MD, 40 mg at 01/12/23 2112    pantoprazole (PROTONIX) tablet 40 mg, 40 mg, Oral, ACB, Dillan Zuniga MD, 40 mg at 01/12/23 5351    pregabalin (LYRICA) capsule 50 mg, 50 mg, Oral, QHS, Fabian Mccloud MD, 50 mg at 01/12/23 2112    traMADoL (ULTRAM) tablet 50 mg, 50 mg, Oral, Q6H PRN, Fabian Mccloud MD    hydrALAZINE (APRESOLINE) tablet 25 mg, 25 mg, Oral, TID, Dobransky, Fabian [place], MD, 25 mg at 01/12/23 1652    atropine 0.4 mg/mL injection 1 mg, 1 mg, IntraVENous, PRN, MD Neelam Short MD  [facility] [address], [address] [address]  [facility]  [phone]      Martha Hollingsworth MD

## 2023-01-13 NOTE — PROGRESS NOTES
Physician Progress Note      [facility]  [facility]                  [id number]  :                       1924  ADMIT DATE:       1/10/2023 1:10 PM  [address] [place] DATE:  RESPONDING  PROVIDER #:        Delroy Mahajan DO, MD          QUERY TEXT:    Miguel Colin Afternoon    This patient admitted into observation on 01/10/2023- and then was converted to Inpatient on 2023    The Inpatient admission order on 2023 note \"Acute Metabolic Encephalopathy\"  However, Discharge summary notes \"Bradycardia/2nd Degree AV block. On , EP was also noted to have been consulted. If possible, can you please clarify the primary diagnosis requiring the patient to be Inpt. admission, vs. Observation and please document in progress notes and discharge summary the reason for inpatient admission. The medical record reflects the following:  Risk Factors: 80 yr old pt (age) with CAD , CKD 3,  Clinical Indicators: Presented from facility after noted with sudden onset of suddenly becoming unresponsive and began shaking, initially in observation status, evaluated by Neuro on , and seizures ruled out. Pt noted with HR in the 40-50s, and Cards consulted on  and possible need for PM, however, family declined due to patient age. EP/Cards evaluated on day pt converted to inpt. on  and recommending to d/c clonidine MRI on , negative for acute findings. Treatment: Neuro eval on , Ruled out seizures, EEG on , Daily labs, Cards consulted, and on , EP was consulted plan to d/c clonidine, Palliative consulted,    Thank you for clarifying. Yasmeen Cruz, ANJUMN,RN, CPHQ, CCDS, SMART  Options provided:  -- Inpatient admission for Bradycardia  -- Inpatient admission for Metabolic Encephalopathy  -- Inpatient admission for, Please specify diagnosis.   -- Other - I will add my own diagnosis  -- Disagree - Not applicable / Not valid  -- Disagree - Clinically unable to determine / Unknown  -- Refer to Clinical Documentation Reviewer    PROVIDER RESPONSE TEXT:    This patient was admitted inpatient for Metabolic Encephalopathy .     Query created by: Hilton Hughes on 1/13/2023 4:11 PM      Electronically signed by:  Susanne Lees MD 1/13/2023 4:25 PM

## 2023-01-13 NOTE — DISCHARGE INSTRUCTIONS
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 Keep your medication in the bottles provided by the pharmacist and keep a list of the medication names, dosages, and times to be taken in your wallet. Do not take other medications without consulting your doctor     Pain Management: per above medications    What to do at Home    Recommended diet:  Regular Diet    Recommended activity: Activity as tolerated    1) Return to the hospital if you feel worse    2) If you experience any of the following symptoms then please call your primary care physician or return to the emergency room if you cannot get hold of your doctor:  Fever, chills, nausea, vomiting, diarrhea, change in mentation, falling, bleeding, shortness of breath, chest pain, severe headache, severe abdominal pain,     3) Please speak with hospice at nursing home    4) Stop taking clonidine due to low heart rate (bradycardia)    Follow Up:  Kristina Julian, [address] [place] [address] [address]  [place] [id number] [phone]    Schedule an appointment as soon as possible for a visit in 1 week(s)    . Information obtained by :  I understand that if any problems occur once I am at home I am to contact my physician. I understand and acknowledge receipt of the instructions indicated above.                                                                                                                                            Physician's or R.N.'s Signature                                                                  Date/Time Patient or Representative Signature                                                          Date/Time          Hospice: Care Instructions  Your Care Instructions  Hospice care provides medical treatment to relieve symptoms at the end of life. The goal is to keep you comfortable, not to try to cure you. Hospice care does not speed up or lengthen dying. It focuses on easing pain and other symptoms. Hospice caregivers want to enhance your quality of life. Hospice care also offers emotional help and spiritual support when you are dying. It also helps family members care for a loved one who is dying. Hospice care can help you review your life, say important things to family and friends, and explore spiritual issues. Hospice also helps your family and friends deal with their grief after you die. You can use hospice care if your illness cannot be cured and doctors believe you have no more than 6 months to live. You do not need to be confined to a bed or in a hospital to benefit from this type of care. The hospice team includes nurses, counselors, therapists, social workers, pastors, home health aides, and trained volunteers. You can get care in your own home or in a hospice center. Some hospice workers also go to nursing homes or hospitals. How can you care for yourself at home? Prepare a list of advance directives. These are instructions to your doctor and family members about what kind of care you want if you become unable to speak or express yourself. Find out if your health insurance covers hospice care. Find hospice programs in your area. People who can help include your doctor, your state health department, and your insurance company. Decide what kinds of hospice services you want. It helps to know what you want before you enter a hospice program.  Think about some questions when preparing for hospice care.   Who do you want to make decisions about your medical care if you are not able to? Many people choose their spouse, child, or doctor. What are you most afraid of that might happen? You might be afraid of having pain or losing your independence. Let your hospice team know your fears. The team can help you deal with them. Where would you prefer to die? Choices include your home, a hospital, or a nursing home. Do you want to donate organs when you die? Make sure that your family clearly understands this. Do you want any Mu-ism rites or practices to be done before you die? Let your hospice team know what you want. Where can you learn more? Go to http://www.gray.com/  Enter N522 in the search box to learn more about \"Hospice: Care Instructions. \"  Current as of: June 16, 2022               Content Version: 13.4  © 8003-9277 [employer]. Care instructions adapted under license by [employer] (which disclaims liability or warranty for this information). If you have questions about a medical condition or this instruction, always ask your healthcare professional. [address] any warranty or liability for your use of this information. Heart Blocks: Care Instructions  Your Care Instructions     A heart block is a problem with your heart's electrical system. Normally, a small area of the heart (sinus node) creates the electrical signals that cause the heart to beat in a timed and regular way. A heart block occurs when the signal is blocked. This disrupts the heartbeat. A heart block does not mean that blood flow to the heart is blocked. Heart block can be caused by many things that affect the electrical system of the heart. These things include the effects of aging, certain medicines, and another health condition. There are three types of heart blocks.  In a first-degree heart block, the signal is slower than normal. But the heart rate is normal, and the heart usually is not damaged. In a second-degree heart block, some signals do not reach the lower chambers of the heart. This can cause the heart to skip a beat or have an abnormal rhythm. In a third-degree heart block, the signal is completely blocked from reaching the lower chambers. This can cause the heart to slow down a lot or even stop beating. It is a very serious condition. How heart block is treated can depend on the type and what is causing it. Treatment can also depend on your symptoms. If heart block doesn't cause symptoms, it may not be treated. Treatment may be a pacemaker. You and your doctor can decide what treatment is right for you. Follow-up care is a key part of your treatment and safety. Be sure to make and go to all appointments, and call your doctor if you are having problems. It's also a good idea to know your test results and keep a list of the medicines you take. How can you care for yourself at home? If you feel lightheaded, sit or lie down to avoid injury that might occur if you faint and fall. Make lifestyle changes to improve your heart health. Eat a heart-healthy diet that includes vegetables, fruits, nuts, beans, lean meat, fish, and whole grains. Limit alcohol, sodium, and sugar. Get regular exercise. Try for 30 minutes on most days of the week. If you do not have other heart problems, you likely do not have limits on the type or level of activity that you can do. You may want to walk, swim, bike, or do other activities. Ask your doctor what level of exercise is safe for you. Stay at a healthy weight. Lose weight if you need to. Do not smoke. If you need help quitting, talk to your doctor about stop-smoking programs and medicines. These can increase your chances of quitting for good. Manage other health problems such as high blood pressure, high cholesterol, and diabetes.   If you received a pacemaker or an implantable cardioverter-defibrillator (ICD), you will get more information about it. Wear medical alert jewelry that describes your condition and says you have a pacemaker or ICD. You can buy this at most drugstores. When should you call for help? Call 911 anytime you think you may need emergency care. For example, call if:    You passed out (lost consciousness). You have symptoms of a heart attack. These may include:  Chest pain or pressure, or a strange feeling in the chest.  Sweating. Shortness of breath. Nausea or vomiting. Pain, pressure, or a strange feeling in the back, neck, jaw, or upper belly or in one or both shoulders or arms. Lightheadedness or sudden weakness. A fast or irregular heartbeat. After you call 911, the  may tell you to chew 1 adult-strength or 2 to 4 low-dose aspirin. Wait for an ambulance. Do not try to drive yourself. Call your doctor now or seek immediate medical care if:    You are dizzy or lightheaded, or you feel like you may faint. You have new or increased shortness of breath. Watch closely for changes in your health, and be sure to contact your doctor if you have any problems. Where can you learn more? Go to http://www.gray.com/  Enter A923 in the search box to learn more about \"Heart Blocks: Care Instructions. \"  Current as of: January 10, 2022               Content Version: 13.4  © 2900-9858 [employer]. Care instructions adapted under license by [employer] (which disclaims liability or warranty for this information). If you have questions about a medical condition or this instruction, always ask your healthcare professional. [place] [address] any warranty or liability for your use of this information.

## 2023-01-14 ENCOUNTER — APPOINTMENT (OUTPATIENT)
Dept: GENERAL RADIOLOGY | Age: 88
End: 2023-01-14
Attending: INTERNAL MEDICINE
Payer: MEDICARE

## 2023-01-14 ENCOUNTER — HOSPICE ADMISSION (OUTPATIENT)
Dept: HOSPICE | Facility: HOSPICE | Age: 88
End: 2023-01-14

## 2023-01-14 LAB
BACTERIA SPEC CULT: ABNORMAL
CC UR VC: ABNORMAL
SERVICE CMNT-IMP: ABNORMAL

## 2023-01-14 PROCEDURE — 74011250636 HC RX REV CODE- 250/636: Performed by: INTERNAL MEDICINE

## 2023-01-14 PROCEDURE — 73030 X-RAY EXAM OF SHOULDER: CPT

## 2023-01-14 PROCEDURE — 77030038269 HC DRN EXT URIN PURWCK BARD -A

## 2023-01-14 PROCEDURE — 65270000029 HC RM PRIVATE

## 2023-01-14 PROCEDURE — 74011000250 HC RX REV CODE- 250: Performed by: INTERNAL MEDICINE

## 2023-01-14 PROCEDURE — 74011250637 HC RX REV CODE- 250/637: Performed by: INTERNAL MEDICINE

## 2023-01-14 PROCEDURE — 96375 TX/PRO/DX INJ NEW DRUG ADDON: CPT

## 2023-01-14 PROCEDURE — 74011000258 HC RX REV CODE- 258: Performed by: INTERNAL MEDICINE

## 2023-01-14 PROCEDURE — 74011000250 HC RX REV CODE- 250: Performed by: NURSE PRACTITIONER

## 2023-01-14 PROCEDURE — G0378 HOSPITAL OBSERVATION PER HR: HCPCS

## 2023-01-14 RX ORDER — LIDOCAINE 4 G/100G
1 PATCH TOPICAL EVERY 24 HOURS
Status: DISCONTINUED | OUTPATIENT
Start: 2023-01-14 | End: 2023-01-19 | Stop reason: HOSPADM

## 2023-01-14 RX ORDER — KETOROLAC TROMETHAMINE 30 MG/ML
15 INJECTION, SOLUTION INTRAMUSCULAR; INTRAVENOUS
Status: DISPENSED | OUTPATIENT
Start: 2023-01-14 | End: 2023-01-16

## 2023-01-14 RX ORDER — DEXTROSE MONOHYDRATE AND SODIUM CHLORIDE 5; .45 G/100ML; G/100ML
50 INJECTION, SOLUTION INTRAVENOUS CONTINUOUS
Status: DISCONTINUED | OUTPATIENT
Start: 2023-01-14 | End: 2023-01-18

## 2023-01-14 RX ADMIN — DEXTROSE AND SODIUM CHLORIDE 50 ML/HR: 5; 450 INJECTION, SOLUTION INTRAVENOUS at 14:59

## 2023-01-14 RX ADMIN — AMLODIPINE BESYLATE 5 MG: 5 TABLET ORAL at 10:29

## 2023-01-14 RX ADMIN — PANTOPRAZOLE SODIUM 40 MG: 40 TABLET, DELAYED RELEASE ORAL at 06:45

## 2023-01-14 RX ADMIN — SODIUM CHLORIDE, PRESERVATIVE FREE 10 ML: 5 INJECTION INTRAVENOUS at 06:16

## 2023-01-14 RX ADMIN — TRAMADOL HYDROCHLORIDE 50 MG: 50 TABLET ORAL at 12:41

## 2023-01-14 RX ADMIN — ASPIRIN 81 MG: 81 TABLET, COATED ORAL at 21:13

## 2023-01-14 RX ADMIN — SODIUM CHLORIDE, PRESERVATIVE FREE 10 ML: 5 INJECTION INTRAVENOUS at 21:13

## 2023-01-14 RX ADMIN — ACETAMINOPHEN 650 MG: 325 TABLET ORAL at 10:41

## 2023-01-14 RX ADMIN — MEROPENEM 1 G: 1 INJECTION, POWDER, FOR SOLUTION INTRAVENOUS at 21:13

## 2023-01-14 RX ADMIN — MEROPENEM 1 G: 1 INJECTION, POWDER, FOR SOLUTION INTRAVENOUS at 10:29

## 2023-01-14 RX ADMIN — KETOROLAC TROMETHAMINE 15 MG: 30 INJECTION, SOLUTION INTRAMUSCULAR at 18:10

## 2023-01-14 RX ADMIN — PREGABALIN 50 MG: 50 CAPSULE ORAL at 21:12

## 2023-01-14 RX ADMIN — SODIUM CHLORIDE, PRESERVATIVE FREE 10 ML: 5 INJECTION INTRAVENOUS at 14:59

## 2023-01-14 RX ADMIN — ATORVASTATIN CALCIUM 40 MG: 20 TABLET, FILM COATED ORAL at 21:12

## 2023-01-14 NOTE — CONSULTS
ORTHOPAEDIC CONSULT NOTE    Subjective:     Date of Consultation:  2023      Orquidea Lassiter is a 80 y.o. female with PMH of CAD, heart block, HTN, hx of falls who is being seen for R shoulder pain. Pt states she notes R shoulder pain which limits her use of her R arm for several months, she notes she spends most of her day in a WC due to mobility issues and is able to wheel her self around the detention with her arms. Pt denies known injury or history of rotator cuff injury. Pt is R hand dominant. Patient Active Problem List    Diagnosis Date Noted    Acute metabolic encephalopathy     Heart block AV second degree 2023    Seizure ([facility] [address] 01/10/2023    CKD (chronic kidney disease) stage 3, GFR 30-59 ml/min ([facility]) 2019    Frequent falls 2019    Neuropathy 2019    GERD (gastroesophageal reflux disease) 2014    CAD (coronary artery disease)     HTN (hypertension), benign 2011    Mixed hyperlipidemia 2011     Family History   Problem Relation Age of Onset    Other Mother          of renal failure, not sure what caused id    Other Father         something with throat, not sure if it was cancer    Heart Disease Sister       Social History     Tobacco Use    Smoking status: Never    Smokeless tobacco: Never   Substance Use Topics    Alcohol use: No     Past Medical History:   Diagnosis Date    CAD (coronary artery disease)     Femur fracture ([facility] [address]     right    GERD (gastroesophageal reflux disease)     HTN (hypertension), benign 2011    Mixed hyperlipidemia 2011      Past Surgical History:   Procedure Laterality Date    HX FEMUR FRACTURE TX      HX KNEE REPLACEMENT      bilateral    HX ORTHOPAEDIC      bilat knee replacements    HX VEIN STRIPPING        Prior to Admission medications    Medication Sig Start Date End Date Taking? Authorizing Provider   amLODIPine (Norvasc) 5 mg tablet Take 1 Tablet by mouth daily.  Indications: high blood pressure 1/13/23  Yes DoJoel MD   cefdinir (OMNICEF) 300 mg capsule Take 1 Capsule by mouth two (2) times a day for 5 days. 1/13/23 1/18/23 Yes , Joel ALVES MD   cloNIDine HCL (CATAPRES) 0.1 mg tablet Take  by mouth two (2) times a day. Provider, Historical   Ferrous Fumarate 325 mg (106 mg iron) tab Take  by mouth. Provider, Historical   pantoprazole (PROTONIX) 40 mg tablet Take 40 mg by mouth daily. Provider, Historical   Polyethylene Glycol 3350 powd by Does Not Apply route. Provider, Historical   hydrALAZINE (APRESOLINE) 10 mg tablet Take  by mouth. Provider, Historical   loperamide (IMMODIUM) 2 mg tablet Take 2 mg by mouth four (4) times daily as needed for Diarrhea. Provider, Historical   guaiFENesin (ROBITUSSIN) 100 mg/5 mL liquid Take 200 mg by mouth three (3) times daily as needed for Cough. Provider, Historical   traMADoL (ULTRAM) 50 mg tablet Take 50 mg by mouth every six (6) hours as needed for Pain. Provider, Historical   atorvastatin (Lipitor) 40 mg tablet Take 1 Tablet by mouth nightly. 12/30/21   Mari Nunez MD   acetaminophen (TYLENOL) 325 mg tablet Take 2 Tabs by mouth every four (4) hours as needed for Pain. 3/2/21   Lily Martinez MD   ondansetron (Zofran ODT) 4 mg disintegrating tablet Take 1 Tab by mouth every eight (8) hours as needed for Nausea. 3/2/21   Lily Martinez MD   omega-3 fatty acids-fish oil 360-1,200 mg cap Take 2 Tabs by mouth daily. Fredo Davis MD   pregabalin (LYRICA) 50 mg capsule Take 50 mg by mouth nightly. Indications: neuropathy    Provider, Historical   aspirin delayed-release 81 mg tablet Take 81 mg by mouth nightly.     Provider, Historical     Current Facility-Administered Medications   Medication Dose Route Frequency    meropenem (MERREM) 1 g in 0.9% sodium chloride (MBP/ADV) 50 mL MBP  1 g IntraVENous Q12H    ketorolac (TORADOL) injection 15 mg  15 mg IntraVENous Q8H PRN    dextrose 5 % - 0.45% NaCl infusion  50 mL/hr IntraVENous CONTINUOUS    amLODIPine (NORVASC) tablet 5 mg  5 mg Oral DAILY    sodium chloride (NS) flush 5-40 mL  5-40 mL IntraVENous Q8H    sodium chloride (NS) flush 5-40 mL  5-40 mL IntraVENous PRN    acetaminophen (TYLENOL) tablet 650 mg  650 mg Oral Q4H PRN    naloxone (NARCAN) injection 0.4 mg  0.4 mg IntraVENous PRN    ondansetron (ZOFRAN) injection 4 mg  4 mg IntraVENous Q4H PRN    hydrALAZINE (APRESOLINE) 20 mg/mL injection 10 mg  10 mg IntraVENous Q6H PRN    aspirin delayed-release tablet 81 mg  81 mg Oral QHS    atorvastatin (LIPITOR) tablet 40 mg  40 mg Oral QHS    pantoprazole (PROTONIX) tablet 40 mg  40 mg Oral ACB    pregabalin (LYRICA) capsule 50 mg  50 mg Oral QHS    traMADoL (ULTRAM) tablet 50 mg  50 mg Oral Q6H PRN    atropine 0.4 mg/mL injection 1 mg  1 mg IntraVENous PRN      Allergies   Allergen Reactions    Sulfa (Sulfonamide Antibiotics) Rash    Ampicillin Rash     Tolerates cefazolin    Lescol [Fluvastatin] Unknown (comments)        Review of Systems:  A comprehensive review of systems was negative except for that written in the HPI. Mental Status: no dementia    Objective:     Patient Vitals for the past 8 hrs:   BP Temp Pulse Resp SpO2   23 1202 (!) 145/49 -- -- -- --   23 1200 (!) 144/79 98 °F (36.7 °C) 70 17 98 %   23 0824 (!) 144/78 97.8 °F (36.6 °C) 69 18 97 %     Temp (24hrs), Av.8 °F (36.6 °C), Min:97.4 °F (36.3 °C), Max:98 °F (36.7 °C)      Gen: Well-developed,  in no acute distress   Musc: RUE - exam limted by pain, + pain with ROM only able to extension to 60 with abduction to 70 degrees, min pain with palpation to anterior/posterior shoulder, + FROM of elbow and wrist without pain, no edema or erythema noted,  NVI    Skin: No skin breakdown noted. Skin warm, pink, dry  Neuro: Cranial nerves are grossly intact, no focal motor weakness, follows commands appropriately       Imaging Review: EXAM: XR SHOULDER RT AP/LAT MIN 2 V     INDICATION: right shoulder pain. COMPARISON: X-ray of the right shoulder, 9/9/2019. X-ray of the right humerus,  12/24/2021. FINDINGS: Three views of the right shoulder demonstrate no visible fracture or  malalignment. Degenerative changes in the glenohumeral joint. Osteopenia. Interstitial prominence in the lung fields. IMPRESSION  1. No visible fracture. Labs: No results found for this or any previous visit (from the past 24 hour(s)). Impression:     Patient Active Problem List    Diagnosis Date Noted    Acute metabolic encephalopathy 79/65/9027    Heart block AV second degree 01/13/2023    Seizure ([facility] [address] 01/10/2023    CKD (chronic kidney disease) stage 3, GFR 30-59 ml/min ([facility]) 09/08/2019    Frequent falls 09/08/2019    Neuropathy 09/08/2019    GERD (gastroesophageal reflux disease) 09/30/2014    CAD (coronary artery disease)     HTN (hypertension), benign 01/11/2011    Mixed hyperlipidemia 01/11/2011     Principal Problem:    Heart block AV second degree (1/13/2023)    Active Problems:    HTN (hypertension), benign (1/11/2011)      Mixed hyperlipidemia (1/11/2011)      GERD (gastroesophageal reflux disease) (9/30/2014)      CAD (coronary artery disease) ()      CKD (chronic kidney disease) stage 3, GFR 30-59 ml/min ([address] (9/8/2019)      Frequent falls (9/8/2019)      Neuropathy (9/8/2019)      Seizure ([facility] [address] (1/10/2023)      Acute metabolic encephalopathy (0/46/3825)        Plan:   -  Pt is stable orthopaedically, Non-Operative management at this time  - R shoulder pain/ frozen shoulder, DJD - no acute fracture or subluxation on XR, + OA, pain management with NSAIDs as per medicine, adding lido patch and may consider topical voltaren PRN, unable to provide jt injection at this time due to current treatment for UTI, pt may follow up in 2-3 weeks after complication of UTI treatment. -  orhto to sign off, follow up outpt with Dr. Barbra Rothman or Dr. Galicia Sender     Dr. Rodriguez Human aware and agrees with plan as above.         Jaime Kang, NP  Orthopedic Nurse Practitioner   [place]

## 2023-01-14 NOTE — PROGRESS NOTES
Problem: Falls - Risk of  Goal: *Absence of Falls  Description: Document Jonh Humphrey Fall Risk and appropriate interventions in the flowsheet.   Outcome: Progressing Towards Goal  Note: Fall Risk Interventions:  Mobility Interventions: Bed/chair exit alarm, Patient to call before getting OOB         Medication Interventions: Assess postural VS orthostatic hypotension, Bed/chair exit alarm, Patient to call before getting OOB, Teach patient to arise slowly    Elimination Interventions: Bed/chair exit alarm, Call light in reach              Problem: Patient Education: Go to Patient Education Activity  Goal: Patient/Family Education  Outcome: Progressing Towards Goal

## 2023-01-14 NOTE — PROGRESS NOTES
CM Consult Noted:   1:00 PM- CM spoke with Kirstie Fernandez with [address]- consult was from previous day- pt's family does not wish to have hospice at this time- will re-evaluate after rehab. [address] will initiate [employer] Monday morning. Floor CM updated and will continue to follow and assist as needed. 12:23 PM- Pt remains on Ortho- noted consult for Hospice- per chart review pt may qualify for GIP- referral sent to [facility] for eval for GIP hospice. Will arrange for OP services if needed.       Anastasiya Reyes, MSW, [address]

## 2023-01-14 NOTE — PROGRESS NOTES
Occupational Therapy Note  1/14/2023    OT eval order received and acknowledged and screen completed. Patient initially evaluated on 1/11/2022 and at baseline functional status requiring assist for all self care and functional transfers/mobility at [facility] (patient at [facility] level) and no change in status since initial evaluation thus will D/C pt from skilled OT services at this time. Recommend return to [place] when medically appropriate.     Thank you,  Wiley Bingham OTR/L

## 2023-01-14 NOTE — PROGRESS NOTES
Physical Therapy Note:  Re-consult received and chart reviewed. Patient evaluated and discharged on this admission on 1/11. At baseline, the patient lives in [place] and requires assist for all mobility, transfers, and ADLs at the wheelchair level. Skilled therapy services are not indicated if the patient remains at her baseline functional status (was as of 1/11 eval) and will sign off again. Please re-consult if there has been a functional decline.       Thank you,  Ree Yepez, PT, DPT

## 2023-01-14 NOTE — PROGRESS NOTES
pt is c/o R shoulder pain. States is been getting worse since the past week. Unable to use RUE. Reports no falls. Orders received for imaging from Dr. Hayes Coe.    Upon transfer to wheelchair pt was 2 max assist & unsteady.

## 2023-01-14 NOTE — PROGRESS NOTES
Problem: Falls - Risk of  Goal: *Absence of Falls  Description: Document Eudelia Romberg Fall Risk and appropriate interventions in the flowsheet. Outcome: Progressing Towards Goal  Note: Fall Risk Interventions:  Mobility Interventions: Bed/chair exit alarm, Patient to call before getting OOB         Medication Interventions: Assess postural VS orthostatic hypotension, Bed/chair exit alarm, Patient to call before getting OOB, Teach patient to arise slowly    Elimination Interventions: Bed/chair exit alarm, Call light in reach              Problem: Patient Education: Go to Patient Education Activity  Goal: Patient/Family Education  Outcome: Progressing Towards Goal     Problem: Pressure Injury - Risk of  Goal: *Prevention of pressure injury  Description: Document John Scale and appropriate interventions in the flowsheet. Outcome: Progressing Towards Goal  Note: Pressure Injury Interventions:  Sensory Interventions: Assess changes in LOC, Sit a 90-degree angle/use footstool if needed, Minimize linen layers, Monitor skin under medical devices    Moisture Interventions: Absorbent underpads    Activity Interventions: Increase time out of bed, PT/OT evaluation    Mobility Interventions: HOB 30 degrees or less, Turn and reposition approx.  every two hours(pillow and wedges)    Nutrition Interventions: Document food/fluid/supplement intake    Friction and Shear Interventions: Feet elevated on foot rest, HOB 30 degrees or less, Transferring/repositioning devices                Problem: Patient Education: Go to Patient Education Activity  Goal: Patient/Family Education  Outcome: Progressing Towards Goal     Problem: Syncope  Goal: *Absence of injury  Outcome: Progressing Towards Goal  Goal: Decrease or eliminate episodes of syncope  Outcome: Progressing Towards Goal     Problem: Patient Education: Go to Patient Education Activity  Goal: Patient/Family Education  Outcome: Progressing Towards Goal     Problem: Seizure Disorder (Adult)  Goal: *STG: Remains free of seizure activity  Outcome: Progressing Towards Goal  Goal: *STG: Maintains lab values within therapeutic range  Outcome: Progressing Towards Goal  Goal: *STG/LTG: Complies with medication therapy  Outcome: Progressing Towards Goal  Goal: *STG: Remains free of injury during seizure activity  Outcome: Progressing Towards Goal  Goal: *STG: Remains safe in hospital  Outcome: Progressing Towards Goal  Goal: Interventions  Outcome: Progressing Towards Goal     Problem: Patient Education: Go to Patient Education Activity  Goal: Patient/Family Education  Outcome: Progressing Towards Goal

## 2023-01-14 NOTE — PROGRESS NOTES
Chris Diggs [facility] [address]  [address] [place] [address], [place], [address] [place] [address]  [phone]      Hospitalist Progress Note      NAME: Pineda Meade   :  1924  MRM:  [id number]    Date/Time of service: 2023  11:18 AM       Subjective:     Chief Complaint:  Patient was personally seen and examined by me during this time period. Chart reviewed. No fevers, chills. C/o mild shoulder pain. Daughter at bedside        Objective:       Vitals:       Last 24hrs VS reviewed since prior progress note.  Most recent are:    Visit Vitals  BP (!) 144/78 (BP 1 Location: Left upper arm, BP Patient Position: At rest)   Pulse 69   Temp 97.8 °F (36.6 °C)   Resp 18   Ht 5' 4.02\" (1.626 m)   Wt 90.7 kg (200 lb)   SpO2 97%   BMI 34.31 kg/m²     SpO2 Readings from Last 6 Encounters:   23 97%   23 96%   21 96%   21 93%   20 98%   20 96%          Intake/Output Summary (Last 24 hours) at 2023 1118  Last data filed at 2023 0920  Gross per 24 hour   Intake 300 ml   Output 1000 ml   Net -700 ml          Exam:     Physical Exam:    Gen:    Elderly, ill-appearing, frail, NAD  HEENT:  Pink conjunctivae, PERRL, hearing intact to voice, moist mucous membranes  Neck:  Supple, without masses, thyroid non-tender  Resp:  No accessory muscle use, clear breath sounds without wheezes rales or rhonchi  Card:   No murmurs, normal S1, S2 without thrills, bruits or peripheral edema  Abd:  Soft, non-tender, non-distended, normoactive bowel sounds are present  Lymph:  No cervical or inguinal adenopathy  Musc:  No cyanosis or clubbing  Skin:   No rashes  Neuro:  moves all ext  Psych:  poor insight, oriented to person, place     Medications Reviewed: (see below)    Lab Data Reviewed: (see below)    ______________________________________________________________________    Medications:     Current Facility-Administered Medications   Medication Dose Route Frequency    meropenem (MERREM) 1 g in 0.9% sodium chloride (MBP/ADV) 50 mL MBP  1 g IntraVENous Q12H    amLODIPine (NORVASC) tablet 5 mg  5 mg Oral DAILY    sodium chloride (NS) flush 5-40 mL  5-40 mL IntraVENous Q8H    sodium chloride (NS) flush 5-40 mL  5-40 mL IntraVENous PRN    acetaminophen (TYLENOL) tablet 650 mg  650 mg Oral Q4H PRN    naloxone (NARCAN) injection 0.4 mg  0.4 mg IntraVENous PRN    ondansetron (ZOFRAN) injection 4 mg  4 mg IntraVENous Q4H PRN    hydrALAZINE (APRESOLINE) 20 mg/mL injection 10 mg  10 mg IntraVENous Q6H PRN    aspirin delayed-release tablet 81 mg  81 mg Oral QHS    atorvastatin (LIPITOR) tablet 40 mg  40 mg Oral QHS    pantoprazole (PROTONIX) tablet 40 mg  40 mg Oral ACB    pregabalin (LYRICA) capsule 50 mg  50 mg Oral QHS    traMADoL (ULTRAM) tablet 50 mg  50 mg Oral Q6H PRN    atropine 0.4 mg/mL injection 1 mg  1 mg IntraVENous PRN          Lab Review:     Recent Labs     01/12/23  0356   WBC 7.2   HGB 13.7   HCT 43.0          Recent Labs     01/12/23  0356      K 4.3   *   CO2 25   *   BUN 24*   CREA 1.06*   CA 8.8       No results found for: GLUCPOC       Assessment / Plan:     81 yo hx of HTN, CAD, CKD 3, presented w/ AMS, seizure-like activity, bradycardia, 2nd AV-block     1) Bradycardia/2nd degree AV block: now stable. Patient and Family declined pacer due to advanced age, but also declined hospice. Cards/EP does not recommend pacer either. Stopped clonidine and avoid other AV-blocking agents. CM to set up SNF     2) Acute met encephalopathy: now improving, likely at baseline. AMS on admission likely from bradycardia. No evidence of seizures on EEG. 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 No further management per Neuro     3) ESBL UTI: urine Cx w/ ESBL klebsiella. Will stop IV CTX. Start IV meropenem      4) HTN: stopped hydralazine, clonidine.   Cont norvasc (new med)    Code: DNR    Total time spent with patient care: 35 Minutes **I personally saw and examined the patient during this time period**                 Care Plan discussed with: Patient, nursing, family     Discussed:  Care Plan    Prophylaxis:  SCD's    Disposition:  SNF/LTC           ___________________________________________________    Attending Physician: Carolyn Giang MD

## 2023-01-15 LAB
ANION GAP SERPL CALC-SCNC: 6 MMOL/L (ref 5–15)
BUN SERPL-MCNC: 32 MG/DL (ref 6–20)
BUN/CREAT SERPL: 26 (ref 12–20)
CALCIUM SERPL-MCNC: 8.6 MG/DL (ref 8.5–10.1)
CHLORIDE SERPL-SCNC: 110 MMOL/L (ref 97–108)
CO2 SERPL-SCNC: 27 MMOL/L (ref 21–32)
CREAT SERPL-MCNC: 1.22 MG/DL (ref 0.55–1.02)
ERYTHROCYTE [DISTWIDTH] IN BLOOD BY AUTOMATED COUNT: 13.6 % (ref 11.5–14.5)
EST. AVERAGE GLUCOSE BLD GHB EST-MCNC: 128 MG/DL
GLUCOSE SERPL-MCNC: 122 MG/DL (ref 65–100)
HBA1C MFR BLD: 6.1 % (ref 4–5.6)
HCT VFR BLD AUTO: 43.6 % (ref 35–47)
HGB BLD-MCNC: 13.3 G/DL (ref 11.5–16)
MAGNESIUM SERPL-MCNC: 2.1 MG/DL (ref 1.6–2.4)
MCH RBC QN AUTO: 30.5 PG (ref 26–34)
MCHC RBC AUTO-ENTMCNC: 30.5 G/DL (ref 30–36.5)
MCV RBC AUTO: 100 FL (ref 80–99)
NRBC # BLD: 0 K/UL (ref 0–0.01)
NRBC BLD-RTO: 0 PER 100 WBC
PHOSPHATE SERPL-MCNC: 4 MG/DL (ref 2.6–4.7)
PLATELET # BLD AUTO: 197 K/UL (ref 150–400)
PMV BLD AUTO: 11.1 FL (ref 8.9–12.9)
POTASSIUM SERPL-SCNC: 4.8 MMOL/L (ref 3.5–5.1)
RBC # BLD AUTO: 4.36 M/UL (ref 3.8–5.2)
SODIUM SERPL-SCNC: 143 MMOL/L (ref 136–145)
WBC # BLD AUTO: 6.4 K/UL (ref 3.6–11)

## 2023-01-15 PROCEDURE — 85027 COMPLETE CBC AUTOMATED: CPT

## 2023-01-15 PROCEDURE — 83735 ASSAY OF MAGNESIUM: CPT

## 2023-01-15 PROCEDURE — 84100 ASSAY OF PHOSPHORUS: CPT

## 2023-01-15 PROCEDURE — 74011250636 HC RX REV CODE- 250/636: Performed by: INTERNAL MEDICINE

## 2023-01-15 PROCEDURE — 65270000029 HC RM PRIVATE

## 2023-01-15 PROCEDURE — 74011250637 HC RX REV CODE- 250/637: Performed by: INTERNAL MEDICINE

## 2023-01-15 PROCEDURE — 96376 TX/PRO/DX INJ SAME DRUG ADON: CPT

## 2023-01-15 PROCEDURE — 74011000250 HC RX REV CODE- 250: Performed by: INTERNAL MEDICINE

## 2023-01-15 PROCEDURE — 80048 BASIC METABOLIC PNL TOTAL CA: CPT

## 2023-01-15 PROCEDURE — 74011000250 HC RX REV CODE- 250: Performed by: NURSE PRACTITIONER

## 2023-01-15 PROCEDURE — 83036 HEMOGLOBIN GLYCOSYLATED A1C: CPT

## 2023-01-15 PROCEDURE — G0378 HOSPITAL OBSERVATION PER HR: HCPCS

## 2023-01-15 PROCEDURE — 36415 COLL VENOUS BLD VENIPUNCTURE: CPT

## 2023-01-15 PROCEDURE — 96372 THER/PROPH/DIAG INJ SC/IM: CPT

## 2023-01-15 PROCEDURE — 74011000258 HC RX REV CODE- 258: Performed by: INTERNAL MEDICINE

## 2023-01-15 RX ORDER — ENOXAPARIN SODIUM 100 MG/ML
30 INJECTION SUBCUTANEOUS EVERY 24 HOURS
Status: DISCONTINUED | OUTPATIENT
Start: 2023-01-16 | End: 2023-01-17 | Stop reason: DRUGHIGH

## 2023-01-15 RX ORDER — ENOXAPARIN SODIUM 100 MG/ML
40 INJECTION SUBCUTANEOUS EVERY 24 HOURS
Status: DISCONTINUED | OUTPATIENT
Start: 2023-01-15 | End: 2023-01-15

## 2023-01-15 RX ADMIN — DEXTROSE AND SODIUM CHLORIDE 50 ML/HR: 5; 450 INJECTION, SOLUTION INTRAVENOUS at 14:49

## 2023-01-15 RX ADMIN — SODIUM CHLORIDE, PRESERVATIVE FREE 10 ML: 5 INJECTION INTRAVENOUS at 08:54

## 2023-01-15 RX ADMIN — SODIUM CHLORIDE, PRESERVATIVE FREE 10 ML: 5 INJECTION INTRAVENOUS at 23:17

## 2023-01-15 RX ADMIN — SODIUM CHLORIDE, PRESERVATIVE FREE 10 ML: 5 INJECTION INTRAVENOUS at 06:40

## 2023-01-15 RX ADMIN — PANTOPRAZOLE SODIUM 40 MG: 40 TABLET, DELAYED RELEASE ORAL at 06:39

## 2023-01-15 RX ADMIN — MEROPENEM 1 G: 1 INJECTION, POWDER, FOR SOLUTION INTRAVENOUS at 23:16

## 2023-01-15 RX ADMIN — PREGABALIN 50 MG: 50 CAPSULE ORAL at 23:16

## 2023-01-15 RX ADMIN — MEROPENEM 1 G: 1 INJECTION, POWDER, FOR SOLUTION INTRAVENOUS at 08:52

## 2023-01-15 RX ADMIN — ASPIRIN 81 MG: 81 TABLET, COATED ORAL at 23:16

## 2023-01-15 RX ADMIN — AMLODIPINE BESYLATE 5 MG: 5 TABLET ORAL at 08:52

## 2023-01-15 RX ADMIN — ATORVASTATIN CALCIUM 40 MG: 20 TABLET, FILM COATED ORAL at 23:16

## 2023-01-15 RX ADMIN — ENOXAPARIN SODIUM 40 MG: 100 INJECTION SUBCUTANEOUS at 12:48

## 2023-01-15 RX ADMIN — KETOROLAC TROMETHAMINE 15 MG: 30 INJECTION, SOLUTION INTRAMUSCULAR at 12:48

## 2023-01-15 RX ADMIN — TRAMADOL HYDROCHLORIDE 50 MG: 50 TABLET ORAL at 23:22

## 2023-01-15 RX ADMIN — SODIUM CHLORIDE, PRESERVATIVE FREE 10 ML: 5 INJECTION INTRAVENOUS at 05:18

## 2023-01-15 NOTE — PROGRESS NOTES
[facility] Lovenox Dosing 01/15/23  Lovenox dose change per protocol  Physician: Dr Haley Villatoro    [facility] Protocol  Enoxaparin prophylaxis dosing (medically ill, surgical patients)   Patient Weight (kg)     50 and below 51 - 100 101 - 150 151 - 174 175 or greater         Estimated CrCl  (ml/min) 30 or greater   30 mg SUBQ daily   40 mg SUBQ daily 30 mg SUBQ BID  40 mg SUBQ BID 60mg SUBQ BID      15-29 UFH 5000 units SUBQ BID   30 mg SUBQ daily 30 mg SUBQ daily 40 mg SUBQ daily   60 mg SUBQ daily      Less than 15 or Dialysis UFH 5000 units SUBQ BID   UFH 5000 units SUBQ TID UFH 7500 units SUBQ TID     Estimated Creatinine Clearance: 28.1 mL/min (A) (based on SCr of 1.22 mg/dL (H)).   Current dose: Lovenox 40 mg SC daily  Recommendation: Lovenox 30 mg SC daily    Thank you  Liliana Kovacs, PharmD  [phone]

## 2023-01-15 NOTE — PROGRESS NOTES
Bedside shift change report given to Aretha (oncoming nurse) by Yomaira Carbone (offgoing nurse). Report included the following information SBAR, Kardex, Procedure Summary, Intake/Output, MAR, and Recent Results.

## 2023-01-15 NOTE — PROGRESS NOTES
Chris Diggs [facility] 79  [address] [facility], [address] [place] [address]  [phone]      Hospitalist Progress Note      NAME: Raghavendra Hyatt   :  1924  MRM:  [id number]    Date/Time of service: 1/15/2023  9:45 AM       Subjective:     Chief Complaint:  Patient was personally seen and examined by me during this time period. Chart reviewed. Only has shoulder pain to movement. Updated daughter at bedside        Objective:       Vitals:       Last 24hrs VS reviewed since prior progress note.  Most recent are:    Visit Vitals  BP (!) 150/64   Pulse 64   Temp 97.6 °F (36.4 °C)   Resp 17   Ht 5' 4.02\" (1.626 m)   Wt 90.7 kg (200 lb)   SpO2 98%   BMI 34.31 kg/m²     SpO2 Readings from Last 6 Encounters:   01/15/23 98%   23 96%   21 96%   21 93%   20 98%   20 96%    O2 Flow Rate (L/min): 2 l/min     Intake/Output Summary (Last 24 hours) at 1/15/2023 0945  Last data filed at 1/15/2023 0815  Gross per 24 hour   Intake 980.83 ml   Output 800 ml   Net 180.83 ml          Exam:     Physical Exam:    Gen:    Elderly, ill-appearing, frail, NAD  HEENT:  Pink conjunctivae, PERRL, hearing intact to voice, moist mucous membranes  Neck:  Supple, without masses, thyroid non-tender  Resp:  No accessory muscle use, clear breath sounds without wheezes rales or rhonchi  Card:   No murmurs, normal S1, S2 without thrills, bruits or peripheral edema  Abd:  Soft, non-tender, non-distended, normoactive bowel sounds are present  Lymph:  No cervical or inguinal adenopathy  Musc:  No cyanosis or clubbing, right shoulder pain to ROM  Skin:   No rashes  Neuro:  moves all ext  Psych:  poor insight, oriented to person, place     Medications Reviewed: (see below)    Lab Data Reviewed: (see below)    ______________________________________________________________________    Medications:     Current Facility-Administered Medications   Medication Dose Route Frequency    meropenem (MERREM) 1 g in 0.9% sodium chloride (MBP/ADV) 50 mL MBP  1 g IntraVENous Q12H    ketorolac (TORADOL) injection 15 mg  15 mg IntraVENous Q8H PRN    dextrose 5 % - 0.45% NaCl infusion  50 mL/hr IntraVENous CONTINUOUS    lidocaine 4 % patch 1 Patch  1 Patch TransDERmal Q24H    amLODIPine (NORVASC) tablet 5 mg  5 mg Oral DAILY    sodium chloride (NS) flush 5-40 mL  5-40 mL IntraVENous Q8H    sodium chloride (NS) flush 5-40 mL  5-40 mL IntraVENous PRN    acetaminophen (TYLENOL) tablet 650 mg  650 mg Oral Q4H PRN    naloxone (NARCAN) injection 0.4 mg  0.4 mg IntraVENous PRN    ondansetron (ZOFRAN) injection 4 mg  4 mg IntraVENous Q4H PRN    hydrALAZINE (APRESOLINE) 20 mg/mL injection 10 mg  10 mg IntraVENous Q6H PRN    aspirin delayed-release tablet 81 mg  81 mg Oral QHS    atorvastatin (LIPITOR) tablet 40 mg  40 mg Oral QHS    pantoprazole (PROTONIX) tablet 40 mg  40 mg Oral ACB    pregabalin (LYRICA) capsule 50 mg  50 mg Oral QHS    traMADoL (ULTRAM) tablet 50 mg  50 mg Oral Q6H PRN    atropine 0.4 mg/mL injection 1 mg  1 mg IntraVENous PRN          Lab Review:     Recent Labs     01/15/23  0347   WBC 6.4   HGB 13.3   HCT 43.6          Recent Labs     01/15/23  0347      K 4.8   *   CO2 27   *   BUN 32*   CREA 1.22*   CA 8.6   MG 2.1   PHOS 4.0       No results found for: GLUCPOC       Assessment / Plan:     79 yo hx of HTN, CAD, CKD 3, presented w/ AMS, seizure-like activity, bradycardia, 2nd AV-block     1) Bradycardia/2nd degree AV block: now stable. Patient and Family declined pacer due to advanced age, but also declined hospice. Cards/EP does not recommend pacer either. Stopped clonidine and avoid other AV-blocking agents. CM to set up SNF     2) Acute met encephalopathy: now improving, likely at baseline. AMS on admission likely from bradycardia. No evidence of seizures on EEG. 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 No further management per Neuro     3) ESBL UTI: urine Cx w/ ESBL klebsiella. Will cont IV meropenem      4) HTN: stopped hydralazine, clonidine. Cont norvasc (new med)    5) R shoulder pain:  due to DJD. Xrays neg for fractures. Ortho was following.   Will cont lidocaine patch, IV toradol prn, PT/OT    Code: DNR    Total time spent with patient care: 30 Minutes **I personally saw and examined the patient during this time period**                 Care Plan discussed with: Patient, nursing, family     Discussed:  Care Plan    Prophylaxis:  SCD's, lovenox     Disposition:  SNF/LTC           ___________________________________________________    Attending Physician: Ramy Luis MD

## 2023-01-15 NOTE — PROGRESS NOTES
Problem: Falls - Risk of  Goal: *Absence of Falls  Description: Document Bishop Pancoast Fall Risk and appropriate interventions in the flowsheet. Outcome: Progressing Towards Goal  Note: Fall Risk Interventions:  Mobility Interventions: Bed/chair exit alarm, Patient to call before getting OOB         Medication Interventions: Assess postural VS orthostatic hypotension, Bed/chair exit alarm, Patient to call before getting OOB, Teach patient to arise slowly    Elimination Interventions: Bed/chair exit alarm, Call light in reach              Problem: Pressure Injury - Risk of  Goal: *Prevention of pressure injury  Description: Document John Scale and appropriate interventions in the flowsheet.   Outcome: Progressing Towards Goal  Note: Pressure Injury Interventions:  Sensory Interventions: Assess changes in LOC, Discuss PT/OT consult with provider, Keep linens dry and wrinkle-free, Maintain/enhance activity level, Minimize linen layers, Monitor skin under medical devices    Moisture Interventions: Absorbent underpads, Internal/External urinary devices, Maintain skin hydration (lotion/cream)    Activity Interventions: PT/OT evaluation    Mobility Interventions: HOB 30 degrees or less, PT/OT evaluation, Pressure redistribution bed/mattress (bed type)    Nutrition Interventions: Document food/fluid/supplement intake    Friction and Shear Interventions: Transferring/repositioning devices, HOB 30 degrees or less

## 2023-01-16 PROCEDURE — 96372 THER/PROPH/DIAG INJ SC/IM: CPT

## 2023-01-16 PROCEDURE — 74011250636 HC RX REV CODE- 250/636: Performed by: INTERNAL MEDICINE

## 2023-01-16 PROCEDURE — 74011000250 HC RX REV CODE- 250: Performed by: INTERNAL MEDICINE

## 2023-01-16 PROCEDURE — 77010033678 HC OXYGEN DAILY

## 2023-01-16 PROCEDURE — 96376 TX/PRO/DX INJ SAME DRUG ADON: CPT

## 2023-01-16 PROCEDURE — 74011250637 HC RX REV CODE- 250/637: Performed by: INTERNAL MEDICINE

## 2023-01-16 PROCEDURE — 74011000258 HC RX REV CODE- 258: Performed by: INTERNAL MEDICINE

## 2023-01-16 PROCEDURE — 65270000029 HC RM PRIVATE

## 2023-01-16 PROCEDURE — G0378 HOSPITAL OBSERVATION PER HR: HCPCS

## 2023-01-16 PROCEDURE — 74011000250 HC RX REV CODE- 250: Performed by: NURSE PRACTITIONER

## 2023-01-16 RX ADMIN — ASPIRIN 81 MG: 81 TABLET, COATED ORAL at 23:46

## 2023-01-16 RX ADMIN — DEXTROSE AND SODIUM CHLORIDE 50 ML/HR: 5; 450 INJECTION, SOLUTION INTRAVENOUS at 16:42

## 2023-01-16 RX ADMIN — SODIUM CHLORIDE, PRESERVATIVE FREE 10 ML: 5 INJECTION INTRAVENOUS at 06:30

## 2023-01-16 RX ADMIN — PANTOPRAZOLE SODIUM 40 MG: 40 TABLET, DELAYED RELEASE ORAL at 06:38

## 2023-01-16 RX ADMIN — TRAMADOL HYDROCHLORIDE 50 MG: 50 TABLET ORAL at 06:39

## 2023-01-16 RX ADMIN — MEROPENEM 1 G: 1 INJECTION, POWDER, FOR SOLUTION INTRAVENOUS at 11:40

## 2023-01-16 RX ADMIN — TRAMADOL HYDROCHLORIDE 50 MG: 50 TABLET ORAL at 23:45

## 2023-01-16 RX ADMIN — MEROPENEM 1 G: 1 INJECTION, POWDER, FOR SOLUTION INTRAVENOUS at 23:45

## 2023-01-16 RX ADMIN — PREGABALIN 50 MG: 50 CAPSULE ORAL at 23:46

## 2023-01-16 RX ADMIN — SODIUM CHLORIDE, PRESERVATIVE FREE 10 ML: 5 INJECTION INTRAVENOUS at 14:42

## 2023-01-16 RX ADMIN — AMLODIPINE BESYLATE 5 MG: 5 TABLET ORAL at 11:41

## 2023-01-16 RX ADMIN — ENOXAPARIN SODIUM 30 MG: 100 INJECTION SUBCUTANEOUS at 11:41

## 2023-01-16 RX ADMIN — ATORVASTATIN CALCIUM 40 MG: 20 TABLET, FILM COATED ORAL at 23:46

## 2023-01-16 NOTE — PROGRESS NOTES
Problem: Falls - Risk of  Goal: *Absence of Falls  Description: Document Dennie Rous Fall Risk and appropriate interventions in the flowsheet.   Outcome: Progressing Towards Goal  Note: Fall Risk Interventions:  Mobility Interventions: Bed/chair exit alarm, OT consult for ADLs, Patient to call before getting OOB, PT Consult for mobility concerns, PT Consult for assist device competence, Utilize walker, cane, or other assistive device, Utilize gait belt for transfers/ambulation         Medication Interventions: Bed/chair exit alarm, Patient to call before getting OOB, Evaluate medications/consider consulting pharmacy, Teach patient to arise slowly    Elimination Interventions: Bed/chair exit alarm, Call light in reach, Patient to call for help with toileting needs, Toileting schedule/hourly rounds              Problem: Seizure Disorder (Adult)  Goal: *STG: Remains free of seizure activity  Outcome: Progressing Towards Goal     Problem: Urinary Elimination - Impaired  Goal: *Absence/decrease in episodes of  urinary incontinence  Outcome: Progressing Towards Goal

## 2023-01-16 NOTE — PROGRESS NOTES
Bedside shift change report given to Gavin Parks (oncoming nurse) by Joaquin Andrade (offgoing nurse). Report included the following information SBAR, Kardex, Procedure Summary, Intake/Output, MAR, and Recent Results.

## 2023-01-16 NOTE — PROGRESS NOTES
Chris Diggs [facility] 79  [address] [facility], [address] [address]  [phone]      Hospitalist Progress Note      NAME: Jamil Max   :  1924  MRM:  [id number]    Date/Time of service: 2023  10:15 AM       Subjective:     Chief Complaint:  Patient was personally seen and examined by me during this time period. Chart reviewed. No fevers, chills. Still with right shoulder pain        Objective:       Vitals:       Last 24hrs VS reviewed since prior progress note.  Most recent are:    Visit Vitals  BP (!) 121/57 (BP 1 Location: Left upper arm, BP Patient Position: At rest)   Pulse 64   Temp 98 °F (36.7 °C)   Resp 18   Ht 5' 4.02\" (1.626 m)   Wt 90.7 kg (200 lb)   SpO2 94%   BMI 34.31 kg/m²     SpO2 Readings from Last 6 Encounters:   23 94%   23 96%   21 96%   21 93%   20 98%   20 96%    O2 Flow Rate (L/min): 2 l/min     Intake/Output Summary (Last 24 hours) at 2023 1015  Last data filed at 1/15/2023 1900  Gross per 24 hour   Intake 1120 ml   Output 220 ml   Net 900 ml          Exam:     Physical Exam:    Gen:    Elderly, ill-appearing, frail, NAD  HEENT:  Pink conjunctivae, PERRL, hearing intact to voice, moist mucous membranes  Neck:  Supple, without masses, thyroid non-tender  Resp:  No accessory muscle use, clear breath sounds without wheezes rales or rhonchi  Card:   No murmurs, normal S1, S2 without thrills, bruits or peripheral edema  Abd:  Soft, non-tender, non-distended, normoactive bowel sounds are present  Lymph:  No cervical or inguinal adenopathy  Musc:  No cyanosis or clubbing, right shoulder pain to ROM  Skin:   No rashes  Neuro:  moves all ext  Psych:  poor insight, oriented to person, place     Medications Reviewed: (see below)    Lab Data Reviewed: (see below)    ______________________________________________________________________    Medications:     Current Facility-Administered Medications   Medication Dose Route Frequency    enoxaparin (LOVENOX) injection 30 mg  30 mg SubCUTAneous Q24H    meropenem (MERREM) 1 g in 0.9% sodium chloride (MBP/ADV) 50 mL MBP  1 g IntraVENous Q12H    ketorolac (TORADOL) injection 15 mg  15 mg IntraVENous Q8H PRN    dextrose 5 % - 0.45% NaCl infusion  50 mL/hr IntraVENous CONTINUOUS    lidocaine 4 % patch 1 Patch  1 Patch TransDERmal Q24H    amLODIPine (NORVASC) tablet 5 mg  5 mg Oral DAILY    sodium chloride (NS) flush 5-40 mL  5-40 mL IntraVENous Q8H    sodium chloride (NS) flush 5-40 mL  5-40 mL IntraVENous PRN    acetaminophen (TYLENOL) tablet 650 mg  650 mg Oral Q4H PRN    naloxone (NARCAN) injection 0.4 mg  0.4 mg IntraVENous PRN    ondansetron (ZOFRAN) injection 4 mg  4 mg IntraVENous Q4H PRN    hydrALAZINE (APRESOLINE) 20 mg/mL injection 10 mg  10 mg IntraVENous Q6H PRN    aspirin delayed-release tablet 81 mg  81 mg Oral QHS    atorvastatin (LIPITOR) tablet 40 mg  40 mg Oral QHS    pantoprazole (PROTONIX) tablet 40 mg  40 mg Oral ACB    pregabalin (LYRICA) capsule 50 mg  50 mg Oral QHS    traMADoL (ULTRAM) tablet 50 mg  50 mg Oral Q6H PRN    atropine 0.4 mg/mL injection 1 mg  1 mg IntraVENous PRN          Lab Review:     Recent Labs     01/15/23  0347   WBC 6.4   HGB 13.3   HCT 43.6          Recent Labs     01/15/23  0347      K 4.8   *   CO2 27   *   BUN 32*   CREA 1.22*   CA 8.6   MG 2.1   PHOS 4.0       No results found for: GLUCPOC       Assessment / Plan:     81 yo hx of HTN, CAD, CKD 3, presented w/ AMS, seizure-like activity, bradycardia, 2nd AV-block     1) Bradycardia/2nd degree AV block: now stable. Patient and Family declined pacer due to advanced age, but also declined hospice. Cards/EP does not recommend pacer either. Stopped clonidine and avoid other AV-blocking agents. CM to set up SNF     2) Acute met encephalopathy: now improving, likely at baseline. AMS on admission likely from bradycardia, UTI. No evidence of seizures on EEG.   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 No further management per Neuro     3) ESBL UTI: urine Cx w/ ESBL klebsiella. Will cont IV meropenem while here. No need for abx on discharge     4) HTN: stopped hydralazine, clonidine. Cont norvasc (new med)    5) R shoulder pain:  due to DJD. Xrays neg for fractures. Ortho was following.   Will cont lidocaine patch, IV toradol prn, PT/OT    Code: DNR    Total time spent with patient care: 30 Minutes **I personally saw and examined the patient during this time period**                 Care Plan discussed with: Patient, nursing    Discussed:  Care Plan    Prophylaxis:  SCD's, lovenox     Disposition:  SNF/LTC pending            ___________________________________________________    Attending Physician: Melvin Cabrera MD

## 2023-01-17 LAB
ANION GAP SERPL CALC-SCNC: 4 MMOL/L (ref 5–15)
BUN SERPL-MCNC: 28 MG/DL (ref 6–20)
BUN/CREAT SERPL: 29 (ref 12–20)
CALCIUM SERPL-MCNC: 8.5 MG/DL (ref 8.5–10.1)
CHLORIDE SERPL-SCNC: 109 MMOL/L (ref 97–108)
CO2 SERPL-SCNC: 27 MMOL/L (ref 21–32)
CREAT SERPL-MCNC: 0.96 MG/DL (ref 0.55–1.02)
GLUCOSE SERPL-MCNC: 111 MG/DL (ref 65–100)
MAGNESIUM SERPL-MCNC: 2.1 MG/DL (ref 1.6–2.4)
PHOSPHATE SERPL-MCNC: 2.4 MG/DL (ref 2.6–4.7)
POTASSIUM SERPL-SCNC: 4.9 MMOL/L (ref 3.5–5.1)
SODIUM SERPL-SCNC: 140 MMOL/L (ref 136–145)

## 2023-01-17 PROCEDURE — 97168 OT RE-EVAL EST PLAN CARE: CPT

## 2023-01-17 PROCEDURE — 74011000250 HC RX REV CODE- 250: Performed by: NURSE PRACTITIONER

## 2023-01-17 PROCEDURE — 96376 TX/PRO/DX INJ SAME DRUG ADON: CPT

## 2023-01-17 PROCEDURE — 74011000258 HC RX REV CODE- 258: Performed by: INTERNAL MEDICINE

## 2023-01-17 PROCEDURE — 97530 THERAPEUTIC ACTIVITIES: CPT

## 2023-01-17 PROCEDURE — 74011250637 HC RX REV CODE- 250/637: Performed by: INTERNAL MEDICINE

## 2023-01-17 PROCEDURE — 97164 PT RE-EVAL EST PLAN CARE: CPT

## 2023-01-17 PROCEDURE — 74011000250 HC RX REV CODE- 250: Performed by: INTERNAL MEDICINE

## 2023-01-17 PROCEDURE — G0378 HOSPITAL OBSERVATION PER HR: HCPCS

## 2023-01-17 PROCEDURE — 84100 ASSAY OF PHOSPHORUS: CPT

## 2023-01-17 PROCEDURE — 83735 ASSAY OF MAGNESIUM: CPT

## 2023-01-17 PROCEDURE — 74011250636 HC RX REV CODE- 250/636: Performed by: INTERNAL MEDICINE

## 2023-01-17 PROCEDURE — 80048 BASIC METABOLIC PNL TOTAL CA: CPT

## 2023-01-17 PROCEDURE — 36415 COLL VENOUS BLD VENIPUNCTURE: CPT

## 2023-01-17 PROCEDURE — 96372 THER/PROPH/DIAG INJ SC/IM: CPT

## 2023-01-17 PROCEDURE — 65270000029 HC RM PRIVATE

## 2023-01-17 PROCEDURE — 97535 SELF CARE MNGMENT TRAINING: CPT

## 2023-01-17 RX ORDER — ENOXAPARIN SODIUM 100 MG/ML
40 INJECTION SUBCUTANEOUS EVERY 24 HOURS
Status: DISCONTINUED | OUTPATIENT
Start: 2023-01-18 | End: 2023-01-19 | Stop reason: HOSPADM

## 2023-01-17 RX ADMIN — ENOXAPARIN SODIUM 30 MG: 100 INJECTION SUBCUTANEOUS at 09:42

## 2023-01-17 RX ADMIN — PANTOPRAZOLE SODIUM 40 MG: 40 TABLET, DELAYED RELEASE ORAL at 09:42

## 2023-01-17 RX ADMIN — ATORVASTATIN CALCIUM 40 MG: 20 TABLET, FILM COATED ORAL at 22:00

## 2023-01-17 RX ADMIN — ASPIRIN 81 MG: 81 TABLET, COATED ORAL at 22:00

## 2023-01-17 RX ADMIN — ACETAMINOPHEN 650 MG: 325 TABLET ORAL at 10:46

## 2023-01-17 RX ADMIN — AMLODIPINE BESYLATE 5 MG: 5 TABLET ORAL at 09:42

## 2023-01-17 RX ADMIN — ACETAMINOPHEN 650 MG: 325 TABLET ORAL at 15:33

## 2023-01-17 RX ADMIN — PREGABALIN 50 MG: 50 CAPSULE ORAL at 22:00

## 2023-01-17 RX ADMIN — SODIUM CHLORIDE, PRESERVATIVE FREE 10 ML: 5 INJECTION INTRAVENOUS at 22:00

## 2023-01-17 RX ADMIN — MEROPENEM 1 G: 1 INJECTION, POWDER, FOR SOLUTION INTRAVENOUS at 09:42

## 2023-01-17 RX ADMIN — SODIUM CHLORIDE, PRESERVATIVE FREE 10 ML: 5 INJECTION INTRAVENOUS at 07:45

## 2023-01-17 RX ADMIN — SODIUM CHLORIDE, PRESERVATIVE FREE 10 ML: 5 INJECTION INTRAVENOUS at 15:05

## 2023-01-17 NOTE — PROGRESS NOTES
Chris Diggs [facility] 79  [address] [facility], [address] [place] [address]  [phone]      Hospitalist Progress Note      NAME: Divya Austin   :  1924  MRM:  [id number]    Date/Time of service: 2023  10:15 AM       Subjective:     Chief Complaint:  Patient was personally seen and examined by me during this time period. Chart reviewed. No fevers, chills. Still with right shoulder pain        Objective:       Vitals:       Last 24hrs VS reviewed since prior progress note.  Most recent are:    Visit Vitals  /68 (BP 1 Location: Left upper arm, BP Patient Position: At rest;Supine)   Pulse 66   Temp 98.2 °F (36.8 °C)   Resp 16   Ht 5' 4.02\" (1.626 m)   Wt 90.7 kg (200 lb)   SpO2 94%   BMI 34.31 kg/m²     SpO2 Readings from Last 6 Encounters:   23 94%   23 96%   21 96%   21 93%   20 98%   20 96%    O2 Flow Rate (L/min): 2 l/min     Intake/Output Summary (Last 24 hours) at 2023 1708  Last data filed at 2023 1505  Gross per 24 hour   Intake 100 ml   Output 800 ml   Net -700 ml          Exam:     Physical Exam:    Gen:    Elderly, ill-appearing, frail, NAD  HEENT:  Pink conjunctivae, PERRL, hearing intact to voice, moist mucous membranes  Neck:  Supple, without masses, thyroid non-tender  Resp:  No accessory muscle use, clear breath sounds without wheezes rales or rhonchi  Card:   No murmurs, normal S1, S2 without thrills, bruits or peripheral edema  Abd:  Soft, non-tender, non-distended, normoactive bowel sounds are present  Lymph:  No cervical or inguinal adenopathy  Musc:  No cyanosis or clubbing, right shoulder pain to ROM  Skin:   No rashes  Neuro:  moves all ext  Psych:  poor insight, oriented to person, place     Medications Reviewed: (see below)    Lab Data Reviewed: (see below)    ______________________________________________________________________    Medications:     Current Facility-Administered Medications   Medication Dose Route Frequency    [START ON 1/18/2023] enoxaparin (LOVENOX) injection 40 mg  40 mg SubCUTAneous Q24H    meropenem (MERREM) 1 g in 0.9% sodium chloride (MBP/ADV) 50 mL MBP  1 g IntraVENous Q12H    dextrose 5 % - 0.45% NaCl infusion  50 mL/hr IntraVENous CONTINUOUS    lidocaine 4 % patch 1 Patch  1 Patch TransDERmal Q24H    amLODIPine (NORVASC) tablet 5 mg  5 mg Oral DAILY    sodium chloride (NS) flush 5-40 mL  5-40 mL IntraVENous Q8H    sodium chloride (NS) flush 5-40 mL  5-40 mL IntraVENous PRN    acetaminophen (TYLENOL) tablet 650 mg  650 mg Oral Q4H PRN    naloxone (NARCAN) injection 0.4 mg  0.4 mg IntraVENous PRN    ondansetron (ZOFRAN) injection 4 mg  4 mg IntraVENous Q4H PRN    hydrALAZINE (APRESOLINE) 20 mg/mL injection 10 mg  10 mg IntraVENous Q6H PRN    aspirin delayed-release tablet 81 mg  81 mg Oral QHS    atorvastatin (LIPITOR) tablet 40 mg  40 mg Oral QHS    pantoprazole (PROTONIX) tablet 40 mg  40 mg Oral ACB    pregabalin (LYRICA) capsule 50 mg  50 mg Oral QHS    traMADoL (ULTRAM) tablet 50 mg  50 mg Oral Q6H PRN    atropine 0.4 mg/mL injection 1 mg  1 mg IntraVENous PRN          Lab Review:     Recent Labs     01/15/23  0347   WBC 6.4   HGB 13.3   HCT 43.6          Recent Labs     01/17/23  0749 01/15/23  0347    143   K 4.9 4.8   * 110*   CO2 27 27   * 122*   BUN 28* 32*   CREA 0.96 1.22*   CA 8.5 8.6   MG 2.1 2.1   PHOS 2.4* 4.0       No results found for: GLUCPOC       Assessment / Plan:     79 yo hx of HTN, CAD, CKD 3, presented w/ AMS, seizure-like activity, bradycardia, 2nd AV-block     1) Bradycardia/2nd degree AV block: now stable. Patient and Family declined pacer due to advanced age, but also declined hospice. Cards/EP does not recommend pacer either. Stopped clonidine and avoid other AV-blocking agents. CM to set up SNF - Peer to Peer done, per Insurance they did not have most recent notes.  Then, once change in status read to them from PT notes I was told they would \"discuss with my director and call you back in 5 minutes\"  - No return call ever received. 2) Acute met encephalopathy: now improving, likely at baseline. AMS on admission likely from bradycardia, UTI. No evidence of seizures on EEG. 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 No further management per Neuro     3) ESBL UTI: urine Cx w/ ESBL klebsiella. Will cont IV meropenem while here. No need for abx on discharge     4) HTN: stopped hydralazine, clonidine. Cont norvasc (new med)    5) R shoulder pain:  due to DJD. Xrays neg for fractures. Ortho was following.   Will cont lidocaine patch, IV toradol prn, PT/OT    Code: DNR    Total time spent with patient care: 40 Minutes **I personally saw and examined the patient during this time period**                 Care Plan discussed with: Patient, nursing    Discussed:  Care Plan    Prophylaxis:  SCD's, lovenox     Disposition:  SNF/LTC pending            ___________________________________________________    Attending Physician: Rufino Foreman MD

## 2023-01-17 NOTE — PROGRESS NOTES
Lovenox 30mg every day  Crcl >30 ml/min  New order: Lovenox 40mg every day    Aurelia Lombard, SheilaD

## 2023-01-17 NOTE — TELEPHONE ENCOUNTER
MD Pacheco Curtis, LPN  Caller: Unspecified (4 days ago, 10:50 AM)  In my opinion at 81 yo I would not favor doing a PPM but she may feel bad at times without it and will feel dizzy and nauseous and if they choose not to do a PPM then they should place her on comfort care with hospice. Without the PPM she will eventually pass away and want her to be comfortable.

## 2023-01-17 NOTE — TELEPHONE ENCOUNTER
Pt family wants to review her chart and let them know what you think about her getting a pacemaker. They know it will be next week before your respond- she is probably going to rehab after hospital stay.

## 2023-01-17 NOTE — PROGRESS NOTES
Problem: Self Care Deficits Care Plan (Adult)  Goal: *Acute Goals and Plan of Care (Insert Text)  Description: FUNCTIONAL STATUS PRIOR TO ADMISSION: Patient required assistance for basic and instrumental ADLs, especially bathing, dressing, and toileting. The patient was functional at the wheelchair level and required assistance for transfers to the chair. HOME SUPPORT: Pt was resident at [facility] and received assistance for transfers and ADLs    Occupational Therapy Goals  Initiated 1/17/2023  1. Patient will perform grooming, seated EOB or in chair with supervision/set-up within 7 day(s). 2.  Patient will perform upper body dressing with minimal assistance/contact guard assist within 7 day(s). 3.  Patient will perform anterior upper body bathing with minimal assistance/contact guard assist within 7 day(s). 4.  Patient will perform toilet transfers to [facility] with moderate assistance  within 7 day(s). 5.  Patient will participate in upper extremity therapeutic exercise/activities with minimal assistance/contact guard assist for 5 minutes within 7 day(s). Outcome: Progressing Towards Goal     OCCUPATIONAL THERAPY RE-EVALUATION  Patient: Suad Hines (77 y.o. female)  Date: 1/17/2023  Diagnosis: Seizure ([address] [L10.0]  Acute metabolic encephalopathy [R09.17] Heart block AV second degree      Precautions: Fall  Chart, occupational therapy assessment, plan of care, and goals were reviewed. ASSESSMENT  Based on the objective data described below, generally decreased strength, impaired balance, limited functional mobility, and decreased safety and independence with ADLs.  Pt initially admitted on 1/10/23 after witnessed seizure activity by family and pt participated in initial OT evaluation on 1/11/23 in ED where at the time she was able to participate in seated ADLs with intact balance and performed SPT with mod A x 1-2 to [facility] for toileting tasks and required increased assistance for LB ADLs which was consistent with her baseline. Pt has since been in hospital with minimal activity and has been worked up/treated for encephalopathy, bradycardia with 2nd degree AV block, UTI, HTN, and shoulder pain. Pt seen today for OT re-evaluation on medical floor. She is alert, oriented x 3-4 (difficulty with date/time) and follows all commands. She offers good efforts but is globally much weaker than initial evaluation. She requires increased assistance to transition to EOB and up to max A x 2 for standing. She has limited tolerance for standing and on second attempt is able to take brief side step, but not safe to complete functional transfer to [facility]. Due to incontinence of bladder, pt required full bathing and gown change which she requires up to max A to complete. She continues to be limited by poor AROM in R shoulder and generally slowed initiation and completion of tasks. Pt is returned to supine at end of session with all needs met. Due to noted decreased function since initial evaluation a week ago, would recommend SNF at discharge. Will continue to follow for skilled OT services and maximize return to highest level of function. Current Level of Function Impacting Discharge (ADLs): set up for self feeding in supported sitting; up to max A for bathing; total A LB dressing and toileting; up to max A for UB dressing; min A for supported seated grooming; max A x 2 for standing at EOB    Other factors to consider for discharge: from [facility]; supportive family; high fall risk; intermittent confusion; A x 2         PLAN :  Recommendations and Planned Interventions: self care training, functional mobility training, therapeutic exercise, balance training, therapeutic activities, endurance activities, patient education, home safety training, and family training/education    Frequency/Duration: Patient will be followed by occupational therapy 3 times a week to address goals.     Recommend with staff: bed in chair position 3x/day; encourage participation for all ADLs; Itzel Tanner for safe transfers    Recommendation for discharge: (in order for the patient to meet his/her long term goals)  Therapy up to 5 days/week in SNF setting    This discharge recommendation:  Has been made in collaboration with the attending provider and/or case management    Equipment recommendations for successful discharge (if) home: TBD       SUBJECTIVE:   Patient stated Don't let me fall.     OBJECTIVE DATA SUMMARY:   Hospital course since last seen and reason for reevaluation: re-consulted after LOS resulted in increased weakness    Cognitive/Behavioral Status:  Neurologic State: Alert  Orientation Level: Oriented to person;Oriented to place;Oriented to situation;Disoriented to time  Cognition: Follows commands     Perseveration: No perseveration noted  Safety/Judgement: Decreased awareness of environment;Decreased awareness of need for assistance;Decreased awareness of need for safety    Hearing: Auditory  Auditory Impairment: None    Range of Motion:  AROM: Generally decreased, functional R shoulder limited    Strength:  Strength: Generally decreased, functional    Coordination:  Coordination: Generally decreased, functional  Fine Motor Skills-Upper: Left Impaired;Right Impaired (slowed)    Gross Motor Skills-Upper: Left Intact; Right Intact    Tone & Sensation:  Tone: Normal   Sensation: Intact     Functional Mobility and Transfers for ADLs:  Bed Mobility:  Rolling:  Moderate assistance;Assist x1  Supine to Sit: Moderate assistance;Assist x1  Sit to Supine: Maximum assistance;Assist x2  Scooting: Maximum assistance    Transfers:  Sit to Stand: Maximum assistance;Assist x2  Functional Transfers  Toilet Transfer : Assist x2 (infer based on observations)  Bed to Chair:  (unable to tolerate as pt unable to make steps)    Balance:  Sitting: Without support  Sitting - Static: Good (unsupported)  Sitting - Dynamic: Fair (occasional)  Standing: Impaired  Standing - Static: Poor;Constant support  Standing - Dynamic : Poor;Constant support    ADL Assessment:  Feeding: Setup    Oral Facial Hygiene/Grooming: Minimum assistance (in semi-fowlers)    Bathing: Maximum assistance    Upper Body Dressing: Maximum assistance    Lower Body Dressing: Total assistance    Toileting: Total assistance    ADL Intervention and task modifications:  Upper Body Bathing  Bathing Assistance: Maximum assistance  Position Performed: Seated edge of bed  Cues: Physical assistance; Tactile cues provided;Verbal cues provided;Visual cues provided     Lower Body Bathing  Lower Body : Maximum assistance; Total assistance (dependent)  Position Performed: Seated edge of bed;Supine  Cues: Physical assistance; Tactile cues provided;Verbal cues provided;Visual cues provided    Upper Body [address] Gown: Maximum assistance  Cues: Physical assistance; Tactile cues provided;Verbal cues provided;Visual cues provided    Lower Body Dressing Assistance  Socks: Total assistance (dependent)  Leg Crossed Method Used: No  Position Performed: Seated edge of bed  Cues: Shruthi Snow  Bladder Hygiene: Total assistance (dependent)  Cues: Physical assistance for pants down;Physical assistance for pants up; Tactile cues provided;Verbal cues provided;Visual cues provided    Cognitive Retraining  Safety/Judgement: Decreased awareness of environment;Decreased awareness of need for assistance;Decreased awareness of need for safety    Functional Measure:    Barthel Index:  Bathin  Bladder: 0  Bowels: 5  Groomin  Dressin  Feedin  Mobility: 0  Stairs: 0  Toilet Use: 0  Transfer (Bed to Chair and Back): 5  Total: 15/100      The Barthel ADL Index: Guidelines  1. The index should be used as a record of what a patient does, not as a record of what a patient could do. 2. The main aim is to establish degree of independence from any help, physical or verbal, however minor and for whatever reason.   3. The need for supervision renders the patient not independent. 4. A patient's performance should be established using the best available evidence. Asking the patient, friends/relatives and nurses are the usual sources, but direct observation and common sense are also important. However direct testing is not needed. 5. Usually the patient's performance over the preceding 24-48 hours is important, but occasionally longer periods will be relevant. 6. Middle categories imply that the patient supplies over 50 per cent of the effort. 7. Use of aids to be independent is allowed. Score Interpretation (from [address] [address])    Independent   60-79 Minimally independent   40-59 Partially dependent   20-39 Very dependent   <20 Totally dependent     -Chrystal Richards., Barthel, DDottyW. (1965). Functional evaluation: the Barthel Index. [facility] ([address] (1997). The Barthel activities of daily living index: self-reporting versus actual performance in the old (> or = 75 years). Journal [address] 45(7), [address] [facility], YNEI, Leticia Coles., [facility]. (1999). Measuring the change in disability after inpatient rehabilitation; comparison of the responsiveness of the Barthel Index and Functional [place] Measure. Journal of Neurology, Neurosurgery, and Psychiatry, 66(4), 899-932. Jean-Claude Cazares, N.J.A, RACHEL Weaver, & Refugio Mart, MDottyA. (2004) Assessment of post-stroke quality of life in cost-effectiveness studies: The usefulness of the Barthel Index and the EuroQoL-5D.  Quality of Life Research, 13, 427-43     Pain:  Pt reporting no pain    Activity Tolerance:   Fair    After treatment patient left in no apparent distress:   Supine in bed, Heels elevated for pressure relief, Call bell within reach, Bed / chair alarm activated, and Side rails x 3    COMMUNICATION/COLLABORATION:   The patients plan of care was discussed with: Physical therapist and Registered nurse.      Tala Mosqueda, OT  Time Calculation: 33 mins

## 2023-01-17 NOTE — PROGRESS NOTES
1/17/2023  3:50 PM  Care Management Progress Note      ICD-10-CM ICD-9-CM    1. Seizure-like activity ([facility] [address]  R56.9 780.39       2. Bradycardia [R00.1 (ICD-10-CM)]  R00.1 427.89       3. HTN (hypertension), benign [I10 (ICD-10-CM)]  I10 401.1       4. Advanced age [R55 (ICD-10-CM)]  R54 797       11. SSS (sick sinus syndrome) (HCC) [I49.5 (ICD-10-CM)]  I49.5 427.81       6. Coronary artery disease involving native heart without angina pectoris, unspecified vessel or lesion type [I25.10 (ICD-10-CM)]  I25.10 414.01       7. Seizure ([facility] [address] [R56.9 (ICD-10-CM)]  R56.9 780.39           RUR:  13%  Risk Level: [x]Low []Moderate []High  Value-based purchasing: [] Yes [x] No  Bundle patient: [] Yes [x] No   Specify:     Transition of care plan:  Awaiting medical clearance and DC order. PT/OT re-evaled, and will continue to follow pt. Palliative and neurology following. TBD - Auth denied for [facility]. Peer 2 peer completed by attending, but denied. Pt's family notified of their right to appeal, and they will discuss if they plan to pursue that and notify CM in the AM. If they do not pursue appeal or if family appeal is denied, Shara Hogan will need to come to hospital to eval pt for her return. Outpatient follow-up. Stretcher transport required.

## 2023-01-17 NOTE — PROGRESS NOTES
Music Therapy Assessment  [address] [place] [address] [phone]     4/26/1924  80 y.o.  female    Patient Telephone Number: [phone] (home)   Yarsani Affiliation: Buddhism   Language: English   Patient Active Problem List    Diagnosis Date Noted    Acute metabolic encephalopathy 42/19/1462    Heart block AV second degree 01/13/2023    Seizure ([address] 01/10/2023    CKD (chronic kidney disease) stage 3, GFR 30-59 ml/min ([facility]) 09/08/2019    Frequent falls 09/08/2019    Neuropathy 09/08/2019    GERD (gastroesophageal reflux disease) 09/30/2014    CAD (coronary artery disease)     HTN (hypertension), benign 01/11/2011    Mixed hyperlipidemia 01/11/2011        Date: 1/17/2023            Total Time (in minutes): 45          SFM 4M POST SURG ORT 1    Mental Status:   [x] Alert [  ] Forgetful [x]  Confused-at times  [  ] Minimally responsive  [  ] Sleeping    Communication Status: [  ] Impaired Speech [  ] Nonverbal -N/A    Physical Status:   [  ] Oxygen in use  [  ] Hard of Hearing [  ] Vision Impaired  [  ] Ambulatory  [  ] Ambulatory with assistance [  ] Non-ambulatory -N/A    Music Preferences, Background: Traditional Hymns. 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 Clinical Problem addressed: Spiritual and social support. Goal(s) met in session:  Physical/Pain management (Scale of 1-10):    Pre-session rating: Pt didn't rate her pain level when she moves her right shoulder when asked, and it appeared this was due to her confusion. Post-session rating: Pt didn't rate her pain level when she moves her right shoulder when asked, and it appeared this was due to her confusion. She reiterated that it only hurts when she's moving it.   [x] Increased relaxation   [  ] Affected breathing patterns  [  ] Decreased muscle tension   [  ] Decreased agitation  [  ] Affected heart rate    [  ] Increased alertness     Emotional/Psychological:  [x] Increased self-expression   [ ] Decreased aggressive behavior   [  ] Decreased feelings of stress  [  ] Discussed healthy coping skills     [  ] Improved mood    [  ] Decreased withdrawn behavior     Social:  [  ] Decreased feelings of isolation/loneliness [x] Positive social interaction   [  ] Provided support and/or comfort for family/friends    Spiritual:  [x] Spiritual support    [  ] Expressed peace  [  ] Expressed carlita    [  ] Discussed beliefs    Techniques Utilized (Check all that apply):   [  ] Procedural support MT [  ] Music for relaxation [x] Patient preferred music  [  ] Ele analysis  [x] Song choice  [  ] Music for validation  [  ] Entrainment  [  ] Movement to music [  ] Guided visualization  [  ] Ren Leblanc  [  ] Patient instrument playing [  ] Sandy Patpedro writing  [x] Sing along   [  ] Gricelda Isidro  [  ] Sensory stimulation  [x] Active Listening  [x] Music for spiritual support [  ] Making of CDs as gifts    Session Observations:  Referral from Danielle Yepez, Palliative . Patient (pt) was alert lying in bed. She reported pain in her right shoulder when she moves it but not when she's holding still. Pt shared about her music preferences and music background. Pt said she enjoys the feeling of singing, but notices that her voice has changed through the years, and she doesn't know if she thinks it still sounds good to her/others. This music therapist (MT) provided active listening, validating the losses we experience, and discussing the health benefits of singing. MT selected a [id number] hymn, How Great Thou Art, and sang this in a low key knowing the pt was most comfortable singing in the alto vocal range. Pt increased self-expression in response to the music as evidenced by (AEB) singing along. MT asked the pt what her favorite hymn was and pt said [employer]. MT began singing this and pt sang along, remembering most lyrics to the multiple verses sung.  Pt expressed enjoyment in the music, and requested to hear [place]. Pt sang along as MT sang this. MT observed aloud afterward that the pt closed her eyes for stretches of time as she sang. Pt said she was better able to take in the feeling of singing the old hymns with another person by doing this. MT offered words of validation and support, and pt thanked MT for the session. She denied further needs at this time.     BETSY GilmanBC (Music Therapist-Board Certified)  Spiritual Care Department  Referral-based service

## 2023-01-17 NOTE — PROGRESS NOTES
Problem: Falls - Risk of  Goal: *Absence of Falls  Description: Document Mcgovern Goessel Fall Risk and appropriate interventions in the flowsheet. Outcome: Progressing Towards Goal  Note: Fall Risk Interventions:  Mobility Interventions: Bed/chair exit alarm, OT consult for ADLs, Patient to call before getting OOB, PT Consult for mobility concerns, PT Consult for assist device competence, Utilize walker, cane, or other assistive device, Utilize gait belt for transfers/ambulation         Medication Interventions: Bed/chair exit alarm, Patient to call before getting OOB, Evaluate medications/consider consulting pharmacy, Teach patient to arise slowly    Elimination Interventions: Bed/chair exit alarm, Call light in reach, Patient to call for help with toileting needs, Toileting schedule/hourly rounds              Problem: Pressure Injury - Risk of  Goal: *Prevention of pressure injury  Description: Document John Scale and appropriate interventions in the flowsheet.   Outcome: Progressing Towards Goal  Note: Pressure Injury Interventions:  Sensory Interventions: Assess changes in LOC, Monitor skin under medical devices    Moisture Interventions: Internal/External urinary devices, Limit adult briefs    Activity Interventions: Pressure redistribution bed/mattress(bed type)    Mobility Interventions: HOB 30 degrees or less, Pressure redistribution bed/mattress (bed type)    Nutrition Interventions: Document food/fluid/supplement intake    Friction and Shear Interventions: Apply protective barrier, creams and emollients

## 2023-01-17 NOTE — PROGRESS NOTES
Problem: Mobility Impaired (Adult and Pediatric)  Goal: *Acute Goals and Plan of Care (Insert Text)  Description: FUNCTIONAL STATUS PRIOR TO ADMISSION: Pt requires assistance for transfers to the wheelchair. HOME SUPPORT PRIOR TO ADMISSION: From [facility]. Assist with ADLs and transfers. Physical Therapy Goals  Initiated 1/17/2023  1. Patient will move from supine to sit and sit to supine , scoot up and down, and roll side to side in bed with minimal assistance/contact guard assist within 7 day(s). 2.  Patient will transfer from bed to chair and chair to bed with moderate assistance  using the least restrictive device within 7 day(s). 3.  Patient will perform sit to stand with moderate assistance  within 7 day(s). 4.  Patient will ambulate with moderate assistance  for 5 feet with the least restrictive device within 7 day(s). 5.  Patient will propel w/c 25ft with minimal assistance in straight path without turns within 7 days. Outcome: Progressing Towards Goal   PHYSICAL THERAPY REEVALUATION  Patient: Divya Austin (89 y.o. female)  Date: 1/17/2023  Primary Diagnosis: Seizure ([facility] [address] [O48.3]  Acute metabolic encephalopathy [N76.30]       Precautions:   Fall      ASSESSMENT  Based on the objective data described below, the patient presents with baseline confusion, global weakness, decline in functional mobility, impaired ability to transfer, poor standing tolerance and functional mobility below baseline of transfers to w/c with A x 1 at [facility]. Pt with initial evaluation 1/11 and able to transfers with Mod A x 1(baseline). Pt has not mobilized OOB since that evaluation and now requires up to Mod A for rolling, A x 2 for transfers to EOB and Max A x 2 to stand with RW. Pt only maintains ~10 seconds with maximal assistance and encouragement. Returned to supine d/t overwhelming fatigue. Significantly deconditioned at this time and will require SNF placement at d/c.     Current Level of Function Impacting Discharge (mobility/balance): Max A x 2 for standing attempt, could not transfer this day    Functional Outcome Measure: The patient scored 15/100 on the Barthel outcome measure. Other factors to consider for discharge: from [facility], family wants SNF     Patient will benefit from skilled therapy intervention to address the above noted impairments. PLAN :  Recommendations and Planned Interventions: bed mobility training, transfer training, gait training, therapeutic exercises, neuromuscular re-education, patient and family training/education, and therapeutic activities      Frequency/Duration: Patient will be followed by physical therapy:  5 times a week to address goals. Recommendation for discharge: (in order for the patient to meet his/her long term goals)  Therapy up to 5 days/week in SNF setting    This discharge recommendation:  Has been made in collaboration with the attending provider and/or case management    Equipment recommendations for successful discharge (if) home: patient owns DME required for discharge         SUBJECTIVE:   Patient stated I am weak.     OBJECTIVE DATA SUMMARY:   HISTORY:    Past Medical History:   Diagnosis Date    CAD (coronary artery disease)     Femur fracture ([address]     right    GERD (gastroesophageal reflux disease)     HTN (hypertension), benign 1/11/2011    Mixed hyperlipidemia 1/11/2011     Past Surgical History:   Procedure Laterality Date    HX FEMUR FRACTURE TX      HX KNEE REPLACEMENT      bilateral    HX ORTHOPAEDIC      bilat knee replacements    Ling 4 course since last seen and reason for reevaluation: signed off as pt was at baseline, pt did not mobilize out of bed and is now much weaker    Personal factors and/or comorbidities impacting plan of care: CAD, dementia, HTN    Home Situation  [address] Name:  [facility])  One/Two Story Residence: One story  Living Alone: No  Support Systems: Child(anish), Assisted Living  Patient Expects to be Discharged to[de-identified] Assisted Living  Current DME Used/Available at Home: Wheelchair, Walker, rolling    EXAMINATION/PRESENTATION/DECISION MAKING:   Critical Behavior:  Neurologic State: Alert  Orientation Level: Oriented to person, Oriented to place, Oriented to situation, Disoriented to time  Cognition: Follows commands  Safety/Judgement: Decreased awareness of environment, Decreased awareness of need for assistance, Decreased awareness of need for safety  Hearing: Auditory  Auditory Impairment: None  Skin:    Edema:   Range Of Motion:  AROM: Generally decreased, functional (R shoulder limited most  Simultaneous filing. User may not have seen previous data.)                       Strength:    Strength: Generally decreased, functional (Simultaneous filing. User may not have seen previous data.)                    Tone & Sensation:   Tone: Normal (Simultaneous filing. User may not have seen previous data.)              Sensation: Intact (Simultaneous filing. User may not have seen previous data.)               Coordination:  Coordination: Generally decreased, functional (Simultaneous filing. User may not have seen previous data.)  Vision:      Functional Mobility:  Bed Mobility:  Rolling: Moderate assistance;Assist x1  Supine to Sit: Moderate assistance;Assist x1  Sit to Supine: Maximum assistance;Assist x2  Scooting: Maximum assistance  Transfers:  Sit to Stand: Maximum assistance;Assist x2  Stand to Sit: Maximum assistance;Assist x2        Bed to Chair:  (unable to tolerate as pt unable to make steps)              Balance:   Sitting: Without support  Sitting - Static: Good (unsupported)  Sitting - Dynamic: Fair (occasional)  Standing: Impaired  Standing - Static: Poor;Constant support  Standing - Dynamic : Poor;Constant support  Ambulation/Gait Training:                                                         Stairs:               Therapeutic Exercises: Functional Measure:  Barthel Index:    Bathin  Bladder: 0  Bowels: 5  Groomin  Dressin  Feedin  Mobility: 0  Stairs: 0  Toilet Use: 0  Transfer (Bed to Chair and Back): 5  Total: 15/100       The Barthel ADL Index: Guidelines  1. The index should be used as a record of what a patient does, not as a record of what a patient could do. 2. The main aim is to establish degree of independence from any help, physical or verbal, however minor and for whatever reason. 3. The need for supervision renders the patient not independent. 4. A patient's performance should be established using the best available evidence. Asking the patient, friends/relatives and nurses are the usual sources, but direct observation and common sense are also important. However direct testing is not needed. 5. Usually the patient's performance over the preceding 24-48 hours is important, but occasionally longer periods will be relevant. 6. Middle categories imply that the patient supplies over 50 per cent of the effort. 7. Use of aids to be independent is allowed. Score Interpretation (from [address] [address])    Independent   60-79 Minimally independent   40-59 Partially dependent   20-39 Very dependent   <20 Totally dependent     -Chrystal Richards., Barthel, D.W. (1965). Functional evaluation: the Barthel Index. [address] [facility] ([address] (1997). The Barthel activities of daily living index: self-reporting versus actual performance in the old (> or = 75 years). Journal [address] 45(7), 14 [facility], J.J.M.F, Trudi Centeno., Solis Horn. (1999). Measuring the change in disability after inpatient rehabilitation; comparison of the responsiveness of the Barthel Index and Functional [place] Measure. Journal of Neurology, Neurosurgery, and Psychiatry, 66(4), 131-969.   -Santo Mays, [place].KIA, RACHEL Weaver, Angelica Hi, M.A. (2004) Assessment of post-stroke quality of life in cost-effectiveness studies: The usefulness of the Barthel Index and the EuroQoL-5D. Quality of Life Research, 13, 427-43              Pain Rating:      Activity Tolerance:   Poor and requires rest breaks    After treatment patient left in no apparent distress:   Supine in bed, Call bell within reach, Bed / chair alarm activated, and Side rails x 3    COMMUNICATION/EDUCATION:   The patients plan of care was discussed with: Occupational therapist and Registered nurse. Fall prevention education was provided and the patient/caregiver indicated understanding., Patient/family have participated as able in goal setting and plan of care. , and Patient/family agree to work toward stated goals and plan of care.     Thank you for this referral.  Lisa Khalil, PT   Time Calculation: 23 mins

## 2023-01-18 ENCOUNTER — APPOINTMENT (OUTPATIENT)
Dept: GENERAL RADIOLOGY | Age: 88
End: 2023-01-18
Attending: INTERNAL MEDICINE
Payer: MEDICARE

## 2023-01-18 PROCEDURE — 74011000258 HC RX REV CODE- 258: Performed by: INTERNAL MEDICINE

## 2023-01-18 PROCEDURE — 74011000250 HC RX REV CODE- 250: Performed by: NURSE PRACTITIONER

## 2023-01-18 PROCEDURE — 73110 X-RAY EXAM OF WRIST: CPT

## 2023-01-18 PROCEDURE — 96372 THER/PROPH/DIAG INJ SC/IM: CPT

## 2023-01-18 PROCEDURE — G0378 HOSPITAL OBSERVATION PER HR: HCPCS

## 2023-01-18 PROCEDURE — 74011250636 HC RX REV CODE- 250/636: Performed by: INTERNAL MEDICINE

## 2023-01-18 PROCEDURE — 74011000250 HC RX REV CODE- 250: Performed by: INTERNAL MEDICINE

## 2023-01-18 PROCEDURE — 74011250637 HC RX REV CODE- 250/637: Performed by: INTERNAL MEDICINE

## 2023-01-18 PROCEDURE — 73030 X-RAY EXAM OF SHOULDER: CPT

## 2023-01-18 PROCEDURE — 65270000029 HC RM PRIVATE

## 2023-01-18 PROCEDURE — 97530 THERAPEUTIC ACTIVITIES: CPT | Performed by: OCCUPATIONAL THERAPIST

## 2023-01-18 PROCEDURE — 77030038269 HC DRN EXT URIN PURWCK BARD -A

## 2023-01-18 PROCEDURE — 96366 THER/PROPH/DIAG IV INF ADDON: CPT

## 2023-01-18 PROCEDURE — 97530 THERAPEUTIC ACTIVITIES: CPT

## 2023-01-18 PROCEDURE — 96367 TX/PROPH/DG ADDL SEQ IV INF: CPT

## 2023-01-18 PROCEDURE — 96376 TX/PRO/DX INJ SAME DRUG ADON: CPT

## 2023-01-18 RX ADMIN — ATORVASTATIN CALCIUM 40 MG: 20 TABLET, FILM COATED ORAL at 22:48

## 2023-01-18 RX ADMIN — SODIUM CHLORIDE, PRESERVATIVE FREE 10 ML: 5 INJECTION INTRAVENOUS at 22:48

## 2023-01-18 RX ADMIN — PREGABALIN 50 MG: 50 CAPSULE ORAL at 22:48

## 2023-01-18 RX ADMIN — MEROPENEM 1 G: 1 INJECTION, POWDER, FOR SOLUTION INTRAVENOUS at 13:37

## 2023-01-18 RX ADMIN — TRAMADOL HYDROCHLORIDE 50 MG: 50 TABLET ORAL at 00:34

## 2023-01-18 RX ADMIN — POTASSIUM PHOSPHATE, MONOBASIC AND POTASSIUM PHOSPHATE, DIBASIC: 224; 236 INJECTION, SOLUTION, CONCENTRATE INTRAVENOUS at 15:11

## 2023-01-18 RX ADMIN — PANTOPRAZOLE SODIUM 40 MG: 40 TABLET, DELAYED RELEASE ORAL at 08:44

## 2023-01-18 RX ADMIN — SODIUM CHLORIDE, PRESERVATIVE FREE 10 ML: 5 INJECTION INTRAVENOUS at 07:53

## 2023-01-18 RX ADMIN — ENOXAPARIN SODIUM 40 MG: 100 INJECTION SUBCUTANEOUS at 08:44

## 2023-01-18 RX ADMIN — SODIUM CHLORIDE, PRESERVATIVE FREE 10 ML: 5 INJECTION INTRAVENOUS at 15:11

## 2023-01-18 RX ADMIN — MEROPENEM 1 G: 1 INJECTION, POWDER, FOR SOLUTION INTRAVENOUS at 00:35

## 2023-01-18 RX ADMIN — DEXTROSE AND SODIUM CHLORIDE 50 ML/HR: 5; 450 INJECTION, SOLUTION INTRAVENOUS at 07:52

## 2023-01-18 RX ADMIN — ASPIRIN 81 MG: 81 TABLET, COATED ORAL at 22:48

## 2023-01-18 NOTE — PROGRESS NOTES
Problem: Mobility Impaired (Adult and Pediatric)  Goal: *Acute Goals and Plan of Care (Insert Text)  Outcome: Progressing Towards Goal  Note:   PHYSICAL THERAPY TREATMENT  Patient: Pineda Meade (21 y.o. female)  Date: 1/18/2023  Diagnosis: Seizure ([address] [V93.4]  Acute metabolic encephalopathy [I51.06] Heart block AV second degree      Precautions: Fall  Chart, physical therapy assessment, plan of care and goals were reviewed. ASSESSMENT  Patient continues with skilled PT services and progressing towards goals. Pt c/o R shoulder pain, Waiting xray results, audible crepitus in BUE with bed mobility. With some improved  use with shoulder adducted to side. Increased time for bed mobility with Mod A x 1. Fear of falling with attempt to transfer to w/c stand pivot with w/c directly in front of her. Verbal cues to initiate fwd lean and able to laterally scoot toward [facility] with CGA/ min A x 1. Use of MARCUS STEDY with Min A x 2 to come to standing for transfer to w/c. Current Level of Function Impacting Discharge (mobility/balance): Mod A  for bedmobility, A x2 for sit<>stand    Other factors to consider for discharge:          PLAN :  Patient continues to benefit from skilled intervention to address the above impairments. Continue treatment per established plan of care. to address goals. Recommendation for discharge: (in order for the patient to meet his/her long term goals)  Therapy up to 5 days/week in SNF setting    This discharge recommendation:  Has been made in collaboration with the attending provider and/or case management    IF patient discharges home will need the following DME: to be determined (TBD)       SUBJECTIVE:   Patient stated  can believe I am this weak.     OBJECTIVE DATA SUMMARY:   Critical Behavior:  Neurologic State: Alert  Orientation Level: Oriented to person, Oriented to place, Oriented to situation  Cognition: Follows commands  Safety/Judgement: Awareness of environment, Fall prevention, Decreased insight into deficits  Functional Mobility Training:  Bed Mobility:     Supine to Sit: Moderate assistance; Additional time;Assist x1     Scooting: Minimum assistance;Contact guard assistance; Additional time        Transfers:  Sit to Stand: Minimum assistance; Additional time;Assist x2  Stand to Sit: Minimum assistance; Additional time;Assist x2 (to control sit)        Bed to Chair: Maximum assistance; Additional time;Assist x1 (squat pivot transfer- min A x2 using Giacomo Valenzuela)                    Balance:  Sitting: Intact  Standing: Impaired;Pull to stand; With support  Standing - Static: Fair;Constant support  Standing - Dynamic : Poor;Constant support  Ambulation/Gait Training:                                                        Stairs: Therapeutic Exercises:     Pain Rating:  R shoulder 8/10    Activity Tolerance:   Good    After treatment patient left in no apparent distress:   Sitting in chair and Call bell within reach    COMMUNICATION/COLLABORATION:   The patients plan of care was discussed with: Occupational therapist and Registered nurse.      Mikayla Gonzales   Time Calculation: 34 mins

## 2023-01-18 NOTE — PROGRESS NOTES
Problem: Self Care Deficits Care Plan (Adult)  Goal: *Acute Goals and Plan of Care (Insert Text)  Description: FUNCTIONAL STATUS PRIOR TO ADMISSION: Patient required assistance for basic and instrumental ADLs, especially bathing, dressing, and toileting. The patient was functional at the wheelchair level and required assistance for transfers to the chair. HOME SUPPORT: Pt was resident at [facility] and received assistance for transfers and ADLs    Occupational Therapy Goals  Initiated 1/17/2023  1. Patient will perform grooming, seated EOB or in chair with supervision/set-up within 7 day(s). 2.  Patient will perform upper body dressing with minimal assistance/contact guard assist within 7 day(s). 3.  Patient will perform anterior upper body bathing with minimal assistance/contact guard assist within 7 day(s). 4.  Patient will perform toilet transfers to [facility] with moderate assistance  within 7 day(s). 5.  Patient will participate in upper extremity therapeutic exercise/activities with minimal assistance/contact guard assist for 5 minutes within 7 day(s). Outcome: Progressing Towards Goal     OCCUPATIONAL THERAPY TREATMENT  Patient: Renay Ingram (37 y.o. female)  Date: 1/18/2023  Diagnosis: Seizure ([facility] [address] [V42.8]  Acute metabolic encephalopathy [M28.57] Heart block AV second degree      Precautions: Fall  Chart, occupational therapy assessment, plan of care, and goals were reviewed. ASSESSMENT  Patient continues with skilled OT services and is progressing towards goals. She demonstrated improved functional mobility this session and able to get OOB to chair using the [employer] and min A x2. Pt with significant anxiety and fear of falling, decreased strength, endurance, mobility, balance and safety. Pt continues with c/o R shoulder pain and awaiting xray results. Recommend SNF vs [facility] with assist for functional mobility pending [facility] able to provide this level of assistance.     Current Level of Function Impacting Discharge (ADLs): max A LE ADLs and toileting, mod A bed mobility and min A x2 sit to stand    Other factors to consider for discharge: see above         PLAN :  Patient continues to benefit from skilled intervention to address the above impairments. Continue treatment per established plan of care to address goals. Recommend with staff: Skye haynes for OOB and BSC transfers    Recommend next OT session: lateral transfers    Recommendation for discharge: (in order for the patient to meet his/her long term goals)  Therapy up to 5 days/week in SNF setting    This discharge recommendation:  Has been made in collaboration with the attending provider and/or case management    IF patient discharges home will need the following DME: TBD       SUBJECTIVE:   Patient stated Mt R shoulder hurts so bad and I can't hardly move it now.     OBJECTIVE DATA SUMMARY:   Cognitive/Behavioral Status:  Neurologic State: Alert  Orientation Level: Oriented to person;Oriented to place;Oriented to situation  Cognition: Follows commands  Perception: Cues to maintain midline in standing; Tactile;Verbal;Visual  Perseveration: No perseveration noted  Safety/Judgement: Awareness of environment; Fall prevention;Decreased insight into deficits    Functional Mobility and Transfers for ADLs:  Bed Mobility:  Supine to Sit: Moderate assistance; Additional time;Assist x1    Transfers:  Sit to Stand: Minimum assistance; Additional time;Assist x2  Functional Transfers  Toilet Transfer : Maximum assistance; Additional time;Assist x1 (squat pivot transfer- min A x2 using Alena Darlington)  Cues: Physical assistance; Tactile cues provided;Verbal cues provided;Visual cues provided  Bed to Chair: Maximum assistance; Additional time;Assist x1 (squat pivot transfer- min A x2 using Alena Darlington)    Balance:  Sitting: Intact  Standing: Impaired;Pull to stand; With support  Standing - Static: Fair;Constant support  Standing - Dynamic : Poor;Constant support    ADL Intervention:  Lower Body Dressing Assistance  Socks: Total assistance (dependent)  Position Performed: Supine  Cues: Don;Physical assistance    Toileting  Toileting Assistance: Total assistance(dependent) (pt incontinent of urine throughout session)  Bladder Hygiene: Moderate assistance  Bowel Hygiene: Maximum assistance  Clothing Management: Maximum assistance  Cues: Physical assistance for pants down;Physical assistance for pants up; Tactile cues provided;Verbal cues provided;Visual cues provided    Cognitive Retraining  Safety/Judgement: Awareness of environment; Fall prevention;Decreased insight into deficits    Pain:  7/10 R shoulder    Activity Tolerance:   Fair and requires frequent rest breaks    After treatment patient left in no apparent distress:   Call bell within reach and seated in her w/c    COMMUNICATION/COLLABORATION:   The patients plan of care was discussed with: Physical therapy assistant and Registered nurse.      Gómez Tripathi OT  Time Calculation: 35 mins

## 2023-01-18 NOTE — PROGRESS NOTES
Problem: Falls - Risk of  Goal: *Absence of Falls  Description: Document Tami Stamford Fall Risk and appropriate interventions in the flowsheet.   Outcome: Progressing Towards Goal  Note: Fall Risk Interventions:  Mobility Interventions: Bed/chair exit alarm         Medication Interventions: Bed/chair exit alarm    Elimination Interventions: Bed/chair exit alarm              Problem: Patient Education: Go to Patient Education Activity  Goal: Patient/Family Education  Outcome: Progressing Towards Goal

## 2023-01-18 NOTE — PROGRESS NOTES
1/18/2023  11:38 AM  Care Management Progress Note      ICD-10-CM ICD-9-CM    1. Seizure-like activity ([facility] [address]  R56.9 780.39       2. Bradycardia [R00.1 (ICD-10-CM)]  R00.1 427.89       3. HTN (hypertension), benign [I10 (ICD-10-CM)]  I10 401.1       4. Advanced age [R55 (ICD-10-CM)]  R54 797       11. SSS (sick sinus syndrome) (HCC) [I49.5 (ICD-10-CM)]  I49.5 427.81       6. Coronary artery disease involving native heart without angina pectoris, unspecified vessel or lesion type [I25.10 (ICD-10-CM)]  I25.10 414.01       7. Seizure ([facility] [address] [R56.9 (ICD-10-CM)]  R56.9 780.39           RUR:  13%  Risk Level: [x]Low []Moderate []High  Value-based purchasing: [] Yes [x] No  Bundle patient: [] Yes [x] No   Specify:     Transition of care plan:  Discharge pending placement. PT/OT treating. TBD - Auth denied for SNF at [address] [address]. CM received v/m from pt's granddaughter, Brittanie Gross, who reported family wishes for [facility] to evaluate pt for return prior to considering further appeals with insurance. CJ called and left message with Jak Hernandez requesting pt be evaluated for return to [facility]. Jak Hernandez reported she would notify the Brittany HURTADO, and request she call CM ASAP. Outpatient follow-up. Stretcher transport required.

## 2023-01-18 NOTE — PROGRESS NOTES
[address] [place] [address]  [address]  [phone]      Hospitalist Progress Note      NAME: Yusuf Esquivel   :  1924  MRM:  [id number]    Date/Time of service: 2023  10:15 AM       Subjective:     Chief Complaint:  Patient was personally seen and examined by me during this time period. Chart reviewed. No fevers, chills. Still with right shoulder pain        Objective:       Vitals:       Last 24hrs VS reviewed since prior progress note.  Most recent are:    Visit Vitals  /72 (BP 1 Location: Left upper arm, BP Patient Position: At rest)   Pulse (!) 57   Temp 97.7 °F (36.5 °C)   Resp 16   Ht 5' 4.02\" (1.626 m)   Wt 90.7 kg (200 lb)   SpO2 97%   BMI 34.31 kg/m²     SpO2 Readings from Last 6 Encounters:   23 97%   23 96%   21 96%   21 93%   20 98%   20 96%    O2 Flow Rate (L/min): 2 l/min     Intake/Output Summary (Last 24 hours) at 2023 1411  Last data filed at 2023 1299  Gross per 24 hour   Intake 2250 ml   Output 450 ml   Net 1800 ml          Exam:     Physical Exam:    Gen:    Elderly, ill-appearing, frail, NAD  HEENT:  Pink conjunctivae, PERRL, hearing intact to voice, moist mucous membranes  Neck:  Supple, without masses, thyroid non-tender  Resp:  No accessory muscle use, clear breath sounds without wheezes rales or rhonchi  Card:   No murmurs, normal S1, S2 without thrills, bruits or peripheral edema  Abd:  Soft, non-tender, non-distended, normoactive bowel sounds are present  Lymph:  No cervical or inguinal adenopathy  Musc:  No cyanosis or clubbing, right shoulder pain to ROM  Skin:   No rashes  Neuro:  moves all ext  Psych:  poor insight, oriented to person, place     Medications Reviewed: (see below)    Lab Data Reviewed: (see below)    ______________________________________________________________________    Medications:     Current Facility-Administered Medications   Medication Dose Route Frequency    potassium phosphate 30 mmol in 0.9% sodium chloride 250 mL infusion   IntraVENous ONCE    enoxaparin (LOVENOX) injection 40 mg  40 mg SubCUTAneous Q24H    meropenem (MERREM) 1 g in 0.9% sodium chloride (MBP/ADV) 50 mL MBP  1 g IntraVENous Q12H    lidocaine 4 % patch 1 Patch  1 Patch TransDERmal Q24H    amLODIPine (NORVASC) tablet 5 mg  5 mg Oral DAILY    sodium chloride (NS) flush 5-40 mL  5-40 mL IntraVENous Q8H    sodium chloride (NS) flush 5-40 mL  5-40 mL IntraVENous PRN    acetaminophen (TYLENOL) tablet 650 mg  650 mg Oral Q4H PRN    naloxone (NARCAN) injection 0.4 mg  0.4 mg IntraVENous PRN    ondansetron (ZOFRAN) injection 4 mg  4 mg IntraVENous Q4H PRN    hydrALAZINE (APRESOLINE) 20 mg/mL injection 10 mg  10 mg IntraVENous Q6H PRN    aspirin delayed-release tablet 81 mg  81 mg Oral QHS    atorvastatin (LIPITOR) tablet 40 mg  40 mg Oral QHS    pantoprazole (PROTONIX) tablet 40 mg  40 mg Oral ACB    pregabalin (LYRICA) capsule 50 mg  50 mg Oral QHS    traMADoL (ULTRAM) tablet 50 mg  50 mg Oral Q6H PRN    atropine 0.4 mg/mL injection 1 mg  1 mg IntraVENous PRN          Lab Review:     No results for input(s): WBC, HGB, HCT, PLT, HGBEXT, HCTEXT, PLTEXT, HGBEXT, HCTEXT, PLTEXT in the last 72 hours. Recent Labs     01/17/23  0749      K 4.9   *   CO2 27   *   BUN 28*   CREA 0.96   CA 8.5   MG 2.1   PHOS 2.4*       No results found for: GLUCPOC       Assessment / Plan:     81 yo hx of HTN, CAD, CKD 3, presented w/ AMS, seizure-like activity, bradycardia, 2nd AV-block     1) Bradycardia/2nd degree AV block: now stable. Patient and Family declined pacer due to advanced age, but also declined hospice. Cards/EP does not recommend pacer either. Stopped clonidine and avoid other AV-blocking agents. CM to set up SNF - Peer to Peer done, denied     2) Acute met encephalopathy: now improving, likely at baseline. AMS on admission likely from bradycardia, UTI.   No evidence of seizures on EEG. 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 No further management per Neuro     3) ESBL UTI: urine Cx w/ ESBL klebsiella. Will cont IV meropenem while here. No need for abx on discharge     4) HTN: stopped hydralazine, clonidine. Cont norvasc (new med)    5) R shoulder pain:  due to DJD. Xrays neg for fractures. Ortho was following. Will cont lidocaine patch, IV toradol prn, PT/OT    Code: DNR      Reviewed outside records, Discussed with RN, Multi D Rounds, Ortho.      Total time spent with patient care: 40 Minutes **I personally saw and examined the patient during this time period**                 Care Plan discussed with: Patient, nursing    Discussed:  Care Plan    Prophylaxis:  SCD's, lovenox     Disposition:  SNF/LTC pending            ___________________________________________________    Attending Physician: Kyle Manning MD

## 2023-01-19 VITALS
HEIGHT: 64 IN | BODY MASS INDEX: 34.15 KG/M2 | WEIGHT: 200 LBS | SYSTOLIC BLOOD PRESSURE: 141 MMHG | DIASTOLIC BLOOD PRESSURE: 78 MMHG | HEART RATE: 74 BPM | RESPIRATION RATE: 17 BRPM | TEMPERATURE: 97.8 F | OXYGEN SATURATION: 93 %

## 2023-01-19 PROCEDURE — 74011250636 HC RX REV CODE- 250/636: Performed by: INTERNAL MEDICINE

## 2023-01-19 PROCEDURE — 77010033678 HC OXYGEN DAILY

## 2023-01-19 PROCEDURE — 96372 THER/PROPH/DIAG INJ SC/IM: CPT

## 2023-01-19 PROCEDURE — 97530 THERAPEUTIC ACTIVITIES: CPT

## 2023-01-19 PROCEDURE — 96374 THER/PROPH/DIAG INJ IV PUSH: CPT

## 2023-01-19 PROCEDURE — 74011250637 HC RX REV CODE- 250/637: Performed by: INTERNAL MEDICINE

## 2023-01-19 PROCEDURE — G0378 HOSPITAL OBSERVATION PER HR: HCPCS

## 2023-01-19 PROCEDURE — 97116 GAIT TRAINING THERAPY: CPT

## 2023-01-19 PROCEDURE — 74011000250 HC RX REV CODE- 250: Performed by: INTERNAL MEDICINE

## 2023-01-19 PROCEDURE — 96366 THER/PROPH/DIAG IV INF ADDON: CPT

## 2023-01-19 PROCEDURE — 74011000258 HC RX REV CODE- 258: Performed by: INTERNAL MEDICINE

## 2023-01-19 PROCEDURE — 96365 THER/PROPH/DIAG IV INF INIT: CPT

## 2023-01-19 RX ADMIN — ENOXAPARIN SODIUM 40 MG: 100 INJECTION SUBCUTANEOUS at 08:31

## 2023-01-19 RX ADMIN — PANTOPRAZOLE SODIUM 40 MG: 40 TABLET, DELAYED RELEASE ORAL at 08:31

## 2023-01-19 RX ADMIN — SODIUM CHLORIDE, PRESERVATIVE FREE 10 ML: 5 INJECTION INTRAVENOUS at 13:23

## 2023-01-19 RX ADMIN — MEROPENEM 1 G: 1 INJECTION, POWDER, FOR SOLUTION INTRAVENOUS at 12:54

## 2023-01-19 RX ADMIN — ACETAMINOPHEN 650 MG: 325 TABLET ORAL at 08:31

## 2023-01-19 RX ADMIN — MEROPENEM 1 G: 1 INJECTION, POWDER, FOR SOLUTION INTRAVENOUS at 00:31

## 2023-01-19 RX ADMIN — SODIUM CHLORIDE, PRESERVATIVE FREE 10 ML: 5 INJECTION INTRAVENOUS at 05:54

## 2023-01-19 RX ADMIN — AMLODIPINE BESYLATE 5 MG: 5 TABLET ORAL at 08:31

## 2023-01-19 NOTE — WOUND CARE
Wound Nurse Note    Attempted to see patient for follow-up skin assessment; currently sitting in bedside recliner. Spoke with patient's nurse who reports patient's skin intact; en route for discharge via [facility] to [facility]. Plan;  Will sign off at this time; if patient's plan change, please reconsult.     Lisa Rosario RN [facility], [facility].  [facility] Wound Dept  [phone]

## 2023-01-19 NOTE — PROGRESS NOTES
Problem: Self Care Deficits Care Plan (Adult)  Goal: *Acute Goals and Plan of Care (Insert Text)  Description: FUNCTIONAL STATUS PRIOR TO ADMISSION: Patient required assistance for basic and instrumental ADLs, especially bathing, dressing, and toileting. The patient was functional at the wheelchair level and required assistance for transfers to the chair. HOME SUPPORT: Pt was resident at [facility] and received assistance for transfers and ADLs    Occupational Therapy Goals  Initiated 1/17/2023  1. Patient will perform grooming, seated EOB or in chair with supervision/set-up within 7 day(s). 2.  Patient will perform upper body dressing with minimal assistance/contact guard assist within 7 day(s). 3.  Patient will perform anterior upper body bathing with minimal assistance/contact guard assist within 7 day(s). 4.  Patient will perform toilet transfers to [facility] with moderate assistance  within 7 day(s). 5.  Patient will participate in upper extremity therapeutic exercise/activities with minimal assistance/contact guard assist for 5 minutes within 7 day(s). Outcome: Progressing Towards Goal     OCCUPATIONAL THERAPY TREATMENT  Patient: Alec Ga (47 y.o. female)  Date: 1/19/2023  Diagnosis: Seizure ([address] [T55.0]  Acute metabolic encephalopathy [W80.08] Heart block AV second degree      Precautions: Fall  Chart, occupational therapy assessment, plan of care, and goals were reviewed. ASSESSMENT  Patient received semi supine in bed A&O to self, place and year (for month stating February) and agreeable for OT/PT tx. Patient continues with skilled OT services and is progressing towards goals. Patient is currently CGA sup->sit and scooting EOB with increased time and HOB raised and demonstrating good static sitting balance. Patient requiring mod Ax2 sit<->stand and bed to chair with RW and gait belt and increased time. Patient set up for facial/hand hygiene and is SBA for task at this time while seated in chair. Patient educated on kim UE HEP in prep for self care tasks with pt verbalizing understanding. Patient requiring set up of meals and MI for bringing food and drink to mouth. Patient would benefit from continued skilled OT services while at [facility] in order to increase safety and independence with self care and functional transfers/mobility. Recommend discharge to SNF when medically appropriate. Other factors to consider for discharge: time since onset, severity of deficits, close to baseline         PLAN :  Patient continues to benefit from skilled intervention to address the above impairments. Continue treatment per established plan of care to address goals. Recommend with staff: up to recliner for meals with use of jose eduardo steady, up to [facility] for toileting with jose eduardo steady    Recommend next OT session: continue to progress towards goals    Recommendation for discharge: (in order for the patient to meet his/her long term goals)  Therapy up to 5 days/week in SNF setting    This discharge recommendation:  Has been made in collaboration with the attending provider and/or case management    IF patient discharges home will need the following DME: TBD       SUBJECTIVE:   Patient stated I sleep better when its cold.     OBJECTIVE DATA SUMMARY:   Cognitive/Behavioral Status:  Neurologic State: Alert  Orientation Level: Oriented to person;Oriented to place (oriented to year for month stating february)  Cognition: Follows commands     Functional Mobility and Transfers for ADLs:  Bed Mobility:  Supine to Sit: Contact guard assistance;Bed Modified; Additional time  Scooting: Contact guard assistance;Bed Modified; Additional time    Transfers:  Sit to Stand: Moderate assistance;Assist x2; Additional time     Bed to Chair: Moderate assistance;Assist x2; Additional time    Balance:  Sitting - Static: Good (unsupported)  Sitting - Dynamic: Fair (occasional)  Standing: Impaired; With support  Standing - Static: Constant support; Fair  Standing - Dynamic : Constant support;Poor    ADL Intervention:     Grooming  Grooming Assistance: Stand-by assistance;Set-up  Position Performed: Seated in chair  Washing Face: Stand-by assistance  Washing Hands: Stand-by assistance     Lower Body Dressing Assistance  Socks: Total assistance (dependent)  Position Performed: Supine    Toileting  Toileting Assistance: Total assistance(dependent)  Clothing Management: Total assistance (dependent)    Pain:  No pain reported at rest however with kim UE ROM pt stating pain (did not specify which kim UE or joint)    Activity Tolerance:   Fair and requires rest breaks    After treatment patient left in no apparent distress:   Sitting in chair, Call bell within reach, and Bed / chair alarm activated    COMMUNICATION/COLLABORATION:   The patients plan of care was discussed with: Physical therapist and Registered nurse.    Co-treatment completed with PT for increased patient and clinician safety    Lucia Blanchard  Time Calculation: 20 mins

## 2023-01-19 NOTE — PROGRESS NOTES
Bedside shift change report given to Fabrizio(oncoming nurse) by Erica Nuno (offgoing nurse). Report included the following information SBAR, Kardex, Intake/Output, MAR, and Recent Results.

## 2023-01-19 NOTE — DISCHARGE SUMMARY
[facility]  [address] [facility], [facility], [address] [place] [address]  [phone] [address] Adult  Hospitalist Group     Discharge Summary       PATIENT ID: Francisco Bailey  MRN: [id number]   YOB: 1924    DATE OF ADMISSION: 1/10/2023  1:10 PM    DATE OF DISCHARGE: 01/19/23   PRIMARY CARE PROVIDER: Jaci Gayle MD     DISCHARGING PROVIDER: Christiano Garcia MD      CONSULTATIONS: IP CONSULT TO HOSPITALIST  IP CONSULT TO NEUROLOGY  IP CONSULT TO PALLIATIVE CARE - PROVIDER  IP CONSULT TO ELECTROPHYSIOLOGY  IP CONSULT TO CARDIOLOGY  IP CONSULT TO ORTHOPEDIC SURGERY    PROCEDURES/SURGERIES: * No surgery found *    [address] COURSE:     81 yo hx of HTN, CAD, CKD 3, presented w/ AMS, seizure-like activity, bradycardia, 2nd AV-block     1) Bradycardia/2nd degree AV block: now stable. Patient and Family declined pacer due to advanced age, but also declined hospice. Cards/EP does not recommend pacer either. Stopped clonidine and avoid other AV-blocking agents. Discharge back to [facility]. 2) Acute met encephalopathy: now improving, likely at baseline. AMS on admission likely from bradycardia, UTI. No evidence of seizures on EEG. 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 No further management per Neuro     3) ESBL UTI: urine Cx w/ ESBL klebsiella. Given IV meropenem for 5 days. No need for abx on discharge     4) HTN: stopped hydralazine, clonidine. Cont norvasc (new med)     5) R shoulder pain:  due to DJD. Xrays neg for fractures. Ortho was following.   Will cont lidocaine patch, IV toradol prn, PT/OT       PENDING TEST RESULTS:   At the time of discharge the following test results are still pending: none    FOLLOW UP APPOINTMENTS:    Follow-up Information       Follow up With Specialties Details Why Contact Info    Jaci Gayle MD Internal Medicine Physician Schedule an appointment as soon as possible for a visit in 1 week(s)  [phone] [facility] [address] [phone]                 DIET: regular    ACTIVITY: as tolerated       DISCHARGE MEDICATIONS:  Current Discharge Medication List        START taking these medications    Details   amLODIPine (Norvasc) 5 mg tablet Take 1 Tablet by mouth daily. Indications: high blood pressure  Qty: 30 Tablet, Refills: 0  Start date: 1/13/2023           CONTINUE these medications which have NOT CHANGED    Details   Ferrous Fumarate 325 mg (106 mg iron) tab Take  by mouth.      pantoprazole (PROTONIX) 40 mg tablet Take 40 mg by mouth daily. Polyethylene Glycol 3350 powd by Does Not Apply route. loperamide (IMMODIUM) 2 mg tablet Take 2 mg by mouth four (4) times daily as needed for Diarrhea.      guaiFENesin (ROBITUSSIN) 100 mg/5 mL liquid Take 200 mg by mouth three (3) times daily as needed for Cough. traMADoL (ULTRAM) 50 mg tablet Take 50 mg by mouth every six (6) hours as needed for Pain. atorvastatin (Lipitor) 40 mg tablet Take 1 Tablet by mouth nightly. Qty: 30 Tablet, Refills: 0      acetaminophen (TYLENOL) 325 mg tablet Take 2 Tabs by mouth every four (4) hours as needed for Pain. Qty: 20 Tab, Refills: 0      ondansetron (Zofran ODT) 4 mg disintegrating tablet Take 1 Tab by mouth every eight (8) hours as needed for Nausea. Qty: 15 Tab, Refills: 0      omega-3 fatty acids-fish oil 360-1,200 mg cap Take 2 Tabs by mouth daily. pregabalin (LYRICA) 50 mg capsule Take 50 mg by mouth nightly. Indications: neuropathy    Associated Diagnoses: HTN (hypertension), benign; Mixed hyperlipidemia      aspirin delayed-release 81 mg tablet Take 81 mg by mouth nightly. STOP taking these medications       cloNIDine HCL (CATAPRES) 0.1 mg tablet Comments:   Reason for Stopping:         hydrALAZINE (APRESOLINE) 10 mg tablet Comments:   Reason for Stopping:                 NOTIFY YOUR PHYSICIAN FOR ANY OF THE FOLLOWING:   Fever over 101 degrees for 24 hours.    Chest pain, shortness of breath, fever, chills, nausea, vomiting, diarrhea, change in mentation, falling, weakness, bleeding. Severe pain or pain not relieved by medications. Or, any other signs or symptoms that you may have questions about.     DISPOSITION:    Home With:   OT  PT  HH  RN       Long term SNF/Inpatient Rehab   x Independent/assisted living    Hospice    Other:         PHYSICAL EXAMINATION AT DISCHARGE:    Gen:    Elderly, ill-appearing, frail, NAD  HEENT:  Pink conjunctivae, PERRL, hearing intact to voice, moist mucous membranes  Neck:  Supple, without masses, thyroid non-tender  Resp:  No accessory muscle use, clear breath sounds without wheezes rales or rhonchi  Card:   No murmurs, normal S1, S2 without thrills, bruits or peripheral edema  Abd:  Soft, non-tender, non-distended, normoactive bowel sounds are present  Lymph:  No cervical or inguinal adenopathy  Musc:  No cyanosis or clubbing, right shoulder pain to ROM  Skin:   No rashes  Neuro:  moves all ext  Psych:  poor insight, oriented to person, place       CHRONIC MEDICAL DIAGNOSES:  Problem List as of 1/19/2023 Date Reviewed: 1/13/2023            Codes Class Noted - Resolved    Acute metabolic encephalopathy [id number]-96-GG: G93.41  ICD-9-CM: 348.31  1/13/2023 - Present        * (Principal) Heart block AV second degree ICD-10-CM: I44.1  ICD-9-CM: 426.13  1/13/2023 - Present        Seizure ([address] ICD-10-CM: R56.9  ICD-9-CM: 780.39  1/10/2023 - Present        CKD (chronic kidney disease) stage 3, GFR 30-59 ml/min (HCC) (Chronic) ICD-10-CM: N18.30  ICD-9-CM: 585.3  9/8/2019 - Present        Frequent falls ICD-10-CM: R29.6  ICD-9-CM: V15.88  9/8/2019 - Present        Neuropathy (Chronic) ICD-10-CM: G62.9  ICD-9-CM: 355.9  9/8/2019 - Present        GERD (gastroesophageal reflux disease) (Chronic) ICD-10-CM: K21.9  ICD-9-CM: 530.81  9/30/2014 - Present        CAD (coronary artery disease) (Chronic) ICD-10-CM: I25.10  ICD-9-CM: 414.00  Unknown - Present HTN (hypertension), benign (Chronic) ICD-10-CM: I10  ICD-9-CM: 401.1  1/11/2011 - Present        Mixed hyperlipidemia (Chronic) ICD-10-CM: E78.2  ICD-9-CM: 272.2  1/11/2011 - Present        RESOLVED: Acute metabolic encephalopathy XTX-37-TF: G93.41  ICD-9-CM: 348.31  12/24/2021 - 1/10/2023        RESOLVED: Shortness of breath ICD-10-CM: R06.02  ICD-9-CM: 786.05  1/18/2020 - 1/20/2020        RESOLVED: Falls frequently ICD-10-CM: R29.6  ICD-9-CM: V15.88  9/10/2019 - 1/10/2023        RESOLVED: Bacteriuria ICD-10-CM: R82.71  ICD-9-CM: 791.9  9/8/2019 - 1/10/2023        RESOLVED: Sepsis ([facility] ICD-10-CM: A41.9  ICD-9-CM: 038.9, 995.91  2/6/2016 - 2/8/2016        RESOLVED: Nausea and vomiting ICD-10-CM: R11.2  ICD-9-CM: 787.01  2/6/2016 - 2/8/2016        RESOLVED: ARF (acute renal failure) ([facility] ICD-10-CM: N17.9  ICD-9-CM: 584.9  5/7/2015 - 5/10/2015        RESOLVED: Rash ICD-10-CM: R21  ICD-9-CM: 782.1  5/7/2015 - 1/10/2023        RESOLVED: Pneumonia ICD-10-CM: J18.9  ICD-9-CM: 966  4/5/2015 - 5/10/2015        RESOLVED: Sepsis ([facility] ICD-10-CM: A41.9  ICD-9-CM: 038.9, 995.91  4/5/2015 - 5/10/2015        RESOLVED: Femur fracture ([facility] ICD-10-CM: S72.90XA  ICD-9-CM: 821.00  12/1/2014 - 5/7/2015        RESOLVED: UTI (urinary tract infection) ICD-10-CM: N39.0  ICD-9-CM: 599.0  10/1/2014 - 1/10/2023        RESOLVED: Heart block ICD-10-CM: I45.9  ICD-9-CM: 426.9  9/30/2014 - 12/1/2014        RESOLVED: Junctional rhythm ICD-10-CM: I49.8  ICD-9-CM: 427.89  9/30/2014 - 12/1/2014        RESOLVED: Dehydration ICD-10-CM: E86.0  ICD-9-CM: 276.51  9/30/2014 - 12/1/2014        RESOLVED: ARF (acute renal failure) ([facility] ICD-10-CM: N17.9  ICD-9-CM: 584.9  9/30/2014 - 12/1/2014        RESOLVED: Dizziness ICD-10-CM: R42  ICD-9-CM: 780.4  9/30/2014 - 12/1/2014        RESOLVED: Syncope ICD-10-CM: R55  ICD-9-CM: 780.2  9/30/2014 - 12/1/2014        RESOLVED: Fall ICD-10-CM: W19. Eliseo Amas  ICD-9-CM: V971.7  9/30/2014 - 12/1/2014        RESOLVED: Chest pain, unspecified ICD-10-CM: R07.9  ICD-9-CM: 786.50  1/11/2011 - 9/30/2014           Greater than 30 minutes were spent with the patient on counseling and coordination of care    Signed:   Maria Del Carmen Rossi MD  1/19/2023  11:42 AM

## 2023-01-19 NOTE — PROGRESS NOTES
Problem: Mobility Impaired (Adult and Pediatric)  Goal: *Acute Goals and Plan of Care (Insert Text)  Description: FUNCTIONAL STATUS PRIOR TO ADMISSION: Pt requires assistance for transfers to the wheelchair. HOME SUPPORT PRIOR TO ADMISSION: From [facility]. Assist with ADLs and transfers. Physical Therapy Goals  Initiated 1/17/2023  1. Patient will move from supine to sit and sit to supine , scoot up and down, and roll side to side in bed with minimal assistance/contact guard assist within 7 day(s). 2.  Patient will transfer from bed to chair and chair to bed with moderate assistance  using the least restrictive device within 7 day(s). 3.  Patient will perform sit to stand with moderate assistance  within 7 day(s). 4.  Patient will ambulate with moderate assistance  for 5 feet with the least restrictive device within 7 day(s). 5.  Patient will propel w/c 25ft with minimal assistance in straight path without turns within 7 days. Outcome: Progressing Towards Goal   PHYSICAL THERAPY TREATMENT  Patient: Maria L Trujillo (71 y.o. female)  Date: 1/19/2023  Diagnosis: Seizure ([address] [D88.7]  Acute metabolic encephalopathy [C85.77] Heart block AV second degree      Precautions: Fall  Chart, physical therapy assessment, plan of care and goals were reviewed. ASSESSMENT  Patient continues with skilled PT services and is progressing towards goals. Patient very agreeable to working with therapy today with goal to work on standing and transfer to chair. Patient required less assistance to mobilize to EOB, very light MIN only and additional time, good sitting balance. Coni to standing with RW and MOD A x 2, additional times and tactile cues to achieve full trunk and hip extension. Practiced weight shifting and marching at EOB then advanced to gait to chair, MOD A x 2. No buckling and patient followed commands well for technique.   Left in chair with alarm and spoke with RN, recommended using [place] Steady to return to bed.     Current Level of Function Impacting Discharge (mobility/balance): MOD A x 2 transfers, gait bed to chair    Other factors to consider for discharge: close to baseline at [facility]         PLAN :  Patient continues to benefit from skilled intervention to address the above impairments. Continue treatment per established plan of care. to address goals. Recommendation for discharge: (in order for the patient to meet his/her long term goals)  Physical therapy at least 2 days/week in the home  at the [facility]    This discharge recommendation:  Has been made in collaboration with the attending provider and/or case management    IF patient discharges home will need the following DME: patient owns DME required for discharge       SUBJECTIVE:   Patient stated It feels good to be up.     OBJECTIVE DATA SUMMARY:   Critical Behavior:  Neurologic State: Alert  Orientation Level: Oriented to person, Oriented to place (oriented to year for month stating february)  Cognition: Follows commands  Safety/Judgement: Awareness of environment, Fall prevention, Decreased insight into deficits  Functional Mobility Training:  Bed Mobility:     Supine to Sit: Contact guard assistance;Bed Modified; Additional time     Scooting: Contact guard assistance;Bed Modified; Additional time        Transfers:  Sit to Stand: Moderate assistance;Assist x2; Additional time  Stand to Sit: Moderate assistance;Assist x2; Additional time        Bed to Chair: Moderate assistance;Assist x2; Additional time                    Balance:  Sitting - Static: Good (unsupported)  Sitting - Dynamic: Fair (occasional)  Standing: Impaired; With support  Standing - Static: Constant support; Fair  Standing - Dynamic : Constant support;Poor  Ambulation/Gait Training:  Distance (ft): 3 Feet (ft)  Assistive Device: Gait belt;Walker, rolling  Ambulation - Level of Assistance:  Moderate assistance;Assist x2        Gait Abnormalities: Decreased step clearance;Trunk sway increased Base of Support: Widened     Speed/Meghan: Slow;Shuffled                      Pain Rating:  None reported    Activity Tolerance:   Fair    After treatment patient left in no apparent distress:   Sitting in chair, Call bell within reach, and Bed / chair alarm activated    COMMUNICATION/COLLABORATION:   The patients plan of care was discussed with: Occupational therapist and Registered nurse.      Chay Alvarado, PT   Time Calculation: 23 mins

## 2023-01-19 NOTE — PROGRESS NOTES
Problem: Falls - Risk of  Goal: *Absence of Falls  Description: Document Romeo Tilley Fall Risk and appropriate interventions in the flowsheet. Outcome: Progressing Towards Goal  Note: Fall Risk Interventions:  Mobility Interventions: Bed/chair exit alarm    Mentation Interventions: Bed/chair exit alarm    Medication Interventions: Bed/chair exit alarm    Elimination Interventions: Call light in reach              Problem: Patient Education: Go to Patient Education Activity  Goal: Patient/Family Education  Outcome: Progressing Towards Goal     Problem: Pressure Injury - Risk of  Goal: *Prevention of pressure injury  Description: Document John Scale and appropriate interventions in the flowsheet.   Outcome: Progressing Towards Goal  Note: Pressure Injury Interventions:  Sensory Interventions: Keep linens dry and wrinkle-free    Moisture Interventions: Minimize layers    Activity Interventions: Increase time out of bed    Mobility Interventions: HOB 30 degrees or less    Nutrition Interventions: Document food/fluid/supplement intake    Friction and Shear Interventions: Apply protective barrier, creams and emollients, Foam dressings/transparent film/skin sealants, HOB 30 degrees or less, Minimize layers, Transferring/repositioning devices

## 2023-01-19 NOTE — PROGRESS NOTES
Medicare pt has received, reviewed, and signed 2nd IM letter informing them of their right to appeal the discharge. Signed copy has been placed on pt bedside chart.   Dre Cullen CMS

## 2023-02-13 NOTE — PATIENT INSTRUCTIONS

## 2023-02-13 NOTE — PROGRESS NOTES
CARDIOLOGY OFFICE NOTE    Floyd Pickett MD, [address], [address] [facility] [address], [id number] [place] [address] N[place]  Phone [phone]; Fax [phone]  Mobile [phone]   Voice Mail [phone]    Primary care: Tonio Mahoney MD       ATTENTION:   This medical record was transcribed using an electronic medical records/speech recognition system. Although proofread, it may and can contain electronic, spelling and other errors. Corrections may be executed at a later time. Please feel free to contact us for any clarifications as needed. Main Gordillo is a 80 y.o. female with HTN and dyslipidemia referred for 6 month follow up. Cardiac risk factors: dyslipidemia, hypertension, post-menopausal, syncope  I have personally obtained the history from the patient. HISTORY OF PRESENTING ILLNESS    Ms./Mr. Main Gordillo  80 y.o. is a very pleasant lady who was recently hospitalized for seizure/syncopal spell felt attributed to bradycardia. The family was not interested in a pacemaker or clonidine and hydralazine were discontinued she was placed on Norvasc When I last saw her she was having periods of dizziness. She has not had any further episodes. She comes in with her grandson and I are discussion mostly revolved around whether or not to do any further testing or determine if she needed pacemaker. I gave them the option of placing a event monitor but she says she does not want a pacemaker at this time should this change then we would have further discussions. Also talked about DNR status.        ACTIVE PROBLEM LIST     Patient Active Problem List    Diagnosis Date Noted    Acute metabolic encephalopathy 20/25/2751    Heart block AV second degree 01/13/2023    Seizure ([facility] [address] 01/10/2023    CKD (chronic kidney disease) stage 3, GFR 30-59 ml/min ([facility]) 09/08/2019    Frequent falls 09/08/2019    Neuropathy 09/08/2019    GERD (gastroesophageal reflux disease) 09/30/2014    CAD (coronary artery disease)     HTN (hypertension), benign 2011    Mixed hyperlipidemia 2011           PAST MEDICAL HISTORY     Past Medical History:   Diagnosis Date    CAD (coronary artery disease)     Femur fracture (HCC)     right    GERD (gastroesophageal reflux disease)     HTN (hypertension), benign 2011    Mixed hyperlipidemia 2011           PAST SURGICAL HISTORY     Past Surgical History:   Procedure Laterality Date    HX FEMUR FRACTURE TX      HX KNEE REPLACEMENT      bilateral    HX ORTHOPAEDIC      bilat knee replacements    HX VEIN STRIPPING            ALLERGIES     Allergies   Allergen Reactions    Sulfa (Sulfonamide Antibiotics) Rash    Ampicillin Rash     Tolerates cefazolin    Lescol [Fluvastatin] Unknown (comments)          FAMILY HISTORY     Family History   Problem Relation Age of Onset    Other Mother          of renal failure, not sure what caused id    Other Father         something with throat, not sure if it was cancer    Heart Disease Sister     negative for cardiac disease       SOCIAL HISTORY     Social History     Socioeconomic History    Marital status:    Tobacco Use    Smoking status: Never    Smokeless tobacco: Never   Substance and Sexual Activity    Alcohol use: No    Drug use: No         MEDICATIONS     Current Outpatient Medications   Medication Sig    amLODIPine (Norvasc) 5 mg tablet Take 1 Tablet by mouth daily. Indications: high blood pressure    Ferrous Fumarate 325 mg (106 mg iron) tab Take  by mouth.    pantoprazole (PROTONIX) 40 mg tablet Take 40 mg by mouth daily. Polyethylene Glycol 3350 powd by Does Not Apply route. loperamide (IMMODIUM) 2 mg tablet Take 2 mg by mouth four (4) times daily as needed for Diarrhea.    guaiFENesin (ROBITUSSIN) 100 mg/5 mL liquid Take 200 mg by mouth three (3) times daily as needed for Cough. atorvastatin (Lipitor) 40 mg tablet Take 1 Tablet by mouth nightly.  (Patient taking differently: Take 10 mg by mouth nightly.)    acetaminophen (TYLENOL) 325 mg tablet Take 2 Tabs by mouth every four (4) hours as needed for Pain. ondansetron (Zofran ODT) 4 mg disintegrating tablet Take 1 Tab by mouth every eight (8) hours as needed for Nausea. omega-3 fatty acids-fish oil 360-1,200 mg cap Take 2 Tabs by mouth daily. pregabalin (LYRICA) 50 mg capsule Take 50 mg by mouth nightly. Indications: neuropathy    aspirin delayed-release 81 mg tablet Take 81 mg by mouth nightly. traMADoL (ULTRAM) 50 mg tablet Take 50 mg by mouth every six (6) hours as needed for Pain. (Patient not taking: Reported on 2/14/2023)     No current facility-administered medications for this visit. I have reviewed the nurses notes, vitals, problem list, allergy list, medical history, family, social history and medications. REVIEW OF SYMPTOMS    As per HPI  General: Pt denies excessive weight gain or loss. Pt is able to conduct ADL's  HEENT: Denies blurred vision, headaches, hearing loss, epistaxis and difficulty swallowing. Respiratory: Denies cough, congestion, shortness of breath, SHAH, wheezing or stridor. Cardiovascular: Denies precordial pain, palpitations, edema or PND  Gastrointestinal: Denies poor appetite, indigestion, abdominal pain or blood in stool  Genitourinary: Denies hematuria, dysuria, increased urinary frequency  Musculoskeletal: Denies joint pain or swelling from muscles or joints  Neurologic: Denies tremor, paresthesias, headache, or sensory motor disturbance  Psychiatric: Denies confusion, insomnia, depression  Integumentray: Denies rash, itching or ulcers. Hematologic: Denies easy bruising, bleeding     PHYSICAL EXAMINATION      Vitals:    02/14/23 1044   BP: 138/70   Pulse: 68   SpO2: 96%   Weight: 198 lb (89.8 kg)   Height: 5' 4\" (1.626 m)       General: Well developed, in no acute distress.   Looks younger than stated age  [de-identified]: No jaundice  Neck: Supple, no stiffness  Heart: Irregularly irregular  Respiratory: Clear bilaterally x 4, no wheezing or rales  Extremities:  No edema, normal cap refill, no cyanosis. Musculoskeletal: No clubbing, no deformities  Neuro: A&Ox3, speech clear, in a wheelchair cooperative, no focal neurologic deficits  Skin: Skin color is normal. No rashes or lesions. Non diaphoretic, moist.  Abdomen:   Soft, non-tender, bowel sounds are active. DIAGNOSTIC DATA     1. Echo  12/2/08 - EF 60-65%  10/1/14- EF 70%, grade 2 DD, LAE mod, AI/TR mild  1/19/20-EF 60 - 65%, LAE, Aortic valve sclerosis with no evidence of reduced excursion, Severe mitral annular calcification, mild MR, mild/mod pulm HTN  12/28/21-EF 55 - 60%. Diastolic function present with increased LAP with normal LV EF, Mildly thickened AV cusps,Severely thickened MV leaflets. Severe mitral annular calcification,Mild transvalvular regurgitation,Mild stenosis, RUBIN  1/11/23-EF 65 - 70%,  concentric hypertrophy, Mild sclerosis of AV cusp, Calcified MV leaflet, Mild annular calcification of MV    2. Myocardial perfusion study  12/20/00 - persantine, mild mid distal anterolateral wall ischemia, EF 62%    3.  Cholesterol  1/6/20- , HDL 62, LDL 68,   6/17/20- , HDL 59, ,   12/28/21- , HDL 84, LDL 86.2, TG 69         LABORATORY DATA       Lab Results   Component Value Date/Time    WBC 6.4 01/15/2023 03:47 AM    HGB 13.3 01/15/2023 03:47 AM    HCT 43.6 01/15/2023 03:47 AM    PLATELET 759 33/86/5411 03:47 AM    .0 (H) 01/15/2023 03:47 AM      Lab Results   Component Value Date/Time    Sodium 140 01/17/2023 07:49 AM    Potassium 4.9 01/17/2023 07:49 AM    Chloride 109 (H) 01/17/2023 07:49 AM    CO2 27 01/17/2023 07:49 AM    Anion gap 4 (L) 01/17/2023 07:49 AM    Glucose 111 (H) 01/17/2023 07:49 AM    BUN 28 (H) 01/17/2023 07:49 AM    Creatinine 0.96 01/17/2023 07:49 AM    BUN/Creatinine ratio 29 (H) 01/17/2023 07:49 AM    GFR est AA >60 12/30/2021 10:06 AM    GFR est non-AA >60 12/30/2021 10:06 AM    Calcium 8.5 01/17/2023 07:49 AM    Bilirubin, total 0.4 01/10/2023 03:00 PM    Alk. phosphatase 132 (H) 01/10/2023 03:00 PM    Protein, total 7.3 01/10/2023 03:00 PM    Albumin 3.4 (L) 01/10/2023 03:00 PM    Globulin 3.9 01/10/2023 03:00 PM    A-G Ratio 0.9 (L) 01/10/2023 03:00 PM    ALT (SGPT) 21 01/10/2023 03:00 PM            ICD-10-CM ICD-9-CM   1. HTN (hypertension), benign  I10 401.1   2. Mixed hyperlipidemia  E78.2 272.2   3. Coronary artery disease involving native coronary artery of native heart without angina pectoris  I25.10 414.01          ASSESSMENT/RECOMMENDATIONS:.        1. HTN  -Blood pressure good today at 138/70 only on Norvasc      2. Dyslipidemia  -Remains on Lipitor 10 mg daily. Followed by Duc Yu MD   -LDL has been at goal    3. History of atrial fibrillation/bradycardia  -Risk are greater than benefits of anticoagulation  -Holding on pacemaker at this time. I believe she is a DNR and does not want any intervention.  -If this should change the family will let me know and then we would place an event monitor to see if she is having significant pauses. She did have several pauses during her hospital course but she also was sick with urinary tract infection and sepsis. 4.  Syncope/seizure  -I will was attributable to bradycardia she was taken off of her clonidine and hydralazine and placed on Norvasc  -Family is not interested in pacemaker  -We will be diligent about not adding any negative trauma chronotropic agents       3. Return in 6 months    No orders of the defined types were placed in this encounter. We discussed the expected course, resolution and complications of the diagnosis(es) in detail. Medication risks, benefits, costs, interactions, and alternatives were discussed as indicated. I advised him to contact the office if his condition worsens, changes or fails to improve as anticipated.  He expressed understanding with the diagnosis(es) and plan          Follow-up and Dispositions    Return in about 6 months (around 8/14/2023). I have discussed the diagnosis with  Daniel Jaramillo and the intended plan as seen in the above orders. Questions were answered concerning future plans. I have discussed medication side effects and warnings with the patient as well. Thank you,  Ruchi Johnson MD for involving me in the care of  Daniel Jaramillo. Please do not hesitate to contact me for further questions/concerns. Floyd Pradhan MD, [facility] [address]      [facility], [address] [place] [address] Drive      [phone] / [phone] Fax

## 2023-02-14 ENCOUNTER — OFFICE VISIT (OUTPATIENT)
Dept: CARDIOLOGY CLINIC | Age: 88
End: 2023-02-14
Payer: MEDICARE

## 2023-02-14 VITALS
OXYGEN SATURATION: 96 % | HEART RATE: 68 BPM | WEIGHT: 198 LBS | HEIGHT: 64 IN | DIASTOLIC BLOOD PRESSURE: 70 MMHG | BODY MASS INDEX: 33.8 KG/M2 | SYSTOLIC BLOOD PRESSURE: 138 MMHG

## 2023-02-14 DIAGNOSIS — I25.10 CORONARY ARTERY DISEASE INVOLVING NATIVE CORONARY ARTERY OF NATIVE HEART WITHOUT ANGINA PECTORIS: ICD-10-CM

## 2023-02-14 DIAGNOSIS — I10 HTN (HYPERTENSION), BENIGN: Primary | ICD-10-CM

## 2023-02-14 DIAGNOSIS — E78.2 MIXED HYPERLIPIDEMIA: ICD-10-CM

## 2023-02-14 PROCEDURE — 1090F PRES/ABSN URINE INCON ASSESS: CPT | Performed by: SPECIALIST

## 2023-02-14 PROCEDURE — G8427 DOCREV CUR MEDS BY ELIG CLIN: HCPCS | Performed by: SPECIALIST

## 2023-02-14 PROCEDURE — 1123F ACP DISCUSS/DSCN MKR DOCD: CPT | Performed by: SPECIALIST

## 2023-02-14 PROCEDURE — 1101F PT FALLS ASSESS-DOCD LE1/YR: CPT | Performed by: SPECIALIST

## 2023-02-14 PROCEDURE — G8536 NO DOC ELDER MAL SCRN: HCPCS | Performed by: SPECIALIST

## 2023-02-14 PROCEDURE — G8417 CALC BMI ABV UP PARAM F/U: HCPCS | Performed by: SPECIALIST

## 2023-02-14 PROCEDURE — 99214 OFFICE O/P EST MOD 30 MIN: CPT | Performed by: SPECIALIST

## 2023-02-14 PROCEDURE — G8432 DEP SCR NOT DOC, RNG: HCPCS | Performed by: SPECIALIST

## 2023-02-14 NOTE — PROGRESS NOTES
Edyta Layton is a 80 y.o. female    Vitals:    02/14/23 1044   BP: 138/70   BP 1 Location: Left upper arm   BP Patient Position: Sitting   BP Cuff Size: Adult   Pulse: 68   Height: 5' 4\" (1.626 m)   Weight: 198 lb (89.8 kg)   SpO2: 96%       Chief Complaint   Patient presents with    Hypertension    Cholesterol Problem       Chest pain NO  SOB NO  Dizziness NO  Swelling FEET  Recent hospital visit [facility], BR[facility]ARDIA  Refills NO  COVID VACCINE YES  HAD COVID?  YES

## 2023-02-14 NOTE — LETTER
2/14/2023    Patient: Erich Cantu   YOB: 1924   Date of Visit: 2/14/2023     Josias Kessler MD  [facility] [address] [phone]  Via Fax: [phone]    Dear Josias Kessler MD,      Thank you for referring Ms. Melisa Nichols to [address] [place] [address] for evaluation. My notes for this consultation are attached. If you have questions, please do not hesitate to call me. I look forward to following your patient along with you.       Sincerely,    Lizeth Acosta MD

## 2023-03-28 ENCOUNTER — HOSPITAL ENCOUNTER (INPATIENT)
Age: 88
LOS: 1 days | Discharge: HOME | DRG: 690 | End: 2023-03-29
Attending: EMERGENCY MEDICINE | Admitting: INTERNAL MEDICINE
Payer: MEDICARE

## 2023-03-28 DIAGNOSIS — N39.0 URINARY TRACT INFECTION WITHOUT HEMATURIA, SITE UNSPECIFIED: Primary | ICD-10-CM

## 2023-03-28 PROBLEM — A49.9 ESBL (EXTENDED SPECTRUM BETA-LACTAMASE) PRODUCING BACTERIA INFECTION: Status: ACTIVE | Noted: 2023-03-28

## 2023-03-28 PROBLEM — Z16.12 ESBL (EXTENDED SPECTRUM BETA-LACTAMASE) PRODUCING BACTERIA INFECTION: Status: ACTIVE | Noted: 2023-03-28

## 2023-03-28 PROBLEM — N30.00 ACUTE CYSTITIS WITHOUT HEMATURIA: Status: ACTIVE | Noted: 2023-03-28

## 2023-03-28 LAB
ALBUMIN SERPL-MCNC: 3.3 G/DL (ref 3.5–5)
ALBUMIN/GLOB SERPL: 0.9 (ref 1.1–2.2)
ALP SERPL-CCNC: 110 U/L (ref 45–117)
ALT SERPL-CCNC: 18 U/L (ref 12–78)
ANION GAP SERPL CALC-SCNC: 2 MMOL/L (ref 5–15)
APPEARANCE UR: ABNORMAL
AST SERPL-CCNC: 19 U/L (ref 15–37)
BACTERIA URNS QL MICRO: ABNORMAL /HPF
BASOPHILS # BLD: 0 K/UL (ref 0–0.1)
BASOPHILS NFR BLD: 1 % (ref 0–1)
BILIRUB SERPL-MCNC: 0.4 MG/DL (ref 0.2–1)
BILIRUB UR QL: NEGATIVE
BUN SERPL-MCNC: 27 MG/DL (ref 6–20)
BUN/CREAT SERPL: 20 (ref 12–20)
CALCIUM SERPL-MCNC: 8.8 MG/DL (ref 8.5–10.1)
CHLORIDE SERPL-SCNC: 113 MMOL/L (ref 97–108)
CO2 SERPL-SCNC: 26 MMOL/L (ref 21–32)
COLOR UR: ABNORMAL
COMMENT, HOLDF: NORMAL
CREAT SERPL-MCNC: 1.35 MG/DL (ref 0.55–1.02)
DIFFERENTIAL METHOD BLD: NORMAL
EOSINOPHIL # BLD: 0.1 K/UL (ref 0–0.4)
EOSINOPHIL NFR BLD: 2 % (ref 0–7)
EPITH CASTS URNS QL MICRO: ABNORMAL /LPF
ERYTHROCYTE [DISTWIDTH] IN BLOOD BY AUTOMATED COUNT: 14.3 % (ref 11.5–14.5)
GLOBULIN SER CALC-MCNC: 3.7 G/DL (ref 2–4)
GLUCOSE SERPL-MCNC: 110 MG/DL (ref 65–100)
GLUCOSE UR STRIP.AUTO-MCNC: NEGATIVE MG/DL
HCT VFR BLD AUTO: 42 % (ref 35–47)
HGB BLD-MCNC: 13.1 G/DL (ref 11.5–16)
HGB UR QL STRIP: ABNORMAL
HYALINE CASTS URNS QL MICRO: ABNORMAL /LPF (ref 0–2)
IMM GRANULOCYTES # BLD AUTO: 0 K/UL (ref 0–0.04)
IMM GRANULOCYTES NFR BLD AUTO: 0 % (ref 0–0.5)
KETONES UR QL STRIP.AUTO: NEGATIVE MG/DL
LEUKOCYTE ESTERASE UR QL STRIP.AUTO: ABNORMAL
LYMPHOCYTES # BLD: 1.9 K/UL (ref 0.8–3.5)
LYMPHOCYTES NFR BLD: 30 % (ref 12–49)
MCH RBC QN AUTO: 30.6 PG (ref 26–34)
MCHC RBC AUTO-ENTMCNC: 31.2 G/DL (ref 30–36.5)
MCV RBC AUTO: 98.1 FL (ref 80–99)
MONOCYTES # BLD: 0.6 K/UL (ref 0–1)
MONOCYTES NFR BLD: 10 % (ref 5–13)
NEUTS SEG # BLD: 3.6 K/UL (ref 1.8–8)
NEUTS SEG NFR BLD: 57 % (ref 32–75)
NITRITE UR QL STRIP.AUTO: POSITIVE
NRBC # BLD: 0 K/UL (ref 0–0.01)
NRBC BLD-RTO: 0 PER 100 WBC
PH UR STRIP: 7 (ref 5–8)
PLATELET # BLD AUTO: 204 K/UL (ref 150–400)
PMV BLD AUTO: 11.3 FL (ref 8.9–12.9)
POTASSIUM SERPL-SCNC: 4.7 MMOL/L (ref 3.5–5.1)
PROCALCITONIN SERPL-MCNC: <0.05 NG/ML
PROT SERPL-MCNC: 7 G/DL (ref 6.4–8.2)
PROT UR STRIP-MCNC: ABNORMAL MG/DL
RBC # BLD AUTO: 4.28 M/UL (ref 3.8–5.2)
RBC #/AREA URNS HPF: ABNORMAL /HPF (ref 0–5)
SAMPLES BEING HELD,HOLD: NORMAL
SODIUM SERPL-SCNC: 141 MMOL/L (ref 136–145)
SP GR UR REFRACTOMETRY: 1.01 (ref 1–1.03)
UROBILINOGEN UR QL STRIP.AUTO: 0.2 EU/DL (ref 0.2–1)
WBC # BLD AUTO: 6.2 K/UL (ref 3.6–11)
WBC URNS QL MICRO: >100 /HPF (ref 0–4)

## 2023-03-28 PROCEDURE — 96374 THER/PROPH/DIAG INJ IV PUSH: CPT

## 2023-03-28 PROCEDURE — 87086 URINE CULTURE/COLONY COUNT: CPT

## 2023-03-28 PROCEDURE — 85025 COMPLETE CBC W/AUTO DIFF WBC: CPT

## 2023-03-28 PROCEDURE — 87186 SC STD MICRODIL/AGAR DIL: CPT

## 2023-03-28 PROCEDURE — 99285 EMERGENCY DEPT VISIT HI MDM: CPT

## 2023-03-28 PROCEDURE — 81001 URINALYSIS AUTO W/SCOPE: CPT

## 2023-03-28 PROCEDURE — 87077 CULTURE AEROBIC IDENTIFY: CPT

## 2023-03-28 PROCEDURE — 74011250636 HC RX REV CODE- 250/636: Performed by: EMERGENCY MEDICINE

## 2023-03-28 PROCEDURE — 74011000258 HC RX REV CODE- 258: Performed by: EMERGENCY MEDICINE

## 2023-03-28 PROCEDURE — 74011250637 HC RX REV CODE- 250/637: Performed by: INTERNAL MEDICINE

## 2023-03-28 PROCEDURE — 36415 COLL VENOUS BLD VENIPUNCTURE: CPT

## 2023-03-28 PROCEDURE — 80053 COMPREHEN METABOLIC PANEL: CPT

## 2023-03-28 PROCEDURE — 84145 PROCALCITONIN (PCT): CPT

## 2023-03-28 PROCEDURE — 65270000029 HC RM PRIVATE

## 2023-03-28 RX ORDER — ONDANSETRON 4 MG/1
4 TABLET, ORALLY DISINTEGRATING ORAL
Status: DISCONTINUED | OUTPATIENT
Start: 2023-03-28 | End: 2023-03-29 | Stop reason: HOSPADM

## 2023-03-28 RX ORDER — ONDANSETRON 2 MG/ML
4 INJECTION INTRAMUSCULAR; INTRAVENOUS
Status: DISCONTINUED | OUTPATIENT
Start: 2023-03-28 | End: 2023-03-29 | Stop reason: HOSPADM

## 2023-03-28 RX ORDER — SODIUM CHLORIDE 0.9 % (FLUSH) 0.9 %
5-40 SYRINGE (ML) INJECTION AS NEEDED
Status: DISCONTINUED | OUTPATIENT
Start: 2023-03-28 | End: 2023-03-29 | Stop reason: HOSPADM

## 2023-03-28 RX ORDER — SODIUM CHLORIDE 0.9 % (FLUSH) 0.9 %
5-40 SYRINGE (ML) INJECTION EVERY 8 HOURS
Status: DISCONTINUED | OUTPATIENT
Start: 2023-03-28 | End: 2023-03-29 | Stop reason: HOSPADM

## 2023-03-28 RX ORDER — ENOXAPARIN SODIUM 100 MG/ML
30 INJECTION SUBCUTANEOUS DAILY
Status: DISCONTINUED | OUTPATIENT
Start: 2023-03-29 | End: 2023-03-29 | Stop reason: HOSPADM

## 2023-03-28 RX ORDER — ACETAMINOPHEN 650 MG/1
650 SUPPOSITORY RECTAL
Status: DISCONTINUED | OUTPATIENT
Start: 2023-03-28 | End: 2023-03-29 | Stop reason: HOSPADM

## 2023-03-28 RX ORDER — POLYETHYLENE GLYCOL 3350 17 G/17G
17 POWDER, FOR SOLUTION ORAL DAILY PRN
Status: DISCONTINUED | OUTPATIENT
Start: 2023-03-28 | End: 2023-03-29 | Stop reason: HOSPADM

## 2023-03-28 RX ORDER — ACETAMINOPHEN 325 MG/1
650 TABLET ORAL
Status: DISCONTINUED | OUTPATIENT
Start: 2023-03-28 | End: 2023-03-29 | Stop reason: HOSPADM

## 2023-03-28 RX ADMIN — MEROPENEM 2 G: 1 INJECTION, POWDER, FOR SOLUTION INTRAVENOUS at 13:57

## 2023-03-28 RX ADMIN — ACETAMINOPHEN 650 MG: 325 TABLET ORAL at 21:50

## 2023-03-28 NOTE — H&P
History and Physical              History and Physical    Date of Service:  3/28/2023  Primary Care Provider: Gilbert Liao MD  Source of information: The patient and Chart review    Chief Complaint: Bladder Infection      History of Presenting Illness:   Meghna Stoner is a 80 y.o. female who med hx of HTN, CAD, CKD 3, bradycardia, 2nd AV-block, recent admission for KLEBSIELLA PNEUMONIAE ** (EXTENDED SPECTRUM BETA LACTAMASE  UTI, currently resident of assisted living facility, started having dysuria and increased urinary frequency so was brought in. Denies fever chills no nausea vomiting or diarrhea  Vitals were stable afebrile  Chemistries and CBC were normal except for creatinine of 1.35  UA showed UTI  Due to history of ESBL Klebsiella UTI patient was made to admit for IV meropenem and         REVIEW OF SYSTEMS:  A comprehensive review of systems was negative except for that written in the History of Present Illness. Past Medical History:   Diagnosis Date    CAD (coronary artery disease)     Femur fracture ([address]     right    GERD (gastroesophageal reflux disease)     HTN (hypertension), benign 1/11/2011    Mixed hyperlipidemia 1/11/2011      Past Surgical History:   Procedure Laterality Date    HX FEMUR FRACTURE TX      HX KNEE REPLACEMENT      bilateral    HX ORTHOPAEDIC      bilat knee replacements    HX VEIN STRIPPING       Prior to Admission medications    Medication Sig Start Date End Date Taking? Authorizing Provider   amLODIPine (Norvasc) 5 mg tablet Take 1 Tablet by mouth daily. Indications: high blood pressure 1/13/23   Joel Basurto MD   Ferrous Fumarate 325 mg (106 mg iron) tab Take  by mouth. Provider, Historical   pantoprazole (PROTONIX) 40 mg tablet Take 40 mg by mouth daily. Provider, Historical   Polyethylene Glycol 3350 powd by Does Not Apply route. Provider, Historical   loperamide (IMMODIUM) 2 mg tablet Take 2 mg by mouth four (4) times daily as needed for Diarrhea. Provider, Historical   guaiFENesin (ROBITUSSIN) 100 mg/5 mL liquid Take 200 mg by mouth three (3) times daily as needed for Cough. Provider, Historical   atorvastatin (Lipitor) 40 mg tablet Take 1 Tablet by mouth nightly. Patient taking differently: Take 10 mg by mouth nightly. 21   Destinee Arriaga MD   acetaminophen (TYLENOL) 325 mg tablet Take 2 Tabs by mouth every four (4) hours as needed for Pain. 3/2/21   Lexi Hamilton MD   ondansetron (Zofran ODT) 4 mg disintegrating tablet Take 1 Tab by mouth every eight (8) hours as needed for Nausea. 3/2/21   Lexi Hamilton MD   omega-3 fatty acids-fish oil 360-1,200 mg cap Take 2 Tabs by mouth daily. Lorraine Calle MD   pregabalin (LYRICA) 50 mg capsule Take 50 mg by mouth nightly. Indications: neuropathy    Provider, Historical   aspirin delayed-release 81 mg tablet Take 81 mg by mouth nightly. Provider, Historical     Allergies   Allergen Reactions    Sulfa (Sulfonamide Antibiotics) Rash    Ampicillin Rash     Tolerates cefazolin    Lescol [Fluvastatin] Unknown (comments)      Family History   Problem Relation Age of Onset    Other Mother          of renal failure, not sure what caused id    Other Father         something with throat, not sure if it was cancer    Heart Disease Sister       Social History:  reports that she has never smoked. She has never used smokeless tobacco. She reports that she does not drink alcohol and does not use drugs.    Social Determinants of Health     Tobacco Use: Low Risk     Smoking Tobacco Use: Never    Smokeless Tobacco Use: Never    Passive Exposure: Not on file   Alcohol Use: Not on file   Financial Resource Strain: Not on file   Food Insecurity: Not on file   Transportation Needs: Not on file   Physical Activity: Not on file   Stress: Not on file   Social Connections: Not on file   Intimate Partner Violence: Not on file   Depression: Not on file   Housing Stability: Not on file        Medications were reconciled to the best of my ability given all available resources at the time of admission. Route is PO if not otherwise noted. Family and social history were personally reviewed, all pertinent and relevant details are outlined as above.     Objective:   Visit Vitals  /73 (BP 1 Location: Left upper arm, BP Patient Position: Semi fowlers)   Pulse (!) 55   Temp (!) 96 °F (35.6 °C)   Resp 18   Ht 5' 4\" (1.626 m)   Wt 89.8 kg (198 lb)   SpO2 91%   BMI 33.99 kg/m²           PHYSICAL EXAM:   General: Alert x oriented x 3, awake, no acute distress,   HEENT: PEERL, EOMI, moist mucus membranes  Neck: Supple, no JVD, no meningeal signs  Chest: Clear to auscultation bilaterally   CVS: RRR, S1 S2 heard, no murmurs/rubs/gallops  Abd: Soft, non-tender, non-distended, +bowel sounds   Ext: No clubbing, no cyanosis, no edema  Neuro/Psych: Pleasant mood and affect, CN 2-12 grossly intact, sensory grossly within normal limit, Strength 5/5 in all extremities, DTR 1+ x 4  Cap refill: Brisk, less than 3 seconds  Pulses: 2+, symmetric in all extremities  Skin: Warm, dry, without rashes or lesions    Data Review:   I have independently reviewed and interpreted patient's lab and all other diagnostic data    Abnormal Labs Reviewed   URINALYSIS W/MICROSCOPIC - Abnormal; Notable for the following components:       Result Value    Appearance TURBID (*)     Protein TRACE (*)     Blood SMALL (*)     Nitrites Positive (*)     Leukocyte Esterase LARGE (*)     WBC >100 (*)     Bacteria TOO NUMEROUS TO COUNT (*)     All other components within normal limits   METABOLIC PANEL, COMPREHENSIVE - Abnormal; Notable for the following components:    Chloride 113 (*)     Anion gap 2 (*)     Glucose 110 (*)     BUN 27 (*)     Creatinine 1.35 (*)     eGFR 36 (*)     Albumin 3.3 (*)     A-G Ratio 0.9 (*)     All other components within normal limits       All Micro Results       Procedure Component Value Units Date/Time    CULTURE, URINE [[id number]] Collected: 03/28/23 1233    Order Status: Sent Specimen: Cath Urine Updated: 03/28/23 1238            IMAGING:   No orders to display        ECG/ECHO:    Results for orders placed or performed during the hospital encounter of 01/10/23   EKG, 12 LEAD, INITIAL   Result Value Ref Range    Ventricular Rate 56 BPM    Atrial Rate 56 BPM    QRS Duration 98 ms    Q-T Interval 444 ms    QTC Calculation (Bezet) 428 ms    Calculated R Axis -39 degrees    Calculated T Axis 17 degrees    Diagnosis       Sinus rhythm  Left axis deviation  Incomplete right bundle branch block  Minimal voltage criteria for LVH, may be normal variant  Septal infarct (cited on or before 18-JAN-2020)  Abnormal ECG    Confirmed by Jeannie Hernandez ([id number]) on 1/10/2023 5:19:24 PM            Notes reviewed from all clinical/nonclinical/nursing services involved in patient's clinical care. Care coordination discussions were held with appropriate clinical/nonclinical/ nursing providers based on care coordination needs. Assessment:   Given the patient's current clinical presentation, there is a high level of concern for decompensation if discharged from the emergency department. Complex decision making was performed, which includes reviewing the patient's available past medical records, laboratory results, and imaging studies.     Active Problems:    HTN (hypertension), benign (1/11/2011)      Mixed hyperlipidemia (1/11/2011)      CAD (coronary artery disease) ()      CKD (chronic kidney disease) stage 3, GFR 30-59 ml/min ([facility]) (9/8/2019)      Seizure ([address] (1/10/2023)      Heart block AV second degree (1/13/2023)      Acute cystitis without hematuria (3/28/2023)      ESBL (extended spectrum beta-lactamase) producing bacteria infection (3/28/2023)      UTI (urinary tract infection) (3/28/2023)        Plan:     - UTI  History of recent ESBL Klebsiella UTI  Admitted for IV meropenem  ID consultation  Follow-up on urine cultures  Continue supportive care    - CKD   Stable   Trend daily     -History of encephalopathy  Optimization of infectious abnormalities  Delirium precautions  Avoid sedatives  Continue to reorient daily    - HX of  Bradycardia/2nd degree AV block: now stable  Patient and Family declined pacer due to advanced age, but also declined hospice. Cards/EP does not recommend pacer either  Monitor on tele     HTN  Cont norvasc             DIET: No diet orders on file   ISOLATION PRECAUTIONS: There are currently no Active Isolations  CODE STATUS: Prior   DVT PROPHYLAXIS: Heparin  FUNCTIONAL STATUS PRIOR TO HOSPITALIZATION: Ambulatory and capable of self-care but relies on assistive devices (rolling walker/cane). Ambulatory status/function: Ambulates with assistance:  Cane   EARLY MOBILITY ASSESSMENT: Recommend a consult to the Mobility Team  ANTICIPATED DISCHARGE: 24-48 hours. ANTICIPATED DISPOSITION: Home with Home Healthcare  EMERGENCY CONTACT/SURROGATE DECISION MAKER:     CRITICAL CARE WAS PERFORMED FOR THIS ENCOUNTER: YES. I had a face to face encounter with the patient, reviewed and interpreted patient data including clinical events, labs, images, vital signs, I/O's, and examined patient. I have discussed the case and the plan and management of the patient's care with the consulting services, the bedside nurses and necessary ancillary providers. This patient has a high probability of imminent, clinically significant deterioration, which requires the highest level of preparedness to intervene urgently. I participated in the decision-making and personally managed or directed the management of the following life and organ supporting interventions that required my frequent assessment to treat or prevent imminent deterioration. I personally spent 60 minutes of critical care time.   This is time spent at this critically ill patient's bedside actively involved in patient care as well as the coordination of care and discussions with the patient's family. This does not include any procedural time which has been billed separately. Signed By: Abbey Cevallos MD     March 28, 2023         Please note that this dictation may have been completed with Dragon, the computer voice recognition software. Quite often unanticipated grammatical, syntax, homophones, and other interpretive errors are inadvertently transcribed by the computer software. Please disregard these errors. Please excuse any errors that have escaped final proofreading.

## 2023-03-28 NOTE — PROGRESS NOTES
Pharmacy Dosing Services: Meropenem    Meropenem 1 gm IV Q8H automatically adjusted per protocol to Meropenem 1 gm IV Q12H (extended-infusion over 3 hours) for CrCl 26-49 mL/min  Indication: UTI with h/o ESBL    Thank you,  [facility]

## 2023-03-28 NOTE — ED PROVIDER NOTES
Tika Arguelles is a 80 up f wotj j/o recurrent UTIs who states she had a clean catch UA done at her family's request at [facility] where she is in assisted living. She states she has been feeling well. Denies dysuria, frequency or change in mental status. She was referred to the ED today because her urine culture grew out Klebsiella Pneumoniae which is resistant to everything but Amikacin, gentamycin and meropenem.           Past Medical History:   Diagnosis Date    CAD (coronary artery disease)     Femur fracture (HCC)     right    GERD (gastroesophageal reflux disease)     HTN (hypertension), benign 2011    Mixed hyperlipidemia 2011       Past Surgical History:   Procedure Laterality Date    HX FEMUR FRACTURE TX      HX KNEE REPLACEMENT      bilateral    HX ORTHOPAEDIC      bilat knee replacements    HX VEIN STRIPPING           Family History:   Problem Relation Age of Onset    Other Mother          of renal failure, not sure what caused id    Other Father         something with throat, not sure if it was cancer    Heart Disease Sister        Social History     Socioeconomic History    Marital status:      Spouse name: Not on file    Number of children: Not on file    Years of education: Not on file    Highest education level: Not on file   Occupational History    Not on file   Tobacco Use    Smoking status: Never    Smokeless tobacco: Never   Substance and Sexual Activity    Alcohol use: No    Drug use: No    Sexual activity: Not on file   Other Topics Concern    Not on file   Social History Narrative    Not on file     Social Determinants of Health     Financial Resource Strain: Not on file   Food Insecurity: Not on file   Transportation Needs: Not on file   Physical Activity: Not on file   Stress: Not on file   Social Connections: Not on file   Intimate Partner Violence: Not on file   Housing Stability: Not on file         ALLERGIES: Sulfa (sulfonamide antibiotics), Ampicillin, and Lescol [fluvastatin]    Review of Systems   Constitutional:  Negative for fever. HENT:  Negative for sore throat. Eyes:  Negative for visual disturbance. Respiratory:  Negative for cough. Cardiovascular:  Negative for chest pain. Gastrointestinal:  Negative for abdominal pain. Genitourinary:  Negative for dysuria. Musculoskeletal:  Negative for back pain. Skin:  Negative for rash. Neurological:  Negative for headaches. Vitals:    03/28/23 1138 03/28/23 1214 03/28/23 1527   BP: 113/73  138/73   Pulse: (!) 55  60   Resp:  18 18   Temp: (!) 96 °F (35.6 °C)  97.4 °F (36.3 °C)   SpO2: 91%  90%   Weight:  89.8 kg (198 lb)    Height:  5' 4\" (1.626 m)             Physical Exam  Vitals and nursing note reviewed. Constitutional:       General: She is not in acute distress. Appearance: She is well-developed. HENT:      Head: Normocephalic and atraumatic. Mouth/Throat:      Mouth: Mucous membranes are moist.   Eyes:      Extraocular Movements: Extraocular movements intact. Conjunctiva/sclera: Conjunctivae normal.   Neck:      Trachea: Phonation normal.   Cardiovascular:      Rate and Rhythm: Normal rate and regular rhythm. Pulmonary:      Effort: Pulmonary effort is normal. No respiratory distress. Abdominal:      General: There is no distension. Tenderness: There is no abdominal tenderness. There is no guarding. Musculoskeletal:         General: No tenderness. Normal range of motion. Cervical back: Normal range of motion. Skin:     General: Skin is warm and dry. Neurological:      General: No focal deficit present. Mental Status: She is alert. She is not disoriented. Motor: No abnormal muscle tone. Medical Decision Making  Amount and/or Complexity of Data Reviewed  Labs: ordered. Risk  Decision regarding hospitalization. Perfect Serve Consult for Admission  1:09 PM    ED Room Number: Tod Cuevass  Patient Name and age:  Sujata Ferris 80 y.o. female  Working Diagnosis:   1. Urinary tract infection without hematuria, site unspecified        COVID-19 Suspicion:  no  Sepsis present:  no  Reassessment needed: yes  Code Status:  DNR  Readmission: no  Isolation Requirements:  Other ESBL  Recommended Level of Care:  med/surg  Department: [facility] - [phone]  Admitting Provider: Tootie Spencer    Other:  Assisted living resident at [facility]. Had UA with culture last week growing Klebsiella Pneumoniae resistant to everything but Amikacin, gentamycin and meropenem. We repeated the UA today with a straight cath specimen that is c/w UTI. I have ordered meropenem.         Procedures

## 2023-03-28 NOTE — ED TRIAGE NOTES
Patient presents to the ED today with chief complaint of:    Sent by facility [facility] for UTI.         Daron De La Rosa RN

## 2023-03-29 VITALS
BODY MASS INDEX: 33.8 KG/M2 | WEIGHT: 198 LBS | SYSTOLIC BLOOD PRESSURE: 150 MMHG | OXYGEN SATURATION: 93 % | DIASTOLIC BLOOD PRESSURE: 52 MMHG | HEART RATE: 65 BPM | TEMPERATURE: 97.7 F | HEIGHT: 64 IN | RESPIRATION RATE: 16 BRPM

## 2023-03-29 LAB
ANION GAP SERPL CALC-SCNC: 3 MMOL/L (ref 5–15)
BUN SERPL-MCNC: 23 MG/DL (ref 6–20)
BUN/CREAT SERPL: 21 (ref 12–20)
CALCIUM SERPL-MCNC: 8.6 MG/DL (ref 8.5–10.1)
CHLORIDE SERPL-SCNC: 113 MMOL/L (ref 97–108)
CO2 SERPL-SCNC: 26 MMOL/L (ref 21–32)
CREAT SERPL-MCNC: 1.11 MG/DL (ref 0.55–1.02)
ERYTHROCYTE [DISTWIDTH] IN BLOOD BY AUTOMATED COUNT: 13.9 % (ref 11.5–14.5)
GLUCOSE SERPL-MCNC: 93 MG/DL (ref 65–100)
HCT VFR BLD AUTO: 42.8 % (ref 35–47)
HGB BLD-MCNC: 13.8 G/DL (ref 11.5–16)
MCH RBC QN AUTO: 31 PG (ref 26–34)
MCHC RBC AUTO-ENTMCNC: 32.2 G/DL (ref 30–36.5)
MCV RBC AUTO: 96.2 FL (ref 80–99)
NRBC # BLD: 0 K/UL (ref 0–0.01)
NRBC BLD-RTO: 0 PER 100 WBC
PLATELET # BLD AUTO: 199 K/UL (ref 150–400)
PMV BLD AUTO: 11.4 FL (ref 8.9–12.9)
POTASSIUM SERPL-SCNC: 4.7 MMOL/L (ref 3.5–5.1)
RBC # BLD AUTO: 4.45 M/UL (ref 3.8–5.2)
SODIUM SERPL-SCNC: 142 MMOL/L (ref 136–145)
WBC # BLD AUTO: 5.9 K/UL (ref 3.6–11)

## 2023-03-29 PROCEDURE — 74011000258 HC RX REV CODE- 258: Performed by: INTERNAL MEDICINE

## 2023-03-29 PROCEDURE — 74011250636 HC RX REV CODE- 250/636: Performed by: HOSPITALIST

## 2023-03-29 PROCEDURE — 80048 BASIC METABOLIC PNL TOTAL CA: CPT

## 2023-03-29 PROCEDURE — 85027 COMPLETE CBC AUTOMATED: CPT

## 2023-03-29 PROCEDURE — 74011000250 HC RX REV CODE- 250: Performed by: HOSPITALIST

## 2023-03-29 PROCEDURE — 74011250636 HC RX REV CODE- 250/636: Performed by: INTERNAL MEDICINE

## 2023-03-29 PROCEDURE — 36415 COLL VENOUS BLD VENIPUNCTURE: CPT

## 2023-03-29 RX ORDER — LIDOCAINE 40 MG/G
CREAM TOPICAL
Status: COMPLETED | OUTPATIENT
Start: 2023-03-29 | End: 2023-03-29

## 2023-03-29 RX ORDER — ATORVASTATIN CALCIUM 10 MG/1
10 TABLET, FILM COATED ORAL
Qty: 30 TABLET | Refills: 0 | Status: ON HOLD
Start: 2023-03-29 | End: 2023-04-28

## 2023-03-29 RX ORDER — KETOROLAC TROMETHAMINE 30 MG/ML
15 INJECTION, SOLUTION INTRAMUSCULAR; INTRAVENOUS
Status: COMPLETED | OUTPATIENT
Start: 2023-03-29 | End: 2023-03-29

## 2023-03-29 RX ORDER — NITROFURANTOIN 25; 75 MG/1; MG/1
100 CAPSULE ORAL 2 TIMES DAILY
Qty: 10 CAPSULE | Refills: 0 | Status: ON HOLD | OUTPATIENT
Start: 2023-03-29 | End: 2023-04-03

## 2023-03-29 RX ADMIN — LIDOCAINE 4%: 4 CREAM TOPICAL at 02:28

## 2023-03-29 RX ADMIN — ENOXAPARIN SODIUM 30 MG: 100 INJECTION SUBCUTANEOUS at 11:08

## 2023-03-29 RX ADMIN — MEROPENEM 1 G: 1 INJECTION, POWDER, FOR SOLUTION INTRAVENOUS at 01:32

## 2023-03-29 RX ADMIN — KETOROLAC TROMETHAMINE 15 MG: 30 INJECTION, SOLUTION INTRAMUSCULAR at 03:22

## 2023-03-29 NOTE — DISCHARGE INSTRUCTIONS
HOSPITALIST DISCHARGE INSTRUCTIONS  NAME: Micheal Adams   :  1924   MRN:  [id number]     Date/Time:  3/29/2023 9:44 AM    ADMIT DATE: 3/28/2023     DISCHARGE DATE: 3/29/2023     ADMITTING DIAGNOSIS:  UTI    DISCHARGE DIAGNOSIS:  You were admitted for evaluation and treatment of the above. You will need to complete several more days of an antibiotic called macrobid. We also recommend you establish care with urology to consider preventive treatment with antibiotics. MEDICATIONS:    It is important that you take the medication exactly as they are prescribed. Keep your medication in the bottles provided by the pharmacist and keep a list of the medication names, dosages, and times to be taken in your wallet. Do not take other medications without consulting your doctor. If you experience any of the following symptoms then please call your primary care physician or return to the emergency room if you cannot get hold of your doctor:  Fever, chills, nausea, vomiting, diarrhea, change in mentation, falling, bleeding, shortness of breath    Follow Up:  Please call and set up an appointment to see them in 1 week. Kristie Winston, [address] [place] [id number] [phone]              Information obtained by :  I understand that if any problems occur once I am at home I am to contact my physician. I understand and acknowledge receipt of the instructions indicated above.                                                                                                                                            Physician's or R.N.'s Signature                                                                  Date/Time                                                                                                                                              Patient or Representative Signature                                                          Date/Time

## 2023-03-29 NOTE — PROGRESS NOTES
11:59 AM     Faxed updated AVS to [address] and sent with patient  per MD request as Urology added a follow up appointment to the AVS. Romecorey Fitch    10:45 AM  Returning to [place] [address]. via [facility] Home wheelchair Joselyn santos at 11:35-11:45 AM. Nursing please call report to [phone]. Send AVS and script with . 3/29/23  10:43 AM    Care Management Initial Evaluation:    Reason for Admission:    1. Urinary tract infection without hematuria, site unspecified                         RUR Score:  11%                   Plan for utilizing home health:  therapy provided at facility        PCP: First and Last name:  Mere Henry MD     Name of Practice:    Are you a current patient: Yes/No: NO- sees MD at [facility]   Approximate date of last visit:    Can you participate in a virtual visit with your PCP:                     Current Advanced Directive/Advance Care Plan: Full Code      Healthcare Decision Maker:   Click here to complete [address] including selection of the Healthcare Decision Maker Relationship (ie \"Primary\")             Primary Decision MakeDaniella Mendoza - Child - [phone]                  Transition of Care Plan:                    -Patient resides in [address] [place] [address]  -Has a wheelchair and RW  -debilitated from UTI- will likely need PT and OT at return to [facility]    CM spoke to patients grandson, Marj Valladares and he is agreeable to patient to return to [place] [address].- preferred stretcher transport but asked CM to speak to patient- feels she used stretcher in the past, CM confirmed at last dc in January, patient used [facility] for transport. CM spoke to patient and she thought wheelchair but wasn't sure- is ok  with a wheelchair [place]- CM received permission for [facility] to pay for hospital to home.     CM spoke to the nurse at [address] and they are able to accept patient back- need a script for patients new medication- sent message to MD requesting scripts- transport set for 11:45AM.    Zelda Schumacher [address] Management Interventions  PCP Verified by CM:  Yes (sees NP at facility)  Mode of Transport at Discharge: BLS  Transition of Care Consult (CM Consult): Discharge Planning  Discharge Durable Medical Equipment: No  Physical Therapy Consult: No  Occupational Therapy Consult: No  Speech Therapy Consult: No  Support Systems: Other Family Member(s), Assisted Living  Confirm Follow Up Transport: Other (see comment)  Discharge Location  Patient Expects to be Discharged to[de-identified] Assisted Living

## 2023-03-29 NOTE — DISCHARGE SUMMARY
Hospitalist Discharge Summary     Patient ID:  Sandeep Rodriguez  [id number]  72 y.o.  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 female who med hx of HTN, CAD, CKD 3, bradycardia, 2nd AV-block, recent admission for KLEBSIELLA PNEUMONIAE ** (EXTENDED SPECTRUM BETA LACTAMASE  UTI, currently resident of assisted living facility, started having dysuria and increased urinary frequency so was brought in. She was admitted for further evaluation and treatment of the following:      #UTI: Hx recurrent UTI, ESBL K pneumonia. She is not toxic and has no sepsis criteria so I have exceedingly low concern for bacteremia for which IV abx would be indicated. Upon review of culture data, this ESBL species is susceptible to macrobid so will discharge on this. Additionally, I discussed with Son as well as the Urology team that she very well may benefit from long term prophylactic therapy.      PCP: Wendy Parmar MD     Consults: ID    Condition of patient at discharge: good and improved    Discharge Exam:    Physical Exam:    Gen: Well-developed, well-nourished, in no acute distress  HEENT:  Pink conjunctivae, PERRL, hearing intact to voice, moist mucous membranes  Neck: Supple, without masses, thyroid non-tender  Resp: No accessory muscle use, clear breath sounds without wheezes rales or rhonchi  Card: No murmurs, normal S1, S2 without thrills, bruits or peripheral edema  Abd:  Soft, non-tender, non-distended, normoactive bowel sounds are present, no palpable organomegaly and no detectable hernias  Lymph:  No cervical or inguinal adenopathy  Musc: No cyanosis or clubbing  Skin: No rashes or ulcers, skin turgor is good  Neuro:  Cranial nerves are grossly intact, no focal motor weakness, follows commands appropriately  Psych:  Good insight, oriented to person, place and time, alert          Disposition: home    Patient Instructions:   Current Discharge Medication List        START taking these medications    Details   nitrofurantoin, macrocrystal-monohydrate, (Macrobid) 100 mg capsule Take 1 Capsule by mouth two (2) times a day for 5 days. Qty: 10 Capsule, Refills: 0           CONTINUE these medications which have CHANGED    Details   atorvastatin (Lipitor) 10 mg tablet Take 1 Tablet by mouth nightly for 30 days. Qty: 30 Tablet, Refills: 0           CONTINUE these medications which have NOT CHANGED    Details   amLODIPine (Norvasc) 5 mg tablet Take 1 Tablet by mouth daily. Indications: high blood pressure  Qty: 30 Tablet, Refills: 0      Ferrous Fumarate 325 mg (106 mg iron) tab Take  by mouth.      pantoprazole (PROTONIX) 40 mg tablet Take 40 mg by mouth daily. Polyethylene Glycol 3350 powd by Does Not Apply route. loperamide (IMMODIUM) 2 mg tablet Take 2 mg by mouth four (4) times daily as needed for Diarrhea.      guaiFENesin (ROBITUSSIN) 100 mg/5 mL liquid Take 200 mg by mouth three (3) times daily as needed for Cough. acetaminophen (TYLENOL) 325 mg tablet Take 2 Tabs by mouth every four (4) hours as needed for Pain. Qty: 20 Tab, Refills: 0      ondansetron (Zofran ODT) 4 mg disintegrating tablet Take 1 Tab by mouth every eight (8) hours as needed for Nausea. Qty: 15 Tab, Refills: 0      omega-3 fatty acids-fish oil 360-1,200 mg cap Take 2 Tabs by mouth daily.       pregabalin (LYRICA) 50 mg capsule Take 50 mg by mouth nightly. Indications: neuropathy    Associated Diagnoses: HTN (hypertension), benign; Mixed hyperlipidemia      aspirin delayed-release 81 mg tablet Take 81 mg by mouth nightly. Activity: Activity as tolerated  Diet: Regular Diet  Wound Care: None needed    Follow-up with Agata Guillermo MD in 1 week. Follow-up tests/labs    - Final Cx results    Approximate time spent in patient care on day of discharge: 40 min    Signed:   Maciej Galvan MD  3/29/2023  9:44 AM

## 2023-03-29 NOTE — ED NOTES
Bedside and Verbal shift change report given to Daniel Salvador RN (oncoming nurse) by Slime Bond RN (offgoing nurse). Report included the following information SBAR, ED Summary, and MAR.

## 2023-03-30 ENCOUNTER — HOSPITAL ENCOUNTER (OUTPATIENT)
Age: 88
Setting detail: OBSERVATION
End: 2023-03-30
Attending: EMERGENCY MEDICINE | Admitting: INTERNAL MEDICINE
Payer: MEDICARE

## 2023-03-30 DIAGNOSIS — N30.00 ACUTE CYSTITIS WITHOUT HEMATURIA: Primary | ICD-10-CM

## 2023-03-30 DIAGNOSIS — R53.1 WEAKNESS: ICD-10-CM

## 2023-03-30 PROBLEM — R62.7 FTT (FAILURE TO THRIVE) IN ADULT: Status: ACTIVE | Noted: 2023-03-30

## 2023-03-30 LAB
ALBUMIN SERPL-MCNC: 3.4 G/DL (ref 3.5–5)
ALBUMIN/GLOB SERPL: 0.8 (ref 1.1–2.2)
ALP SERPL-CCNC: 123 U/L (ref 45–117)
ALT SERPL-CCNC: 16 U/L (ref 12–78)
ANION GAP SERPL CALC-SCNC: 5 MMOL/L (ref 5–15)
APPEARANCE UR: CLEAR
AST SERPL-CCNC: 18 U/L (ref 15–37)
BACTERIA URNS QL MICRO: NEGATIVE /HPF
BASOPHILS # BLD: 0 K/UL (ref 0–0.1)
BASOPHILS NFR BLD: 0 % (ref 0–1)
BILIRUB SERPL-MCNC: 0.7 MG/DL (ref 0.2–1)
BILIRUB UR QL: NEGATIVE
BUN SERPL-MCNC: 25 MG/DL (ref 6–20)
BUN/CREAT SERPL: 24 (ref 12–20)
CALCIUM SERPL-MCNC: 9.1 MG/DL (ref 8.5–10.1)
CHLORIDE SERPL-SCNC: 113 MMOL/L (ref 97–108)
CO2 SERPL-SCNC: 23 MMOL/L (ref 21–32)
COLOR UR: ABNORMAL
CREAT SERPL-MCNC: 1.03 MG/DL (ref 0.55–1.02)
DIFFERENTIAL METHOD BLD: ABNORMAL
EOSINOPHIL # BLD: 0.1 K/UL (ref 0–0.4)
EOSINOPHIL NFR BLD: 1 % (ref 0–7)
EPITH CASTS URNS QL MICRO: ABNORMAL /LPF
ERYTHROCYTE [DISTWIDTH] IN BLOOD BY AUTOMATED COUNT: 14.4 % (ref 11.5–14.5)
GLOBULIN SER CALC-MCNC: 4.2 G/DL (ref 2–4)
GLUCOSE SERPL-MCNC: 145 MG/DL (ref 65–100)
GLUCOSE UR STRIP.AUTO-MCNC: NEGATIVE MG/DL
HCT VFR BLD AUTO: 45.3 % (ref 35–47)
HGB BLD-MCNC: 14.6 G/DL (ref 11.5–16)
HGB UR QL STRIP: NEGATIVE
HYALINE CASTS URNS QL MICRO: ABNORMAL /LPF (ref 0–2)
IMM GRANULOCYTES # BLD AUTO: 0 K/UL (ref 0–0.04)
IMM GRANULOCYTES NFR BLD AUTO: 0 % (ref 0–0.5)
KETONES UR QL STRIP.AUTO: NEGATIVE MG/DL
LEUKOCYTE ESTERASE UR QL STRIP.AUTO: ABNORMAL
LYMPHOCYTES # BLD: 0.4 K/UL (ref 0.8–3.5)
LYMPHOCYTES NFR BLD: 4 % (ref 12–49)
MCH RBC QN AUTO: 31.3 PG (ref 26–34)
MCHC RBC AUTO-ENTMCNC: 32.2 G/DL (ref 30–36.5)
MCV RBC AUTO: 97 FL (ref 80–99)
MONOCYTES # BLD: 0.6 K/UL (ref 0–1)
MONOCYTES NFR BLD: 6 % (ref 5–13)
NEUTS SEG # BLD: 8.8 K/UL (ref 1.8–8)
NEUTS SEG NFR BLD: 89 % (ref 32–75)
NITRITE UR QL STRIP.AUTO: NEGATIVE
NRBC # BLD: 0 K/UL (ref 0–0.01)
NRBC BLD-RTO: 0 PER 100 WBC
PH UR STRIP: 7 (ref 5–8)
PLATELET # BLD AUTO: 215 K/UL (ref 150–400)
PMV BLD AUTO: 11.2 FL (ref 8.9–12.9)
POTASSIUM SERPL-SCNC: 4.7 MMOL/L (ref 3.5–5.1)
PROT SERPL-MCNC: 7.6 G/DL (ref 6.4–8.2)
PROT UR STRIP-MCNC: ABNORMAL MG/DL
RBC # BLD AUTO: 4.67 M/UL (ref 3.8–5.2)
RBC #/AREA URNS HPF: ABNORMAL /HPF (ref 0–5)
RBC MORPH BLD: ABNORMAL
SODIUM SERPL-SCNC: 141 MMOL/L (ref 136–145)
SP GR UR REFRACTOMETRY: 1.01 (ref 1–1.03)
UR CULT HOLD, URHOLD: NORMAL
UROBILINOGEN UR QL STRIP.AUTO: 1 EU/DL (ref 0.2–1)
WBC # BLD AUTO: 9.9 K/UL (ref 3.6–11)
WBC URNS QL MICRO: ABNORMAL /HPF (ref 0–4)

## 2023-03-30 PROCEDURE — 36415 COLL VENOUS BLD VENIPUNCTURE: CPT

## 2023-03-30 PROCEDURE — 74011250636 HC RX REV CODE- 250/636: Performed by: EMERGENCY MEDICINE

## 2023-03-30 PROCEDURE — 99285 EMERGENCY DEPT VISIT HI MDM: CPT

## 2023-03-30 PROCEDURE — 80053 COMPREHEN METABOLIC PANEL: CPT

## 2023-03-30 PROCEDURE — G0378 HOSPITAL OBSERVATION PER HR: HCPCS

## 2023-03-30 PROCEDURE — 96375 TX/PRO/DX INJ NEW DRUG ADDON: CPT

## 2023-03-30 PROCEDURE — 85025 COMPLETE CBC W/AUTO DIFF WBC: CPT

## 2023-03-30 PROCEDURE — 74011000250 HC RX REV CODE- 250: Performed by: INTERNAL MEDICINE

## 2023-03-30 PROCEDURE — 96374 THER/PROPH/DIAG INJ IV PUSH: CPT

## 2023-03-30 PROCEDURE — 81001 URINALYSIS AUTO W/SCOPE: CPT

## 2023-03-30 PROCEDURE — 97530 THERAPEUTIC ACTIVITIES: CPT

## 2023-03-30 PROCEDURE — 74011250637 HC RX REV CODE- 250/637: Performed by: EMERGENCY MEDICINE

## 2023-03-30 PROCEDURE — 87086 URINE CULTURE/COLONY COUNT: CPT

## 2023-03-30 PROCEDURE — 97163 PT EVAL HIGH COMPLEX 45 MIN: CPT

## 2023-03-30 PROCEDURE — 74011250636 HC RX REV CODE- 250/636: Performed by: INTERNAL MEDICINE

## 2023-03-30 RX ORDER — NITROFURANTOIN 25; 75 MG/1; MG/1
100 CAPSULE ORAL
Status: COMPLETED | OUTPATIENT
Start: 2023-03-30 | End: 2023-03-30

## 2023-03-30 RX ORDER — ENOXAPARIN SODIUM 100 MG/ML
40 INJECTION SUBCUTANEOUS DAILY
Status: DISCONTINUED
Start: 2023-03-31 | End: 2023-04-04 | Stop reason: HOSPADM

## 2023-03-30 RX ORDER — SODIUM CHLORIDE 0.9 % (FLUSH) 0.9 %
5-40 SYRINGE (ML) INJECTION AS NEEDED
Status: DISCONTINUED
Start: 2023-03-30 | End: 2023-04-04 | Stop reason: HOSPADM

## 2023-03-30 RX ORDER — ACETAMINOPHEN 325 MG/1
650 TABLET ORAL
Status: DISCONTINUED
Start: 2023-03-30 | End: 2023-04-04 | Stop reason: HOSPADM

## 2023-03-30 RX ORDER — SODIUM CHLORIDE, SODIUM LACTATE, POTASSIUM CHLORIDE, CALCIUM CHLORIDE 600; 310; 30; 20 MG/100ML; MG/100ML; MG/100ML; MG/100ML
50 INJECTION, SOLUTION INTRAVENOUS CONTINUOUS
Status: DISCONTINUED
Start: 2023-03-30 | End: 2023-04-04 | Stop reason: HOSPADM

## 2023-03-30 RX ORDER — ONDANSETRON 2 MG/ML
4 INJECTION INTRAMUSCULAR; INTRAVENOUS
Status: DISCONTINUED
Start: 2023-03-30 | End: 2023-04-04 | Stop reason: HOSPADM

## 2023-03-30 RX ORDER — SODIUM CHLORIDE 0.9 % (FLUSH) 0.9 %
5-40 SYRINGE (ML) INJECTION EVERY 8 HOURS
Status: DISCONTINUED
Start: 2023-03-30 | End: 2023-04-04 | Stop reason: HOSPADM

## 2023-03-30 RX ORDER — ONDANSETRON 4 MG/1
4 TABLET, ORALLY DISINTEGRATING ORAL
Status: DISCONTINUED
Start: 2023-03-30 | End: 2023-04-04 | Stop reason: HOSPADM

## 2023-03-30 RX ORDER — ONDANSETRON 4 MG/1
4 TABLET, ORALLY DISINTEGRATING ORAL
Status: COMPLETED | OUTPATIENT
Start: 2023-03-30 | End: 2023-03-30

## 2023-03-30 RX ORDER — POLYETHYLENE GLYCOL 3350 17 G/17G
17 POWDER, FOR SOLUTION ORAL DAILY PRN
Status: DISCONTINUED
Start: 2023-03-30 | End: 2023-04-04 | Stop reason: HOSPADM

## 2023-03-30 RX ORDER — ACETAMINOPHEN 650 MG/1
650 SUPPOSITORY RECTAL
Status: DISCONTINUED
Start: 2023-03-30 | End: 2023-04-04 | Stop reason: HOSPADM

## 2023-03-30 RX ADMIN — SODIUM CHLORIDE, PRESERVATIVE FREE 10 ML: 5 INJECTION INTRAVENOUS at 21:44

## 2023-03-30 RX ADMIN — ONDANSETRON 4 MG: 2 INJECTION INTRAMUSCULAR; INTRAVENOUS at 21:43

## 2023-03-30 RX ADMIN — SODIUM CHLORIDE, POTASSIUM CHLORIDE, SODIUM LACTATE AND CALCIUM CHLORIDE 100 ML/HR: 600; 310; 30; 20 INJECTION, SOLUTION INTRAVENOUS at 17:09

## 2023-03-30 RX ADMIN — SODIUM CHLORIDE 1 G: 9 INJECTION INTRAMUSCULAR; INTRAVENOUS; SUBCUTANEOUS at 17:20

## 2023-03-30 RX ADMIN — NITROFURANTOIN MONOHYDRATE/MACROCRYSTALLINE 100 MG: 25; 75 CAPSULE ORAL at 12:46

## 2023-03-30 RX ADMIN — ONDANSETRON 4 MG: 4 TABLET, ORALLY DISINTEGRATING ORAL at 12:09

## 2023-03-30 NOTE — ED PROVIDER NOTES
Patient is a 51-year-old who was recently diagnosed with an ESBL Klebsiella urinary tract infection; she received 2 days of meropenem and was discharged on Macrobid yesterday. Facility returns her to the emergency department today reporting that she is not taking her antibiotics and that they are concerned for sepsis. Patient arrives to the emergency department with normal vital signs complaining only of increased thirst and \"not feeling well\".          Past Medical History:   Diagnosis Date    CAD (coronary artery disease)     Femur fracture (HCC)     right    GERD (gastroesophageal reflux disease)     HTN (hypertension), benign 2011    Mixed hyperlipidemia 2011       Past Surgical History:   Procedure Laterality Date    HX FEMUR FRACTURE TX      HX KNEE REPLACEMENT      bilateral    HX ORTHOPAEDIC      bilat knee replacements    HX VEIN STRIPPING           Family History:   Problem Relation Age of Onset    Other Mother          of renal failure, not sure what caused id    Other Father         something with throat, not sure if it was cancer    Heart Disease Sister        Social History     Socioeconomic History    Marital status:      Spouse name: Not on file    Number of children: Not on file    Years of education: Not on file    Highest education level: Not on file   Occupational History    Not on file   Tobacco Use    Smoking status: Never    Smokeless tobacco: Never   Substance and Sexual Activity    Alcohol use: No    Drug use: No    Sexual activity: Not on file   Other Topics Concern    Not on file   Social History Narrative    Not on file     Social Determinants of Health     Financial Resource Strain: Not on file   Food Insecurity: Not on file   Transportation Needs: Not on file   Physical Activity: Not on file   Stress: Not on file   Social Connections: Not on file   Intimate Partner Violence: Not on file   Housing Stability: Not on file         ALLERGIES: Sulfa (sulfonamide Urology antibiotics), Ampicillin, and Lescol [fluvastatin]    Review of Systems   Constitutional:  Negative for chills and fever. All other systems reviewed and are negative. Vitals:    03/30/23 1151 03/30/23 1157   BP: 138/70    Resp: 16    Temp: 98.1 °F (36.7 °C)    SpO2: (!) 89% 92%   Weight: 89.8 kg (198 lb)    Height: 5' 4\" (1.626 m)             Physical Exam  Vitals and nursing note reviewed. Constitutional:       Appearance: She is well-developed. She is not ill-appearing. HENT:      Head: Normocephalic and atraumatic. Eyes:      Pupils: Pupils are equal, round, and reactive to light. Cardiovascular:      Rate and Rhythm: Normal rate and regular rhythm. Pulmonary:      Effort: Pulmonary effort is normal.      Breath sounds: Normal breath sounds. Abdominal:      General: There is no distension. Palpations: Abdomen is soft. Tenderness: There is no abdominal tenderness. Musculoskeletal:      Cervical back: Normal range of motion and neck supple. Skin:     General: Skin is warm and dry. Capillary Refill: Capillary refill takes less than 2 seconds. Neurological:      General: No focal deficit present. Mental Status: She is alert. Mental status is at baseline. Psychiatric:         Mood and Affect: Mood normal.         Behavior: Behavior normal.        Medical Decision Making  Amount and/or Complexity of Data Reviewed  Labs: ordered. Risk  Prescription drug management. Decision regarding hospitalization. Procedures    4:03 PM   Patient's presentation was discussed with Dr. Stephanie Ellis, the physician at her facility, who reports that the patient is unable to manage her ADLs and the facility does not have the resources to give her the care and assistance that she needs. Dr. Iban Hansen was also unsure whether she had gotten IV antibiotics while she was in the hospital prior to being discharged on Macrobid.   The patient was evaluated by PT and OT who had similar experience, the patient was unable to stand with assistance. Patient is being admitted to the hospital.  The results of their tests and reasons for their admission have been discussed with them and/or available family. They convey agreement and understanding for the need to be admitted and for their admission diagnosis. Consultation will be made now with the inpatient physician for hospitalization. Perfect Serve Consult for Admission  4:04 PM    ED Room Number: J06/V95  Patient Name and age:  Wilber Simeon 80 y.o.  female  Working Diagnosis:   1. Acute cystitis without hematuria    2.  Weakness      COVID-19 Suspicion:  no  Sepsis present:  no  Reassessment needed: no  Code Status:  Do Not Resuscitate  Readmission: yes  Isolation Requirements:  yes  Recommended Level of Care:  med/surg  Department: [facility] ED - [phone]  Admitting Provider: Dr. Murtaza Ng  Other:  unable to manage ADLs

## 2023-03-30 NOTE — H&P
Hospitalist Admission Note    NAME: Walter Ga   :  1924   MRN:  [id number]     Date/Time:  3/30/2023 4:56 PM    Patient PCP: Gabrielle Hines MD  ________________________________________________________________________    Given the patient's current clinical presentation, I have a high level of concern for decompensation if discharged from the emergency department. Complex decision making was performed, which includes reviewing the patient's available past medical records, laboratory results, and x-ray films. My assessment of this patient's clinical condition and my plan of care is as follows. Assessment / Plan:      89F hx HTN, CAD, recurrent Uti recently discharged for UTI presents with weakness. #Weakness: Certainly would consider infection driving, but urinalysis here today is not suggestive (cleared from 2 days ago). She does appear slightly volume down but does not have NEHA per se. Will plan to complete 1 more day of IV meropenem while awaiting final cultures, and to fluid resuscitate. Will have PT continue to work with her and have CM assist with placement. #Recurrent UTI: ESBL in the past. UA cleared today, but macrobid is concentrated in the urine so would not treat bacteremia. However, she does not exhibit signs or sx to suggest that. For now, will complete 3rd day of meropenem tmr. Prior to last admission I did coordinate follow up with urology to consider prophylactic abx    #HTN: Hold amlodipine for now    #CAD: Stable      Discussed with son, Amy Hassan. All questions answered    I have personally reviewed the radiographs, laboratory data in Epic and decisions and statements above are based partially on this personal interpretation.     Code Status: DNR/DNI  DVT Prophylaxis: Lovenox  GI Prophylaxis: not indicated       Subjective:   CHIEF COMPLAINT: \"weakness\"    HISTORY OF PRESENT ILLNESS:     Jose Raul Greenfield is a 80 y.o.  F hx HTN, CAD, recurrent UTI who presents from her assisted due to weakness. She was admitted here 3/28- for UTI having been sent by her [facility] due to urine culture showing ESBL, and that facility's provider requesting IV antibiotics. She received IV meropenem and then discharged on nitrofurantoin. She was non-toxic during that admission and now presents without fever, tachycardia, leukocytosis. UA shows resolution of nitrites and bacteria. PT evaluated patient in ER and rec is for SNF prior to returning to [facility]. On my exam she reports feeling tired, but fine, and wants to return to her room. Past Medical History:   Diagnosis Date    CAD (coronary artery disease)     Femur fracture (HCC)     right    GERD (gastroesophageal reflux disease)     HTN (hypertension), benign 2011    Mixed hyperlipidemia 2011      Past Surgical History:   Procedure Laterality Date    HX FEMUR FRACTURE TX      HX KNEE REPLACEMENT      bilateral    HX ORTHOPAEDIC      bilat knee replacements    HX VEIN STRIPPING       Social History     Tobacco Use    Smoking status: Never    Smokeless tobacco: Never   Substance Use Topics    Alcohol use: No      Family History   Problem Relation Age of Onset    Other Mother          of renal failure, not sure what caused id    Other Father         something with throat, not sure if it was cancer    Heart Disease Sister         Allergies   Allergen Reactions    Sulfa (Sulfonamide Antibiotics) Rash    Ampicillin Rash     Tolerates cefazolin    Lescol [Fluvastatin] Unknown (comments)        Prior to Admission medications    Medication Sig Start Date End Date Taking? Authorizing Provider   atorvastatin (Lipitor) 10 mg tablet Take 1 Tablet by mouth nightly for 30 days. 3/29/23 4/28/23  Tina Cannon MD   nitrofurantoin, macrocrystal-monohydrate, (Macrobid) 100 mg capsule Take 1 Capsule by mouth two (2) times a day for 5 days. 3/29/23 4/3/23  Tina Cannon MD   amLODIPine (Norvasc) 5 mg tablet Take 1 Tablet by mouth daily.  Indications: high blood pressure 1/13/23   Joel Basurto MD   Ferrous Fumarate 325 mg (106 mg iron) tab Take  by mouth. Provider, Historical   pantoprazole (PROTONIX) 40 mg tablet Take 40 mg by mouth daily. Provider, Historical   Polyethylene Glycol 3350 powd by Does Not Apply route. Provider, Historical   loperamide (IMMODIUM) 2 mg tablet Take 2 mg by mouth four (4) times daily as needed for Diarrhea. Provider, Historical   guaiFENesin (ROBITUSSIN) 100 mg/5 mL liquid Take 200 mg by mouth three (3) times daily as needed for Cough. Provider, Historical   acetaminophen (TYLENOL) 325 mg tablet Take 2 Tabs by mouth every four (4) hours as needed for Pain. 3/2/21   Jacqueline Fontenot MD   ondansetron (Zofran ODT) 4 mg disintegrating tablet Take 1 Tab by mouth every eight (8) hours as needed for Nausea. 3/2/21   Jacqueline Fontenot MD   omega-3 fatty acids-fish oil 360-1,200 mg cap Take 2 Tabs by mouth daily. Antonella Velasquez MD   pregabalin (LYRICA) 50 mg capsule Take 50 mg by mouth nightly. Indications: neuropathy    Provider, Historical   aspirin delayed-release 81 mg tablet Take 81 mg by mouth nightly.     Provider, Historical     REVIEW OF SYSTEMS:  See HPI for details  General: negative for fever, chills, sweats, ++weakness, weight loss  Eyes: negative for blurred vision, eye pain, loss of vision, diplopia  Ear Nose and Throat: negative for rhinorrhea, pharyngitis, otalgia, tinnitus, speech or swallowing difficulties  Respiratory:  negative for pleuritic pain, cough, sputum production, wheezing, SOB, SHAH  Cardiology:  negative for chest pain, palpitations, orthopnea, PND, edema, syncope   Gastrointestinal: negative for abdominal pain, N/V, dysphagia, change in bowel habits, bleeding  Genitourinary: negative for frequency, urgency, dysuria, hematuria, incontinence  Muskuloskeletal : negative for arthralgia, myalgia  Hematology: negative for easy bruising, bleeding, lymphadenopathy  Dermatological: negative for rash, ulceration, mole change, new lesion  Endocrine: negative for hot flashes or polydipsia  Neurological: negative for headache, dizziness, confusion, focal weakness, paresthesia, memory loss, gait disturbance  Psychological: negative for anxiety, depression, agitation    Objective:   VITALS:    Visit Vitals  /70 (BP 1 Location: Right arm, BP Patient Position: At rest)   Temp 98.1 °F (36.7 °C)   Resp 16   Ht 5' 4\" (1.626 m)   Wt 89.8 kg (198 lb)   SpO2 92%   BMI 33.99 kg/m²     PHYSICAL EXAM:    Physical Exam:    Gen: Well-developed, well-nourished, in no acute distress  HEENT:  Pink conjunctivae, PERRL, hearing intact to voice, dry mucous membranes  Neck: Supple, without masses, thyroid non-tender  Resp: No accessory muscle use, clear breath sounds without wheezes rales or rhonchi  Card: No murmurs, normal S1, S2 without thrills, bruits or peripheral edema  Abd:  Soft, non-tender, non-distended, normoactive bowel sounds are present, no palpable organomegaly and no detectable hernias  Lymph:  No cervical or inguinal adenopathy  Musc: No cyanosis or clubbing  Skin: No rashes or ulcers, skin turgor is good  Neuro:  Cranial nerves are grossly intact, no focal motor weakness, follows commands appropriately  Psych:  Good insight, oriented to person, place and time, alert          _______________________________________________________________________  Care Plan discussed with:    Comments   Patient x Discussed with patient in room.  POC outlined and Questions answered    Family  x    RN x    Care Manager                    Consultant:  jerry LEMUS MD   _______________________________________________________________________  Recommended Disposition:   Home with Family x   HH/PT/OT/RN    SNF/LTC    CASSY    ________________________________________________________________________  TOTAL TIME:  60 Minutes        Comments   >50% of visit spent in counseling and coordination of care  Chart reviewed  Discussion with patient and/or family and questions answered     ________________________________________________________________________  Signed: Navin Norman MD        Procedures: see electronic medical records for all procedures/Xrays and details which were not copied into this note but were reviewed prior to creation of Plan. LAB DATA REVIEWED:    Recent Results (from the past 24 hour(s))   CBC WITH AUTOMATED DIFF    Collection Time: 03/30/23 12:30 PM   Result Value Ref Range    WBC 9.9 3.6 - 11.0 K/uL    RBC 4.67 3.80 - 5.20 M/uL    HGB 14.6 11.5 - 16.0 g/dL    HCT 45.3 35.0 - 47.0 %    MCV 97.0 80.0 - 99.0 FL    MCH 31.3 26.0 - 34.0 PG    MCHC 32.2 30.0 - 36.5 g/dL    RDW 14.4 11.5 - 14.5 %    PLATELET 442 646 - 617 K/uL    MPV 11.2 8.9 - 12.9 FL    NRBC 0.0 0  WBC    ABSOLUTE NRBC 0.00 0.00 - 0.01 K/uL    NEUTROPHILS 89 (H) 32 - 75 %    LYMPHOCYTES 4 (L) 12 - 49 %    MONOCYTES 6 5 - 13 %    EOSINOPHILS 1 0 - 7 %    BASOPHILS 0 0 - 1 %    IMMATURE GRANULOCYTES 0 0.0 - 0.5 %    ABS. NEUTROPHILS 8.8 (H) 1.8 - 8.0 K/UL    ABS. LYMPHOCYTES 0.4 (L) 0.8 - 3.5 K/UL    ABS. MONOCYTES 0.6 0.0 - 1.0 K/UL    ABS. EOSINOPHILS 0.1 0.0 - 0.4 K/UL    ABS. BASOPHILS 0.0 0.0 - 0.1 K/UL    ABS. IMM. GRANS. 0.0 0.00 - 0.04 K/UL    DF SMEAR SCANNED      RBC COMMENTS NORMOCYTIC, NORMOCHROMIC     METABOLIC PANEL, COMPREHENSIVE    Collection Time: 03/30/23 12:30 PM   Result Value Ref Range    Sodium 141 136 - 145 mmol/L    Potassium 4.7 3.5 - 5.1 mmol/L    Chloride 113 (H) 97 - 108 mmol/L    CO2 23 21 - 32 mmol/L    Anion gap 5 5 - 15 mmol/L    Glucose 145 (H) 65 - 100 mg/dL    BUN 25 (H) 6 - 20 MG/DL    Creatinine 1.03 (H) 0.55 - 1.02 MG/DL    BUN/Creatinine ratio 24 (H) 12 - 20      eGFR 49 (L) >60 ml/min/1.73m2    Calcium 9.1 8.5 - 10.1 MG/DL    Bilirubin, total 0.7 0.2 - 1.0 MG/DL    ALT (SGPT) 16 12 - 78 U/L    AST (SGOT) 18 15 - 37 U/L    Alk.  phosphatase 123 (H) 45 - 117 U/L    Protein, total 7.6 6.4 - 8.2 g/dL    Albumin 3.4 (L) 3.5 - 5.0 g/dL Globulin 4.2 (H) 2.0 - 4.0 g/dL    A-G Ratio 0.8 (L) 1.1 - 2.2     URINALYSIS W/MICROSCOPIC    Collection Time: 03/30/23  1:05 PM   Result Value Ref Range    Color YELLOW/STRAW      Appearance CLEAR CLEAR      Specific gravity 1.015 1.003 - 1.030      pH (UA) 7.0 5.0 - 8.0      Protein TRACE (A) NEG mg/dL    Glucose Negative NEG mg/dL    Ketone Negative NEG mg/dL    Bilirubin Negative NEG      Blood Negative NEG      Urobilinogen 1.0 0.2 - 1.0 EU/dL    Nitrites Negative NEG      Leukocyte Esterase MODERATE (A) NEG      WBC 20-50 0 - 4 /hpf    RBC 0-5 0 - 5 /hpf    Epithelial cells MODERATE (A) FEW /lpf    Bacteria Negative NEG /hpf    Hyaline cast 0-2 0 - 2 /lpf   URINE CULTURE HOLD SAMPLE    Collection Time: 03/30/23  1:05 PM    Specimen: Urine   Result Value Ref Range    Urine culture hold        Urine on hold in Microbiology dept for 2 days. If unpreserved urine is submitted, it cannot be used for addtional testing after 24 hours, recollection will be required.

## 2023-03-30 NOTE — PROGRESS NOTES
Problem: Mobility Impaired (Adult and Pediatric)  Goal: *Acute Goals and Plan of Care (Insert Text)  Description: FUNCTIONAL STATUS PRIOR TO ADMISSION: Pt reports receiving assistance for all bed mobility and transfers, utilizes wheelchair for all locomotion. She reports self-propelling wheelchair to dining villalta. Care manager reports pt transfers with SBA/supervision at baseline. HOME SUPPORT PRIOR TO ADMISSION: The patient lived at [facility]. Physical Therapy Goals  Initiated 3/30/2023  1. Patient will move from supine to sit and sit to supine  in bed with moderate assistance  within 7 day(s). 2.  Patient will transfer from bed to chair and chair to bed with moderate assistance  using the least restrictive device within 7 day(s). 3.  Patient will perform sit to stand with moderate assistance  within 7 day(s). 4.  Patient will self propel wheelchair 48' with supervision within 7 days. Outcome: Not Met   PHYSICAL THERAPY EVALUATION  Patient: Rosalva Davis (84 y.o. female)  Date: 3/30/2023  Primary Diagnosis: FTT (failure to thrive) in adult [R62.7]       Precautions:   Fall    ASSESSMENT  Based on the objective data described below, the patient presents with high fear of falling, fear avoidance behaviors, impaired balance, generally decreased strength, and very limited functional mobility on day of presentation to ED with substantial decline in function. Per reports of ED staff and chart, pt presented to ER yesterday, was discharged at baseline, and awoke today with inability to ambulate. Pt undergoing treatment for UTI per chart. Pt presents today with disorientation to situation and place as well as memory impairment. She mobilizes to edge of bed, dangles x 5 mins, and initiates transfer training. With attempted sit to stand transfers pt lifting feet from floor, resisting efforts, and voicing inability stand. Offered rest breaks and 3 separate attempts with consistent pt performance.  She denies LE pain or complaints; appears very fearful of falling with all attempts. She denies recent falls though does report R shoulder discomfort that she has experienced (for awhile). Case discussed with care manager. Current Level of Function Impacting Discharge (mobility/balance): total A bed to chair    Functional Outcome Measure: The patient scored 6 on the [facility]. Other factors to consider for discharge: none additional     Patient will benefit from skilled therapy intervention to address the above noted impairments. PLAN :  Recommendations and Planned Interventions: bed mobility training, transfer training, therapeutic exercises, neuromuscular re-education, edema management/control, patient and family training/education, and therapeutic activities      Frequency/Duration: Patient will be followed by physical therapy:  5 times a week to address goals. Recommendation for discharge: (in order for the patient to meet his/her long term goals)  Therapy up to 5 days/week in SNF setting    This discharge recommendation:  Has been made in collaboration with the attending provider and/or case management    IF patient discharges home will need the following DME: stretcher transport, 24-hour assist, HHPT; (total care level)         SUBJECTIVE:   Patient stated I can't do it! \" Re: standing for transfer. Pt received supine, agreeable to PT and cleared by RN.     OBJECTIVE DATA SUMMARY:   HISTORY:    Past Medical History:   Diagnosis Date    CAD (coronary artery disease)     Femur fracture ([address]     right    GERD (gastroesophageal reflux disease)     HTN (hypertension), benign 1/11/2011    Mixed hyperlipidemia 1/11/2011     Past Surgical History:   Procedure Laterality Date    HX FEMUR FRACTURE TX      HX KNEE REPLACEMENT      bilateral    HX ORTHOPAEDIC      bilat knee replacements    HX VEIN STRIPPING         Personal factors and/or comorbidities impacting plan of care: as above    Home Situation  Home Environment: Assisted living  One/Two Story Residence: One story  Support Systems: Assisted Living  Current DME Used/Available at Home: Wheelchair    EXAMINATION/PRESENTATION/DECISION MAKING:   Critical Behavior:  Neurologic State: Alert  Orientation Level: Disoriented to place, Disoriented to situation (oriented to self, month, year)  Cognition: Follows commands  Safety/Judgement: Decreased insight into deficits (very high fear of falling)       Skin:  LE exposed skin intact  Edema: nonpitting LEs  Range Of Motion:      AROM generally decreased, functional LEs                    Strength:     Grossly decreased BLEs                    Tone & Sensation:          Normal LE tone                        Coordination:   Generally decreased; additional time       Functional Mobility:  Bed Mobility:  Rolling: Maximum assistance  Supine to Sit: Additional time;Minimum assistance; Adaptive equipment  Sit to Supine: Additional time;Assist x2;Maximum assistance  Scooting:  (total A up in bed)  Transfers:  Sit to Stand: Total assistance (unable to clear buttocks; x 3 trials; pt resisting, lifting feet from floor; appears very fearful)                       Other: total A bed to chair; recommend waleska lift/maxi-move at this time. Balance:    Sitting:  Good static  Good dynamic  Standing: unable to achieve stand. Ambulation/Gait Training:            Nonambulatory at baseline                                           Therapeutic Exercises: Ankle pumps x 10    Functional Measure:  [facility] AM-PAC®      Basic Mobility Inpatient Short Form (6-Clicks) Version 2  How much HELP from another person do you currently need. .. (If the patient hasn't done an activity recently, how much help from another person do you think they would need if they tried?) Total A Lot A Little None   1. Turning from your back to your side while in a flat bed without using bedrails? [x]  1 []  2 []  3  []  4   2.   Moving from lying on your back to sitting on the side of a flat bed without using bedrails? [x]  1 []  2 []  3  []  4   3. Moving to and from a bed to a chair (including a wheelchair)? [x]  1 []  2 []  3  []  4   4. Standing up from a chair using your arms (e.g. wheelchair or bedside chair)? [x]  1 []  2 []  3  []  4   5. Walking in hospital room? [x]  1 []  2 []  3  []  4   6. Climbing 3-5 steps with a railing? [x]  1 []  2 []  3  []  4     Raw Score: 6/24                            Cutoff score ?171,2,3 had higher odds of discharging home with home health or need of SNF/IPR. [address], Shana Criselda Maxwell, Jacqueline Mistry. Validity of the AM-PAC 6-Clicks Inpatient Daily Activity and Basic Mobility Short Forms. Physical Therapy Mar 2014, 94 3) 379-391; DOI: 10.2522/ptj.75835360  2. Chava Mccloud. Association of AM-PAC \"6-Clicks\" Basic Mobility and Daily Activity Scores With Discharge Destination. Phys Ther. 2021 Apr 4;101(4):yowi552. doi: 10.1093/ptj/alik943. PMID: 44621711. V Abigail Campos, Rene BONE, Roberta Thompson, Emily K, Donavon S. Activity Measure for Post-Acute Care \"6-Clicks\" Basic Mobility Scores Predict Discharge Destination After Acute Care Hospitalization in Select Patient Groups: A Retrospective, Observational Study. Arch Rehabil Res Clin Transl. 2022 Jul 16;4(3):115980. doi: 10.1016/j.arrct. 6899.724960. PMID: 18598026; PMCID: [id number]. 4. Dieter Pelayo Ni P. AM-PAC Short Forms Manual 4.0. Revised 2/2020.        Physical Therapy Evaluation Charge Determination   History Examination Presentation Decision-Making   HIGH Complexity :3+ comorbidities / personal factors will impact the outcome/ POC  HIGH Complexity : 4+ Standardized tests and measures addressing body structure, function, activity limitation and / or participation in recreation  HIGH Complexity : Unstable and unpredictable characteristics  Other outcome measures [facility]  HIGH Based on the above components, the patient evaluation is determined to be of the following complexity level: HIGH     Pain Rating:  Denies pain at rest  States R shoulder pain with AROM, does not rate; states consistent with baseline for \"awhile\"    Activity Tolerance:   Poor associated with fear    After treatment patient left in no apparent distress:   Supine in bed and Call bell within reach;    COMMUNICATION/EDUCATION:   The patients plan of care was discussed with: Registered nurse, Case management, Rehabilitation technician, and rehab director . Fall prevention education was provided and the patient/caregiver indicated understanding., Patient/family have participated as able in goal setting and plan of care. , and Patient/family agree to work toward stated goals and plan of care.     Thank you for this referral.  Stephanie Lan, PT, DPT   Time Calculation: 34 mins

## 2023-03-30 NOTE — PROGRESS NOTES
CARE MANAGEMENT NOTE      Pt discharged back to [facility], returning to ED today. ED provider spoke with MD at [facility] who reports decline. MD at [facility] recommending SNF placement. CJ made MD aware that Pt's insurance requires authorization for rehab. CM was asked to see if PT/OT evals were possible today and to determine Pt's baseline status with staff at [facility]. Therapy in ED to eval.    CM spoke with CASPER Soto at [facility] to discuss Pt's baseline status. Brittany reports that staff assists Pt with ADLs, however, Pt is able to bear weight, stand, pivot, and assist with bathing. Brittany reports that Pt uses w/c for mobility at baseline. Staff is present during w/c transfers. Brittany unable to report Pt's level of care at assisted living facility. CM asked if facility noticed a decline in Pt's status since readmission from hospital. Brittany states that when Pt returned to facility yesterday she returned at her baseline. Brittany states overnight Pt experienced a decline is functional status. This morning Pt was unable to do anything for herself. Brittany states that Pt wasn't even able to get out of bed. Due to this, MD at [facility] sent her to ER and recommended SNF placement. CJ updated provider. 4:18 PM  PT reports Pt is total assist with ADLs. Pt has significant fear of falling. Recommendation for SNF placement.  Provider updated.     _____________________________________  Nette Schmitt [address] [facility]   Available via [address] [place] [address]  3/30/2023   3:17 PM

## 2023-03-30 NOTE — ED TRIAGE NOTES
Patient from nursing home. Possible sepsis. Currently has UTI. Refusing PO intake. Altered at baseline.

## 2023-03-31 LAB
BACTERIA SPEC CULT: ABNORMAL
BACTERIA SPEC CULT: ABNORMAL
BACTERIA SPEC CULT: NORMAL
CC UR VC: ABNORMAL
SERVICE CMNT-IMP: ABNORMAL
SERVICE CMNT-IMP: NORMAL

## 2023-03-31 PROCEDURE — 97530 THERAPEUTIC ACTIVITIES: CPT

## 2023-03-31 PROCEDURE — 74011250637 HC RX REV CODE- 250/637: Performed by: INTERNAL MEDICINE

## 2023-03-31 PROCEDURE — 77010033678 HC OXYGEN DAILY

## 2023-03-31 PROCEDURE — G0378 HOSPITAL OBSERVATION PER HR: HCPCS

## 2023-03-31 PROCEDURE — 2709999900 HC NON-CHARGEABLE SUPPLY

## 2023-03-31 PROCEDURE — 74011250636 HC RX REV CODE- 250/636: Performed by: INTERNAL MEDICINE

## 2023-03-31 PROCEDURE — 74011000258 HC RX REV CODE- 258: Performed by: INTERNAL MEDICINE

## 2023-03-31 PROCEDURE — 94761 N-INVAS EAR/PLS OXIMETRY MLT: CPT

## 2023-03-31 PROCEDURE — 97165 OT EVAL LOW COMPLEX 30 MIN: CPT

## 2023-03-31 PROCEDURE — 74011000250 HC RX REV CODE- 250: Performed by: INTERNAL MEDICINE

## 2023-03-31 RX ADMIN — SODIUM CHLORIDE, POTASSIUM CHLORIDE, SODIUM LACTATE AND CALCIUM CHLORIDE 100 ML/HR: 600; 310; 30; 20 INJECTION, SOLUTION INTRAVENOUS at 22:21

## 2023-03-31 RX ADMIN — SODIUM CHLORIDE, PRESERVATIVE FREE 10 ML: 5 INJECTION INTRAVENOUS at 22:21

## 2023-03-31 RX ADMIN — ACETAMINOPHEN 650 MG: 325 TABLET ORAL at 10:22

## 2023-03-31 RX ADMIN — MEROPENEM 1 G: 1 INJECTION, POWDER, FOR SOLUTION INTRAVENOUS at 18:23

## 2023-03-31 RX ADMIN — SODIUM CHLORIDE, PRESERVATIVE FREE 10 ML: 5 INJECTION INTRAVENOUS at 04:51

## 2023-03-31 RX ADMIN — ENOXAPARIN SODIUM 40 MG: 100 INJECTION SUBCUTANEOUS at 10:17

## 2023-03-31 RX ADMIN — SODIUM CHLORIDE, POTASSIUM CHLORIDE, SODIUM LACTATE AND CALCIUM CHLORIDE 100 ML/HR: 600; 310; 30; 20 INJECTION, SOLUTION INTRAVENOUS at 10:20

## 2023-03-31 RX ADMIN — MEROPENEM 1 G: 1 INJECTION, POWDER, FOR SOLUTION INTRAVENOUS at 04:51

## 2023-03-31 NOTE — PROGRESS NOTES
Chris Dig[facility]  [address] [facility], [address] [place] [address]  [phone]      Hospitalist Progress Note      NAME: Nancy Lanza   :  1924  MRM:  [id number]    Date of service: 3/31/2023  9:57 AM       Assessment and Plan:   Weakness: unclear cause. No leukocytosis. UA is ok this admission. complete IV meropenem today. cont fluid resuscitate. PT/OT. Consult CM for placement. 2.  Recurrent UTI: ESBL in the recent past. UA cleared on this admission. As above complete course of meropenem. Follow up with urology to consider prophylactic abx as outpatient      3. HTN: not on meds. 4.  CAD: Stable         Subjective:     Chief Complaint[de-identified] Patient was seen and examined as a follow up for weakness. Chart was reviewed. c/o generalized body weakness and shoulder pain     ROS:  (bold if positive, if negative)    Tolerating PT  Tolerating Diet        Objective:     Last 24hrs VS reviewed since prior progress note.  Most recent are:    Visit Vitals  /61 (BP 1 Location: Left upper arm, BP Patient Position: At rest)   Pulse 76   Temp 97.6 °F (36.4 °C)   Resp 18   Ht 5' 4\" (1.626 m)   Wt 89.8 kg (198 lb)   SpO2 95%   BMI 33.99 kg/m²     SpO2 Readings from Last 6 Encounters:   23 95%   23 93%   23 96%   23 93%   23 96%   21 96%    O2 Flow Rate (L/min): 3 l/min     Intake/Output Summary (Last 24 hours) at 3/31/2023 0957  Last data filed at 3/31/2023 0631  Gross per 24 hour   Intake 1335 ml   Output --   Net 1335 ml        Physical Exam:    Gen:  Well-developed, well-nourished, in no acute distress  HEENT:  Pink conjunctivae, PERRL, hearing intact to voice, moist mucous membranes  Neck:  Supple, without masses, thyroid non-tender  Resp:  No accessory muscle use, clear breath sounds without wheezes rales or rhonchi  Card:  No murmurs, normal S1, S2 without thrills, bruits or peripheral edema  Abd:  Soft, non-tender, non-distended, normoactive bowel sounds are present, no palpable organomegaly and no detectable hernias  Lymph:  No cervical or inguinal adenopathy  Musc:  No cyanosis or clubbing  Skin:  No rashes or ulcers, skin turgor is good  Neuro:  Cranial nerves are grossly intact, no focal motor weakness, follows commands appropriately  Psych:  Good insight, oriented to person, place and time, alert  __________________________________________________________________  Medications Reviewed: (see below)  Medications:     Current Facility-Administered Medications   Medication Dose Route Frequency    sodium chloride (NS) flush 5-40 mL  5-40 mL IntraVENous Q8H    sodium chloride (NS) flush 5-40 mL  5-40 mL IntraVENous PRN    acetaminophen (TYLENOL) tablet 650 mg  650 mg Oral Q6H PRN    Or    acetaminophen (TYLENOL) suppository 650 mg  650 mg Rectal Q6H PRN    polyethylene glycol (MIRALAX) packet 17 g  17 g Oral DAILY PRN    ondansetron (ZOFRAN ODT) tablet 4 mg  4 mg Oral Q8H PRN    Or    ondansetron (ZOFRAN) injection 4 mg  4 mg IntraVENous Q6H PRN    enoxaparin (LOVENOX) injection 40 mg  40 mg SubCUTAneous DAILY    lactated Ringers infusion  100 mL/hr IntraVENous CONTINUOUS    meropenem (MERREM) 1 g in 0.9% sodium chloride (MBP/ADV) 50 mL MBP  1 g IntraVENous Q12H        Lab Data Reviewed: (see below)  Lab Review:     Recent Labs     03/30/23  1230 03/29/23  0137 03/28/23  1151   WBC 9.9 5.9 6.2   HGB 14.6 13.8 13.1   HCT 45.3 42.8 42.0    199 204     Recent Labs     03/30/23  1230 03/29/23  0137 03/28/23  1151    142 141   K 4.7 4.7 4.7   * 113* 113*   CO2 23 26 26   * 93 110*   BUN 25* 23* 27*   CREA 1.03* 1.11* 1.35*   CA 9.1 8.6 8.8   ALB 3.4*  --  3.3*   TBILI 0.7  --  0.4   ALT 16  --  18     No results found for: GLUCPOC  No results for input(s): PH, PCO2, PO2, HCO3, FIO2 in the last 72 hours. No results for input(s): INR, INREXT in the last 72 hours.   All Micro Results       Procedure Component Value Units Date/Time CULTURE, URINE [[id number]] Collected: 03/30/23 1305    Order Status: Sent Specimen: Cath Urine Updated: 03/30/23 2313    URINE CULTURE HOLD SAMPLE [[id number]] Collected: 03/30/23 1305    Order Status: Completed Specimen: Urine Updated: 03/30/23 1319     Urine culture hold       Urine on hold in Microbiology dept for 2 days. If unpreserved urine is submitted, it cannot be used for addtional testing after 24 hours, recollection will be required. I have reviewed notes of prior 24hr. Other pertinent lab: Total time: -35- minutes **I personally saw and examined the patient during this time period**    I personally reviewed chart, notes, data and current medications in the medical record. I have personally examined and treated the patient at bedside during this period.                  Care Plan discussed with: Patient, Care Manager, Nursing Staff, and >50% of time spent in counseling and coordination of care    Discussed:  Care Plan    Prophylaxis:  Lovenox    Disposition:  SNF/LTC           ___________________________________________________    Attending Physician: Nataliia Mao MD

## 2023-03-31 NOTE — ROUTINE PROCESS
Bedside and Verbal shift change report given to Claritza Haji (oncoming nurse) by Diana Rutledge (offgoing nurse). Report included the following information SBAR and Kardex.

## 2023-03-31 NOTE — PROGRESS NOTES
3/31/2023  Case Management Note    1:31 PM  Called patient's daughter for initial assessment--HIPAA compliant VM left. Reviewed previous chart--patient resides at [facility] and returned there after last admission, however had a decline overnight and they are now recommending SNF placement    Of note, SNF was attempted last admission however [facility] was denied. Referral sent to [address] (this was patient's choice last admission) to see if they will consider her again.      RACH Dsouza

## 2023-03-31 NOTE — PROGRESS NOTES
Problem: Mobility Impaired (Adult and Pediatric)  Goal: *Acute Goals and Plan of Care (Insert Text)  Description: FUNCTIONAL STATUS PRIOR TO ADMISSION: Pt reports receiving assistance for all bed mobility and transfers, utilizes wheelchair for all locomotion. She reports self-propelling wheelchair to dining villalta. Care manager reports pt transfers with SBA/supervision at baseline. HOME SUPPORT PRIOR TO ADMISSION: The patient lived at [facility]. Physical Therapy Goals  Initiated 3/30/2023  1. Patient will move from supine to sit and sit to supine  in bed with moderate assistance  within 7 day(s). 2.  Patient will transfer from bed to chair and chair to bed with moderate assistance  using the least restrictive device within 7 day(s). 3.  Patient will perform sit to stand with moderate assistance  within 7 day(s). 4.  Patient will self propel wheelchair 48' with supervision within 7 days. Note:   PHYSICAL THERAPY TREATMENT  Patient: Erum Arteaga (72 y.o. female)  Date: 3/31/2023  Diagnosis: FTT (failure to thrive) in adult [R62.7] <principal problem not specified>      Precautions: Fall  Chart, physical therapy assessment, plan of care and goals were reviewed. ASSESSMENT  Patient continues with skilled PT services and is slowly progressing towards goals. Patient reports feeling terrible and generalized pain and weakness \"all over. \" Patient needing max A x 1-2 to mobilize from supine to sit edge of bed and increased strain from flat supine position. Bed modified and use of bed rails however patient complains of pain in both hands and a \"burning sensation. \" Sat edge of bed with only supervision for sitting balance. With arm chair placed in front of her patient able to pull to stand and come up to near erect stance holding onto arms of chair and clearing buttocks from the bed. Upon second stand changed adult diaper and wet pad with pure wick in place.  Patient on 2L and complains of severe fatigue- unable to tolerate attempted stance a third time. Patient declined Jennetta Ink steady and transfer to bedside chair reporting exhaustion and feeling more unwell overall today. Current Level of Function Impacting Discharge (mobility/balance): total to max A x 1-2 for bed mobility and Sit<>stand; [facility] or [employer] for transfers bed <>chair     Other factors to consider for discharge: 79 yo, prior level, pain manangement         PLAN :  Patient continues to benefit from skilled intervention to address the above impairments. Continue treatment per established plan of care. to address goals. Recommendation for discharge: (in order for the patient to meet his/her long term goals)  Therapy up to 5 days/week in SNF setting    This discharge recommendation:  Has not yet been discussed the attending provider and/or case management    IF patient discharges home will need the following DME: none new       SUBJECTIVE:   Patient stated my hands are burning and hurt so very badly. I will try with you but I feel worse today. I am just so weak.     OBJECTIVE DATA SUMMARY:   Critical Behavior:  Neurologic State: Alert  Orientation Level: Oriented to person, Oriented to time, Disoriented to place, Disoriented to situation  Cognition: Follows commands, Decreased attention/concentration, Poor safety awareness, Memory loss, Impaired decision making  Safety/Judgement: Decreased insight into deficits (very high fear of falling)  Functional Mobility Training:  Bed Mobility:  Rolling: Maximum assistance; Additional time  Supine to Sit: Maximum assistance; Additional time;Bed Modified  Sit to Supine: Maximum assistance;Assist x2; Additional time  Scooting: Maximum assistance; Additional time        Transfers:  Sit to Stand: Total assistance (attempted however unable to clear bed x3 trials, pt reporting fear)       Balance:  Sitting: Impaired  Sitting - Static: Good (unsupported)  Sitting - Dynamic: Fair (occasional)  Standing: Impaired; With support (attempted stance unable to clear bed)    Pain Rating:  Not rated but reports pain in both hands    Activity Tolerance:   Poor, desaturates with exertion and requires oxygen, requires frequent rest breaks, and observed SOB with activity    After treatment patient left in no apparent distress:   Supine in bed, Heels elevated for pressure relief, Call bell within reach, Bed / chair alarm activated, and Side rails x 3    COMMUNICATION/COLLABORATION:   The patients plan of care was discussed with: Registered nurse and Rehabilitation technician.      Akira Lamas, PT, DPT   Time Calculation: 28 mins

## 2023-03-31 NOTE — PROGRESS NOTES
Medicare Outpatient Observation Notice (MOON)/ [place] Outpatient Observation Notice (Crespo Hobgood) provided to patient with verbal explanation of the notice. Patient provided a completed copy of the MOON/VOON notice. Copy placed on bedside chart.   Stefanie Antonio CMS

## 2023-03-31 NOTE — PROGRESS NOTES
Problem: Pressure Injury - Risk of  Goal: *Prevention of pressure injury  Description: Document John Scale and appropriate interventions in the flowsheet. Outcome: Progressing Towards Goal  Note: Pressure Injury Interventions:  Sensory Interventions: Assess changes in LOC    Moisture Interventions: Absorbent underpads    Activity Interventions: PT/OT evaluation, Pressure redistribution bed/mattress(bed type)    Mobility Interventions: HOB 30 degrees or less, Float heels, PT/OT evaluation, Turn and reposition approx. every two hours(pillow and wedges)         Problem: Falls - Risk of  Goal: *Absence of Falls  Description: Document Johanny Lopes Fall Risk and appropriate interventions in the flowsheet.   Outcome: Progressing Towards Goal  Note: Fall Risk Interventions:

## 2023-03-31 NOTE — ROUTINE PROCESS
Admitted from ER around 1am pt alert/oriented x2  unable to complete admission req.documentation and home med.rec. due to loc.

## 2023-03-31 NOTE — PROGRESS NOTES
Problem: Patient Education: Go to Patient Education Activity  Goal: Patient/Family Education  Outcome: Progressing Towards Goal     Problem: Pressure Injury - Risk of  Goal: *Prevention of pressure injury  Outcome: Progressing Towards Goal  Note: Pressure Injury Interventions:  Sensory Interventions: Assess changes in LOC, Float heels, Avoid rigorous massage over bony prominences, Maintain/enhance activity level    Moisture Interventions: Absorbent underpads, Minimize layers, Moisture barrier, Maintain skin hydration (lotion/cream), Offer toileting Q_hr    Activity Interventions: Increase time out of bed, Pressure redistribution bed/mattress(bed type)    Mobility Interventions: HOB 30 degrees or less, Float heels, Pressure redistribution bed/mattress (bed type)    Nutrition Interventions: Document food/fluid/supplement intake    Friction and Shear Interventions: HOB 30 degrees or less, Minimize layers                Problem: Patient Education: Go to Patient Education Activity  Goal: Patient/Family Education  Outcome: Progressing Towards Goal     Problem: Falls - Risk of  Goal: *Absence of Falls  Outcome: Progressing Towards Goal  Note: Fall Risk Interventions:                                Problem: Patient Education: Go to Patient Education Activity  Goal: Patient/Family Education  Outcome: Progressing Towards Goal     Problem: Urinary Tract Infection - Adult  Goal: *Absence of infection signs and symptoms  Outcome: Progressing Towards Goal     Problem: Patient Education: Go to Patient Education Activity  Goal: Patient/Family Education  Outcome: Progressing Towards Goal     Problem: Urinary Tract Infection - Adult  Goal: *Absence of infection signs and symptoms  Outcome: Progressing Towards Goal     Problem: Impaired Skin Integrity/Pressure Injury Treatment  Goal: *Improvement of Existing Pressure Injury  Outcome: Progressing Towards Goal  Goal: *Prevention of pressure injury  Outcome: Progressing Towards Goal  Note: Pressure Injury Interventions:  Sensory Interventions: Assess changes in LOC, Float heels, Avoid rigorous massage over bony prominences, Maintain/enhance activity level    Moisture Interventions: Absorbent underpads, Minimize layers, Moisture barrier, Maintain skin hydration (lotion/cream), Offer toileting Q_hr    Activity Interventions: Increase time out of bed, Pressure redistribution bed/mattress(bed type)    Mobility Interventions: HOB 30 degrees or less, Float heels, Pressure redistribution bed/mattress (bed type)    Nutrition Interventions: Document food/fluid/supplement intake    Friction and Shear Interventions: HOB 30 degrees or less, Minimize layers                Problem: Impaired Skin Integrity/Pressure Injury Treatment  Goal: *Prevention of pressure injury  Outcome: Progressing Towards Goal  Note: Pressure Injury Interventions:  Sensory Interventions: Assess changes in LOC, Float heels, Avoid rigorous massage over bony prominences, Maintain/enhance activity level    Moisture Interventions: Absorbent underpads, Minimize layers, Moisture barrier, Maintain skin hydration (lotion/cream), Offer toileting Q_hr    Activity Interventions: Increase time out of bed, Pressure redistribution bed/mattress(bed type)    Mobility Interventions: HOB 30 degrees or less, Float heels, Pressure redistribution bed/mattress (bed type)    Nutrition Interventions: Document food/fluid/supplement intake    Friction and Shear Interventions: HOB 30 degrees or less, Minimize layers

## 2023-03-31 NOTE — WOUND CARE
Wound Consult:  new consult Visit. Chart reviewed. Consulted for right breast MASD. Spoke with patients nurse,  Lucy Rivas RN. Patient is resting on a anastacio bed with ESTEFANI with air box added mattress. Heels off loaded with pillows at end of visit. Patient is awake, cooperative; requires 2 assists to move side to side in bed. John score 13. Assessment:  POA Right breast fold- moist, red yeast like rash, patient complains of itching   bilateral heels- pink, boggy, blanching  Buttocks/sacrum- no redness noted  Treatment:  Breast fold- cleansed with blue wipes , dried and Dri-go sheet tucked under breast.  Elevated heels on pillows   Repositioned onto right side  Wound Recommendations:  Breast fold- cleanse with blue wipes, dry and tuck Dri-go sheet under breast with 2 inches showing at end  Elevate heels on pillows   Reposition every 2 hours, use air box to anastacio bed  Skin Care / PI Prevention Recommendations:  1. Minimize friction/shear: minimize layers of linen/pads under patient. 2. Off load pressure/reposition:  turn and reposition approximately every 2 hours; float heels with pillows or use off loading heel boots; waffle cushion for sitting; position wedge. 3. Manage Moisture - keep skin folds dry; incontinence skin care with incontinence wipes; zinc guard barrier ointment; appropriate sized briefs ; purewick in use to help contain urine. 4. Continue to monitor nutrition, pain, and skin risk scale, and skin assessment. Plan:  Spoke with Dr. Liane Aguirre regarding findings and proposed orders for treatment. Please re-consult should concerns arise despite continued skin/PI prevention measures.     [address] [facility], Wound / [address] [facility] Healing Office [phone]

## 2023-03-31 NOTE — PROGRESS NOTES
Bedside and Verbal shift change report given to Yinka Verduzco RN (oncoming nurse) by Edd Iniguez RN and Reta Randle, Student Nurse (offgoing nurse). Report included the following information SBAR, Kardex, Intake/Output, and MAR.

## 2023-03-31 NOTE — PROGRESS NOTES
Problem: Self Care Deficits Care Plan (Adult)  Goal: *Acute Goals and Plan of Care (Insert Text)  Description: FUNCTIONAL STATUS PRIOR TO ADMISSION: Patient requires assist for self care tasks (bathing/dressing/toileting) and stating able to complete grooming and self feeding however requires assist at times from staff. Per pt report, pt requires assist for bed mobility and transfers into [facility] however once in [facility] able to self propel WC to dining room. HOME SUPPORT: The patient lives at an [facility] and requires assist for self care and transfers from staff. .    Occupational Therapy Goals  Initiated 3/31/2023  1. Patient will perform grooming seated EOB/chair level with supervision/set-up within 7 day(s). 2.  Patient will perform upper body dressing with minimal assistance/contact guard assist within 7 day(s). 3.  Patient will perform anterior upper body bathing with minimal assistance/contact guard assist within 7 day(s). 4.  Patient will perform BSC transfers with moderate assistance  within 7 day(s). 5.  Patient will perform all aspects of toileting with moderate assistance  within 7 day(s). 6.  Patient will participate in upper extremity therapeutic exercise/activities with contact guard/stand by assist for 5 minutes within 10 day(s). Outcome: Not Met     OCCUPATIONAL THERAPY EVALUATION  Patient: Alka Walden (1 y.o. female)  Date: 3/31/2023  Primary Diagnosis: FTT (failure to thrive) in adult [R62.7]       Precautions:  Fall    ASSESSMENT  Patient received semi supine in bed A&O to self and time (reoriented to place and situation pt believes she is at someone's home) and agreeable for OT eval/tx. Per pt report and chart review, patient is LTC resident at [address] [place] [address] and requires assist for self care (bathing/dressing/toileting and at times self feeding and grooming) and requires assist for bed mobility and transfers to [facility] (pt reporting able to self propel WC to dinning room). Patient reporting recent fall. Patient presents with decreased balance, decreased activity tolerance, generalized weakness (noted with right shoulder pt pt stating has had for years), fearful of falling and increased need for assist with self care (total A LB dressing and toileting/cloth mgmt, SBA grooming seated EOB) and functional transfers/mobility (max A rolling, max A sup->Sit and scooting EOB, good static sitting balance EOB, max Ax2 sit->sup). Patient educated on kim UE HEP in prep for self care tasks with pt demonstrating and verbalizing understanding (would benefit from continued review with therapy). Patient would benefit from continued skilled OT services while at [facility] In order to increase safety and independence with self care and functional transfers/mobility. Recommend discharge to SNF when medically appropriate. Functional Outcome Measure: The patient scored 14/24 on the [address] [facility] [address] outcome measure  Other factors to consider for discharge: time since onset, severity of deficits, PLOF        PLAN :  Recommendations and Planned Interventions: self care training, functional mobility training, therapeutic exercise, balance training, therapeutic activities, endurance activities, patient education, and home safety training    Frequency/Duration: Patient will be followed by occupational therapy 5 times a week to address goals. Recommendation for discharge: (in order for the patient to meet his/her long term goals)  Therapy up to 5 days/week in SNF setting    This discharge recommendation:  Has been made in collaboration with the attending provider and/or case management    IF patient discharges home will need the following DME: TBD       SUBJECTIVE:   Patient stated I hurt everywhere.     OBJECTIVE DATA SUMMARY:   HISTORY:   Past Medical History:   Diagnosis Date    CAD (coronary artery disease)     Femur fracture ([address]     right    GERD (gastroesophageal reflux disease)     HTN (hypertension), benign 1/11/2011    Mixed hyperlipidemia 1/11/2011     Past Surgical History:   Procedure Laterality Date    HX FEMUR FRACTURE TX      HX KNEE REPLACEMENT      bilateral    HX ORTHOPAEDIC      bilat knee replacements    HX VEIN STRIPPING         Expanded or extensive additional review of patient history:     Home Situation  Home Environment: Assisted living  [address] [place] [address] Mando Name: Dougie  One/Two Story Residence: One story  Support Systems: Assisted Living  Patient Expects to be Discharged to[de-identified] Unable to determine at this time  Current DME Used/Available at Home: Wheelchair    EXAMINATION OF PERFORMANCE DEFICITS:  Cognitive/Behavioral Status:  Neurologic State: Alert  Orientation Level: Oriented to person;Oriented to time;Disoriented to place; Disoriented to situation  Cognition: Follows commands    Hearing: Auditory  Auditory Impairment: Hard of hearing, bilateral    Range of Motion:  AROM:  (LUE gen decreased functional, RUE shoulder ROM limited painful, RUE distally gen decreased functional)  PROM:  (LUE gen decreased functional, RUE shoulder ROM limited painful, RUE distally gen decreased functional)     Strength:  Strength:  (LUE gen decreased functional, RUE shoulder 2-/5 distally 3+/5)     Balance:  Sitting: Impaired  Sitting - Static: Good (unsupported)  Sitting - Dynamic: Fair (occasional)  Standing: Impaired; With support (attempted stance unable to clear bed)    Functional Mobility and Transfers for ADLs:  Bed Mobility:  Rolling: Maximum assistance; Additional time  Supine to Sit: Maximum assistance; Additional time;Bed Modified  Sit to Supine: Maximum assistance;Assist x2; Additional time  Scooting: Maximum assistance; Additional time    Transfers:  Sit to Stand: Total assistance (attempted however unable to clear bed x3 trials, pt reporting fear)    ADL Assessment:     Oral Facial Hygiene/Grooming: Stand-by assistance (seated EOB)    Lower Body Dressing: Total assistance    Toileting:  Total assistance     ADL Intervention and task modifications:     Grooming  Grooming Assistance: Stand-by assistance  Position Performed: Seated edge of bed  Washing Face: Stand-by assistance  Washing Hands: Stand-by assistance  Brushing/Combing Hair: Stand-by assistance (SBA increased time)    Lower Body Dressing Assistance  Socks: Total assistance (dependent)  Position Performed: Supine    Toileting  Toileting Assistance: Total assistance(dependent)  Bladder Hygiene: Total assistance (dependent)  Bowel Hygiene: Total assistance (dependent)  Clothing Management: Total assistance (dependent)    Functional Measure:  [employer] AM-PAC®      Daily Activity Inpatient Short Form (6-Clicks) Version 2  How much HELP from another person do you currently need. .. (If the patient hasn't done an activity recently, how much help from another person do you think they would need if they tried?) Total A Lot A Little None   1. Putting on and taking off regular lower body clothing? [x]   1 []   2 []   3 []   4   2. Bathing (including washing, rinsing, drying)? []   1 [x]   2 []   3 []   4   3. Toileting, which includes using toilet, bedpan, or urinal? [x]   1 []   2 []   3 []   4   4. Putting on and taking off regular upper body clothing? []   1 [x]   2 []   3 []   4   5. Taking care of personal grooming such as brushing teeth? []   1 []   2 [x]   3 []   4   6. Eating meals? []   1 []   2 [x]   3 []   4     Raw Score: 12/24                            Cutoff score ?191,2,3 had higher odds of discharging home with home health or need of SNF/IPR    1. Andres Gould. Validity of the AM-PAC 6-Clicks Inpatient Daily Activity and Basic Mobility Short Forms. Physical Therapy Mar 2014, 94 (3) 379-391; DOI: 10.2522/ptj.39461498  2. Tiffany Flores.  Association of AM-PAC \"6-Clicks\" Basic Mobility and Daily Activity Scores With Discharge Destination. Phys Ther. 2021 Apr 4;101(4):wimr483. doi: 10.1093/ptj/aauf544. PMID: 86349193. INOCENCIA Campos, Rene BONE, Emily Gonzáles K, Donavon S. Activity Measure for Post-Acute Care \"6-Clicks\" Basic Mobility Scores Predict Discharge Destination After Acute Care Hospitalization in Select Patient Groups: A Retrospective, Observational Study. Arch Rehabil Res Clin Transl. 2022 Jul 16;4(3):942474. doi: 10.1016/j.arrct. 6286.969998. PMID: 73613998; PMCID: [id number]. 4. Dieter Richmond, Kristin RODRIGUEZ. AM-PAC Short Forms Manual 4.0. Revised 2/2020. Occupational Therapy Evaluation Charge Determination   History Examination Decision-Making   LOW Complexity : Brief history review  MEDIUM Complexity : 3-5 performance deficits relating to physical, cognitive , or psychosocial skils that result in activity limitations and / or participation restrictions MEDIUM Complexity : Patient may present with comorbidities that affect occupational performnce. Miniml to moderate modification of tasks or assistance (eg, physical or verbal ) with assesment(s) is necessary to enable patient to complete evaluation       Based on the above components, the patient evaluation is determined to be of the following complexity level: LOW   Pain Rating:  Pt reporting generalized pain 10/10    Activity Tolerance:   Fair and requires rest breaks    After treatment patient left in no apparent distress:    Supine in bed, Call bell within reach, Bed / chair alarm activated, and wound care nursing    COMMUNICATION/EDUCATION:   The patients plan of care was discussed with: Registered nurse and Case management. Home safety education was provided and the patient/caregiver indicated understanding. and Patient/family have participated as able in goal setting and plan of care. This patients plan of care is appropriate for delegation to [facility].     Thank you for this referral.  Enma Pena  Time Calculation: 25 mins

## 2023-04-01 LAB
ANION GAP SERPL CALC-SCNC: 3 MMOL/L (ref 5–15)
BASOPHILS # BLD: 0 K/UL (ref 0–0.1)
BASOPHILS NFR BLD: 0 % (ref 0–1)
BUN SERPL-MCNC: 22 MG/DL (ref 6–20)
BUN/CREAT SERPL: 23 (ref 12–20)
CALCIUM SERPL-MCNC: 8.5 MG/DL (ref 8.5–10.1)
CHLORIDE SERPL-SCNC: 113 MMOL/L (ref 97–108)
CO2 SERPL-SCNC: 26 MMOL/L (ref 21–32)
CREAT SERPL-MCNC: 0.94 MG/DL (ref 0.55–1.02)
DIFFERENTIAL METHOD BLD: ABNORMAL
EOSINOPHIL # BLD: 0.2 K/UL (ref 0–0.4)
EOSINOPHIL NFR BLD: 2 % (ref 0–7)
ERYTHROCYTE [DISTWIDTH] IN BLOOD BY AUTOMATED COUNT: 14.1 % (ref 11.5–14.5)
GLUCOSE SERPL-MCNC: 110 MG/DL (ref 65–100)
HCT VFR BLD AUTO: 41.3 % (ref 35–47)
HGB BLD-MCNC: 13 G/DL (ref 11.5–16)
IMM GRANULOCYTES # BLD AUTO: 0 K/UL (ref 0–0.04)
IMM GRANULOCYTES NFR BLD AUTO: 1 % (ref 0–0.5)
LYMPHOCYTES # BLD: 0.8 K/UL (ref 0.8–3.5)
LYMPHOCYTES NFR BLD: 9 % (ref 12–49)
MCH RBC QN AUTO: 31 PG (ref 26–34)
MCHC RBC AUTO-ENTMCNC: 31.5 G/DL (ref 30–36.5)
MCV RBC AUTO: 98.6 FL (ref 80–99)
MONOCYTES # BLD: 0.8 K/UL (ref 0–1)
MONOCYTES NFR BLD: 9 % (ref 5–13)
NEUTS SEG # BLD: 6.8 K/UL (ref 1.8–8)
NEUTS SEG NFR BLD: 79 % (ref 32–75)
NRBC # BLD: 0 K/UL (ref 0–0.01)
NRBC BLD-RTO: 0 PER 100 WBC
PLATELET # BLD AUTO: 179 K/UL (ref 150–400)
PMV BLD AUTO: 11 FL (ref 8.9–12.9)
POTASSIUM SERPL-SCNC: 4.2 MMOL/L (ref 3.5–5.1)
RBC # BLD AUTO: 4.19 M/UL (ref 3.8–5.2)
SODIUM SERPL-SCNC: 142 MMOL/L (ref 136–145)
WBC # BLD AUTO: 8.6 K/UL (ref 3.6–11)

## 2023-04-01 PROCEDURE — 74011000250 HC RX REV CODE- 250: Performed by: INTERNAL MEDICINE

## 2023-04-01 PROCEDURE — 2709999900 HC NON-CHARGEABLE SUPPLY

## 2023-04-01 PROCEDURE — 74011250637 HC RX REV CODE- 250/637: Performed by: INTERNAL MEDICINE

## 2023-04-01 PROCEDURE — 80048 BASIC METABOLIC PNL TOTAL CA: CPT

## 2023-04-01 PROCEDURE — 36415 COLL VENOUS BLD VENIPUNCTURE: CPT

## 2023-04-01 PROCEDURE — 77010033678 HC OXYGEN DAILY

## 2023-04-01 PROCEDURE — 94761 N-INVAS EAR/PLS OXIMETRY MLT: CPT

## 2023-04-01 PROCEDURE — G0378 HOSPITAL OBSERVATION PER HR: HCPCS

## 2023-04-01 PROCEDURE — 74011250636 HC RX REV CODE- 250/636: Performed by: INTERNAL MEDICINE

## 2023-04-01 PROCEDURE — 85025 COMPLETE CBC W/AUTO DIFF WBC: CPT

## 2023-04-01 RX ADMIN — SODIUM CHLORIDE, PRESERVATIVE FREE 10 ML: 5 INJECTION INTRAVENOUS at 21:41

## 2023-04-01 RX ADMIN — SODIUM CHLORIDE, POTASSIUM CHLORIDE, SODIUM LACTATE AND CALCIUM CHLORIDE 50 ML/HR: 600; 310; 30; 20 INJECTION, SOLUTION INTRAVENOUS at 21:41

## 2023-04-01 RX ADMIN — ACETAMINOPHEN 650 MG: 325 TABLET ORAL at 03:23

## 2023-04-01 RX ADMIN — ACETAMINOPHEN 650 MG: 325 TABLET ORAL at 10:25

## 2023-04-01 RX ADMIN — ENOXAPARIN SODIUM 40 MG: 100 INJECTION SUBCUTANEOUS at 10:25

## 2023-04-01 RX ADMIN — SODIUM CHLORIDE, POTASSIUM CHLORIDE, SODIUM LACTATE AND CALCIUM CHLORIDE 100 ML/HR: 600; 310; 30; 20 INJECTION, SOLUTION INTRAVENOUS at 07:30

## 2023-04-01 RX ADMIN — SODIUM CHLORIDE, PRESERVATIVE FREE 10 ML: 5 INJECTION INTRAVENOUS at 06:08

## 2023-04-01 RX ADMIN — ONDANSETRON 4 MG: 2 INJECTION INTRAMUSCULAR; INTRAVENOUS at 02:47

## 2023-04-01 NOTE — PROGRESS NOTES
Bedside and Verbal shift change report given to Nilay Escobedo RN (oncoming nurse) by Valentina Rubi RN and Amena Wiggins, Student Nurse (offgoing nurse). Report included the following information SBAR, Kardex, Intake/Output, and MAR.

## 2023-04-01 NOTE — PROGRESS NOTES
Problem: Pressure Injury - Risk of  Goal: *Prevention of pressure injury  Outcome: Progressing Towards Goal  Note: Pressure Injury Interventions:  Sensory Interventions: Assess changes in LOC    Moisture Interventions: Absorbent underpads, Apply protective barrier, creams and emollients, Internal/External urinary devices, Maintain skin hydration (lotion/cream), Minimize layers, Moisture barrier, Offer toileting Q_hr    Activity Interventions: Increase time out of bed, Pressure redistribution bed/mattress(bed type)    Mobility Interventions: HOB 30 degrees or less, Float heels, Pressure redistribution bed/mattress (bed type), PT/OT evaluation    Nutrition Interventions: Document food/fluid/supplement intake, Discuss nutritional consult with provider, Offer support with meals,snacks and hydration    Friction and Shear Interventions: HOB 30 degrees or less, Minimize layers, Feet elevated on foot rest                Problem: Patient Education: Go to Patient Education Activity  Goal: Patient/Family Education  Outcome: Progressing Towards Goal     Problem: Falls - Risk of  Goal: *Absence of Falls  Outcome: Progressing Towards Goal  Note: Fall Risk Interventions:                                Problem: Patient Education: Go to Patient Education Activity  Goal: Patient/Family Education  Outcome: Progressing Towards Goal     Problem: Patient Education: Go to Patient Education Activity  Goal: Patient/Family Education  Outcome: Progressing Towards Goal     Problem: Urinary Tract Infection - Adult  Goal: *Absence of infection signs and symptoms  Outcome: Progressing Towards Goal     Problem: Patient Education: Go to Patient Education Activity  Goal: Patient/Family Education  Outcome: Progressing Towards Goal     Problem: Impaired Skin Integrity/Pressure Injury Treatment  Goal: *Improvement of Existing Pressure Injury  Outcome: Progressing Towards Goal  Goal: *Prevention of pressure injury  Outcome: Progressing Towards Goal  Note: Pressure Injury Interventions:  Sensory Interventions: Assess changes in LOC    Moisture Interventions: Absorbent underpads, Apply protective barrier, creams and emollients, Internal/External urinary devices, Maintain skin hydration (lotion/cream), Minimize layers, Moisture barrier, Offer toileting Q_hr    Activity Interventions: Increase time out of bed, Pressure redistribution bed/mattress(bed type)    Mobility Interventions: HOB 30 degrees or less, Float heels, Pressure redistribution bed/mattress (bed type), PT/OT evaluation    Nutrition Interventions: Document food/fluid/supplement intake, Discuss nutritional consult with provider, Offer support with meals,snacks and hydration    Friction and Shear Interventions: HOB 30 degrees or less, Minimize layers, Feet elevated on foot rest                Problem: Impaired Skin Integrity/Pressure Injury Treatment  Goal: *Prevention of pressure injury  Outcome: Progressing Towards Goal  Note: Pressure Injury Interventions:  Sensory Interventions: Assess changes in LOC    Moisture Interventions: Absorbent underpads, Apply protective barrier, creams and emollients, Internal/External urinary devices, Maintain skin hydration (lotion/cream), Minimize layers, Moisture barrier, Offer toileting Q_hr    Activity Interventions: Increase time out of bed, Pressure redistribution bed/mattress(bed type)    Mobility Interventions: HOB 30 degrees or less, Float heels, Pressure redistribution bed/mattress (bed type), PT/OT evaluation    Nutrition Interventions: Document food/fluid/supplement intake, Discuss nutritional consult with provider, Offer support with meals,snacks and hydration    Friction and Shear Interventions: HOB 30 degrees or less, Minimize layers, Feet elevated on foot rest                Problem: Patient Education: Go to Patient Education Activity  Goal: Patient/Family Education  Outcome: Progressing Towards Goal     Problem: Risk for Spread of Infection  Goal: Prevent transmission of infectious organism to others  Outcome: Progressing Towards Goal

## 2023-04-01 NOTE — ROUTINE PROCESS
Bedside and Verbal shift change report given to Vadim Seay (oncoming nurse) by Susan Hopkins (offgoing nurse). Report included the following information SBAR and Kardex.

## 2023-04-01 NOTE — PROGRESS NOTES
Chris Diggs [facility]  [address] [facility], [address] [place] [address]  [phone]      Hospitalist Progress Note      NAME: Tomas Coelho   :  1924  MRM:  [id number]    Date of service: 2023  9:57 AM       Assessment and Plan:   Weakness: unclear cause. No leukocytosis. UA is ok this admission. complete IV meropenem. Cont IVF. PT/OT. CM is working on SNF placement       2. Recurrent UTI: ESBL in the recent past. UA cleared on this admission. As above complete course of meropenem. Follow up with urology to consider prophylactic abx as outpatient      3. HTN: not on meds. 4.  CAD: Stable         Subjective:     Chief Complaint[de-identified] Patient was seen and examined as a follow up for weakness. Chart was reviewed. \"not feel good\"    ROS:  (bold if positive, if negative)    Tolerating PT  Tolerating Diet        Objective:     Last 24hrs VS reviewed since prior progress note.  Most recent are:    Visit Vitals  BP (!) 153/85 (BP 1 Location: Right upper arm, BP Patient Position: At rest)   Pulse 75   Temp 98 °F (36.7 °C)   Resp 17   Ht 5' 4\" (1.626 m)   Wt 89.8 kg (198 lb)   SpO2 (!) 87%   BMI 33.99 kg/m²     SpO2 Readings from Last 6 Encounters:   23 (!) 87%   23 93%   23 96%   23 93%   23 96%   21 96%    O2 Flow Rate (L/min): 2 l/min     Intake/Output Summary (Last 24 hours) at 2023 0916  Last data filed at 2023 0608  Gross per 24 hour   Intake 3200 ml   Output 100 ml   Net 3100 ml          Physical Exam:    Gen:  Well-developed, well-nourished, in no acute distress  HEENT:  Pink conjunctivae, PERRL, hearing intact to voice, moist mucous membranes  Neck:  Supple, without masses, thyroid non-tender  Resp:  No accessory muscle use, clear breath sounds without wheezes rales or rhonchi  Card:  No murmurs, normal S1, S2 without thrills, bruits or peripheral edema  Abd:  Soft, non-tender, non-distended, normoactive bowel sounds are present, no palpable organomegaly and no detectable hernias  Lymph:  No cervical or inguinal adenopathy  Musc:  No cyanosis or clubbing  Skin:  No rashes or ulcers, skin turgor is good  Neuro:  Cranial nerves are grossly intact, no focal motor weakness, follows commands appropriately  Psych:  Good insight, oriented to person, place and time, alert  __________________________________________________________________  Medications Reviewed: (see below)  Medications:     Current Facility-Administered Medications   Medication Dose Route Frequency    sodium chloride (NS) flush 5-40 mL  5-40 mL IntraVENous Q8H    sodium chloride (NS) flush 5-40 mL  5-40 mL IntraVENous PRN    acetaminophen (TYLENOL) tablet 650 mg  650 mg Oral Q6H PRN    Or    acetaminophen (TYLENOL) suppository 650 mg  650 mg Rectal Q6H PRN    polyethylene glycol (MIRALAX) packet 17 g  17 g Oral DAILY PRN    ondansetron (ZOFRAN ODT) tablet 4 mg  4 mg Oral Q8H PRN    Or    ondansetron (ZOFRAN) injection 4 mg  4 mg IntraVENous Q6H PRN    enoxaparin (LOVENOX) injection 40 mg  40 mg SubCUTAneous DAILY    lactated Ringers infusion  100 mL/hr IntraVENous CONTINUOUS        Lab Data Reviewed: (see below)  Lab Review:     Recent Labs     04/01/23  0246 03/30/23  1230   WBC 8.6 9.9   HGB 13.0 14.6   HCT 41.3 45.3    215       Recent Labs     04/01/23  0246 03/30/23  1230    141   K 4.2 4.7   * 113*   CO2 26 23   * 145*   BUN 22* 25*   CREA 0.94 1.03*   CA 8.5 9.1   ALB  --  3.4*   TBILI  --  0.7   ALT  --  16       No results found for: GLUCPOC  No results for input(s): PH, PCO2, PO2, HCO3, FIO2 in the last 72 hours. No results for input(s): INR, INREXT, INREXT in the last 72 hours.   All Micro Results       Procedure Component Value Units Date/Time    CULTURE, URINE [[id number]] Collected: 03/30/23 1305    Order Status: Completed Specimen: Cath Urine Updated: 03/31/23 2044     Special Requests: NO SPECIAL REQUESTS        Culture result: No growth (<1,000 CFU/ML) URINE CULTURE HOLD SAMPLE [[id number]] Collected: 03/30/23 1305    Order Status: Completed Specimen: Urine Updated: 03/30/23 1319     Urine culture hold       Urine on hold in Microbiology dept for 2 days. If unpreserved urine is submitted, it cannot be used for addtional testing after 24 hours, recollection will be required. I have reviewed notes of prior 24hr. Other pertinent lab: Total time: -25- minutes **I personally saw and examined the patient during this time period**    I personally reviewed chart, notes, data and current medications in the medical record. I have personally examined and treated the patient at bedside during this period.                  Care Plan discussed with: Patient, Care Manager, Nursing Staff, and >50% of time spent in counseling and coordination of care    Discussed:  Care Plan    Prophylaxis:  Lovenox    Disposition:  SNF/LTC           ___________________________________________________    Attending Physician: Joy Meneses MD

## 2023-04-02 PROCEDURE — 74011250636 HC RX REV CODE- 250/636: Performed by: INTERNAL MEDICINE

## 2023-04-02 PROCEDURE — 74011000250 HC RX REV CODE- 250: Performed by: INTERNAL MEDICINE

## 2023-04-02 PROCEDURE — 94761 N-INVAS EAR/PLS OXIMETRY MLT: CPT

## 2023-04-02 PROCEDURE — 74011250637 HC RX REV CODE- 250/637: Performed by: INTERNAL MEDICINE

## 2023-04-02 PROCEDURE — 77010033678 HC OXYGEN DAILY

## 2023-04-02 PROCEDURE — G0378 HOSPITAL OBSERVATION PER HR: HCPCS

## 2023-04-02 RX ORDER — AMLODIPINE BESYLATE 5 MG/1
10 TABLET ORAL DAILY
Status: DISCONTINUED
Start: 2023-04-03 | End: 2023-04-04 | Stop reason: HOSPADM

## 2023-04-02 RX ORDER — GUAIFENESIN 100 MG/5ML
81 LIQUID (ML) ORAL DAILY
Status: DISCONTINUED
Start: 2023-04-03 | End: 2023-04-04 | Stop reason: HOSPADM

## 2023-04-02 RX ORDER — ATORVASTATIN CALCIUM 10 MG/1
10 TABLET, FILM COATED ORAL DAILY
Status: DISCONTINUED
Start: 2023-04-03 | End: 2023-04-04 | Stop reason: HOSPADM

## 2023-04-02 RX ORDER — HYDRALAZINE HYDROCHLORIDE 20 MG/ML
10 INJECTION INTRAMUSCULAR; INTRAVENOUS
Status: DISCONTINUED
Start: 2023-04-02 | End: 2023-04-04 | Stop reason: HOSPADM

## 2023-04-02 RX ORDER — CLONIDINE HYDROCHLORIDE 0.1 MG/1
0.1 TABLET ORAL
Status: DISCONTINUED
Start: 2023-04-02 | End: 2023-04-04 | Stop reason: HOSPADM

## 2023-04-02 RX ORDER — AMLODIPINE BESYLATE 5 MG/1
5 TABLET ORAL DAILY
Status: DISCONTINUED | OUTPATIENT
Start: 2023-04-02 | End: 2023-04-02

## 2023-04-02 RX ADMIN — ONDANSETRON 4 MG: 2 INJECTION INTRAMUSCULAR; INTRAVENOUS at 17:23

## 2023-04-02 RX ADMIN — CLONIDINE HYDROCHLORIDE 0.1 MG: 0.1 TABLET ORAL at 20:59

## 2023-04-02 RX ADMIN — HYDRALAZINE HYDROCHLORIDE 10 MG: 20 INJECTION INTRAMUSCULAR; INTRAVENOUS at 17:23

## 2023-04-02 RX ADMIN — SODIUM CHLORIDE, PRESERVATIVE FREE 10 ML: 5 INJECTION INTRAVENOUS at 17:23

## 2023-04-02 RX ADMIN — SODIUM CHLORIDE, PRESERVATIVE FREE 10 ML: 5 INJECTION INTRAVENOUS at 06:28

## 2023-04-02 RX ADMIN — HYDRALAZINE HYDROCHLORIDE 10 MG: 20 INJECTION INTRAMUSCULAR; INTRAVENOUS at 12:57

## 2023-04-02 RX ADMIN — SODIUM CHLORIDE, PRESERVATIVE FREE 10 ML: 5 INJECTION INTRAVENOUS at 21:03

## 2023-04-02 RX ADMIN — SODIUM CHLORIDE, POTASSIUM CHLORIDE, SODIUM LACTATE AND CALCIUM CHLORIDE 50 ML/HR: 600; 310; 30; 20 INJECTION, SOLUTION INTRAVENOUS at 21:03

## 2023-04-02 RX ADMIN — ENOXAPARIN SODIUM 40 MG: 100 INJECTION SUBCUTANEOUS at 09:50

## 2023-04-02 RX ADMIN — AMLODIPINE BESYLATE 5 MG: 5 TABLET ORAL at 09:50

## 2023-04-02 RX ADMIN — ACETAMINOPHEN 650 MG: 325 TABLET ORAL at 21:00

## 2023-04-02 NOTE — ROUTINE PROCESS
Called family member to complete req.documentations spoke Dotty Lyubov Mosqueda stated to call in am so other family will answer the question.

## 2023-04-02 NOTE — PROGRESS NOTES
Chris Diggs gianni [address] 79  [address] [facility], [address] [facility]  [phone]      Hospitalist Progress Note      NAME: Michael Gutierrez   :  1924  MRM:  [id number]    Date of service: 2023  9:57 AM       Assessment and Plan:   Weakness/ debility: unclear cause. No leukocytosis. UA is ok this admission. complete IV meropenem. PT/OT. CM is working on SNF placement       2. Recurrent UTI: ESBL in the recent past. UA cleared on this admission. As above complete course of meropenem. Follow up with urology to consider prophylactic abx as outpatient      3. HTN: resume amlodipine and hydralazine PRN     4.  CAD: Stable         Subjective:     Chief Complaint[de-identified] Patient was seen and examined as a follow up for weakness. Chart was reviewed. \"not feel good\"    ROS:  (bold if positive, if negative)    Tolerating PT  Tolerating Diet        Objective:     Last 24hrs VS reviewed since prior progress note.  Most recent are:    Visit Vitals  BP (!) 172/79 (BP 1 Location: Right upper arm, BP Patient Position: At rest)   Pulse 74   Temp 98.3 °F (36.8 °C)   Resp 16   Ht 5' 4\" (1.626 m)   Wt 89.8 kg (198 lb)   SpO2 93%   BMI 33.99 kg/m²     SpO2 Readings from Last 6 Encounters:   23 93%   23 93%   23 96%   23 93%   23 96%   21 96%    O2 Flow Rate (L/min): 2 l/min     Intake/Output Summary (Last 24 hours) at 2023 1226  Last data filed at 2023 0850  Gross per 24 hour   Intake 1850 ml   Output 550 ml   Net 1300 ml          Physical Exam:    Gen:  Well-developed, well-nourished, in no acute distress  HEENT:  Pink conjunctivae, PERRL, hearing intact to voice, moist mucous membranes  Neck:  Supple, without masses, thyroid non-tender  Resp:  No accessory muscle use, clear breath sounds without wheezes rales or rhonchi  Card:  No murmurs, normal S1, S2 without thrills, bruits or peripheral edema  Abd:  Soft, non-tender, non-distended, normoactive bowel sounds are present, no palpable organomegaly and no detectable hernias  Lymph:  No cervical or inguinal adenopathy  Musc:  No cyanosis or clubbing  Skin:  No rashes or ulcers, skin turgor is good  Neuro:  Cranial nerves are grossly intact, no focal motor weakness, follows commands appropriately  Psych:  Good insight, oriented to person, place and time, alert  __________________________________________________________________  Medications Reviewed: (see below)  Medications:     Current Facility-Administered Medications   Medication Dose Route Frequency    hydrALAZINE (APRESOLINE) 20 mg/mL injection 10 mg  10 mg IntraVENous Q6H PRN    [START ON 4/3/2023] amLODIPine (NORVASC) tablet 10 mg  10 mg Oral DAILY    sodium chloride (NS) flush 5-40 mL  5-40 mL IntraVENous Q8H    sodium chloride (NS) flush 5-40 mL  5-40 mL IntraVENous PRN    acetaminophen (TYLENOL) tablet 650 mg  650 mg Oral Q6H PRN    Or    acetaminophen (TYLENOL) suppository 650 mg  650 mg Rectal Q6H PRN    polyethylene glycol (MIRALAX) packet 17 g  17 g Oral DAILY PRN    ondansetron (ZOFRAN ODT) tablet 4 mg  4 mg Oral Q8H PRN    Or    ondansetron (ZOFRAN) injection 4 mg  4 mg IntraVENous Q6H PRN    enoxaparin (LOVENOX) injection 40 mg  40 mg SubCUTAneous DAILY    lactated Ringers infusion  50 mL/hr IntraVENous CONTINUOUS        Lab Data Reviewed: (see below)  Lab Review:     Recent Labs     04/01/23  0246 03/30/23  1230   WBC 8.6 9.9   HGB 13.0 14.6   HCT 41.3 45.3    215       Recent Labs     04/01/23  0246 03/30/23  1230    141   K 4.2 4.7   * 113*   CO2 26 23   * 145*   BUN 22* 25*   CREA 0.94 1.03*   CA 8.5 9.1   ALB  --  3.4*   TBILI  --  0.7   ALT  --  16       No results found for: GLUCPOC  No results for input(s): PH, PCO2, PO2, HCO3, FIO2 in the last 72 hours. No results for input(s): INR, INREXT, INREXT in the last 72 hours.   All Micro Results       Procedure Component Value Units Date/Time    CULTURE, URINE [[id number]] Collected: 03/30/23 1305    Order Status: Completed Specimen: Cath Urine Updated: 03/31/23 2044     Special Requests: NO SPECIAL REQUESTS        Culture result: No growth (<1,000 CFU/ML)       URINE CULTURE HOLD SAMPLE [[id number]] Collected: 03/30/23 1305    Order Status: Completed Specimen: Urine Updated: 03/30/23 1319     Urine culture hold       Urine on hold in Microbiology dept for 2 days. If unpreserved urine is submitted, it cannot be used for addtional testing after 24 hours, recollection will be required. I have reviewed notes of prior 24hr. Other pertinent lab: Total time: -25- minutes **I personally saw and examined the patient during this time period**    I personally reviewed chart, notes, data and current medications in the medical record. I have personally examined and treated the patient at bedside during this period.                  Care Plan discussed with: Patient, Care Manager, Nursing Staff, and >50% of time spent in counseling and coordination of care    Discussed:  Care Plan    Prophylaxis:  Lovenox    Disposition:  SNF/LTC           ___________________________________________________    Attending Physician: Julia Lindo MD

## 2023-04-02 NOTE — PROGRESS NOTES
Bedside and Verbal shift change report given to Yue RN (oncoming nurse) by Clide Gilford RN (offgoing nurse). Report included the following information SBAR, Kardex, and MAR.

## 2023-04-02 NOTE — ROUTINE PROCESS
Bedside and Verbal shift change report given to Altagracia Velasquez (oncoming nurse) by Chaz Morales (offgoing nurse). Report included the following information SBAR and Kardex.

## 2023-04-02 NOTE — PROGRESS NOTES
Problem: Pressure Injury - Risk of  Goal: *Prevention of pressure injury  Outcome: Progressing Towards Goal  Note: Pressure Injury Interventions:  Sensory Interventions: Assess changes in LOC, Check visual cues for pain, Keep linens dry and wrinkle-free, Maintain/enhance activity level, Minimize linen layers    Moisture Interventions: Check for incontinence Q2 hours and as needed, Internal/External urinary devices, Minimize layers    Activity Interventions: Assess need for specialty bed, Increase time out of bed, PT/OT evaluation    Mobility Interventions: Chair cushion, Assess need for specialty bed, PT/OT evaluation, Turn and reposition approx.  every two hours(pillow and wedges)    Nutrition Interventions: Discuss nutritional consult with provider, Document food/fluid/supplement intake    Friction and Shear Interventions: HOB 30 degrees or less, Apply protective barrier, creams and emollients, Minimize layers                Problem: Patient Education: Go to Patient Education Activity  Goal: Patient/Family Education  Outcome: Progressing Towards Goal     Problem: Patient Education: Go to Patient Education Activity  Goal: Patient/Family Education  Outcome: Progressing Towards Goal     Problem: Patient Education: Go to Patient Education Activity  Goal: Patient/Family Education  Outcome: Progressing Towards Goal     Problem: Urinary Tract Infection - Adult  Goal: *Absence of infection signs and symptoms  Outcome: Progressing Towards Goal     Problem: Impaired Skin Integrity/Pressure Injury Treatment  Goal: *Improvement of Existing Pressure Injury  Outcome: Progressing Towards Goal  Goal: *Prevention of pressure injury  Outcome: Progressing Towards Goal  Note: Pressure Injury Interventions:  Sensory Interventions: Assess changes in LOC, Check visual cues for pain, Keep linens dry and wrinkle-free, Maintain/enhance activity level, Minimize linen layers    Moisture Interventions: Check for incontinence Q2 hours and as needed, Internal/External urinary devices, Minimize layers    Activity Interventions: Assess need for specialty bed, Increase time out of bed, PT/OT evaluation    Mobility Interventions: Chair cushion, Assess need for specialty bed, PT/OT evaluation, Turn and reposition approx.  every two hours(pillow and wedges)    Nutrition Interventions: Discuss nutritional consult with provider, Document food/fluid/supplement intake    Friction and Shear Interventions: HOB 30 degrees or less, Apply protective barrier, creams and emollients, Minimize layers                Problem: Patient Education: Go to Patient Education Activity  Goal: Patient/Family Education  Outcome: Progressing Towards Goal     Problem: Risk for Spread of Infection  Goal: Prevent transmission of infectious organism to others  Outcome: Progressing Towards Goal     Problem: Patient Education:  Go to Education Activity  Goal: Patient/Family Education  Outcome: Progressing Towards Goal     Problem: Patient Education: Go to Patient Education Activity  Goal: Patient/Family Education  Outcome: Progressing Towards Goal

## 2023-04-03 PROCEDURE — G0378 HOSPITAL OBSERVATION PER HR: HCPCS

## 2023-04-03 PROCEDURE — 97530 THERAPEUTIC ACTIVITIES: CPT

## 2023-04-03 PROCEDURE — 74011250636 HC RX REV CODE- 250/636: Performed by: INTERNAL MEDICINE

## 2023-04-03 PROCEDURE — 92610 EVALUATE SWALLOWING FUNCTION: CPT

## 2023-04-03 PROCEDURE — 74011000250 HC RX REV CODE- 250: Performed by: INTERNAL MEDICINE

## 2023-04-03 PROCEDURE — 74011250637 HC RX REV CODE- 250/637: Performed by: INTERNAL MEDICINE

## 2023-04-03 PROCEDURE — 94761 N-INVAS EAR/PLS OXIMETRY MLT: CPT

## 2023-04-03 PROCEDURE — 77010033678 HC OXYGEN DAILY

## 2023-04-03 RX ADMIN — ASPIRIN 81 MG: 81 TABLET, CHEWABLE ORAL at 09:31

## 2023-04-03 RX ADMIN — SODIUM CHLORIDE, PRESERVATIVE FREE 10 ML: 5 INJECTION INTRAVENOUS at 19:50

## 2023-04-03 RX ADMIN — ENOXAPARIN SODIUM 40 MG: 100 INJECTION SUBCUTANEOUS at 09:31

## 2023-04-03 RX ADMIN — AMLODIPINE BESYLATE 10 MG: 5 TABLET ORAL at 09:31

## 2023-04-03 RX ADMIN — ATORVASTATIN CALCIUM 10 MG: 10 TABLET, FILM COATED ORAL at 09:31

## 2023-04-03 RX ADMIN — CLONIDINE HYDROCHLORIDE 0.1 MG: 0.1 TABLET ORAL at 19:46

## 2023-04-03 RX ADMIN — SODIUM CHLORIDE, PRESERVATIVE FREE 10 ML: 5 INJECTION INTRAVENOUS at 07:05

## 2023-04-03 NOTE — PROGRESS NOTES
Problem: Self Care Deficits Care Plan (Adult)  Goal: *Acute Goals and Plan of Care (Insert Text)  Description: FUNCTIONAL STATUS PRIOR TO ADMISSION: Patient requires assist for self care tasks (bathing/dressing/toileting) and stating able to complete grooming and self feeding however requires assist at times from staff. Per pt report, pt requires assist for bed mobility and transfers into [facility] however once in [facility] able to self propel WC to dining room. HOME SUPPORT: The patient lives at an [facility] [place]) and requires assist for self care and transfers from staff. .    Occupational Therapy Goals  Initiated 3/31/2023  1. Patient will perform grooming seated EOB/chair level with supervision/set-up within 7 day(s). 2.  Patient will perform upper body dressing with minimal assistance/contact guard assist within 7 day(s). 3.  Patient will perform anterior upper body bathing with minimal assistance/contact guard assist within 7 day(s). 4.  Patient will perform BSC transfers with moderate assistance  within 7 day(s). 5.  Patient will perform all aspects of toileting with moderate assistance  within 7 day(s). 6.  Patient will participate in upper extremity therapeutic exercise/activities with contact guard/stand by assist for 5 minutes within 10 day(s). Outcome: Progressing Towards Goal     OCCUPATIONAL THERAPY TREATMENT  Patient: Tamela Morton (44 y.o. female)  Date: 4/3/2023  Diagnosis: FTT (failure to thrive) in adult [R62.7] <principal problem not specified>      Precautions: Fall  Chart, occupational therapy assessment, plan of care, and goals were reviewed. ASSESSMENT  Patient received semi supine in bed agreeable for OT/PT treatment (patient fearful of falling however agreeable to attempt stance with jose eduardo steady with encouragement and support). Patient continues with skilled OT services and is progressing towards goals.  Patient requiring min A sup->sit with HOB elevated and increased time, mod A scooting EOB and max Ax2 sit<->stand with use of gait belt and jose eduardo steady and mod Ax2 sit->sup. While semi supine in bed patient is SBA for combing hair and total A for LB dressing seated EOB. Patient educated on and completed kim UE HEP in prep for self care tasks (see below for details). Patient would benefit from continued skilled OT services while at [facility] in order to increase safety and independence with self care and functional transfers/mobility. Recommend discharge to SNF when medically appropriate. Other factors to consider for discharge: time since onset, severity of deficits, PLOF         PLAN :  Patient continues to benefit from skilled intervention to address the above impairments. Continue treatment per established plan of care to address goals. Recommend with staff: assist with self care tasks    Recommend next OT session: continue to progress towards goals    Recommendation for discharge: (in order for the patient to meet his/her long term goals)  Therapy up to 5 days/week in SNF setting    This discharge recommendation:  Has been made in collaboration with the attending provider and/or case management    IF patient discharges home will need the following DME: TBD       SUBJECTIVE:   Patient stated I dont want to fall.     OBJECTIVE DATA SUMMARY:   Cognitive/Behavioral Status:  Neurologic State: Alert;Eyes open spontaneously  Orientation Level: Oriented to person;Oriented to place; Disoriented to situation;Disoriented to time  Cognition: Follows commands  Perception: Appears intact     Functional Mobility and Transfers for ADLs:  Bed Mobility:  Rolling: Minimum assistance  Supine to Sit: Minimum assistance; Additional time; Adaptive equipment  Sit to Supine: Moderate assistance;Assist x2  Scooting: Moderate assistance    Transfers:  Sit to Stand: Maximum assistance;Assist x2 (with jose eduardo-steady)     Balance:  Sitting: Impaired; Without support  Sitting - Static: Good (unsupported)  Sitting - Dynamic: Fair (occasional)  Standing: Impaired; With support;Pull to stand  Standing - Static: Constant support;Poor    ADL Intervention:     Grooming  Grooming Assistance: Stand-by assistance  Position Performed:  (semi supine in bed)  Brushing/Combing Hair: Stand-by assistance    Lower Body Dressing Assistance  Socks: Total assistance (dependent)  Position Performed: Seated edge of bed    Therapeutic Exercises:   SBA AROM LUE HEP for 1 set of 5 reps and RUE AAROM for 1 set of 5 reps in all planes in prep for self care tasks    Pain:  Pt reporting right hand pain    Activity Tolerance:   Fair and requires rest breaks    After treatment patient left in no apparent distress:   Supine in bed, Call bell within reach, Bed / chair alarm activated, and Caregiver / family present    COMMUNICATION/COLLABORATION:   The patients plan of care was discussed with: Physical therapist and Registered nurse.    Co-treatment completed with PT for increased patient and clinician safety    Lucia Yuan  Time Calculation: 24 mins

## 2023-04-03 NOTE — ROUTINE PROCESS
Bedside and Verbal shift change report given to  [facility]) by Urban Benavides (offgoing nurse). Report included the following information SBAR and Kardex.

## 2023-04-03 NOTE — PROGRESS NOTES
Chris Diggs [facility] [address]  [address] [facility], [address] [place] [address]  [phone]      Hospitalist Progress Note      NAME: Michael Gutierrez   :  1924  MRM:  [id number]    Date of service: 4/3/2023  9:57 AM       Assessment and Plan:   Weakness/ debility: unclear cause. No leukocytosis. UA is ok this admission. complete IV meropenem. PT/OT. CM is working on SNF placement       2. Recurrent UTI: ESBL in the recent past. UA cleared on this admission. As above complete course of meropenem. Follow up with urology to consider prophylactic abx as outpatient      3. HTN: resume amlodipine and hydralazine PRN     4.  CAD: Stable         Subjective:     Chief Complaint[de-identified] Patient was seen and examined as a follow up for weakness. Chart was reviewed. feels better     ROS:  (bold if positive, if negative)    Tolerating PT  Tolerating Diet        Objective:     Last 24hrs VS reviewed since prior progress note.  Most recent are:    Visit Vitals  BP (!) 157/74 (BP 1 Location: Left upper arm, BP Patient Position: At rest)   Pulse 73   Temp 97.9 °F (36.6 °C)   Resp 18   Ht 5' 4\" (1.626 m)   Wt 89.8 kg (198 lb)   SpO2 93%   BMI 33.99 kg/m²     SpO2 Readings from Last 6 Encounters:   23 93%   23 93%   23 96%   23 93%   23 96%   21 96%    O2 Flow Rate (L/min): 2 l/min     Intake/Output Summary (Last 24 hours) at 4/3/2023 0955  Last data filed at 4/3/2023 0600  Gross per 24 hour   Intake 350 ml   Output 200 ml   Net 150 ml          Physical Exam:    Gen:  Well-developed, well-nourished, in no acute distress  HEENT:  Pink conjunctivae, PERRL, hearing intact to voice, moist mucous membranes  Neck:  Supple, without masses, thyroid non-tender  Resp:  No accessory muscle use, clear breath sounds without wheezes rales or rhonchi  Card:  No murmurs, normal S1, S2 without thrills, bruits or peripheral edema  Abd:  Soft, non-tender, non-distended, normoactive bowel sounds are present, no palpable organomegaly and no detectable hernias  Lymph:  No cervical or inguinal adenopathy  Musc:  No cyanosis or clubbing  Skin:  No rashes or ulcers, skin turgor is good  Neuro:  Cranial nerves are grossly intact, no focal motor weakness, follows commands appropriately  Psych:  Good insight, oriented to person, place and time, alert  __________________________________________________________________  Medications Reviewed: (see below)  Medications:     Current Facility-Administered Medications   Medication Dose Route Frequency    aspirin chewable tablet 81 mg  81 mg Oral DAILY    atorvastatin (LIPITOR) tablet 10 mg  10 mg Oral DAILY    hydrALAZINE (APRESOLINE) 20 mg/mL injection 10 mg  10 mg IntraVENous Q6H PRN    amLODIPine (NORVASC) tablet 10 mg  10 mg Oral DAILY    cloNIDine HCL (CATAPRES) tablet 0.1 mg  0.1 mg Oral Q6H PRN    sodium chloride (NS) flush 5-40 mL  5-40 mL IntraVENous Q8H    sodium chloride (NS) flush 5-40 mL  5-40 mL IntraVENous PRN    acetaminophen (TYLENOL) tablet 650 mg  650 mg Oral Q6H PRN    Or    acetaminophen (TYLENOL) suppository 650 mg  650 mg Rectal Q6H PRN    polyethylene glycol (MIRALAX) packet 17 g  17 g Oral DAILY PRN    ondansetron (ZOFRAN ODT) tablet 4 mg  4 mg Oral Q8H PRN    Or    ondansetron (ZOFRAN) injection 4 mg  4 mg IntraVENous Q6H PRN    enoxaparin (LOVENOX) injection 40 mg  40 mg SubCUTAneous DAILY    lactated Ringers infusion  50 mL/hr IntraVENous CONTINUOUS        Lab Data Reviewed: (see below)  Lab Review:     Recent Labs     04/01/23 0246   WBC 8.6   HGB 13.0   HCT 41.3          Recent Labs     04/01/23 0246      K 4.2   *   CO2 26   *   BUN 22*   CREA 0.94   CA 8.5       No results found for: GLUCPOC  No results for input(s): PH, PCO2, PO2, HCO3, FIO2 in the last 72 hours. No results for input(s): INR, INREXT, INREXT in the last 72 hours.   All Micro Results       Procedure Component Value Units Date/Time    CULTURE, URINE [[id number]] Collected: 03/30/23 1305    Order Status: Completed Specimen: Cath Urine Updated: 03/31/23 2044     Special Requests: NO SPECIAL REQUESTS        Culture result: No growth (<1,000 CFU/ML)       URINE CULTURE HOLD SAMPLE [[id number]] Collected: 03/30/23 1305    Order Status: Completed Specimen: Urine Updated: 03/30/23 1319     Urine culture hold       Urine on hold in Microbiology dept for 2 days. If unpreserved urine is submitted, it cannot be used for addtional testing after 24 hours, recollection will be required. I have reviewed notes of prior 24hr. Other pertinent lab: Total time: -25- minutes **I personally saw and examined the patient during this time period**    I personally reviewed chart, notes, data and current medications in the medical record. I have personally examined and treated the patient at bedside during this period.                  Care Plan discussed with: Patient, Care Manager, Nursing Staff, and >50% of time spent in counseling and coordination of care    Discussed:  Care Plan    Prophylaxis:  Lovenox    Disposition:  SNF/LTC           ___________________________________________________    Attending Physician: Mariya López MD

## 2023-04-03 NOTE — PROGRESS NOTES
Problem: Pressure Injury - Risk of  Goal: *Prevention of pressure injury  Outcome: Progressing Towards Goal  Note: Pressure Injury Interventions:  Sensory Interventions: Maintain/enhance activity level, Minimize linen layers, Turn and reposition approx. every two hours (pillows and wedges if needed), Assess changes in LOC    Moisture Interventions: Check for incontinence Q2 hours and as needed, Apply protective barrier, creams and emollients, Absorbent underpads, Minimize layers    Activity Interventions: PT/OT evaluation, Pressure redistribution bed/mattress(bed type), Assess need for specialty bed    Mobility Interventions: PT/OT evaluation, Turn and reposition approx.  every two hours(pillow and wedges), Pressure redistribution bed/mattress (bed type), Float heels    Nutrition Interventions: Discuss nutritional consult with provider, Document food/fluid/supplement intake    Friction and Shear Interventions: Minimize layers, Lift sheet, HOB 30 degrees or less                Problem: Patient Education: Go to Patient Education Activity  Goal: Patient/Family Education  Outcome: Progressing Towards Goal     Problem: Falls - Risk of  Goal: *Absence of Falls  Outcome: Progressing Towards Goal  Note: Fall Risk Interventions:                                Problem: Urinary Tract Infection - Adult  Goal: *Absence of infection signs and symptoms  Outcome: Progressing Towards Goal     Problem: Patient Education: Go to Patient Education Activity  Goal: Patient/Family Education  Outcome: Progressing Towards Goal     Problem: Patient Education: Go to Patient Education Activity  Goal: Patient/Family Education  Outcome: Progressing Towards Goal     Problem: Risk for Spread of Infection  Goal: Prevent transmission of infectious organism to others  Outcome: Progressing Towards Goal     Problem: Patient Education:  Go to Education Activity  Goal: Patient/Family Education  Outcome: Progressing Towards Goal

## 2023-04-03 NOTE — PROGRESS NOTES
Problem: Mobility Impaired (Adult and Pediatric)  Goal: *Acute Goals and Plan of Care (Insert Text)  Description: FUNCTIONAL STATUS PRIOR TO ADMISSION: Pt reports receiving assistance for all bed mobility and transfers, utilizes wheelchair for all locomotion. She reports self-propelling wheelchair to dining villalta. Care manager reports pt transfers with SBA/supervision at baseline. HOME SUPPORT PRIOR TO ADMISSION: The patient lived at [facility]. Physical Therapy Goals  Initiated 3/30/2023  1. Patient will move from supine to sit and sit to supine  in bed with moderate assistance  within 7 day(s). 2.  Patient will transfer from bed to chair and chair to bed with moderate assistance  using the least restrictive device within 7 day(s). 3.  Patient will perform sit to stand with moderate assistance  within 7 day(s). 4.  Patient will self propel wheelchair 48' with supervision within 7 days. Outcome: Progressing Towards Goal   PHYSICAL THERAPY TREATMENT  Patient: Ayden Griffiths (37 y.o. female)  Date: 4/3/2023  Diagnosis: FTT (failure to thrive) in adult [R62.7] <principal problem not specified>      Precautions: Fall  Chart, physical therapy assessment, plan of care and goals were reviewed. ASSESSMENT  Patient continues with skilled PT services and is progressing towards goals. Patient this afternoon with fair tolerance and participation in physical therapy session secondary to fear behaviors surrounding falling. Patient does show good improvement in bed mobility today, Rodger and additional time to seated edge of bed. However for sit > stand transfer with jose eduardo-steady she requires maxAx2 to clear her ischial tuberosities high enough for lift paddles to be placed. She does tolerate semi-upright posturing in jose eduardo-steady lift for several minutes while linen changed. Patient politely declining transfer to bedside chair today.  With improvement in sit > stand transfer tomorrow, anticipate possible transfer to bedside chair. Continue to recommend SNF level rehab upon d/c. Hermes Esparza Current Level of Function Impacting Discharge (mobility/balance): maxAx2 with jose eduardo-steady    Other factors to consider for discharge: none additional         PLAN :  Patient continues to benefit from skilled intervention to address the above impairments. Continue treatment per established plan of care. to address goals. Recommendation for discharge: (in order for the patient to meet his/her long term goals)  Therapy up to 5 days/week in SNF setting    This discharge recommendation:  Has been made in collaboration with the attending provider and/or case management    IF patient discharges home will need the following DME: to be determined (TBD)       SUBJECTIVE:   Patient stated I'm so scared of falling.     OBJECTIVE DATA SUMMARY:   Patient received supine in bed and was agreeable to participate in PT session. Patient was cleared by nursing to participate in PT session. Patient's family members present at conclusion of session    Critical Behavior:  Neurologic State: Alert, Eyes open spontaneously  Orientation Level: Oriented to person, Oriented to place, Disoriented to situation, Disoriented to time  Cognition: Follows commands  Safety/Judgement: Decreased insight into deficits (very high fear of falling)  Functional Mobility Training:  Bed Mobility:  Rolling: Minimum assistance  Supine to Sit: Minimum assistance; Additional time; Adaptive equipment  Sit to Supine: Moderate assistance;Assist x2  Scooting: Moderate assistance        Transfers:  Sit to Stand: Maximum assistance;Assist x2 (with jose eduardo-steady)                                Balance:  Sitting: Impaired; Without support  Sitting - Static: Good (unsupported)  Sitting - Dynamic: Fair (occasional)  Standing: Impaired; With support;Pull to stand  Standing - Static: Constant support;Poor  Ambulation/Gait Training:                                        Therapeutic Exercises:     Pain Rating:  Patient without reports of pain during therapy      Activity Tolerance:   Good, tolerates ADLs without rest breaks, and desaturates with exertion and requires oxygen    After treatment patient left in no apparent distress:   Supine in bed, Heels elevated for pressure relief, Call bell within reach, Bed / chair alarm activated, Caregiver / family present, and Side rails x 3    COMMUNICATION/COLLABORATION:   The patients plan of care was discussed with: Occupational therapist and Registered nurse.      Lien Wilson PT, DPT   Time Calculation: 19 mins

## 2023-04-03 NOTE — PROGRESS NOTES
Problem: Pressure Injury - Risk of  Goal: *Prevention of pressure injury  Outcome: Progressing Towards Goal  Note: Pressure Injury Interventions:  Sensory Interventions: Assess changes in LOC, Check visual cues for pain, Float heels    Moisture Interventions: Internal/External urinary devices, Absorbent underpads    Activity Interventions: PT/OT evaluation, Pressure redistribution bed/mattress(bed type)    Mobility Interventions: HOB 30 degrees or less    Nutrition Interventions: Document food/fluid/supplement intake    Friction and Shear Interventions: HOB 30 degrees or less, Minimize layers                Problem: Patient Education: Go to Patient Education Activity  Goal: Patient/Family Education  Outcome: Progressing Towards Goal     Problem: Patient Education: Go to Patient Education Activity  Goal: Patient/Family Education  Outcome: Progressing Towards Goal     Problem: Falls - Risk of  Goal: *Absence of Falls  Outcome: Progressing Towards Goal  Note: Fall Risk Interventions:                                Problem: Patient Education: Go to Patient Education Activity  Goal: Patient/Family Education  Outcome: Progressing Towards Goal     Problem: Patient Education: Go to Patient Education Activity  Goal: Patient/Family Education  Outcome: Progressing Towards Goal     Problem: Urinary Tract Infection - Adult  Goal: *Absence of infection signs and symptoms  Outcome: Progressing Towards Goal     Problem: Risk for Spread of Infection  Goal: Prevent transmission of infectious organism to others  Outcome: Progressing Towards Goal     Problem: Pressure Injury - Risk of  Goal: *Prevention of pressure injury  Outcome: Progressing Towards Goal  Note: Pressure Injury Interventions:  Sensory Interventions: Assess changes in LOC, Check visual cues for pain, Float heels    Moisture Interventions: Internal/External urinary devices, Absorbent underpads    Activity Interventions: PT/OT evaluation, Pressure redistribution bed/mattress(bed type)    Mobility Interventions: HOB 30 degrees or less    Nutrition Interventions: Document food/fluid/supplement intake    Friction and Shear Interventions: HOB 30 degrees or less, Minimize layers

## 2023-04-03 NOTE — PROGRESS NOTES
SPEECH PATHOLOGY BEDSIDE SWALLOW EVALUATION/DISCHARGE  Patient: Tiney Shone (48 y.o. female)  Date: 4/3/2023  Primary Diagnosis: FTT (failure to thrive) in adult [R62.7]       Precautions:   Fall    ASSESSMENT :  Based on the objective data described below, the patient presents with minimal PO due to FTT dx. No pulmonary issues. RN reports coughing at lunch. She has been seen by SLP a few years ago and requested [facility] [employer]. .  Skilled acute therapy provided by a speech-language pathologist is not indicated at this time. PLAN :  Recommendations:  Ok for diet per patient request.   Discharge Recommendations: None     SUBJECTIVE:   Patient stated I'm not hungry.     OBJECTIVE:     Past Medical History:   Diagnosis Date    CAD (coronary artery disease)     Femur fracture ([address]     right    GERD (gastroesophageal reflux disease)     HTN (hypertension), benign 1/11/2011    Mixed hyperlipidemia 1/11/2011     Past Surgical History:   Procedure Laterality Date    HX FEMUR FRACTURE TX      HX KNEE REPLACEMENT      bilateral    HX ORTHOPAEDIC      bilat knee replacements    HX VEIN STRIPPING       Prior Level of Function/Home Situation:   Home Situation  Home Environment: Assisted living  [address] [place] [address] M[place] Name: [place]  One/Two Story Residence: One story  Living Alone: Yes  Support Systems: Child(anish), Other Family Member(s)  Patient Expects to be Discharged to[de-identified] Assisted Living  Current DME Used/Available at Home: Wheelchair  Diet prior to admission: regular, thins  Current Diet:  regular, thins   Cognitive and Communication Status:  Neurologic State: Alert, Eyes open spontaneously  Orientation Level: Oriented to person, Oriented to place, Disoriented to situation, Disoriented to time  Cognition: Follows commands  Perception: Appears intact     Safety/Judgement: Decreased insight into deficits (very high fear of falling)  Oral Assessment:  Oral Assessment  Labial: No impairment  Dentition: Natural;Limited  Oral Hygiene: WFL-mildly dry  Lingual: No impairment  Mandible: No impairment  P.O. Trials:  Patient Position: uprigth in bed  Vocal quality prior to P.O.: No impairment  Consistency Presented: Thin liquid (patient refused all items x water. claims she drink the boost)  How Presented: SLP-fed/presented;Straw;Successive swallows   ORAL PHASE:   Bolus Acceptance: Impaired (refusing all but liquids)  Bolus Formation/Control: No impairment     Propulsion: No impairment  Oral Residue: None  PHARYNGEAL PHASE:   Initiation of Swallow: No impairment  Laryngeal Elevation: Functional  Aspiration Signs/Symptoms: None                      NOMS:   The NOMS functional outcome measure was used to quantify this patient's level of swallowing impairment. Based on the NOMS, the patient was determined to be at level 6 for swallow function     NOMS Swallowing Levels:  Level 1 (CN): NPO  Level 2 (CM): NPO but takes consistency in therapy  Level 3 (CL): Takes less than 50% of nutrition p.o. and continues with nonoral feedings; and/or safe with mod cues; and/or max diet restriction  Level 4 (CK): Safe swallow but needs mod cues; and/or mod diet restriction; and/or still requires some nonoral feeding/supplements  Level 5 (CJ): Safe swallow with min diet restriction; and/or needs min cues  Level 6 (CI): Independent with p.o.; rare cues; usually self cues; may need to avoid some foods or needs extra time  Level 7 ([facility]): Independent for all p.o.  MADYSON. (2003). National Outcomes Measurement System (NOMS): Adult Speech-Language Pathology User's Guide. Pain:  Pain Scale 1: Numeric (0 - 10)  Pain Intensity 1: 0 (No pain; pt states \"just not feeling good\")     After treatment:   Patient left in no apparent distress in bed and Nursing notified    COMMUNICATION/EDUCATION:   Patient was educated regarding her deficit(s) of NONE as this relates to her diagnosis of FTT.   She demonstrated Fair understanding as evidenced by no concerns. The patient's plan of care including recommendations, planned interventions, and recommended diet changes were discussed with: Registered nurse.      Thank you for this referral.  LAKESHIA Morales  Time Calculation: 10 mins

## 2023-04-04 PROCEDURE — 74011000250 HC RX REV CODE- 250: Performed by: INTERNAL MEDICINE

## 2023-04-04 PROCEDURE — 74011250636 HC RX REV CODE- 250/636: Performed by: INTERNAL MEDICINE

## 2023-04-04 PROCEDURE — G0378 HOSPITAL OBSERVATION PER HR: HCPCS

## 2023-04-04 PROCEDURE — 94761 N-INVAS EAR/PLS OXIMETRY MLT: CPT

## 2023-04-04 PROCEDURE — 77010033678 HC OXYGEN DAILY

## 2023-04-04 PROCEDURE — 74011250637 HC RX REV CODE- 250/637: Performed by: INTERNAL MEDICINE

## 2023-04-04 RX ADMIN — ASPIRIN 81 MG: 81 TABLET, CHEWABLE ORAL at 10:41

## 2023-04-04 RX ADMIN — ATORVASTATIN CALCIUM 10 MG: 10 TABLET, FILM COATED ORAL at 10:41

## 2023-04-04 RX ADMIN — SODIUM CHLORIDE, POTASSIUM CHLORIDE, SODIUM LACTATE AND CALCIUM CHLORIDE 50 ML/HR: 600; 310; 30; 20 INJECTION, SOLUTION INTRAVENOUS at 13:08

## 2023-04-04 RX ADMIN — SODIUM CHLORIDE, PRESERVATIVE FREE 10 ML: 5 INJECTION INTRAVENOUS at 06:20

## 2023-04-04 RX ADMIN — ENOXAPARIN SODIUM 40 MG: 100 INJECTION SUBCUTANEOUS at 10:40

## 2023-04-04 RX ADMIN — AMLODIPINE BESYLATE 10 MG: 5 TABLET ORAL at 10:41

## 2023-04-04 RX ADMIN — CLONIDINE HYDROCHLORIDE 0.1 MG: 0.1 TABLET ORAL at 01:04

## 2023-04-04 NOTE — PROGRESS NOTES
4/4/2023  Case Management Progress Note    12:35 PM  Transportation confirmed for 3pm. Discharge summary faxed to [facility] at [phone]. RACH Thorne    12:06 PM  Patient is good to go to [address] today. She will go to room 226. RN please call report to  [phone] ext [phone]. I have asked for transportation at 3pm if possible with [facility]. RACH Thorne    9:38 AM  Patient is 80year old female admitted 3/30 with failure to thrive  Patient does not have RUR score due to observation status, letter given on admission  Covid test: none this admission, will need one today   Chart reviewed  Patient is accepted at [facility] and has authorization from insurance. I called over to admissions and left a voicemail as well as sending a message in [employer]. As of yesterday they were thinking they had a bed today. Patient's daughter is also at [facility] currently. I spoke to her grandson over the phone and confirmed the plan for her to go over there today if there's a bed. Will continue to follow and assist with discharge planning.      Transition of Care Plan   Continue medical management/treatment  SNF--[facility] pend bed  Stretcher transport: on will call  CM will continue to follow    RACH Thorne

## 2023-04-04 NOTE — DISCHARGE INSTRUCTIONS
ACUTE DIAGNOSES:  FTT (failure to thrive) in adult [R62.7]    CHRONIC MEDICAL DIAGNOSES:  [unfilled]    DISCHARGE MEDICATIONS:          It is important that you take the medication exactly as they are prescribed. Keep your medication in the bottles provided by the pharmacist and keep a list of the medication names, dosages, and times to be taken in your wallet. Do not take other medications without consulting your doctor. DIET:  Regular Diet    ACTIVITY: Activity as tolerated    ADDITIONAL INFORMATION: If you experience any of the following symptoms then please call your primary care physician or return to the emergency room if you cannot get hold of your doctor: Fever, chills, nausea, vomiting, diarrhea, change in mentation, falling, bleeding, shortness of breath. FOLLOW UP CARE:              Information obtained by :  I understand that if any problems occur once I am at home I am to contact my physician. I understand and acknowledge receipt of the instructions indicated above.                                                                                                                                            Physician's or R.N.'s Signature                                                                  Date/Time                                                                                                                                              Patient or Representative Signature                                                          Date/Time

## 2023-04-04 NOTE — ROUTINE PROCESS
Bedside and Verbal shift change report given to Lisette Hill (oncoming nurse) by Susan Hopkins (offgoing nurse). Report included the following information SBAR and Kardex.

## 2023-04-04 NOTE — PROGRESS NOTES
Pt discharged to [facility] facility via [facility] stretcher transportation. Report given to receiving nurse. All personal belongings sent with patient. Resident denies any issues at discharge. Education provided.

## 2023-04-04 NOTE — PROGRESS NOTES
Chris Fosterelsen [facility] [address]  [address]  [phone]      Hospitalist Progress Note      NAME: Tamela Morton   :  1924  MRM:  [id number]    Date of service: 2023  9:57 AM       Assessment and Plan:   Weakness/ debility: unclear cause. No leukocytosis. UA is ok this admission. complete IV meropenem. PT/OT. Accepted to SNF and is awaiting for auth. 2.  Recurrent UTI: ESBL in the recent past. UA cleared on this admission. As above complete course of meropenem. Follow up with urology to consider prophylactic abx as outpatient      3. HTN: resume amlodipine and hydralazine PRN     4.  CAD: Stable         Subjective:     Chief Complaint[de-identified] Patient was seen and examined as a follow up for weakness. Chart was reviewed. No events    ROS:  (bold if positive, if negative)    Tolerating PT  Tolerating Diet        Objective:     Last 24hrs VS reviewed since prior progress note.  Most recent are:    Visit Vitals  BP (!) 113/56 (BP 1 Location: Left upper arm, BP Patient Position: At rest)   Pulse 66   Temp 97.6 °F (36.4 °C)   Resp 18   Ht 5' 4\" (1.626 m)   Wt 89.8 kg (198 lb)   SpO2 90%   BMI 33.99 kg/m²     SpO2 Readings from Last 6 Encounters:   23 90%   23 93%   23 96%   23 93%   23 96%   21 96%    O2 Flow Rate (L/min): 2 l/min     Intake/Output Summary (Last 24 hours) at 2023 1019  Last data filed at 2023 0622  Gross per 24 hour   Intake 1100 ml   Output 725 ml   Net 375 ml          Physical Exam:    Gen:  Well-developed, well-nourished, in no acute distress  HEENT:  Pink conjunctivae, PERRL, hearing intact to voice, moist mucous membranes  Neck:  Supple, without masses, thyroid non-tender  Resp:  No accessory muscle use, clear breath sounds without wheezes rales or rhonchi  Card:  No murmurs, normal S1, S2 without thrills, bruits or peripheral edema  Abd:  Soft, non-tender, non-distended, normoactive bowel sounds are present, no palpable organomegaly and no detectable hernias  Lymph:  No cervical or inguinal adenopathy  Musc:  No cyanosis or clubbing  Skin:  No rashes or ulcers, skin turgor is good  Neuro:  Cranial nerves are grossly intact, no focal motor weakness, follows commands appropriately  Psych:  Good insight, oriented to person, place and time, alert  __________________________________________________________________  Medications Reviewed: (see below)  Medications:     Current Facility-Administered Medications   Medication Dose Route Frequency    aspirin chewable tablet 81 mg  81 mg Oral DAILY    atorvastatin (LIPITOR) tablet 10 mg  10 mg Oral DAILY    hydrALAZINE (APRESOLINE) 20 mg/mL injection 10 mg  10 mg IntraVENous Q6H PRN    amLODIPine (NORVASC) tablet 10 mg  10 mg Oral DAILY    cloNIDine HCL (CATAPRES) tablet 0.1 mg  0.1 mg Oral Q6H PRN    sodium chloride (NS) flush 5-40 mL  5-40 mL IntraVENous Q8H    sodium chloride (NS) flush 5-40 mL  5-40 mL IntraVENous PRN    acetaminophen (TYLENOL) tablet 650 mg  650 mg Oral Q6H PRN    Or    acetaminophen (TYLENOL) suppository 650 mg  650 mg Rectal Q6H PRN    polyethylene glycol (MIRALAX) packet 17 g  17 g Oral DAILY PRN    ondansetron (ZOFRAN ODT) tablet 4 mg  4 mg Oral Q8H PRN    Or    ondansetron (ZOFRAN) injection 4 mg  4 mg IntraVENous Q6H PRN    enoxaparin (LOVENOX) injection 40 mg  40 mg SubCUTAneous DAILY    lactated Ringers infusion  50 mL/hr IntraVENous CONTINUOUS        Lab Data Reviewed: (see below)  Lab Review:     No results for input(s): WBC, HGB, HCT, PLT, HGBEXT, HCTEXT, PLTEXT, HGBEXT, HCTEXT, PLTEXT in the last 72 hours. No results for input(s): NA, K, CL, CO2, GLU, BUN, CREA, CA, MG, PHOS, ALB, TBIL, TBILI, ALT, INR, INREXT, INREXT in the last 72 hours. No lab exists for component: SGOT    No results found for: GLUCPOC  No results for input(s): PH, PCO2, PO2, HCO3, FIO2 in the last 72 hours.   No results for input(s): INR, INREXT, INREXT in the last 72 hours. All Micro Results       Procedure Component Value Units Date/Time    CULTURE, URINE [[id number]] Collected: 03/30/23 1305    Order Status: Completed Specimen: Cath Urine Updated: 03/31/23 2044     Special Requests: NO SPECIAL REQUESTS        Culture result: No growth (<1,000 CFU/ML)       URINE CULTURE HOLD SAMPLE [[id number]] Collected: 03/30/23 1305    Order Status: Completed Specimen: Urine Updated: 03/30/23 1319     Urine culture hold       Urine on hold in Microbiology dept for 2 days. If unpreserved urine is submitted, it cannot be used for addtional testing after 24 hours, recollection will be required. I have reviewed notes of prior 24hr. Other pertinent lab: Total time: -15- minutes **I personally saw and examined the patient during this time period**    I personally reviewed chart, notes, data and current medications in the medical record. I have personally examined and treated the patient at bedside during this period.                  Care Plan discussed with: Patient, Care Manager, Nursing Staff, and >50% of time spent in counseling and coordination of care    Discussed:  Care Plan    Prophylaxis:  Lovenox    Disposition:  SNF/LTC           ___________________________________________________    Attending Physician: Shelly Treadwell MD

## 2023-04-04 NOTE — DISCHARGE SUMMARY
Hospitalist Discharge Summary     Patient ID:    Tamela Morton  [id number]  31 y.o.  4/26/1924    Admit date of service: 3/30/2023    Discharge date of service: 4/4/2023    Admission Diagnoses: FTT (failure to thrive) in adult [R62.7]    Chronic Diagnoses:    Problem List as of 4/4/2023 Date Reviewed: 2/14/2023            Codes Class Noted - Resolved    FTT (failure to thrive) in adult ICD-10-CM: R62.7  ICD-9-CM: 783.7  3/30/2023 - Present        Acute cystitis without hematuria ICD-10-CM: N30.00  ICD-9-CM: 595.0  3/28/2023 - Present        ESBL (extended spectrum beta-lactamase) producing bacteria infection ICD-10-CM: A49.9, Z16.12  ICD-9-CM: 041.89, V09.1  3/28/2023 - Present        UTI (urinary tract infection) ICD-10-CM: N39.0  ICD-9-CM: 599.0  3/28/2023 - Present        Acute metabolic encephalopathy KGJ-69-PA: G93.41  ICD-9-CM: 348.31  1/13/2023 - Present        Heart block AV second degree ICD-10-CM: I44.1  ICD-9-CM: 426.13  1/13/2023 - Present        Seizure ([address] ICD-10-CM: R56.9  ICD-9-CM: 780.39  1/10/2023 - Present        CKD (chronic kidney disease) stage 3, GFR 30-59 ml/min (HCC) (Chronic) ICD-10-CM: N18.30  ICD-9-CM: 585.3  9/8/2019 - Present        Frequent falls ICD-10-CM: R29.6  ICD-9-CM: V15.88  9/8/2019 - Present        Neuropathy (Chronic) ICD-10-CM: G62.9  ICD-9-CM: 355.9  9/8/2019 - Present        GERD (gastroesophageal reflux disease) (Chronic) ICD-10-CM: K21.9  ICD-9-CM: 530.81  9/30/2014 - Present        CAD (coronary artery disease) (Chronic) ICD-10-CM: I25.10  ICD-9-CM: 414.00  Unknown - Present        HTN (hypertension), benign (Chronic) ICD-10-CM: I10  ICD-9-CM: 401.1  1/11/2011 - Present        Mixed hyperlipidemia (Chronic) ICD-10-CM: E78.2  ICD-9-CM: 272.2  1/11/2011 - Present        RESOLVED: Acute metabolic encephalopathy [facility]-32-BI: G93.41  ICD-9-CM: 348.31  12/24/2021 - 1/10/2023        RESOLVED: Shortness of breath ICD-10-CM: R06.02  ICD-9-CM: 786.05  1/18/2020 - 1/20/2020 RESOLVED: Falls frequently ICD-10-CM: R29.6  ICD-9-CM: V15.88  9/10/2019 - 1/10/2023        RESOLVED: Bacteriuria ICD-10-CM: R82.71  ICD-9-CM: 791.9  9/8/2019 - 1/10/2023        RESOLVED: Sepsis ([address] ICD-10-CM: A41.9  ICD-9-CM: 038.9, 995.91  2/6/2016 - 2/8/2016        RESOLVED: Nausea and vomiting ICD-10-CM: R11.2  ICD-9-CM: 787.01  2/6/2016 - 2/8/2016        RESOLVED: ARF (acute renal failure) ([address] ICD-10-CM: N17.9  ICD-9-CM: 584.9  5/7/2015 - 5/10/2015        RESOLVED: Rash ICD-10-CM: R21  ICD-9-CM: 782.1  5/7/2015 - 1/10/2023        RESOLVED: Pneumonia ICD-10-CM: J18.9  ICD-9-CM: 968  4/5/2015 - 5/10/2015        RESOLVED: Sepsis ([address] ICD-10-CM: A41.9  ICD-9-CM: 038.9, 995.91  4/5/2015 - 5/10/2015        RESOLVED: Femur fracture ([address] ICD-10-CM: S72.90XA  ICD-9-CM: 821.00  12/1/2014 - 5/7/2015        RESOLVED: UTI (urinary tract infection) ICD-10-CM: N39.0  ICD-9-CM: 599.0  10/1/2014 - 1/10/2023        RESOLVED: Heart block ICD-10-CM: I45.9  ICD-9-CM: 426.9  9/30/2014 - 12/1/2014        RESOLVED: Junctional rhythm ICD-10-CM: I49.8  ICD-9-CM: 427.89  9/30/2014 - 12/1/2014        RESOLVED: Dehydration ICD-10-CM: E86.0  ICD-9-CM: 276.51  9/30/2014 - 12/1/2014        RESOLVED: ARF (acute renal failure) ([address] ICD-10-CM: N17.9  ICD-9-CM: 584.9  9/30/2014 - 12/1/2014        RESOLVED: Dizziness ICD-10-CM: R42  ICD-9-CM: 780.4  9/30/2014 - 12/1/2014        RESOLVED: Syncope ICD-10-CM: R55  ICD-9-CM: 780.2  9/30/2014 - 12/1/2014        RESOLVED: Fall ICD-10-CM: W19. Alethea Garzaford  ICD-9-CM: E888.9  9/30/2014 - 12/1/2014        RESOLVED: Chest pain, unspecified ICD-10-CM: R07.9  ICD-9-CM: 786.50  1/11/2011 - 9/30/2014           Discharge Medications:   Current Discharge Medication List        CONTINUE these medications which have NOT CHANGED    Details   atorvastatin (Lipitor) 10 mg tablet Take 1 Tablet by mouth nightly for 30 days. Qty: 30 Tablet, Refills: 0      amLODIPine (Norvasc) 5 mg tablet Take 1 Tablet by mouth daily. Indications: high blood pressure  Qty: 30 Tablet, Refills: 0      acetaminophen (TYLENOL) 325 mg tablet Take 2 Tabs by mouth every four (4) hours as needed for Pain. Qty: 20 Tab, Refills: 0      aspirin delayed-release 81 mg tablet Take 81 mg by mouth nightly. Ferrous Fumarate 325 mg (106 mg iron) tab Take  by mouth.      guaiFENesin (ROBITUSSIN) 100 mg/5 mL liquid Take 200 mg by mouth three (3) times daily as needed for Cough. STOP taking these medications       nitrofurantoin, macrocrystal-monohydrate, (Macrobid) 100 mg capsule Comments:   Reason for Stopping:         pantoprazole (PROTONIX) 40 mg tablet Comments:   Reason for Stopping:         Polyethylene Glycol 3350 pow[address] Comments:   Reason for Stopping:         loperamide (IMMODIUM) 2 mg tablet Comments:   Reason for Stopping:         ondansetron (Zofran ODT) 4 mg disintegrating tablet Comments:   Reason for Stopping:         omega-3 fatty acids-fish oil 360-1,200 mg cap Comments:   Reason for Stopping:         pregabalin (LYRICA) 50 mg capsule Comments:   Reason for Stopping: Follow up Care:    1. Darshana Contreras MD in 1-2 weeks  2. Diet:  Regular Diet    Disposition:  SNF. Advanced Directive:    Discharge Exam:  See today's note. CONSULTATIONS: None    Significant Diagnostic Studies:   No results for input(s): WBC, HGB, HCT, PLT, HGBEXT, HCTEXT, PLTEXT in the last 72 hours. No results for input(s): NA, K, CL, CO2, BUN, CREA, GLU, CA, MG, PHOS, URICA in the last 72 hours. No results for input(s): ALT, AP, TBIL, TBILI, TP, ALB, GLOB, GGT, AML, LPSE in the last 72 hours. No lab exists for component: SGOT, GPT, AMYP, HLPSE  No results for input(s): INR, PTP, APTT, INREXT in the last 72 hours. No results for input(s): FE, TIBC, PSAT, FERR in the last 72 hours. No results for input(s): PH, PCO2, PO2 in the last 72 hours. No results for input(s): CPK, CKMB in the last 72 hours.     No lab exists for component: TROPONINI  No results found for: [address]:   Weakness/ debility: unclear cause. No leukocytosis. UA is ok this admission. complete IV meropenem. Continue PT/OT. Accepted to SNF       2. Recurrent UTI: ESBL in the recent past. UA cleared on this admission. As above complete course of meropenem. Follow up with urology to consider prophylactic abx as outpatient      3. HTN: resume amlodipine and hydralazine PRN     4.  CAD: Stable        Discharged in stable condition.     Spent 25 minutes    Signed:  Juan Rao MD  4/4/2023  10:39 AM

## 2023-04-27 PROBLEM — N39.0 UTI (URINARY TRACT INFECTION): Status: RESOLVED | Noted: 2023-03-28 | Resolved: 2023-04-27

## 2023-04-28 ENCOUNTER — HOSPITAL ENCOUNTER (INPATIENT)
Age: 88
LOS: 6 days | Discharge: HOME HEALTH | DRG: 690 | End: 2023-05-04
Attending: EMERGENCY MEDICINE | Admitting: HOSPITALIST
Payer: MEDICARE

## 2023-04-28 DIAGNOSIS — N39.0 RECURRENT UTI: ICD-10-CM

## 2023-04-28 DIAGNOSIS — N17.9 AKI (ACUTE KIDNEY INJURY) (HCC): ICD-10-CM

## 2023-04-28 DIAGNOSIS — Z88.1 ALLERGY TO MULTIPLE ANTIBIOTICS: ICD-10-CM

## 2023-04-28 DIAGNOSIS — D72.829 LEUKOCYTOSIS, UNSPECIFIED TYPE: ICD-10-CM

## 2023-04-28 DIAGNOSIS — A49.8 PSEUDOMONAS INFECTION: ICD-10-CM

## 2023-04-28 DIAGNOSIS — N39.0 COMPLICATED UTI (URINARY TRACT INFECTION): Primary | ICD-10-CM

## 2023-04-28 DIAGNOSIS — Z86.19 HISTORY OF ESBL KLEBSIELLA PNEUMONIAE INFECTION: ICD-10-CM

## 2023-04-28 LAB
ALBUMIN SERPL-MCNC: 2.8 G/DL (ref 3.5–5)
ALBUMIN/GLOB SERPL: 0.7 (ref 1.1–2.2)
ALP SERPL-CCNC: 122 U/L (ref 45–117)
ALT SERPL-CCNC: 22 U/L (ref 12–78)
ANION GAP SERPL CALC-SCNC: 2 MMOL/L (ref 5–15)
APPEARANCE UR: ABNORMAL
AST SERPL-CCNC: 18 U/L (ref 15–37)
BACTERIA URNS QL MICRO: ABNORMAL /HPF
BASOPHILS # BLD: 0 K/UL (ref 0–0.1)
BASOPHILS NFR BLD: 0 % (ref 0–1)
BILIRUB SERPL-MCNC: 0.5 MG/DL (ref 0.2–1)
BILIRUB UR QL: NEGATIVE
BUN SERPL-MCNC: 39 MG/DL (ref 6–20)
BUN/CREAT SERPL: 27 (ref 12–20)
CALCIUM SERPL-MCNC: 8.5 MG/DL (ref 8.5–10.1)
CHLORIDE SERPL-SCNC: 112 MMOL/L (ref 97–108)
CO2 SERPL-SCNC: 23 MMOL/L (ref 21–32)
COLOR UR: ABNORMAL
COMMENT, HOLDF: NORMAL
CREAT SERPL-MCNC: 1.47 MG/DL (ref 0.55–1.02)
DIFFERENTIAL METHOD BLD: ABNORMAL
EOSINOPHIL # BLD: 0.8 K/UL (ref 0–0.4)
EOSINOPHIL NFR BLD: 5 % (ref 0–7)
EPITH CASTS URNS QL MICRO: ABNORMAL /LPF
ERYTHROCYTE [DISTWIDTH] IN BLOOD BY AUTOMATED COUNT: 13.9 % (ref 11.5–14.5)
GLOBULIN SER CALC-MCNC: 4.1 G/DL (ref 2–4)
GLUCOSE SERPL-MCNC: 112 MG/DL (ref 65–100)
GLUCOSE UR STRIP.AUTO-MCNC: NEGATIVE MG/DL
HCT VFR BLD AUTO: 43.6 % (ref 35–47)
HGB BLD-MCNC: 14 G/DL (ref 11.5–16)
HGB UR QL STRIP: ABNORMAL
IMM GRANULOCYTES # BLD AUTO: 0.1 K/UL (ref 0–0.04)
IMM GRANULOCYTES NFR BLD AUTO: 0 % (ref 0–0.5)
KETONES UR QL STRIP.AUTO: ABNORMAL MG/DL
LEUKOCYTE ESTERASE UR QL STRIP.AUTO: ABNORMAL
LYMPHOCYTES # BLD: 1.6 K/UL (ref 0.8–3.5)
LYMPHOCYTES NFR BLD: 9 % (ref 12–49)
MCH RBC QN AUTO: 30.3 PG (ref 26–34)
MCHC RBC AUTO-ENTMCNC: 32.1 G/DL (ref 30–36.5)
MCV RBC AUTO: 94.4 FL (ref 80–99)
MONOCYTES # BLD: 1.3 K/UL (ref 0–1)
MONOCYTES NFR BLD: 8 % (ref 5–13)
NEUTS SEG # BLD: 13.4 K/UL (ref 1.8–8)
NEUTS SEG NFR BLD: 78 % (ref 32–75)
NITRITE UR QL STRIP.AUTO: NEGATIVE
NRBC # BLD: 0 K/UL (ref 0–0.01)
NRBC BLD-RTO: 0 PER 100 WBC
PH UR STRIP: 5 (ref 5–8)
PLATELET # BLD AUTO: 252 K/UL (ref 150–400)
PMV BLD AUTO: 10.8 FL (ref 8.9–12.9)
POTASSIUM SERPL-SCNC: 4.4 MMOL/L (ref 3.5–5.1)
PROT SERPL-MCNC: 6.9 G/DL (ref 6.4–8.2)
PROT UR STRIP-MCNC: 30 MG/DL
RBC # BLD AUTO: 4.62 M/UL (ref 3.8–5.2)
RBC #/AREA URNS HPF: ABNORMAL /HPF (ref 0–5)
SAMPLES BEING HELD,HOLD: NORMAL
SODIUM SERPL-SCNC: 137 MMOL/L (ref 136–145)
SP GR UR REFRACTOMETRY: 1.02 (ref 1–1.03)
UA: UC IF INDICATED,UAUC: ABNORMAL
UROBILINOGEN UR QL STRIP.AUTO: 1 EU/DL (ref 0.2–1)
WBC # BLD AUTO: 17.3 K/UL (ref 3.6–11)
WBC URNS QL MICRO: >100 /HPF (ref 0–4)

## 2023-04-28 PROCEDURE — 74011000250 HC RX REV CODE- 250: Performed by: EMERGENCY MEDICINE

## 2023-04-28 PROCEDURE — 87186 SC STD MICRODIL/AGAR DIL: CPT

## 2023-04-28 PROCEDURE — 87077 CULTURE AEROBIC IDENTIFY: CPT

## 2023-04-28 PROCEDURE — 74011250637 HC RX REV CODE- 250/637: Performed by: HOSPITALIST

## 2023-04-28 PROCEDURE — 85025 COMPLETE CBC W/AUTO DIFF WBC: CPT

## 2023-04-28 PROCEDURE — 81001 URINALYSIS AUTO W/SCOPE: CPT

## 2023-04-28 PROCEDURE — 96374 THER/PROPH/DIAG INJ IV PUSH: CPT

## 2023-04-28 PROCEDURE — 36415 COLL VENOUS BLD VENIPUNCTURE: CPT

## 2023-04-28 PROCEDURE — 87086 URINE CULTURE/COLONY COUNT: CPT

## 2023-04-28 PROCEDURE — 80053 COMPREHEN METABOLIC PANEL: CPT

## 2023-04-28 PROCEDURE — 99285 EMERGENCY DEPT VISIT HI MDM: CPT

## 2023-04-28 PROCEDURE — 65270000029 HC RM PRIVATE

## 2023-04-28 PROCEDURE — 74011250636 HC RX REV CODE- 250/636: Performed by: EMERGENCY MEDICINE

## 2023-04-28 RX ORDER — SODIUM CHLORIDE 0.9 % (FLUSH) 0.9 %
5-40 SYRINGE (ML) INJECTION EVERY 8 HOURS
Status: DISCONTINUED | OUTPATIENT
Start: 2023-04-28 | End: 2023-05-04 | Stop reason: HOSPADM

## 2023-04-28 RX ORDER — ACETAMINOPHEN 325 MG/1
650 TABLET ORAL
Status: DISCONTINUED | OUTPATIENT
Start: 2023-04-28 | End: 2023-05-04 | Stop reason: HOSPADM

## 2023-04-28 RX ORDER — ONDANSETRON 2 MG/ML
4 INJECTION INTRAMUSCULAR; INTRAVENOUS
Status: DISCONTINUED | OUTPATIENT
Start: 2023-04-28 | End: 2023-05-04 | Stop reason: HOSPADM

## 2023-04-28 RX ORDER — AMLODIPINE BESYLATE 5 MG/1
5 TABLET ORAL DAILY
Status: DISCONTINUED | OUTPATIENT
Start: 2023-04-29 | End: 2023-05-04 | Stop reason: HOSPADM

## 2023-04-28 RX ORDER — ATORVASTATIN CALCIUM 10 MG/1
10 TABLET, FILM COATED ORAL
Status: DISCONTINUED | OUTPATIENT
Start: 2023-04-28 | End: 2023-05-04 | Stop reason: HOSPADM

## 2023-04-28 RX ORDER — POLYETHYLENE GLYCOL 3350 17 G/17G
17 POWDER, FOR SOLUTION ORAL DAILY PRN
Status: DISCONTINUED | OUTPATIENT
Start: 2023-04-28 | End: 2023-05-04 | Stop reason: HOSPADM

## 2023-04-28 RX ORDER — ACETAMINOPHEN 325 MG/1
650 TABLET ORAL
Status: DISCONTINUED | OUTPATIENT
Start: 2023-04-28 | End: 2023-04-28

## 2023-04-28 RX ORDER — GUAIFENESIN 100 MG/5ML
200 SOLUTION ORAL
Status: DISCONTINUED | OUTPATIENT
Start: 2023-04-28 | End: 2023-05-04 | Stop reason: HOSPADM

## 2023-04-28 RX ORDER — ONDANSETRON 4 MG/1
4 TABLET, ORALLY DISINTEGRATING ORAL
Status: DISCONTINUED | OUTPATIENT
Start: 2023-04-28 | End: 2023-05-04 | Stop reason: HOSPADM

## 2023-04-28 RX ORDER — SODIUM CHLORIDE 0.9 % (FLUSH) 0.9 %
5-40 SYRINGE (ML) INJECTION AS NEEDED
Status: DISCONTINUED | OUTPATIENT
Start: 2023-04-28 | End: 2023-05-04 | Stop reason: HOSPADM

## 2023-04-28 RX ORDER — ASPIRIN 81 MG/1
81 TABLET ORAL
Status: DISCONTINUED | OUTPATIENT
Start: 2023-04-28 | End: 2023-05-04 | Stop reason: HOSPADM

## 2023-04-28 RX ORDER — ENOXAPARIN SODIUM 100 MG/ML
40 INJECTION SUBCUTANEOUS DAILY
Status: DISCONTINUED | OUTPATIENT
Start: 2023-04-29 | End: 2023-05-04 | Stop reason: HOSPADM

## 2023-04-28 RX ORDER — ACETAMINOPHEN 650 MG/1
650 SUPPOSITORY RECTAL
Status: DISCONTINUED | OUTPATIENT
Start: 2023-04-28 | End: 2023-05-04 | Stop reason: HOSPADM

## 2023-04-28 RX ADMIN — SODIUM CHLORIDE 1 G: 9 INJECTION INTRAMUSCULAR; INTRAVENOUS; SUBCUTANEOUS at 14:21

## 2023-04-28 RX ADMIN — SODIUM CHLORIDE 500 ML: 9 INJECTION, SOLUTION INTRAVENOUS at 14:27

## 2023-04-28 RX ADMIN — ACETAMINOPHEN 650 MG: 325 TABLET ORAL at 16:53

## 2023-04-29 ENCOUNTER — APPOINTMENT (OUTPATIENT)
Dept: GENERAL RADIOLOGY | Age: 88
DRG: 690 | End: 2023-04-29
Attending: HOSPITALIST
Payer: MEDICARE

## 2023-04-29 LAB
ANION GAP SERPL CALC-SCNC: 2 MMOL/L (ref 5–15)
BUN SERPL-MCNC: 36 MG/DL (ref 6–20)
BUN/CREAT SERPL: 32 (ref 12–20)
CALCIUM SERPL-MCNC: 8.2 MG/DL (ref 8.5–10.1)
CHLORIDE SERPL-SCNC: 114 MMOL/L (ref 97–108)
CO2 SERPL-SCNC: 23 MMOL/L (ref 21–32)
CREAT SERPL-MCNC: 1.12 MG/DL (ref 0.55–1.02)
ERYTHROCYTE [DISTWIDTH] IN BLOOD BY AUTOMATED COUNT: 13.8 % (ref 11.5–14.5)
GLUCOSE SERPL-MCNC: 95 MG/DL (ref 65–100)
HCT VFR BLD AUTO: 40 % (ref 35–47)
HGB BLD-MCNC: 13 G/DL (ref 11.5–16)
MCH RBC QN AUTO: 30.7 PG (ref 26–34)
MCHC RBC AUTO-ENTMCNC: 32.5 G/DL (ref 30–36.5)
MCV RBC AUTO: 94.6 FL (ref 80–99)
NRBC # BLD: 0 K/UL (ref 0–0.01)
NRBC BLD-RTO: 0 PER 100 WBC
PLATELET # BLD AUTO: 210 K/UL (ref 150–400)
PMV BLD AUTO: 10.8 FL (ref 8.9–12.9)
POTASSIUM SERPL-SCNC: 3.8 MMOL/L (ref 3.5–5.1)
RBC # BLD AUTO: 4.23 M/UL (ref 3.8–5.2)
SODIUM SERPL-SCNC: 139 MMOL/L (ref 136–145)
WBC # BLD AUTO: 11.3 K/UL (ref 3.6–11)

## 2023-04-29 PROCEDURE — 74011250637 HC RX REV CODE- 250/637: Performed by: HOSPITALIST

## 2023-04-29 PROCEDURE — 80048 BASIC METABOLIC PNL TOTAL CA: CPT

## 2023-04-29 PROCEDURE — 73130 X-RAY EXAM OF HAND: CPT

## 2023-04-29 PROCEDURE — 74011000250 HC RX REV CODE- 250: Performed by: HOSPITALIST

## 2023-04-29 PROCEDURE — 74011250636 HC RX REV CODE- 250/636: Performed by: HOSPITALIST

## 2023-04-29 PROCEDURE — 73120 X-RAY EXAM OF HAND: CPT

## 2023-04-29 PROCEDURE — 85027 COMPLETE CBC AUTOMATED: CPT

## 2023-04-29 PROCEDURE — 65270000029 HC RM PRIVATE

## 2023-04-29 PROCEDURE — 74011000258 HC RX REV CODE- 258: Performed by: HOSPITALIST

## 2023-04-29 PROCEDURE — 2709999900 HC NON-CHARGEABLE SUPPLY

## 2023-04-29 PROCEDURE — 36415 COLL VENOUS BLD VENIPUNCTURE: CPT

## 2023-04-29 RX ADMIN — ASPIRIN 81 MG: 81 TABLET, COATED ORAL at 00:44

## 2023-04-29 RX ADMIN — ATORVASTATIN CALCIUM 10 MG: 10 TABLET, FILM COATED ORAL at 00:46

## 2023-04-29 RX ADMIN — Medication 10 ML: at 00:52

## 2023-04-29 RX ADMIN — Medication 10 ML: at 06:22

## 2023-04-29 RX ADMIN — ACETAMINOPHEN 650 MG: 325 TABLET ORAL at 06:24

## 2023-04-29 RX ADMIN — ASPIRIN 81 MG: 81 TABLET, COATED ORAL at 23:03

## 2023-04-29 RX ADMIN — ACETAMINOPHEN 650 MG: 325 TABLET ORAL at 23:05

## 2023-04-29 RX ADMIN — MEROPENEM 1 G: 1 INJECTION, POWDER, FOR SOLUTION INTRAVENOUS at 10:51

## 2023-04-29 RX ADMIN — ATORVASTATIN CALCIUM 10 MG: 10 TABLET, FILM COATED ORAL at 23:03

## 2023-04-29 RX ADMIN — AMLODIPINE BESYLATE 5 MG: 5 TABLET ORAL at 10:52

## 2023-04-29 RX ADMIN — Medication 10 ML: at 23:04

## 2023-04-29 RX ADMIN — ENOXAPARIN SODIUM 40 MG: 100 INJECTION SUBCUTANEOUS at 10:52

## 2023-04-29 RX ADMIN — MEROPENEM 1 G: 1 INJECTION, POWDER, FOR SOLUTION INTRAVENOUS at 23:03

## 2023-04-29 RX ADMIN — ACETAMINOPHEN 650 MG: 325 TABLET ORAL at 11:58

## 2023-04-30 LAB
ANION GAP SERPL CALC-SCNC: 4 MMOL/L (ref 5–15)
BASOPHILS # BLD: 0 K/UL (ref 0–0.1)
BASOPHILS NFR BLD: 0 % (ref 0–1)
BUN SERPL-MCNC: 25 MG/DL (ref 6–20)
BUN/CREAT SERPL: 27 (ref 12–20)
CALCIUM SERPL-MCNC: 8.1 MG/DL (ref 8.5–10.1)
CHLORIDE SERPL-SCNC: 112 MMOL/L (ref 97–108)
CO2 SERPL-SCNC: 23 MMOL/L (ref 21–32)
CREAT SERPL-MCNC: 0.92 MG/DL (ref 0.55–1.02)
DIFFERENTIAL METHOD BLD: ABNORMAL
EOSINOPHIL # BLD: 0.9 K/UL (ref 0–0.4)
EOSINOPHIL NFR BLD: 12 % (ref 0–7)
ERYTHROCYTE [DISTWIDTH] IN BLOOD BY AUTOMATED COUNT: 13.7 % (ref 11.5–14.5)
GLUCOSE SERPL-MCNC: 91 MG/DL (ref 65–100)
HCT VFR BLD AUTO: 42.2 % (ref 35–47)
HGB BLD-MCNC: 13.3 G/DL (ref 11.5–16)
IMM GRANULOCYTES # BLD AUTO: 0.1 K/UL (ref 0–0.04)
IMM GRANULOCYTES NFR BLD AUTO: 1 % (ref 0–0.5)
LYMPHOCYTES # BLD: 1.9 K/UL (ref 0.8–3.5)
LYMPHOCYTES NFR BLD: 26 % (ref 12–49)
MCH RBC QN AUTO: 30.6 PG (ref 26–34)
MCHC RBC AUTO-ENTMCNC: 31.5 G/DL (ref 30–36.5)
MCV RBC AUTO: 97 FL (ref 80–99)
MONOCYTES # BLD: 0.6 K/UL (ref 0–1)
MONOCYTES NFR BLD: 8 % (ref 5–13)
NEUTS SEG # BLD: 4 K/UL (ref 1.8–8)
NEUTS SEG NFR BLD: 53 % (ref 32–75)
NRBC # BLD: 0 K/UL (ref 0–0.01)
NRBC BLD-RTO: 0 PER 100 WBC
PLATELET # BLD AUTO: 240 K/UL (ref 150–400)
PMV BLD AUTO: 10.9 FL (ref 8.9–12.9)
POTASSIUM SERPL-SCNC: 3.9 MMOL/L (ref 3.5–5.1)
RBC # BLD AUTO: 4.35 M/UL (ref 3.8–5.2)
SODIUM SERPL-SCNC: 139 MMOL/L (ref 136–145)
WBC # BLD AUTO: 7.5 K/UL (ref 3.6–11)

## 2023-04-30 PROCEDURE — 85025 COMPLETE CBC W/AUTO DIFF WBC: CPT

## 2023-04-30 PROCEDURE — 74011000258 HC RX REV CODE- 258: Performed by: HOSPITALIST

## 2023-04-30 PROCEDURE — 74011000250 HC RX REV CODE- 250: Performed by: HOSPITALIST

## 2023-04-30 PROCEDURE — 74011250636 HC RX REV CODE- 250/636: Performed by: HOSPITALIST

## 2023-04-30 PROCEDURE — 74011250637 HC RX REV CODE- 250/637: Performed by: HOSPITALIST

## 2023-04-30 PROCEDURE — 36415 COLL VENOUS BLD VENIPUNCTURE: CPT

## 2023-04-30 PROCEDURE — 65270000029 HC RM PRIVATE

## 2023-04-30 PROCEDURE — 80048 BASIC METABOLIC PNL TOTAL CA: CPT

## 2023-04-30 RX ORDER — LANOLIN ALCOHOL/MO/W.PET/CERES
3 CREAM (GRAM) TOPICAL
Status: DISCONTINUED | OUTPATIENT
Start: 2023-04-30 | End: 2023-05-04 | Stop reason: HOSPADM

## 2023-04-30 RX ADMIN — ENOXAPARIN SODIUM 40 MG: 100 INJECTION SUBCUTANEOUS at 09:17

## 2023-04-30 RX ADMIN — MEROPENEM 1 G: 1 INJECTION, POWDER, FOR SOLUTION INTRAVENOUS at 09:17

## 2023-04-30 RX ADMIN — ASPIRIN 81 MG: 81 TABLET, COATED ORAL at 22:19

## 2023-04-30 RX ADMIN — MEROPENEM 1 G: 1 INJECTION, POWDER, FOR SOLUTION INTRAVENOUS at 22:19

## 2023-04-30 RX ADMIN — ATORVASTATIN CALCIUM 10 MG: 10 TABLET, FILM COATED ORAL at 22:19

## 2023-04-30 RX ADMIN — Medication 10 ML: at 22:19

## 2023-04-30 RX ADMIN — GUAIFENESIN 200 MG: 200 SOLUTION ORAL at 02:08

## 2023-04-30 RX ADMIN — AMLODIPINE BESYLATE 5 MG: 5 TABLET ORAL at 09:17

## 2023-05-01 ENCOUNTER — APPOINTMENT (OUTPATIENT)
Dept: ULTRASOUND IMAGING | Age: 88
DRG: 690 | End: 2023-05-01
Attending: INTERNAL MEDICINE
Payer: MEDICARE

## 2023-05-01 LAB
ANION GAP SERPL CALC-SCNC: 4 MMOL/L (ref 5–15)
BUN SERPL-MCNC: 16 MG/DL (ref 6–20)
BUN/CREAT SERPL: 18 (ref 12–20)
CALCIUM SERPL-MCNC: 8.6 MG/DL (ref 8.5–10.1)
CHLORIDE SERPL-SCNC: 112 MMOL/L (ref 97–108)
CO2 SERPL-SCNC: 23 MMOL/L (ref 21–32)
CREAT SERPL-MCNC: 0.87 MG/DL (ref 0.55–1.02)
CRP SERPL-MCNC: 3.96 MG/DL (ref 0–0.6)
ERYTHROCYTE [DISTWIDTH] IN BLOOD BY AUTOMATED COUNT: 13.8 % (ref 11.5–14.5)
GLUCOSE SERPL-MCNC: 109 MG/DL (ref 65–100)
HCT VFR BLD AUTO: 43.2 % (ref 35–47)
HGB BLD-MCNC: 14.1 G/DL (ref 11.5–16)
MAGNESIUM SERPL-MCNC: 1.9 MG/DL (ref 1.6–2.4)
MCH RBC QN AUTO: 31.1 PG (ref 26–34)
MCHC RBC AUTO-ENTMCNC: 32.6 G/DL (ref 30–36.5)
MCV RBC AUTO: 95.2 FL (ref 80–99)
NRBC # BLD: 0 K/UL (ref 0–0.01)
NRBC BLD-RTO: 0 PER 100 WBC
PHOSPHATE SERPL-MCNC: 2.6 MG/DL (ref 2.6–4.7)
PLATELET # BLD AUTO: 251 K/UL (ref 150–400)
PMV BLD AUTO: 10.4 FL (ref 8.9–12.9)
POTASSIUM SERPL-SCNC: 4.1 MMOL/L (ref 3.5–5.1)
RBC # BLD AUTO: 4.54 M/UL (ref 3.8–5.2)
SODIUM SERPL-SCNC: 139 MMOL/L (ref 136–145)
WBC # BLD AUTO: 7.2 K/UL (ref 3.6–11)

## 2023-05-01 PROCEDURE — 74011250637 HC RX REV CODE- 250/637: Performed by: HOSPITALIST

## 2023-05-01 PROCEDURE — 74011250637 HC RX REV CODE- 250/637: Performed by: INTERNAL MEDICINE

## 2023-05-01 PROCEDURE — 74011000258 HC RX REV CODE- 258: Performed by: INTERNAL MEDICINE

## 2023-05-01 PROCEDURE — 76770 US EXAM ABDO BACK WALL COMP: CPT

## 2023-05-01 PROCEDURE — 74011000258 HC RX REV CODE- 258: Performed by: HOSPITALIST

## 2023-05-01 PROCEDURE — 74011250636 HC RX REV CODE- 250/636: Performed by: INTERNAL MEDICINE

## 2023-05-01 PROCEDURE — 97165 OT EVAL LOW COMPLEX 30 MIN: CPT

## 2023-05-01 PROCEDURE — 85027 COMPLETE CBC AUTOMATED: CPT

## 2023-05-01 PROCEDURE — 86140 C-REACTIVE PROTEIN: CPT

## 2023-05-01 PROCEDURE — 2709999900 HC NON-CHARGEABLE SUPPLY

## 2023-05-01 PROCEDURE — 99223 1ST HOSP IP/OBS HIGH 75: CPT | Performed by: INTERNAL MEDICINE

## 2023-05-01 PROCEDURE — 36415 COLL VENOUS BLD VENIPUNCTURE: CPT

## 2023-05-01 PROCEDURE — 65270000029 HC RM PRIVATE

## 2023-05-01 PROCEDURE — 74011250636 HC RX REV CODE- 250/636: Performed by: HOSPITALIST

## 2023-05-01 PROCEDURE — 74011000250 HC RX REV CODE- 250: Performed by: HOSPITALIST

## 2023-05-01 PROCEDURE — 80048 BASIC METABOLIC PNL TOTAL CA: CPT

## 2023-05-01 PROCEDURE — 84100 ASSAY OF PHOSPHORUS: CPT

## 2023-05-01 PROCEDURE — 97530 THERAPEUTIC ACTIVITIES: CPT

## 2023-05-01 PROCEDURE — 83735 ASSAY OF MAGNESIUM: CPT

## 2023-05-01 RX ORDER — AMOXICILLIN 250 MG
1 CAPSULE ORAL 2 TIMES DAILY
Status: DISCONTINUED | OUTPATIENT
Start: 2023-05-01 | End: 2023-05-04 | Stop reason: HOSPADM

## 2023-05-01 RX ORDER — POLYETHYLENE GLYCOL 3350 17 G/17G
17 POWDER, FOR SOLUTION ORAL 2 TIMES DAILY
Status: DISCONTINUED | OUTPATIENT
Start: 2023-05-01 | End: 2023-05-04 | Stop reason: HOSPADM

## 2023-05-01 RX ORDER — FACIAL-BODY WIPES
10 EACH TOPICAL DAILY PRN
Status: DISCONTINUED | OUTPATIENT
Start: 2023-05-01 | End: 2023-05-04 | Stop reason: HOSPADM

## 2023-05-01 RX ADMIN — POLYETHYLENE GLYCOL 3350 17 G: 17 POWDER, FOR SOLUTION ORAL at 12:21

## 2023-05-01 RX ADMIN — MEROPENEM 1 G: 1 INJECTION, POWDER, FOR SOLUTION INTRAVENOUS at 21:35

## 2023-05-01 RX ADMIN — Medication 1 CAPSULE: at 09:22

## 2023-05-01 RX ADMIN — ENOXAPARIN SODIUM 40 MG: 100 INJECTION SUBCUTANEOUS at 09:22

## 2023-05-01 RX ADMIN — ACETAMINOPHEN 650 MG: 325 TABLET ORAL at 21:35

## 2023-05-01 RX ADMIN — Medication 10 ML: at 21:35

## 2023-05-01 RX ADMIN — POLYETHYLENE GLYCOL 3350 17 G: 17 POWDER, FOR SOLUTION ORAL at 17:17

## 2023-05-01 RX ADMIN — MEROPENEM 1 G: 1 INJECTION, POWDER, FOR SOLUTION INTRAVENOUS at 09:21

## 2023-05-01 RX ADMIN — SENNOSIDES AND DOCUSATE SODIUM 1 TABLET: 50; 8.6 TABLET ORAL at 12:21

## 2023-05-01 RX ADMIN — SENNOSIDES AND DOCUSATE SODIUM 1 TABLET: 50; 8.6 TABLET ORAL at 17:17

## 2023-05-01 RX ADMIN — Medication 3 MG: at 21:35

## 2023-05-01 RX ADMIN — Medication 10 ML: at 14:00

## 2023-05-01 RX ADMIN — Medication 10 ML: at 06:30

## 2023-05-01 RX ADMIN — AMLODIPINE BESYLATE 5 MG: 5 TABLET ORAL at 09:22

## 2023-05-01 RX ADMIN — ATORVASTATIN CALCIUM 10 MG: 10 TABLET, FILM COATED ORAL at 21:35

## 2023-05-01 RX ADMIN — ASPIRIN 81 MG: 81 TABLET, COATED ORAL at 21:35

## 2023-05-01 NOTE — PROGRESS NOTES
Bedside and Verbal shift change report given to Nikhil RN (oncoming nurse) by Ruthie Trotter RN (offgoing nurse). Report included the following information SBAR and Kardex.

## 2023-05-01 NOTE — ACP (ADVANCE CARE PLANNING)
Advance Care Planning     Advance Care Planning (ACP) Physician/NP/PA Conversation      Date of Conversation: 4/28/2023  Diagnosis: recurrent UTIs    Conducted with: patient, grandson (Wali)    Healthcare Decision Maker:     Primary Decision Maker: Beatriz Solano - Ying - [phone]  Click here to complete [address] including selection of the Healthcare Decision Maker Relationship (ie \"Primary\")        Care Preferences:    Hospitalization: \"If your health worsens and it becomes clear that your chance of recovery is unlikely, what would be your preference regarding hospitalization? \"  Treat infections for now    Ventilation: \"If you were unable to breathe on your own and your chance of recovery was unlikely, what would be your preference about the use of a ventilator (breathing machine) if it was available to you? \"   DNR    Resuscitation: \"In the event your heart stopped as a result of an underlying serious health condition, would you want attempts to be made to restart your heart, or would you prefer a natural death? \"   DNR    Conversation Outcomes / Follow-Up Plan:   Discussed with patient and grandson regarding current medical issues, prognosis, and treatment. Patient is a DNR. Both her and grandson are frustrated by frequent admissions for UTIs and would like recommendations on prophylactic abx. Goal is to return to assisted living.   Declined SNF     Length of Voluntary ACP Conversation in minutes:  16 minutes    Varinder Simms MD

## 2023-05-01 NOTE — PROGRESS NOTES
Problem: Self Care Deficits Care Plan (Adult)  Goal: *Acute Goals and Plan of Care (Insert Text)  Description: FUNCTIONAL STATUS PRIOR TO ADMISSION: Per chart review and patient report, pt came to [facility] from [facility] where pt was for short term rehab. Pt stating walking aprox 6 feet with chair follow with RW with therapy and requiring assist for self care (able to complete grooming/self feeding with set up). Prior to SNF placement, pt is a resident at [address] where pt requires assist for self care and transfers. HOME SUPPORT: At baseline, pt is a resident at [facility]) where staff assist with self care and transfers as needed (pt stating able to ambulate by self few months prior). Occupational Therapy Goals  Initiated 5/1/2023  1. Patient will perform grooming with modified independence within 7 day(s). 2.  Patient will perform upper body bathing/dressing with modified independence within 7 day(s). 3.  Patient will perform lower body dressing with minimal assistance/contact guard assist within 7 day(s). 4.  Patient will perform standard toilet transfers with minimal assistance/contact guard assist within 7 day(s). 5.  Patient will perform all aspects of toileting with minimal assistance/contact guard assist within 7 day(s). 6.  Patient will participate in upper extremity therapeutic exercise/activities with modified independence for 10 minutes within 7 day(s). 7.  Patient will utilize energy conservation techniques during functional activities with verbal cues within 7 day(s). Outcome: Not Met     OCCUPATIONAL THERAPY EVALUATION  Patient: Renay Ingram (33 y.o. female)  Date: 5/1/2023  Primary Diagnosis: UTI (urinary tract infection) [N39.0]       Precautions: falls       ASSESSMENT  Patient received semi supine in bed A&OX4 and agreeable for OT eval/tx.  Per pt report and chart review, pt is a resident at [address] where staff assist with self care and transfers (pt reporting few months prior pt was able to ambulate on own around facility) however since recent hospitalization was adm at SNF ([address]) for short term rehab where staff was assisting with self care (assist with bathing/dressing and toileting, able to self feed and complete grooming with set up) and assist for functional transfers/mobility (ambulating 6ft with therapy with chair follow). Patient presents with decreased balance, decreased activity tolerance, generalized weakness (right hand painful to use per pt report) and increased need for assist with self care (SBA grooming with set up, supervision self feeding with set up, SBA doff right sock and mod A nicolas right sock, total A left sock) and functional transfers/mobility (CGA/min A sup->sit and scooting EOB with HOB raised and increased time, min A sit<->Stand and bed to chair with Rw and gait belt taking side steps with 2LOB requiring min A to recover). Patient would benefit from continued skilled OT services while at [facility] in order to increase safety and independence with self care and functional transfers/mobility. Recommend discharge to SNF when medically appropriate. Functional Outcome Measure: The patient scored 15/24 on the [address] [facility] [address] outcome measure   Other factors to consider for discharge: time since onset, severity of deficits, PLOF         PLAN :  Recommendations and Planned Interventions: self care training, functional mobility training, therapeutic exercise, balance training, therapeutic activities, endurance activities, patient education, and home safety training    Frequency/Duration: Patient will be followed by occupational therapy 5 times a week to address goals.     Recommendation for discharge: (in order for the patient to meet his/her long term goals)  Therapy up to 5 days/week in SNF setting    This discharge recommendation:  Has been made in collaboration with the attending provider and/or case management    IF patient discharges home will need the following DME: TBD       SUBJECTIVE:   Patient stated I walked about 6 feel with a chair following.     OBJECTIVE DATA SUMMARY:   HISTORY:   Past Medical History:   Diagnosis Date    CAD (coronary artery disease)     Femur fracture ([address]     right    GERD (gastroesophageal reflux disease)     HTN (hypertension), benign 1/11/2011    Mixed hyperlipidemia 1/11/2011     Past Surgical History:   Procedure Laterality Date    HX FEMUR FRACTURE TX      HX KNEE REPLACEMENT      bilateral    HX ORTHOPAEDIC      bilat knee replacements    HX VEIN STRIPPING         Expanded or extensive additional review of patient history:     Home Situation  Home Environment: Skilled nursing facility (at SNF for rehab, at baseline from [facility] [place]))  [facility] Mando Name: [address] 13: Toribio Santiago    Hand dominance: Right however utilizing left hand 2/2 pain in right    EXAMINATION OF PERFORMANCE DEFICITS:  Cognitive/Behavioral Status:  Neurologic State: Alert  Orientation Level: Oriented X4  Cognition: Follows commands      Range of Motion:  AROM: Generally decreased, functional (right UE painful with ROM per pt report)  PROM: Generally decreased, functional     Strength:  Strength: Generally decreased, functional (right hand decreased strength (pt stating pins and needles feeling and painfullness))     Balance:  Sitting: Intact; With support  Standing: Impaired; With support  Standing - Static: Constant support; Fair  Standing - Dynamic : Constant support; Fair    Functional Mobility and Transfers for ADLs:  Bed Mobility:  Rolling: Stand-by assistance  Supine to Sit: Contact guard assistance;Minimum assistance; Additional time;Bed Modified  Scooting: Contact guard assistance;Minimum assistance; Additional time;Bed Modified    Transfers:  Sit to Stand: Minimum assistance; Additional time  Stand to Sit: Minimum assistance; Additional time  Bed to Chair: Minimum assistance; Additional time  Assistive Device : Gait Belt;Walker, rolling    ADL Assessment:  Feeding: Setup (using LUE)    Oral Facial Hygiene/Grooming: Stand-by assistance (seated in chair)    Lower Body Dressing:  (SBA doff right sock mod A nicolas right sock, total a left sock)    ADL Intervention and task modifications:     Grooming  Position Performed: Seated in chair  Washing Face: Stand-by assistance  Washing Hands: Stand-by assistance  Brushing/Combing Hair: Stand-by assistance (assist for washing hair using shampoo cap)    Lower Body Dressing Assistance  Socks:  (SBA doff right sock mod A nicolas right sock, total a left sock)  Leg Crossed Method Used: Yes  Position Performed: Seated in chair    Functional Measure:  [employer] AM-PAC®      Daily Activity Inpatient Short Form (6-Clicks) Version 2  How much HELP from another person do you currently need. .. (If the patient hasn't done an activity recently, how much help from another person do you think they would need if they tried?) Total A Lot A Little None   1. Putting on and taking off regular lower body clothing? []   1 [x]   2 []   3 []   4   2. Bathing (including washing, rinsing, drying)? []   1 [x]   2 []   3 []   4   3. Toileting, which includes using toilet, bedpan, or urinal? []   1 [x]   2 []   3 []   4   4. Putting on and taking off regular upper body clothing? []   1 []   2 [x]   3 []   4   5. Taking care of personal grooming such as brushing teeth? []   1 []   2 [x]   3 []   4   6. Eating meals? []   1 []   2 [x]   3 []   4     Raw Score: 15/24                            Cutoff score ?191,2,3 had higher odds of discharging home with home health or need of SNF/IPR    1. Gloria Petit. Validity of the AM-PAC 6-Clicks Inpatient Daily Activity and Basic Mobility Short Forms.  Physical Therapy Mar 2014, 94 (2) 651-873; DOI: 10.2522/ptj.85022721  2. Mireille Lot. Association of AM-PAC \"6-Clicks\" Basic Mobility and Daily Activity Scores With Discharge Destination. Phys Ther. 2021 Apr 4;101(4):jeeo934. doi: 10.1093/ptj/ivuv507. PMID: 25855127. V Abigail 505, Rene D, Soheila Mustafa, Emily K, Donavon S. Activity Measure for Post-Acute Care \"6-Clicks\" Basic Mobility Scores Predict Discharge Destination After Acute Care Hospitalization in Select Patient Groups: A Retrospective, Observational Study. Arch Rehabil Res Clin Transl. 2022 Jul 16;4(3):049737. doi: 10.1016/j.arrct. 7344.189498. PMID: 09178342; PMCID: [id number]. 4. Dieter Olivera Ni P. AM-PAC Short Forms Manual 4.0. Revised 2/2020. Occupational Therapy Evaluation Charge Determination   History Examination Decision-Making   LOW Complexity : Brief history review  MEDIUM Complexity : 3-5 performance deficits relating to physical, cognitive , or psychosocial skils that result in activity limitations and / or participation restrictions MEDIUM Complexity : Patient may present with comorbidities that affect occupational performnce. Miniml to moderate modification of tasks or assistance (eg, physical or verbal ) with assesment(s) is necessary to enable patient to complete evaluation       Based on the above components, the patient evaluation is determined to be of the following complexity level: LOW   Pain Rating:  Pt reporting pain in right palmar aspect of hand    Activity Tolerance:   Good and requires rest breaks    After treatment patient left in no apparent distress:    Sitting in chair, Call bell within reach, Bed / chair alarm activated, and nursing present    COMMUNICATION/EDUCATION:   The patients plan of care was discussed with: Physical therapist and Registered nurse. Home safety education was provided and the patient/caregiver indicated understanding.  and Patient/family have participated as able in goal setting and plan of care.    This patients plan of care is appropriate for delegation to \[facility] [place] [facility]\"".     Thank you for this referral.  Carolina Torres  Time Calculation: 39 mins

## 2023-05-01 NOTE — PROGRESS NOTES
Chris Diggs [facility]  [address] [facility], [address] [place] [address]  [phone]      Hospitalist Progress Note      NAME: Rika Moore   :  1924  MRM:  [id number]    Date/Time of service: 2023  12:02 PM       Subjective:     Chief Complaint:  Patient was personally seen and examined by me during this time period. Chart reviewed. No fevers, chills       Objective:       Vitals:       Last 24hrs VS reviewed since prior progress note.  Most recent are:    Visit Vitals  BP (!) 151/68 (BP 1 Location: Right upper arm, BP Patient Position: At rest)   Pulse 72   Temp 98.2 °F (36.8 °C)   Resp 18   SpO2 91%     SpO2 Readings from Last 6 Encounters:   23 91%   23 90%   23 93%   23 96%   23 93%   23 96%          Intake/Output Summary (Last 24 hours) at 2023 1202  Last data filed at 2023 0453  Gross per 24 hour   Intake 340 ml   Output 1100 ml   Net -760 ml        Exam:     Physical Exam:    Gen:  elderly, frail, NAD  HEENT:  Pink conjunctivae, PERRL, hearing intact to voice, moist mucous membranes  Neck:  Supple, without masses, thyroid non-tender  Resp:  No accessory muscle use, clear breath sounds without wheezes rales or rhonchi  Card:  No murmurs, normal S1, S2 without thrills, bruits or peripheral edema  Abd:  Soft, non-tender, non-distended, normoactive bowel sounds are present  Musc:  No cyanosis or clubbing  Skin:  No rashes  Neuro:  Cranial nerves 3-12 are grossly intact, follows commands appropriately  Psych:  poor insight, oriented to person, place and time, alert    Medications Reviewed: (see below)    Lab Data Reviewed: (see below)    ______________________________________________________________________    Medications:     Current Facility-Administered Medications   Medication Dose Route Frequency    senna-docusate (PERICOLACE) 8.6-50 mg per tablet 1 Tablet  1 Tablet Oral BID    polyethylene glycol (MIRALAX) packet 17 g  17 g Oral BID bisacodyL (DULCOLAX) suppository 10 mg  10 mg Rectal DAILY PRN    melatonin tablet 3 mg  3 mg Oral QHS PRN    L.acidophilus-paracasei-S.thermophil-bifidobacter (RISAQUAD) 8 billion cell capsule  1 Capsule Oral DAILY    meropenem (MERREM) 1 g in 0.9% sodium chloride (MBP/ADV) 50 mL MBP  1 g IntraVENous Q12H    amLODIPine (NORVASC) tablet 5 mg  5 mg Oral DAILY    aspirin delayed-release tablet 81 mg  81 mg Oral QHS    atorvastatin (LIPITOR) tablet 10 mg  10 mg Oral QHS    guaiFENesin (ROBITUSSIN) 100 mg/5 mL oral liquid 200 mg  200 mg Oral TID PRN    sodium chloride (NS) flush 5-40 mL  5-40 mL IntraVENous Q8H    sodium chloride (NS) flush 5-40 mL  5-40 mL IntraVENous PRN    acetaminophen (TYLENOL) tablet 650 mg  650 mg Oral Q6H PRN    Or    acetaminophen (TYLENOL) suppository 650 mg  650 mg Rectal Q6H PRN    polyethylene glycol (MIRALAX) packet 17 g  17 g Oral DAILY PRN    ondansetron (ZOFRAN ODT) tablet 4 mg  4 mg Oral Q8H PRN    Or    ondansetron (ZOFRAN) injection 4 mg  4 mg IntraVENous Q6H PRN    enoxaparin (LOVENOX) injection 40 mg  40 mg SubCUTAneous DAILY    acetaminophen (TYLENOL) tablet 650 mg  650 mg Oral Q6H PRN          Lab Review:     Recent Labs     05/01/23  0520 04/30/23  0137 04/29/23  0142   WBC 7.2 7.5 11.3*   HGB 14.1 13.3 13.0   HCT 43.2 42.2 40.0    240 210     Recent Labs     05/01/23  0520 04/30/23  0137 04/29/23  0142 04/28/23  1316    139 139 137   K 4.1 3.9 3.8 4.4   * 112* 114* 112*   CO2 23 23 23 23   * 91 95 112*   BUN 16 25* 36* 39*   CREA 0.87 0.92 1.12* 1.47*   CA 8.6 8.1* 8.2* 8.5   MG 1.9  --   --   --    PHOS 2.6  --   --   --    ALB  --   --   --  2.8*   TBILI  --   --   --  0.5   ALT  --   --   --  22     No results found for: GLUCPOC       Assessment / Plan:     79 yo hx of HTN, CAD, 2nd AV block, CKD 3, ESBL infection, presented w/ fevers, UTI, NEHA    1) Recurrent Complicated UTI: hx of ESBL. Frequent infection likely from incontinence.   Repeat urine Cx w/ pansensitive E. Coli. Will cont IV meropenem. Consult ID to help with outpatient abx and possible prophylactic abx    2) NEHA/CKD 3: much improved with IVF    3) R hand pain/weakness: due to chronic OA. Ortho recommended conservative management and PT/OT    4) HTN/CAD: cont norvasc, ASA, statin    5) Debility: lives in assisted living with PT/OT.   Does not want nursing home     **Prior records, notes, labs, radiology, and medications reviewed in [facility]**    Total time spent with patient care: 45 Minutes **I personally saw and examined the patient during this time period**                 Care Plan discussed with: Patient, nursing, grandson     Discussed:  Care Plan    Prophylaxis:  Lovenox    Disposition:   PT, OT, RN           ___________________________________________________    Attending Physician: Babita Grissom MD

## 2023-05-01 NOTE — PROGRESS NOTES
Bedside and Verbal shift change report given to SAILAJA Barker  (oncoming nurse) by Sridevi Rico RN (offgoing nurse). Report included the following information SBAR, Kardex, ED Summary, Intake/Output, MAR, and Recent Results.

## 2023-05-01 NOTE — PROGRESS NOTES
Care Management Readmission Assessment        NAME:   [place] Rivers   :     1924   MRN:     [id number]             RUR Score/Risk Level:       RUR:  12%  Risk Level:  Low    Assessment:  Via phone with family member (grandson- Georgia Duncan) and Other: Who - Brittany / Relation - DON at [facility]. Reason for Readmission:  Ms. Nadine Emery is a 80 y.o. female with history that includes CKD, CAD, HTN, and GERD  who was emergently admitted for:  UTI    Patient Active Problem List   Diagnosis Code    HTN (hypertension), benign I10    Mixed hyperlipidemia E78.2    GERD (gastroesophageal reflux disease) K21.9    CAD (coronary artery disease) I25.10    CKD (chronic kidney disease) stage 3, GFR 30-59 ml/min ([facility]) N18.30    Frequent falls R29.6    Neuropathy G62.9    Seizure ([address] B25.7    Acute metabolic encephalopathy K04.16    Heart block AV second degree I44.1    Acute cystitis without hematuria N30.00    ESBL (extended spectrum beta-lactamase) producing bacteria infection A49.9, Z16.12    UTI (urinary tract infection) N39.0    FTT (failure to thrive) in adult R62.7       Has any pertinent information changed since previous Care Management Assessment? Living arrangements:   Pt is a resident of [facility].  Pt recently discharged from SNF at [facility]   ADLs:   Pt requires staff assistance with all ADLs   DME:    w/c   Pharmacy:   [facility] provides meds    Insurer:  Payor: Tracie Arriaza / Plan: [employer] MEDICARE CHOICE PPO/PFFS / Product Type: Managed Care Medicare /     PCP: Rakesh Glez MD   Name of Practice:   MD at [facility]   Current patient: Yes   Approximate date of last visit: unknown   Access to virtual PCP visits:  Unknown    Is a Care Conference indicated:   No    Did you attend your follow up appointment(s):  Yes  If not, why not:  N/A    Resources/supports as identified by patient/family:  Pt has an appropriate support system         Top Challenges facing patient (as identified by patient/family and CM): Finances/Medication cost?  None identified  Transportation? None identified       Support system or lack thereof? None identified     Living arrangements? None identified         Self-care/ADLs/Cognition? None identified       Advance Directive:  DNR, has an advance directive. Copy not available. Plan for utilizing home health:   TBD             Transition of Care Plan:      Unable to determine at this time. Awaiting clinical progress, and disposition recommendations    Additional information:  Pt admitted on 04/28/23 for UTI. CM completed assessment via phone with edward Zhao) and GENESIS at [facility]). Pt was discharged to [address] on 04/04/23. Pt discharged back to [address] on Wednesday. Pt is a resident of [facility]. Wali reports that Pt is 1 level down from highest level at [facility]. Brittany states that Pt is not receiving HH service at this time. Pt has no hx of home O2. Pt uses w/c for mobility. Staff assists Pt with ADLs and transfers in/out of w/c.     CM coordinated with Brittany (GENESIS) at [facility]. Brittany interested in PT/OT recommendations when available. CM reviewed OT recommendations with Wali. Avisrioziel Scott states that he prefers Pt return to [facility]. CM will follow up with Wali and Brittany when recs are available. CM will continue to follow.      _____________________________________  LIZ Maldonado - Care Management  5/1/2023   5:06 PM     Readmission Assessment  Number of days since last admission?: 8-30 days  Previous disposition: SNF  Who is being interviewed?: Caregiver  What was the patient's/caregiver's perception as to why they think they needed to return back to the hospital?: Other (Comment) (shelter said she needed abx (per labs))  Did you visit your Primary Care Physician after you left the hospital, before you returned this time?: Yes  Did you see a specialist, such as Cardiac, Pulmonary, Orthopedic Physician, etc. after you left the hospital?: No  Who advised the patient to return to the hospital?: Other (Comment) ([facility])  Does the patient report anything that got in the way of taking their medications?: No  In our efforts to provide the best possible care to you and others like you, can you think of anything that we could have done to help you after you left the hospital the first time, so that you might not have needed to return so soon?: Other (Comment) (no)        Care Management Interventions  PCP Verified by CM: Yes Gunjan Valera MD)  Mode of Transport at Discharge:  Other (see comment) (TBD)  Transition of Care Consult (CM Consult): Assisted Living  MyChart Signup: No  Discharge Durable Medical Equipment: No  Physical Therapy Consult: Yes  Occupational Therapy Consult: Yes  Speech Therapy Consult: No  Support Systems: [place], Other Family Member(s)  Confirm Follow Up Transport: Family  Discharge Location  Patient Expects to be Discharged to[de-identified] Unable to determine at this time

## 2023-05-01 NOTE — PROGRESS NOTES
Physical Therapy orders acknowledged, chart reviewed and discussed with nurse cleared for therapy. Patient still up on the recliner and declined to participate with therapy for this afternoon,  just want to stay up on the recliner. Instructed to do some exercise on both LE while up on the chair. Communicated with nurse who agreed to monitor patient. We will continue to follow p with the patient for therapy .

## 2023-05-01 NOTE — PROGRESS NOTES
Problem: Falls - Risk of  Goal: *Absence of Falls  Description: Document Paco Pierre Fall Risk and appropriate interventions in the flowsheet. Outcome: Progressing Towards Goal  Note: Fall Risk Interventions:            Problem: Pressure Injury - Risk of  Goal: *Prevention of pressure injury  Description: Document John Scale and appropriate interventions in the flowsheet. Outcome: Progressing Towards Goal  Note: Pressure Injury Interventions:  Sensory Interventions: Assess changes in LOC, Assess need for specialty bed, Discuss PT/OT consult with provider    Moisture Interventions: Absorbent underpads    Activity Interventions: Pressure redistribution bed/mattress(bed type)    Mobility Interventions: Pressure redistribution bed/mattress (bed type)    Nutrition Interventions: Document food/fluid/supplement intake    Friction and Shear Interventions: Minimize layers         Problem: Pain  Goal: *Control of Pain  Outcome: Progressing Towards Goal     Problem: Patient Education: Go to Patient Education Activity  Goal: Patient/Family Education  Outcome: Progressing Towards Goal     Problem: Risk for Spread of Infection  Goal: Prevent transmission of infectious organism to others  Description: Prevent the transmission of infectious organisms to other patients, staff members, and visitors.   Outcome: Progressing Towards Goal

## 2023-05-01 NOTE — CONSULTS
Infectious Disease Consult    Impression/Plan   E. coli and second gram-negative kacie isolated from urine. Patient asymptomatic but with leukocytosis of 17,000 and hematuria agree with treatment. Continue meropenem pending identification of second gram-negative kacie   Recurrent urinary tract infections. Patient states that many of these previous episodes have been asymptomatic and she did not even realize she had a UTI. I suspect that many of these \"infections\" were just cases of asymptomatic bacteriuria. Consider follow-up with urology for recurrent UTIs to rule out structural abnormality although I am not sure patient would be candidate for much at the age of 80. We will check renal ultrasound. No indication for chronic antimicrobial prophylaxis at this time and even if prophylaxis was indicated options would be limited based on her allergies and MDR organisms that have grown from previous cultures   Leukocytosis. Due to above. Resolved. History of sulfa and ampicillin allergies  NEHA/CKD. Monitor renal function closely on antibiotics    Anti-infectives:   Meropenem    Subjective:   Date of Consultation:  May 1, 2023  Date of Admission: 4/28/2023   Referring Physician:     Patient is a 80 y.o. female with a past medical history significant for CKD, hypertension, and gastroesophageal reflux disease who is being seen for a UTI. Patient developed low-grade fever of 99 degrees on the day of admission and began feeling unwell the day prior to admission   She complains of a mild cough although this is chronic. She denies any urinary tract complaints including frequency, hesitancy, or dysuria. Work-up in the ER revealed a white count of 17,000 and pyuria on UA. She has grown an ESBL producing Klebsiella pneumoniae in the past and has been started on meropenem.   The infectious diseases service has been asked to assist with antibiotic management    Patient Active Problem List   Diagnosis Code    HTN (hypertension), benign I10    Mixed hyperlipidemia E78.2    GERD (gastroesophageal reflux disease) K21.9    CAD (coronary artery disease) I25.10    CKD (chronic kidney disease) stage 3, GFR 30-59 ml/min ([facility]) N18.30    Frequent falls R29.6    Neuropathy G62.9    Seizure ([facility]) U16.9    Acute metabolic encephalopathy Y13.00    Heart block AV second degree I44.1    Acute cystitis without hematuria N30.00    ESBL (extended spectrum beta-lactamase) producing bacteria infection A49.9, Z16.12    UTI (urinary tract infection) N39.0    FTT (failure to thrive) in adult R62.7     Past Medical History:   Diagnosis Date    CAD (coronary artery disease)     Femur fracture ([facility])     right    GERD (gastroesophageal reflux disease)     HTN (hypertension), benign 2011    Mixed hyperlipidemia 2011      Family History   Problem Relation Age of Onset    Other Mother          of renal failure, not sure what caused id    Other Father         something with throat, not sure if it was cancer    Heart Disease Sister       Social History     Tobacco Use    Smoking status: Never    Smokeless tobacco: Never   Substance Use Topics    Alcohol use: No     Past Surgical History:   Procedure Laterality Date    HX FEMUR FRACTURE TX      HX KNEE REPLACEMENT      bilateral    HX ORTHOPAEDIC      bilat knee replacements    HX VEIN STRIPPING        Prior to Admission medications    Medication Sig Start Date End Date Taking? Authorizing Provider   amLODIPine (Norvasc) 5 mg tablet Take 1 Tablet by mouth daily. Indications: high blood pressure 23   Joel Basurto MD   Ferrous Fumarate 325 mg (106 mg iron) tab Take  by mouth. Provider, Historical   guaiFENesin (ROBITUSSIN) 100 mg/5 mL liquid Take 200 mg by mouth three (3) times daily as needed for Cough. Provider, Historical   acetaminophen (TYLENOL) 325 mg tablet Take 2 Tabs by mouth every four (4) hours as needed for Pain.  3/2/21   Gabrielle Allen MD   aspirin delayed-release 81 mg tablet Take 81 mg by mouth nightly. Provider, Historical     Allergies   Allergen Reactions    Sulfa (Sulfonamide Antibiotics) Rash    Ampicillin Rash     Tolerates cefazolin    Lescol [Fluvastatin] Unknown (comments)        Review of Systems:  A comprehensive review of systems was negative except for that written in the History of Present Illness. Objective:   Blood pressure (!) 151/78, pulse 74, temperature 97.8 °F (36.6 °C), resp. rate 18, SpO2 92 %. Temp (24hrs), Av °F (36.7 °C), Min:97.8 °F (36.6 °C), Max:98.2 °F (36.8 °C)       Exam:    General:  Alert, cooperative, well noursished, well developed, appears stated age   Eyes:  Sclera anicteric. Mouth/Throat: Mucous membranes normal   Neck: Supple   Lungs:   No distress   CV:     Abdomen:   Soft, nontender, nondistended. No suprapubic tenderness to palpation.    Extremities: No edema   Skin: No acute rash on exposed skin   Lymph nodes:    Musculoskeletal: Moves all   Lines/Devices:  Intact, no erythema, drainage or tenderness   Psych: Alert and oriented, normal mood affect given the setting       Data Review:   Recent Results (from the past 24 hour(s))   CBC W/O DIFF    Collection Time: 23  5:20 AM   Result Value Ref Range    WBC 7.2 3.6 - 11.0 K/uL    RBC 4.54 3.80 - 5.20 M/uL    HGB 14.1 11.5 - 16.0 g/dL    HCT 43.2 35.0 - 47.0 %    MCV 95.2 80.0 - 99.0 FL    MCH 31.1 26.0 - 34.0 PG    MCHC 32.6 30.0 - 36.5 g/dL    RDW 13.8 11.5 - 14.5 %    PLATELET 899 852 - 041 K/uL    MPV 10.4 8.9 - 12.9 FL    NRBC 0.0 0  WBC    ABSOLUTE NRBC 0.00 0.00 - 5.57 K/uL   METABOLIC PANEL, BASIC    Collection Time: 23  5:20 AM   Result Value Ref Range    Sodium 139 136 - 145 mmol/L    Potassium 4.1 3.5 - 5.1 mmol/L    Chloride 112 (H) 97 - 108 mmol/L    CO2 23 21 - 32 mmol/L    Anion gap 4 (L) 5 - 15 mmol/L    Glucose 109 (H) 65 - 100 mg/dL    BUN 16 6 - 20 MG/DL    Creatinine 0.87 0.55 - 1.02 MG/DL    BUN/Creatinine ratio 18 12 - 20      eGFR 60 (L) >60 ml/min/1.73m2    Calcium 8.6 8.5 - 10.1 MG/DL   PHOSPHORUS    Collection Time: 05/01/23  5:20 AM   Result Value Ref Range    Phosphorus 2.6 2.6 - 4.7 MG/DL   MAGNESIUM    Collection Time: 05/01/23  5:20 AM   Result Value Ref Range    Magnesium 1.9 1.6 - 2.4 mg/dL   C REACTIVE PROTEIN, QT    Collection Time: 05/01/23  5:20 AM   Result Value Ref Range    C-Reactive protein 3.96 (H) 0.00 - 0.60 mg/dL        Microbiology:      Studies:      Signed By: Alexi Hickman DO     May 1, 2023

## 2023-05-02 PROCEDURE — 99232 SBSQ HOSP IP/OBS MODERATE 35: CPT | Performed by: INTERNAL MEDICINE

## 2023-05-02 PROCEDURE — 97162 PT EVAL MOD COMPLEX 30 MIN: CPT

## 2023-05-02 PROCEDURE — 74011250636 HC RX REV CODE- 250/636: Performed by: INTERNAL MEDICINE

## 2023-05-02 PROCEDURE — 74011250637 HC RX REV CODE- 250/637: Performed by: INTERNAL MEDICINE

## 2023-05-02 PROCEDURE — 74011000250 HC RX REV CODE- 250: Performed by: HOSPITALIST

## 2023-05-02 PROCEDURE — 74011250636 HC RX REV CODE- 250/636: Performed by: HOSPITALIST

## 2023-05-02 PROCEDURE — 97530 THERAPEUTIC ACTIVITIES: CPT

## 2023-05-02 PROCEDURE — 74011250637 HC RX REV CODE- 250/637: Performed by: HOSPITALIST

## 2023-05-02 PROCEDURE — 65270000029 HC RM PRIVATE

## 2023-05-02 PROCEDURE — 74011000258 HC RX REV CODE- 258: Performed by: INTERNAL MEDICINE

## 2023-05-02 PROCEDURE — 2709999900 HC NON-CHARGEABLE SUPPLY

## 2023-05-02 RX ORDER — DEXAMETHASONE 4 MG/1
4 TABLET ORAL EVERY 12 HOURS
Status: COMPLETED | OUTPATIENT
Start: 2023-05-02 | End: 2023-05-03

## 2023-05-02 RX ORDER — KETOROLAC TROMETHAMINE 30 MG/ML
15 INJECTION, SOLUTION INTRAMUSCULAR; INTRAVENOUS
Status: COMPLETED | OUTPATIENT
Start: 2023-05-02 | End: 2023-05-02

## 2023-05-02 RX ADMIN — AMLODIPINE BESYLATE 5 MG: 5 TABLET ORAL at 09:11

## 2023-05-02 RX ADMIN — ASPIRIN 81 MG: 81 TABLET, COATED ORAL at 21:01

## 2023-05-02 RX ADMIN — POLYETHYLENE GLYCOL 3350 17 G: 17 POWDER, FOR SOLUTION ORAL at 09:11

## 2023-05-02 RX ADMIN — MEROPENEM 1 G: 1 INJECTION, POWDER, FOR SOLUTION INTRAVENOUS at 09:11

## 2023-05-02 RX ADMIN — SENNOSIDES AND DOCUSATE SODIUM 1 TABLET: 50; 8.6 TABLET ORAL at 09:11

## 2023-05-02 RX ADMIN — DEXAMETHASONE 4 MG: 4 TABLET ORAL at 12:53

## 2023-05-02 RX ADMIN — Medication 1 CAPSULE: at 09:11

## 2023-05-02 RX ADMIN — MEROPENEM 1 G: 1 INJECTION, POWDER, FOR SOLUTION INTRAVENOUS at 21:01

## 2023-05-02 RX ADMIN — SENNOSIDES AND DOCUSATE SODIUM 1 TABLET: 50; 8.6 TABLET ORAL at 17:30

## 2023-05-02 RX ADMIN — Medication 10 ML: at 05:57

## 2023-05-02 RX ADMIN — ENOXAPARIN SODIUM 40 MG: 100 INJECTION SUBCUTANEOUS at 09:11

## 2023-05-02 RX ADMIN — DEXAMETHASONE 4 MG: 4 TABLET ORAL at 21:01

## 2023-05-02 RX ADMIN — GUAIFENESIN 200 MG: 200 SOLUTION ORAL at 21:25

## 2023-05-02 RX ADMIN — POLYETHYLENE GLYCOL 3350 17 G: 17 POWDER, FOR SOLUTION ORAL at 17:30

## 2023-05-02 RX ADMIN — ATORVASTATIN CALCIUM 10 MG: 10 TABLET, FILM COATED ORAL at 21:02

## 2023-05-02 RX ADMIN — Medication 10 ML: at 21:01

## 2023-05-02 RX ADMIN — KETOROLAC TROMETHAMINE 15 MG: 30 INJECTION, SOLUTION INTRAMUSCULAR at 12:53

## 2023-05-03 PROCEDURE — 74011000250 HC RX REV CODE- 250: Performed by: HOSPITALIST

## 2023-05-03 PROCEDURE — 65270000029 HC RM PRIVATE

## 2023-05-03 PROCEDURE — 74011250636 HC RX REV CODE- 250/636: Performed by: HOSPITALIST

## 2023-05-03 PROCEDURE — 99232 SBSQ HOSP IP/OBS MODERATE 35: CPT | Performed by: INTERNAL MEDICINE

## 2023-05-03 PROCEDURE — 74011250637 HC RX REV CODE- 250/637: Performed by: HOSPITALIST

## 2023-05-03 PROCEDURE — 74011250637 HC RX REV CODE- 250/637: Performed by: INTERNAL MEDICINE

## 2023-05-03 PROCEDURE — 74011250636 HC RX REV CODE- 250/636: Performed by: INTERNAL MEDICINE

## 2023-05-03 PROCEDURE — 97116 GAIT TRAINING THERAPY: CPT

## 2023-05-03 PROCEDURE — 74011000258 HC RX REV CODE- 258: Performed by: INTERNAL MEDICINE

## 2023-05-03 PROCEDURE — 97530 THERAPEUTIC ACTIVITIES: CPT

## 2023-05-03 RX ADMIN — ENOXAPARIN SODIUM 40 MG: 100 INJECTION SUBCUTANEOUS at 09:19

## 2023-05-03 RX ADMIN — Medication 1 CAPSULE: at 09:20

## 2023-05-03 RX ADMIN — Medication 10 ML: at 21:28

## 2023-05-03 RX ADMIN — AMLODIPINE BESYLATE 5 MG: 5 TABLET ORAL at 09:20

## 2023-05-03 RX ADMIN — Medication 10 ML: at 17:44

## 2023-05-03 RX ADMIN — DEXAMETHASONE 4 MG: 4 TABLET ORAL at 09:20

## 2023-05-03 RX ADMIN — ASPIRIN 81 MG: 81 TABLET, COATED ORAL at 21:28

## 2023-05-03 RX ADMIN — MEROPENEM 1 G: 1 INJECTION, POWDER, FOR SOLUTION INTRAVENOUS at 21:28

## 2023-05-03 RX ADMIN — Medication 10 ML: at 05:31

## 2023-05-03 RX ADMIN — ATORVASTATIN CALCIUM 10 MG: 10 TABLET, FILM COATED ORAL at 21:28

## 2023-05-03 RX ADMIN — DEXAMETHASONE 4 MG: 4 TABLET ORAL at 21:28

## 2023-05-03 RX ADMIN — MEROPENEM 1 G: 1 INJECTION, POWDER, FOR SOLUTION INTRAVENOUS at 09:19

## 2023-05-03 RX ADMIN — POLYETHYLENE GLYCOL 3350 17 G: 17 POWDER, FOR SOLUTION ORAL at 09:20

## 2023-05-03 RX ADMIN — SENNOSIDES AND DOCUSATE SODIUM 1 TABLET: 50; 8.6 TABLET ORAL at 09:19

## 2023-05-03 RX ADMIN — ACETAMINOPHEN 650 MG: 325 TABLET ORAL at 06:38

## 2023-05-04 VITALS
RESPIRATION RATE: 19 BRPM | TEMPERATURE: 97.7 F | DIASTOLIC BLOOD PRESSURE: 69 MMHG | OXYGEN SATURATION: 90 % | HEART RATE: 66 BPM | SYSTOLIC BLOOD PRESSURE: 134 MMHG

## 2023-05-04 LAB
ANION GAP SERPL CALC-SCNC: 2 MMOL/L (ref 5–15)
BACTERIA SPEC CULT: ABNORMAL
BACTERIA SPEC CULT: ABNORMAL
BUN SERPL-MCNC: 23 MG/DL (ref 6–20)
BUN/CREAT SERPL: 28 (ref 12–20)
CALCIUM SERPL-MCNC: 8.4 MG/DL (ref 8.5–10.1)
CC UR VC: ABNORMAL
CHLORIDE SERPL-SCNC: 112 MMOL/L (ref 97–108)
CO2 SERPL-SCNC: 24 MMOL/L (ref 21–32)
CREAT SERPL-MCNC: 0.82 MG/DL (ref 0.55–1.02)
GLUCOSE SERPL-MCNC: 149 MG/DL (ref 65–100)
MAGNESIUM SERPL-MCNC: 2 MG/DL (ref 1.6–2.4)
PHOSPHATE SERPL-MCNC: 3 MG/DL (ref 2.6–4.7)
POTASSIUM SERPL-SCNC: 4.9 MMOL/L (ref 3.5–5.1)
SARS-COV-2 RDRP RESP QL NAA+PROBE: NOT DETECTED
SERVICE CMNT-IMP: ABNORMAL
SODIUM SERPL-SCNC: 138 MMOL/L (ref 136–145)
SOURCE, COVRS: NORMAL

## 2023-05-04 PROCEDURE — 74011250636 HC RX REV CODE- 250/636: Performed by: INTERNAL MEDICINE

## 2023-05-04 PROCEDURE — 74011000258 HC RX REV CODE- 258: Performed by: INTERNAL MEDICINE

## 2023-05-04 PROCEDURE — 74011250637 HC RX REV CODE- 250/637: Performed by: INTERNAL MEDICINE

## 2023-05-04 PROCEDURE — 87635 SARS-COV-2 COVID-19 AMP PRB: CPT

## 2023-05-04 PROCEDURE — 80048 BASIC METABOLIC PNL TOTAL CA: CPT

## 2023-05-04 PROCEDURE — 83735 ASSAY OF MAGNESIUM: CPT

## 2023-05-04 PROCEDURE — 84100 ASSAY OF PHOSPHORUS: CPT

## 2023-05-04 PROCEDURE — 74011000250 HC RX REV CODE- 250: Performed by: HOSPITALIST

## 2023-05-04 PROCEDURE — 74011250637 HC RX REV CODE- 250/637: Performed by: HOSPITALIST

## 2023-05-04 PROCEDURE — 74011250636 HC RX REV CODE- 250/636: Performed by: HOSPITALIST

## 2023-05-04 PROCEDURE — 36415 COLL VENOUS BLD VENIPUNCTURE: CPT

## 2023-05-04 RX ORDER — AMOXICILLIN 250 MG
1 CAPSULE ORAL 2 TIMES DAILY
Qty: 60 TABLET | Refills: 1 | Status: SHIPPED | OUTPATIENT
Start: 2023-05-04

## 2023-05-04 RX ORDER — CIPROFLOXACIN 500 MG/1
500 TABLET ORAL 2 TIMES DAILY
Qty: 6 TABLET | Refills: 0 | Status: SHIPPED | OUTPATIENT
Start: 2023-05-04 | End: 2023-05-07

## 2023-05-04 RX ORDER — CIPROFLOXACIN 500 MG/1
500 TABLET ORAL 2 TIMES DAILY
Qty: 6 TABLET | Refills: 0 | Status: SHIPPED | OUTPATIENT
Start: 2023-05-04 | End: 2023-05-04 | Stop reason: SDUPTHER

## 2023-05-04 RX ORDER — AMOXICILLIN 250 MG
1 CAPSULE ORAL 2 TIMES DAILY
Qty: 60 TABLET | Refills: 0 | Status: SHIPPED | OUTPATIENT
Start: 2023-05-04 | End: 2023-05-04 | Stop reason: SDUPTHER

## 2023-05-04 RX ADMIN — Medication 1 CAPSULE: at 08:58

## 2023-05-04 RX ADMIN — ENOXAPARIN SODIUM 40 MG: 100 INJECTION SUBCUTANEOUS at 08:58

## 2023-05-04 RX ADMIN — AMLODIPINE BESYLATE 5 MG: 5 TABLET ORAL at 08:58

## 2023-05-04 RX ADMIN — Medication 10 ML: at 06:45

## 2023-05-04 RX ADMIN — MEROPENEM 1 G: 1 INJECTION, POWDER, FOR SOLUTION INTRAVENOUS at 09:00

## 2023-05-19 NOTE — PROGRESS NOTES
Attempted to call report nurse nurse unavailable at the moment but will return my call. No difficulties

## 2023-05-28 PROBLEM — N39.0 UTI (URINARY TRACT INFECTION): Status: RESOLVED | Noted: 2023-03-28 | Resolved: 2023-05-28

## 2023-09-01 NOTE — PROGRESS NOTES
Problem: Falls - Risk of  Goal: *Absence of Falls  Description: Document Dennie Rous Fall Risk and appropriate interventions in the flowsheet. Outcome: Progressing Towards Goal  Note: Fall Risk Interventions:  Mobility Interventions: Bed/chair exit alarm, OT consult for ADLs, Patient to call before getting OOB, PT Consult for mobility concerns, PT Consult for assist device competence, Utilize walker, cane, or other assistive device, Utilize gait belt for transfers/ambulation         Medication Interventions: Bed/chair exit alarm, Patient to call before getting OOB, Evaluate medications/consider consulting pharmacy, Teach patient to arise slowly    Elimination Interventions: Bed/chair exit alarm, Call light in reach, Patient to call for help with toileting needs, Toileting schedule/hourly rounds              Problem: Pressure Injury - Risk of  Goal: *Prevention of pressure injury  Description: Document John Scale and appropriate interventions in the flowsheet. Outcome: Progressing Towards Goal  Note: Pressure Injury Interventions:  Sensory Interventions: Assess changes in LOC, Assess need for specialty bed, Float heels, Keep linens dry and wrinkle-free, Maintain/enhance activity level, Minimize linen layers, Monitor skin under medical devices    Moisture Interventions: Absorbent underpads, Apply protective barrier, creams and emollients, Internal/External urinary devices, Offer toileting Q_hr    Activity Interventions: Increase time out of bed, PT/OT evaluation    Mobility Interventions: HOB 30 degrees or less, PT/OT evaluation, Turn and reposition approx.  every two hours(pillow and wedges)    Nutrition Interventions: Document food/fluid/supplement intake    Friction and Shear Interventions: Transferring/repositioning devices, HOB 30 degrees or less obesity

## 2023-09-04 ENCOUNTER — HOSPITAL ENCOUNTER (EMERGENCY)
Facility: HOSPITAL | Age: 88
Discharge: HOME | End: 2023-09-04
Attending: STUDENT IN AN ORGANIZED HEALTH CARE EDUCATION/TRAINING PROGRAM
Payer: MEDICARE

## 2023-09-04 VITALS
SYSTOLIC BLOOD PRESSURE: 183 MMHG | TEMPERATURE: 97.6 F | HEART RATE: 87 BPM | WEIGHT: 190 LBS | OXYGEN SATURATION: 94 % | RESPIRATION RATE: 16 BRPM | DIASTOLIC BLOOD PRESSURE: 79 MMHG | HEIGHT: 64 IN | BODY MASS INDEX: 32.44 KG/M2

## 2023-09-04 DIAGNOSIS — L29.9 ITCHING: Primary | ICD-10-CM
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
## 2023-09-04 PROCEDURE — 6370000000 HC RX 637 (ALT 250 FOR IP): Performed by: STUDENT IN AN ORGANIZED HEALTH CARE EDUCATION/TRAINING PROGRAM

## 2023-09-04 PROCEDURE — 99283 EMERGENCY DEPT VISIT LOW MDM: CPT

## 2023-09-04 PROCEDURE — 94761 N-INVAS EAR/PLS OXIMETRY MLT: CPT

## 2023-09-04 RX ORDER — CETIRIZINE HYDROCHLORIDE 10 MG/1
5 TABLET ORAL DAILY
Qty: 7 TABLET | Refills: 0 | Status: SHIPPED | OUTPATIENT
Start: 2023-09-04 | End: 2023-09-18

## 2023-09-04 RX ORDER — CETIRIZINE HYDROCHLORIDE 10 MG/1
5 TABLET ORAL ONCE
Status: COMPLETED | OUTPATIENT
Start: 2023-09-04 | End: 2023-09-04

## 2023-09-04 RX ADMIN — CETIRIZINE HYDROCHLORIDE 5 MG: 10 TABLET, FILM COATED ORAL at 09:14

## 2023-09-04 ASSESSMENT — PAIN - FUNCTIONAL ASSESSMENT: PAIN_FUNCTIONAL_ASSESSMENT: NONE - DENIES PAIN

## 2023-09-04 ASSESSMENT — ENCOUNTER SYMPTOMS
VOMITING: 0
SHORTNESS OF BREATH: 0
RHINORRHEA: 0
DIARRHEA: 0
NAUSEA: 0
EYE DISCHARGE: 0
ABDOMINAL PAIN: 0
EYE REDNESS: 0
COUGH: 0

## 2023-09-04 NOTE — ED PROVIDER NOTES
[facility] EMERGENCY DEPT  EMERGENCY DEPARTMENT ENCOUNTER      Pt Name: Summer Hartman  MRN: [id number]  [address] [facility] 4/26/1924  Date of evaluation: 9/4/2023  Provider: Ernesto Oleary DO    CHIEF COMPLAINT       Chief Complaint   Patient presents with    Pruritis    Insect Bite         HISTORY OF PRESENT ILLNESS    HPI    Summer Hartman is a 80 y.o. female with a history of hypertension, hyperlipidemia, GERD, CAD who presents to the emergency department for evaluation of a rash. Patient notes she woke up this morning with bumps and itching to her arm and neck. Notes she was outside yesterday prior to symptom onset. Endorses that the rash is itchy, denies any pain sensation. Patient does not believe she is on any new medications and when nursing spoke with patient's facility they state no medication she is on her other medications listed on the paperwork provided with EMS. Facility reports that they sent her to the emergency department as they are concerned for allergic reaction, although it is unclear why the concern for allergic reaction as she has not been started on any new medications and on review of her paperwork provided by the facility none of the medications listed on her medication list are antibiotics. The facility did not give any medications or creams for her itching. Patient denies any difficulty breathing, facial or tongue swelling, denies sensation of throat itching or swelling. No abdominal pain, nausea or vomiting. Of note, patient's blood pressure was elevated on arrival but she did not receive her blood pressure medications prior to transfer to our facility. Denies any recent illness or other complaints at this time. Nursing Notes were reviewed. REVIEW OF SYSTEMS       Review of Systems   Constitutional:  Negative for chills and fever. HENT:  Negative for congestion and rhinorrhea. Eyes:  Negative for discharge and redness.    Respiratory:  Negative for cough and shortness

## 2023-09-04 NOTE — ED TRIAGE NOTES
Patient arrives via EMS from [facility] for complaints of itching and insect bites to the back of the neck and left arm which was noticed yesterday. Per EMS, facility checked for bed bugs with no results. Patient denies pain, shortness of breath. PMH HTN, has not taken her daily meds.

## 2024-06-09 ENCOUNTER — HOSPITAL ENCOUNTER (OUTPATIENT)
Facility: HOSPITAL | Age: 89
Setting detail: OBSERVATION
Discharge: NURSING, REHAB OR LONG-TERM CARE | End: 2024-06-11
Attending: EMERGENCY MEDICINE | Admitting: INTERNAL MEDICINE
Payer: MEDICARE

## 2024-06-09 ENCOUNTER — APPOINTMENT (OUTPATIENT)
Facility: HOSPITAL | Age: 89
End: 2024-06-09
Payer: MEDICARE

## 2024-06-09 DIAGNOSIS — R00.1 BRADYCARDIA: Primary | ICD-10-CM

## 2024-06-09 DIAGNOSIS — N30.00 ACUTE CYSTITIS WITHOUT HEMATURIA: ICD-10-CM

## 2024-06-09 LAB
ALBUMIN SERPL-MCNC: 3.3 G/DL (ref 3.5–5)
ALBUMIN/GLOB SERPL: 0.8 (ref 1.1–2.2)
ALP SERPL-CCNC: 106 U/L (ref 45–117)
ALT SERPL-CCNC: 17 U/L (ref 12–78)
ANION GAP SERPL CALC-SCNC: 3 MMOL/L (ref 5–15)
APPEARANCE UR: ABNORMAL
AST SERPL-CCNC: 20 U/L (ref 15–37)
BACTERIA URNS QL MICRO: ABNORMAL /HPF
BASOPHILS # BLD: 0.1 K/UL (ref 0–0.1)
BASOPHILS NFR BLD: 1 % (ref 0–1)
BILIRUB SERPL-MCNC: 0.5 MG/DL (ref 0.2–1)
BILIRUB UR QL: NEGATIVE
BUN SERPL-MCNC: 25 MG/DL (ref 6–20)
BUN/CREAT SERPL: 21 (ref 12–20)
CALCIUM SERPL-MCNC: 9.1 MG/DL (ref 8.5–10.1)
CHLORIDE SERPL-SCNC: 112 MMOL/L (ref 97–108)
CO2 SERPL-SCNC: 26 MMOL/L (ref 21–32)
COLOR UR: ABNORMAL
COMMENT:: NORMAL
COMMENT:: NORMAL
CREAT SERPL-MCNC: 1.19 MG/DL (ref 0.55–1.02)
DIFFERENTIAL METHOD BLD: ABNORMAL
EKG ATRIAL RATE: 46 BPM
EKG DIAGNOSIS: NORMAL
EKG P AXIS: 110 DEGREES
EKG P-R INTERVAL: 182 MS
EKG Q-T INTERVAL: 442 MS
EKG QRS DURATION: 96 MS
EKG QTC CALCULATION (BAZETT): 386 MS
EKG R AXIS: -51 DEGREES
EKG T AXIS: 46 DEGREES
EKG VENTRICULAR RATE: 46 BPM
EOSINOPHIL # BLD: 0.2 K/UL (ref 0–0.4)
EOSINOPHIL NFR BLD: 2 % (ref 0–7)
EPITH CASTS URNS QL MICRO: ABNORMAL /LPF
ERYTHROCYTE [DISTWIDTH] IN BLOOD BY AUTOMATED COUNT: 13.1 % (ref 11.5–14.5)
GLOBULIN SER CALC-MCNC: 4 G/DL (ref 2–4)
GLUCOSE SERPL-MCNC: 105 MG/DL (ref 65–100)
GLUCOSE UR STRIP.AUTO-MCNC: NEGATIVE MG/DL
HCT VFR BLD AUTO: 48.6 % (ref 35–47)
HGB BLD-MCNC: 15.5 G/DL (ref 11.5–16)
HGB UR QL STRIP: ABNORMAL
HYALINE CASTS URNS QL MICRO: ABNORMAL /LPF (ref 0–2)
IMM GRANULOCYTES # BLD AUTO: 0 K/UL (ref 0–0.04)
IMM GRANULOCYTES NFR BLD AUTO: 0 % (ref 0–0.5)
KETONES UR QL STRIP.AUTO: NEGATIVE MG/DL
LEUKOCYTE ESTERASE UR QL STRIP.AUTO: ABNORMAL
LYMPHOCYTES # BLD: 1.8 K/UL (ref 0.8–3.5)
LYMPHOCYTES NFR BLD: 28 % (ref 12–49)
MAGNESIUM SERPL-MCNC: 2.1 MG/DL (ref 1.6–2.4)
MCH RBC QN AUTO: 30.7 PG (ref 26–34)
MCHC RBC AUTO-ENTMCNC: 31.9 G/DL (ref 30–36.5)
MCV RBC AUTO: 96.2 FL (ref 80–99)
MONOCYTES # BLD: 0.5 K/UL (ref 0–1)
MONOCYTES NFR BLD: 8 % (ref 5–13)
NEUTS SEG # BLD: 3.9 K/UL (ref 1.8–8)
NEUTS SEG NFR BLD: 61 % (ref 32–75)
NITRITE UR QL STRIP.AUTO: NEGATIVE
NRBC # BLD: 0 K/UL (ref 0–0.01)
NRBC BLD-RTO: 0 PER 100 WBC
NT PRO BNP: 532 PG/ML
PH UR STRIP: 7.5 (ref 5–8)
PLATELET # BLD AUTO: 258 K/UL (ref 150–400)
PMV BLD AUTO: 10.9 FL (ref 8.9–12.9)
POTASSIUM SERPL-SCNC: 5.4 MMOL/L (ref 3.5–5.1)
PROT SERPL-MCNC: 7.3 G/DL (ref 6.4–8.2)
PROT UR STRIP-MCNC: ABNORMAL MG/DL
RBC # BLD AUTO: 5.05 M/UL (ref 3.8–5.2)
RBC #/AREA URNS HPF: ABNORMAL /HPF (ref 0–5)
SODIUM SERPL-SCNC: 141 MMOL/L (ref 136–145)
SP GR UR REFRACTOMETRY: 1.01 (ref 1–1.03)
SPECIMEN HOLD: NORMAL
SPECIMEN HOLD: NORMAL
TROPONIN I SERPL HS-MCNC: 9 NG/L (ref 0–51)
URINE CULTURE IF INDICATED: ABNORMAL
UROBILINOGEN UR QL STRIP.AUTO: 0.2 EU/DL (ref 0.2–1)
WBC # BLD AUTO: 6.4 K/UL (ref 3.6–11)
WBC URNS QL MICRO: >100 /HPF (ref 0–4)

## 2024-06-09 PROCEDURE — 83735 ASSAY OF MAGNESIUM: CPT

## 2024-06-09 PROCEDURE — 80053 COMPREHEN METABOLIC PANEL: CPT

## 2024-06-09 PROCEDURE — 96375 TX/PRO/DX INJ NEW DRUG ADDON: CPT

## 2024-06-09 PROCEDURE — 2580000003 HC RX 258: Performed by: INTERNAL MEDICINE

## 2024-06-09 PROCEDURE — 87088 URINE BACTERIA CULTURE: CPT

## 2024-06-09 PROCEDURE — 6360000002 HC RX W HCPCS: Performed by: INTERNAL MEDICINE

## 2024-06-09 PROCEDURE — 96361 HYDRATE IV INFUSION ADD-ON: CPT

## 2024-06-09 PROCEDURE — 96365 THER/PROPH/DIAG IV INF INIT: CPT

## 2024-06-09 PROCEDURE — 93005 ELECTROCARDIOGRAM TRACING: CPT | Performed by: EMERGENCY MEDICINE

## 2024-06-09 PROCEDURE — 99285 EMERGENCY DEPT VISIT HI MDM: CPT

## 2024-06-09 PROCEDURE — 87186 SC STD MICRODIL/AGAR DIL: CPT

## 2024-06-09 PROCEDURE — 96372 THER/PROPH/DIAG INJ SC/IM: CPT

## 2024-06-09 PROCEDURE — G0378 HOSPITAL OBSERVATION PER HR: HCPCS

## 2024-06-09 PROCEDURE — 96366 THER/PROPH/DIAG IV INF ADDON: CPT

## 2024-06-09 PROCEDURE — 2580000003 HC RX 258: Performed by: EMERGENCY MEDICINE

## 2024-06-09 PROCEDURE — 94761 N-INVAS EAR/PLS OXIMETRY MLT: CPT

## 2024-06-09 PROCEDURE — 81001 URINALYSIS AUTO W/SCOPE: CPT

## 2024-06-09 PROCEDURE — 6360000002 HC RX W HCPCS: Performed by: EMERGENCY MEDICINE

## 2024-06-09 PROCEDURE — 71045 X-RAY EXAM CHEST 1 VIEW: CPT

## 2024-06-09 PROCEDURE — 93010 ELECTROCARDIOGRAM REPORT: CPT | Performed by: SPECIALIST

## 2024-06-09 PROCEDURE — 87086 URINE CULTURE/COLONY COUNT: CPT

## 2024-06-09 PROCEDURE — 36415 COLL VENOUS BLD VENIPUNCTURE: CPT

## 2024-06-09 PROCEDURE — 85025 COMPLETE CBC W/AUTO DIFF WBC: CPT

## 2024-06-09 PROCEDURE — 84484 ASSAY OF TROPONIN QUANT: CPT

## 2024-06-09 PROCEDURE — 83880 ASSAY OF NATRIURETIC PEPTIDE: CPT

## 2024-06-09 RX ORDER — SODIUM CHLORIDE 0.9 % (FLUSH) 0.9 %
5-40 SYRINGE (ML) INJECTION EVERY 12 HOURS SCHEDULED
Status: DISCONTINUED | OUTPATIENT
Start: 2024-06-09 | End: 2024-06-11 | Stop reason: HOSPADM

## 2024-06-09 RX ORDER — LEVOFLOXACIN 5 MG/ML
750 INJECTION, SOLUTION INTRAVENOUS
Status: DISCONTINUED | OUTPATIENT
Start: 2024-06-11 | End: 2024-06-10

## 2024-06-09 RX ORDER — LEVOFLOXACIN 5 MG/ML
750 INJECTION, SOLUTION INTRAVENOUS EVERY 24 HOURS
Status: DISCONTINUED | OUTPATIENT
Start: 2024-06-09 | End: 2024-06-09

## 2024-06-09 RX ORDER — ACETAMINOPHEN 325 MG/1
650 TABLET ORAL EVERY 6 HOURS PRN
Status: DISCONTINUED | OUTPATIENT
Start: 2024-06-09 | End: 2024-06-11 | Stop reason: HOSPADM

## 2024-06-09 RX ORDER — 0.9 % SODIUM CHLORIDE 0.9 %
500 INTRAVENOUS SOLUTION INTRAVENOUS ONCE
Status: COMPLETED | OUTPATIENT
Start: 2024-06-09 | End: 2024-06-09

## 2024-06-09 RX ORDER — SODIUM CHLORIDE 0.9 % (FLUSH) 0.9 %
5-40 SYRINGE (ML) INJECTION PRN
Status: DISCONTINUED | OUTPATIENT
Start: 2024-06-09 | End: 2024-06-11 | Stop reason: HOSPADM

## 2024-06-09 RX ORDER — SODIUM CHLORIDE 9 MG/ML
INJECTION, SOLUTION INTRAVENOUS PRN
Status: DISCONTINUED | OUTPATIENT
Start: 2024-06-09 | End: 2024-06-11 | Stop reason: HOSPADM

## 2024-06-09 RX ORDER — ONDANSETRON 2 MG/ML
4 INJECTION INTRAMUSCULAR; INTRAVENOUS EVERY 6 HOURS PRN
Status: DISCONTINUED | OUTPATIENT
Start: 2024-06-09 | End: 2024-06-11 | Stop reason: HOSPADM

## 2024-06-09 RX ORDER — ACETAMINOPHEN 650 MG/1
650 SUPPOSITORY RECTAL EVERY 6 HOURS PRN
Status: DISCONTINUED | OUTPATIENT
Start: 2024-06-09 | End: 2024-06-11 | Stop reason: HOSPADM

## 2024-06-09 RX ORDER — SODIUM CHLORIDE, SODIUM LACTATE, POTASSIUM CHLORIDE, CALCIUM CHLORIDE 600; 310; 30; 20 MG/100ML; MG/100ML; MG/100ML; MG/100ML
INJECTION, SOLUTION INTRAVENOUS CONTINUOUS
Status: DISCONTINUED | OUTPATIENT
Start: 2024-06-09 | End: 2024-06-11 | Stop reason: HOSPADM

## 2024-06-09 RX ORDER — LEVOFLOXACIN 5 MG/ML
750 INJECTION, SOLUTION INTRAVENOUS ONCE
Status: COMPLETED | OUTPATIENT
Start: 2024-06-09 | End: 2024-06-09

## 2024-06-09 RX ORDER — ONDANSETRON 4 MG/1
4 TABLET, ORALLY DISINTEGRATING ORAL EVERY 8 HOURS PRN
Status: DISCONTINUED | OUTPATIENT
Start: 2024-06-09 | End: 2024-06-11 | Stop reason: HOSPADM

## 2024-06-09 RX ORDER — POLYETHYLENE GLYCOL 3350 17 G/17G
17 POWDER, FOR SOLUTION ORAL DAILY PRN
Status: DISCONTINUED | OUTPATIENT
Start: 2024-06-09 | End: 2024-06-11 | Stop reason: HOSPADM

## 2024-06-09 RX ORDER — ENOXAPARIN SODIUM 100 MG/ML
30 INJECTION SUBCUTANEOUS DAILY
Status: DISCONTINUED | OUTPATIENT
Start: 2024-06-09 | End: 2024-06-11 | Stop reason: HOSPADM

## 2024-06-09 RX ADMIN — LEVOFLOXACIN 750 MG: 5 INJECTION, SOLUTION INTRAVENOUS at 16:41

## 2024-06-09 RX ADMIN — SODIUM CHLORIDE, POTASSIUM CHLORIDE, SODIUM LACTATE AND CALCIUM CHLORIDE: 600; 310; 30; 20 INJECTION, SOLUTION INTRAVENOUS at 16:43

## 2024-06-09 RX ADMIN — ENOXAPARIN SODIUM 30 MG: 100 INJECTION SUBCUTANEOUS at 15:13

## 2024-06-09 RX ADMIN — WATER 1000 MG: 1 INJECTION INTRAMUSCULAR; INTRAVENOUS; SUBCUTANEOUS at 15:12

## 2024-06-09 RX ADMIN — SODIUM CHLORIDE 500 ML: 9 INJECTION, SOLUTION INTRAVENOUS at 15:11

## 2024-06-09 ASSESSMENT — PAIN - FUNCTIONAL ASSESSMENT: PAIN_FUNCTIONAL_ASSESSMENT: NONE - DENIES PAIN

## 2024-06-09 ASSESSMENT — LIFESTYLE VARIABLES
HOW MANY STANDARD DRINKS CONTAINING ALCOHOL DO YOU HAVE ON A TYPICAL DAY: PATIENT DOES NOT DRINK
HOW OFTEN DO YOU HAVE A DRINK CONTAINING ALCOHOL: NEVER

## 2024-06-09 NOTE — H&P
(LIPITOR) 40 MG tablet Take 1 tablet by mouth nightly 12/30/21   Automatic Reconciliation, Ar   Ferrous Fumarate 325 (106 Fe) MG TABS Take by mouth    Automatic Reconciliation, Ar   guaiFENesin (ROBITUSSIN) 100 MG/5ML liquid Take 200 mg by mouth 3 times daily as needed    Automatic Reconciliation, Ar   loperamide (IMODIUM A-D) 2 MG tablet Take 2 mg by mouth 4 times daily as needed    Automatic Reconciliation, Ar   ondansetron (ZOFRAN-ODT) 4 MG disintegrating tablet Take 4 mg by mouth every 8 hours as needed 3/2/21   Automatic Reconciliation, Ar   pantoprazole (PROTONIX) 40 MG tablet Take 40 mg by mouth daily    Automatic Reconciliation, Ar   pregabalin (LYRICA) 50 MG capsule Take 50 mg by mouth.    Automatic Reconciliation, Ar       REVIEW OF SYSTEMS:  See HPI for details  General: negative for fever, chills, sweats, weakness, weight loss  Eyes: negative for blurred vision, eye pain, loss of vision, diplopia  Ear Nose and Throat: negative for rhinorrhea, pharyngitis, otalgia, tinnitus, speech or swallowing difficulties  Respiratory:  negative for pleuritic pain, cough, sputum production, wheezing, SOB, SMITH  Cardiology:  negative for chest pain, palpitations, orthopnea, PND, edema, syncope   Gastrointestinal: negative for abdominal pain, N/V, dysphagia, change in bowel habits, bleeding  Genitourinary: negative for frequency, urgency, dysuria, hematuria, incontinence  Muskuloskeletal : negative for arthralgia, myalgia  Hematology: negative for easy bruising, bleeding, lymphadenopathy  Dermatological: negative for rash, ulceration, mole change, new lesion  Endocrine: negative for hot flashes or polydipsia  Neurological: negative for headache, dizziness, confusion, focal weakness, paresthesia, memory loss, gait disturbance  Psychological: negative for anxiety, depression, agitation      Objective:   VITALS:    Vitals:    06/09/24 1245   BP: (!) 127/51   Pulse: (!) 49   Resp: 17   Temp:    SpO2: 90%     PHYSICAL

## 2024-06-09 NOTE — ED TRIAGE NOTES
Pt arrives with EMS from nursing home for hand tingling that started this morning after being outside all day yesterday. Nursing at home also reports some new confusion regarding location and time. EMS placed pt on 2L d/t O2 sats 89-91% on RA.

## 2024-06-09 NOTE — ED PROVIDER NOTES
[facility] EMERGENCY DEPT  EMERGENCY DEPARTMENT ENCOUNTER      Pt Name: Wendy Melara  MRN: [id number]  Birthdate 4/26/1924  Date of evaluation: 6/9/2024  Provider: Libertad Kenney DO    CHIEF COMPLAINT       Chief Complaint   Patient presents with    Tingling    Altered Mental Status         HISTORY OF PRESENT ILLNESS   (Location/Symptom, Timing/Onset, Context/Setting, Quality, Duration, Modifying Factors, Severity)  Note limiting factors.   HPI      Review of External Medical Records:     Nursing Notes were reviewed.    REVIEW OF SYSTEMS    (2-9 systems for level 4, 10 or more for level 5)     Review of Systems    Except as noted above the remainder of the review of systems was reviewed and negative.       PAST MEDICAL HISTORY     Past Medical History:   Diagnosis Date    CAD (coronary artery disease)     Femur fracture ([facility])     right    GERD (gastroesophageal reflux disease)     HTN (hypertension), benign 1/11/2011    Mixed hyperlipidemia 1/11/2011         SURGICAL HISTORY       Past Surgical History:   Procedure Laterality Date    FEMUR FRACTURE SURGERY      ORTHOPEDIC SURGERY      bilat knee replacements    TOTAL KNEE ARTHROPLASTY      bilateral    VEIN SURGERY           CURRENT MEDICATIONS       Previous Medications    ACETAMINOPHEN (TYLENOL) 325 MG TABLET    Take 2 tablets by mouth every 4 hours as needed    AMLODIPINE (NORVASC) 5 MG TABLET    Take 5 mg by mouth daily    ASPIRIN 81 MG EC TABLET    Take 1 tablet by mouth    ATORVASTATIN (LIPITOR) 40 MG TABLET    Take 1 tablet by mouth nightly    DIPHENHYDRAMINE (BENADRYL) 2 % CREAM    Apply topically 3 times daily as needed for itching    FERROUS FUMARATE 325 (106 FE) MG TABS    Take by mouth    GUAIFENESIN (ROBITUSSIN) 100 MG/5ML LIQUID    Take 200 mg by mouth 3 times daily as needed    LOPERAMIDE (IMODIUM A-D) 2 MG TABLET    Take 2 mg by mouth 4 times daily as needed    ONDANSETRON (ZOFRAN-ODT) 4 MG DISINTEGRATING TABLET    Take 4 mg by mouth every 8 hours

## 2024-06-09 NOTE — PROGRESS NOTES
Pharmacy Dosing Services: 6/9/24    Pharmacist Renal Dosing  Note for 6/9/24   Physician Dr. Maddox    Indication: UTI  Previous Regimen Levaquin 500mg IV every 24 hours   Serum Creatinine Lab Results   Component Value Date/Time    CREATININE 1.19 06/09/2024 12:21 PM      Creatinine Clearance Estimated Creatinine Clearance: 27 mL/min (A) (based on SCr of 1.19 mg/dL (H)).   BUN Lab Results   Component Value Date/Time    BUN 25 06/09/2024 12:21 PM         Plan:  Adjust to levaquin 750mg IV every 48 hours    Thank You,  Sharron Vallecillo, PharmD, BCPS

## 2024-06-09 NOTE — ED NOTES
Spoke with pt's facility at her request to confirm that her dentures and glasses were there and they are.

## 2024-06-10 LAB
ANION GAP SERPL CALC-SCNC: 4 MMOL/L (ref 5–15)
BUN SERPL-MCNC: 20 MG/DL (ref 6–20)
BUN/CREAT SERPL: 21 (ref 12–20)
CALCIUM SERPL-MCNC: 8.6 MG/DL (ref 8.5–10.1)
CHLORIDE SERPL-SCNC: 114 MMOL/L (ref 97–108)
CO2 SERPL-SCNC: 24 MMOL/L (ref 21–32)
COMMENT:: NORMAL
CREAT SERPL-MCNC: 0.95 MG/DL (ref 0.55–1.02)
GLUCOSE SERPL-MCNC: 92 MG/DL (ref 65–100)
POTASSIUM SERPL-SCNC: 4.4 MMOL/L (ref 3.5–5.1)
SODIUM SERPL-SCNC: 142 MMOL/L (ref 136–145)
SPECIMEN HOLD: NORMAL

## 2024-06-10 PROCEDURE — 80048 BASIC METABOLIC PNL TOTAL CA: CPT

## 2024-06-10 PROCEDURE — G0378 HOSPITAL OBSERVATION PER HR: HCPCS

## 2024-06-10 PROCEDURE — 97110 THERAPEUTIC EXERCISES: CPT

## 2024-06-10 PROCEDURE — 6360000002 HC RX W HCPCS: Performed by: INTERNAL MEDICINE

## 2024-06-10 PROCEDURE — 6370000000 HC RX 637 (ALT 250 FOR IP): Performed by: FAMILY MEDICINE

## 2024-06-10 PROCEDURE — 99212 OFFICE O/P EST SF 10 MIN: CPT | Performed by: INTERNAL MEDICINE

## 2024-06-10 PROCEDURE — 97530 THERAPEUTIC ACTIVITIES: CPT

## 2024-06-10 PROCEDURE — 97162 PT EVAL MOD COMPLEX 30 MIN: CPT

## 2024-06-10 PROCEDURE — 94761 N-INVAS EAR/PLS OXIMETRY MLT: CPT

## 2024-06-10 PROCEDURE — 97165 OT EVAL LOW COMPLEX 30 MIN: CPT | Performed by: OCCUPATIONAL THERAPIST

## 2024-06-10 PROCEDURE — 96372 THER/PROPH/DIAG INJ SC/IM: CPT

## 2024-06-10 PROCEDURE — 2580000003 HC RX 258: Performed by: INTERNAL MEDICINE

## 2024-06-10 PROCEDURE — 99202 OFFICE O/P NEW SF 15 MIN: CPT | Performed by: INTERNAL MEDICINE

## 2024-06-10 PROCEDURE — 97535 SELF CARE MNGMENT TRAINING: CPT | Performed by: OCCUPATIONAL THERAPIST

## 2024-06-10 RX ORDER — PANTOPRAZOLE SODIUM 40 MG/1
40 TABLET, DELAYED RELEASE ORAL DAILY
Status: DISCONTINUED | OUTPATIENT
Start: 2024-06-10 | End: 2024-06-11 | Stop reason: HOSPADM

## 2024-06-10 RX ORDER — ATORVASTATIN CALCIUM 20 MG/1
40 TABLET, FILM COATED ORAL NIGHTLY
Status: DISCONTINUED | OUTPATIENT
Start: 2024-06-10 | End: 2024-06-11 | Stop reason: HOSPADM

## 2024-06-10 RX ORDER — ASPIRIN 81 MG/1
81 TABLET ORAL DAILY
Status: DISCONTINUED | OUTPATIENT
Start: 2024-06-10 | End: 2024-06-11 | Stop reason: HOSPADM

## 2024-06-10 RX ORDER — LEVOFLOXACIN 750 MG/1
750 TABLET, FILM COATED ORAL EVERY OTHER DAY
Status: DISCONTINUED | OUTPATIENT
Start: 2024-06-11 | End: 2024-06-11 | Stop reason: HOSPADM

## 2024-06-10 RX ORDER — AMLODIPINE BESYLATE 5 MG/1
5 TABLET ORAL DAILY
Status: DISCONTINUED | OUTPATIENT
Start: 2024-06-10 | End: 2024-06-11 | Stop reason: HOSPADM

## 2024-06-10 RX ORDER — HYDRALAZINE HYDROCHLORIDE 20 MG/ML
10 INJECTION INTRAMUSCULAR; INTRAVENOUS EVERY 6 HOURS PRN
Status: DISCONTINUED | OUTPATIENT
Start: 2024-06-10 | End: 2024-06-11 | Stop reason: HOSPADM

## 2024-06-10 RX ADMIN — SODIUM CHLORIDE, PRESERVATIVE FREE 10 ML: 5 INJECTION INTRAVENOUS at 20:52

## 2024-06-10 RX ADMIN — ATORVASTATIN CALCIUM 40 MG: 20 TABLET, FILM COATED ORAL at 20:50

## 2024-06-10 RX ADMIN — SODIUM CHLORIDE, POTASSIUM CHLORIDE, SODIUM LACTATE AND CALCIUM CHLORIDE: 600; 310; 30; 20 INJECTION, SOLUTION INTRAVENOUS at 04:12

## 2024-06-10 RX ADMIN — SODIUM CHLORIDE, POTASSIUM CHLORIDE, SODIUM LACTATE AND CALCIUM CHLORIDE: 600; 310; 30; 20 INJECTION, SOLUTION INTRAVENOUS at 15:12

## 2024-06-10 RX ADMIN — AMLODIPINE BESYLATE 5 MG: 5 TABLET ORAL at 11:54

## 2024-06-10 RX ADMIN — SODIUM CHLORIDE, PRESERVATIVE FREE 10 ML: 5 INJECTION INTRAVENOUS at 08:59

## 2024-06-10 RX ADMIN — ENOXAPARIN SODIUM 30 MG: 100 INJECTION SUBCUTANEOUS at 08:58

## 2024-06-10 RX ADMIN — ASPIRIN 81 MG: 81 TABLET, COATED ORAL at 11:54

## 2024-06-10 RX ADMIN — PANTOPRAZOLE SODIUM 40 MG: 40 TABLET, DELAYED RELEASE ORAL at 11:54

## 2024-06-10 NOTE — PLAN OF CARE
Problem: Occupational Therapy - Adult  Goal: By Discharge: Performs self-care activities at highest level of function for planned discharge setting.  See evaluation for individualized goals.  Description: FUNCTIONAL STATUS PRIOR TO ADMISSION:  Pt resides at [facility] where they assist her with all ADLs and transfers.  Pt states she was able to amb a few feet with RW and mainly uses manual w/c for mobility.  Receives Help From: Personal care attendant, ADL Assistance: Needs assistance, Bath: Moderate assistance, Dressing: Moderate assistance, Grooming: Supervision, Feeding: Modified independent , Toileting: Needs assistance, Ambulation Assistance: Needs assistance (per pt can amb a few feet with RW and assist), Transfer Assistance: Needs assistance, Active : No     HOME SUPPORT: Patient lived at [facility].    Occupational Therapy Goals:  Initiated 6/10/2024  1.  Patient will perform toilet transfers with Moderate Assist  within 7 day(s).  2.  Patient will perform all aspects of toileting with Moderate Assist within 7 day(s).  3.  Patient will participate in upper extremity therapeutic exercise/activities with Supervision for 10 minutes within 7 day(s).    4.  Patient will utilize energy conservation techniques during functional activities with verbal and visual cues within 7 day(s).   Outcome: Progressing     OCCUPATIONAL THERAPY EVALUATION    Patient: Wendy Melara (89 y.o. female)  Date: 6/10/2024  Primary Diagnosis: Bradycardia [R00.1]  Acute cystitis without hematuria [N30.00]         Precautions: Fall Risk, Contact Precautions (DNR)                  ASSESSMENT :  The patient is limited by decreased functional mobility, independence in ADLs, strength, endurance, safety awareness, cognition, balance, and increased anxiety with fear of falling following admission for UTI .    Based on the impairments listed above pt able to perform UE ADLs with set-up, LE ADLs and toileting with total A, bed  Significant modifications of tasks or assistance (eg. physical or verbal) with assessment (s) is necessary to enable pt to complete evaluation   Based on the above components, the patient evaluation is determined to be of the following complexity level: Low

## 2024-06-10 NOTE — PLAN OF CARE
Problem: Physical Therapy - Adult  Goal: By Discharge: Performs mobility at highest level of function for planned discharge setting.  See evaluation for individualized goals.  Description: FUNCTIONAL STATUS PRIOR TO ADMISSION: The patient  required mod to maximum assistance for basic and instrumental ADLs. and The patient was functioning at the wheelchair level and required moderate assistancefor transfers to the chair.    HOME SUPPORT PRIOR TO ADMISSION: The patient lived with home care staff in [facility] and required maximum assistance for ADL and transfers to w/c.    Physical Therapy Goals  Initiated 6/10/2024  1.  Patient will move from supine to sit and sit to supine in bed with contact guard assist within 7 day(s).    2.  Patient will perform sit to stand with moderate assistance within 7 day(s).  3.  Patient will transfer from bed to chair and chair to bed with moderate assistance using the least restrictive device within 7 day(s).  4.  Patient will ambulate with moderate assistance for 3 feet with the least restrictive device within 7 day(s).   Outcome: Progressing   PHYSICAL THERAPY EVALUATION    Patient: Wendy Melara (89 y.o. female)  Date: 6/10/2024  Primary Diagnosis: Bradycardia [R00.1]  Acute cystitis without hematuria [N30.00]       Precautions: Restrictions/Precautions: Fall Risk, Contact Precautions                      ASSESSMENT :   DEFICITS/IMPAIRMENTS:   The patient is limited by decreased functional mobility, independence in ADLs, strength, body mechanics, activity tolerance, safety awareness, coordination, balance.  Pt admitted from [facility] setting due to UTI and bradycardia.    Based on the impairments listed above pt needing Min A to EOB with sup to sit.  Good sitting balance. Tolerated ankle pumping, heel slide and LAQ's well.  Sit to stand with RW with Mod Ax2, yet unable to ambulate.  Very fearful of falling.  Provided Radha Dao with good results with improved sit to stand.  Placed in  Training;Equipment;Fall Prevention Strategies  Education Method: Demonstration;Verbal  Barriers to Learning: None  Education Outcome: Continued education needed    Thank you for this referral.  KRISSY ADAIR, PT  Minutes: 38      Physical Therapy Evaluation Charge Determination   History Examination Presentation Decision-Making   HIGH Complexity :3+ comorbidities / personal factors will impact the outcome/ POC  MEDIUM Complexity : 3 Standardized tests and measures addressin body structure, function, activity limitation and / or participation in recreation  MEDIUM Complexity : Evolving with changing characteristics  AM-PAC  HIGH    Based on the above components, the patient evaluation is determined to be of the following complexity level: Medium

## 2024-06-10 NOTE — CONSULTS
[facility] CARDIOLOGY    DIOMEDES Addendum    I have seen, interviewed, and examined the patient.  I agree with the history, exam, and was involved in the formulation of the assessment and plan as detailed in the Cardiology/Cardiac Electrophysiology consultation documentation composed today by the Advance Practice Provider (DIOMEDES, NP, PA). Please see the DIOMEDES’s note for full details, below includes any additional revisions. I have performed the exam and medical decision making portions in its entirety.     Physical exam:  Visit Vitals  BP (!) 158/71   Pulse 84   Temp 97.9 °F (36.6 °C) (Oral)   Resp 15   Ht 1.626 m (5' 4\")   Wt 89.8 kg (198 lb)   SpO2 92%   BMI 33.99 kg/m²         GENERAL: No acute distress  HEAD: AT/NC  HEENT: Sclerae anicteric, ocular muscles intact.  CARDIOVASCULAR: Regular rate and rhythm, normal S1/S2, no murmurs/rubs/gallops  RESPIRATORY: clear to auscultation bilaterally, without wheezes/rales/rhonchi  ABDOMINAL: soft, non-tender, non-distended, positive bowel sounds  EXTREMITIES: warm, well perfused, no lower extremities edema, no cyanosis.   NEURO: alert, oriented, answering questions appropriately, no focal neurologic deficits  PSY: normal mood    Data: Labs, ECG, telemetry personally reviewed and interpreted    Current active problems:   1. asymptomatic bradycardia  2.  Acute UTI  3.  Hypertension  4.  Paroxysmal atrial fibrillation    Assessment and Plan:   Cardiac electrophysiology was consulted regarding the management of bradycardia.  Patient presented with some confusion.  Was subsequently found to have acute UTI.  On telemetry at some point heart rate went down to the 30s.  - Patient denies of any dizziness, lightheadedness or syncope  - EKG demonstrated sinus bradycardia with PACs  - Telemetry demonstrated no evidence of pauses or high degree blocks  - Recommend avoiding AV kelsi blocking agents  - Treat UTI  - No evidence of A-fib on telemetry.  History of falls and has not been on

## 2024-06-10 NOTE — ED NOTES
TRANSFER - OUT REPORT:    Verbal report given to Asha on Wendy H Kelton  being transferred to [facility] for routine progression of patient care       Report consisted of patient's Situation, Background, Assessment and   Recommendations(SBAR).     Information from the following report(s) Nurse Handoff Report, Index, ED Encounter Summary, ED SBAR, Intake/Output, MAR, Recent Results, Med Rec Status, and Cardiac Rhythm afib/bradycardia  was reviewed with the receiving nurse.    Grace Fall Assessment:    Presents to emergency department  because of falls (Syncope, seizure, or loss of consciousness): No  Age > 70: Yes  Altered Mental Status, Intoxication with alcohol or substance confusion (Disorientation, impaired judgment, poor safety awaremess, or inability to follow instructions): No  Impaired Mobility: Ambulates or transfers with assistive devices or assistance; Unable to ambulate or transer.: Yes  Nursing Judgement: Yes          Lines:   Peripheral IV 06/09/24 Distal;Left;Ventral Forearm (Active)       Peripheral IV 06/09/24 Right Antecubital (Active)        Opportunity for questions and clarification was provided.      Patient transported with:  Monitor and Registered Nurse

## 2024-06-10 NOTE — PROGRESS NOTES
[facility]  [address] [place] [address] [phone]    [facility] Adult  Hospitalist Group                                                                                          Hospitalist Progress Note  Rosalie Draper MD        Date of Service:  6/10/2024  NAME:  Wendy Melara  :  1924  MRN:  [id number]      Admission Summary:   89 yo female is admitted with a UTI and asymptomatic bradycardia     Interval history / Subjective:     Doesn't feel well, still reports burning on urination     Assessment & Plan:     Bradycardia, Afib?  -Dipped into 30s in Er. She is asymptomatic.   -LVH and diastolic dysfunction noted on echo 1 yr ago  -seen by cardiology. No med changes. Pt prefers not to do a pacemaker.     UTI  -Hx ESBL and pseudomonas in the past, sensitive to fluoroquinolones  -cont levaquin  -follow cx     JOSELIN  -Mild incr in Cr; appears dry on exam  -cont gntle fluids   -improved now      Hand tingling  -Chronic, similar to prior     HTN/CAD  -cont norvasc, ASA, statin      Debility  -She is oriented x 3 today and lives at [facility]  -PT/OT    Outisde Records, prior notes, labs, radiology, and medications reviewed     Code status: DNR  DVT prophylaxis: [facility] Problems             Last Modified POA    * (Principal) Bradycardia 2024 Yes          Review of Systems:   Pertinent items are noted in HPI.       Vital Signs:    Last 24hrs VS reviewed since prior progress note. Most recent are:  Vitals:    06/10/24 1231   BP: (!) 136/59   Pulse: 60   Resp: 20   Temp: 97.9 °F (36.6 °C)   SpO2: 97%         Intake/Output Summary (Last 24 hours) at 6/10/2024 1317  Last data filed at 6/10/2024 1233  Gross per 24 hour   Intake 180 ml   Output 2700 ml   Net -2520 ml        Physical Examination:             Constitutional:  No acute distress, cooperative, pleasant    ENT:  Oral mucosa moist, oropharynx benign.    Resp:  CTA bilaterally.  tablet 4 mg  4 mg Oral Q8H PRN    Or    ondansetron (ZOFRAN) injection 4 mg  4 mg IntraVENous Q6H PRN    polyethylene glycol (GLYCOLAX) packet 17 g  17 g Oral Daily PRN    acetaminophen (TYLENOL) tablet 650 mg  650 mg Oral Q6H PRN    Or    acetaminophen (TYLENOL) suppository 650 mg  650 mg Rectal Q6H PRN    [START ON 6/11/2024] levoFLOXacin (LEVAQUIN) 750 MG/150ML infusion 750 mg  750 mg IntraVENous Q48H    lactated ringers IV soln infusion   IntraVENous Continuous     ______________________________________________________________________  EXPECTED LENGTH OF STAY:    ACTUAL LENGTH OF STAY:          0                 Rosalie Draper MD

## 2024-06-10 NOTE — CARE COORDINATION
Care Management Initial Assessment Note:        06/10/24 1120   Service Assessment   History Provided By Medical Record   Discharge Planning   Type of Residence Assisted living   Patient expects to be discharged to: Assisted living   Services At/After Discharge   Transition of Care Consult (CM Consult) N/A   Mode of Transport at Discharge Other (see comment)  (family)   Confirm Follow Up Transport Family     Patient in Obs. Patient lives at [facility], plan is back to [facility] when medically stable. No anticipated CM needs. [facility] Obs letter provided.  ______________________  Minda LOFTON, RN  Care Management  6/10/2024  11:23 AM

## 2024-06-11 VITALS
RESPIRATION RATE: 16 BRPM | OXYGEN SATURATION: 95 % | BODY MASS INDEX: 32.78 KG/M2 | WEIGHT: 192 LBS | TEMPERATURE: 97.7 F | SYSTOLIC BLOOD PRESSURE: 149 MMHG | HEIGHT: 64 IN | HEART RATE: 34 BPM | DIASTOLIC BLOOD PRESSURE: 53 MMHG

## 2024-06-11 PROCEDURE — 96375 TX/PRO/DX INJ NEW DRUG ADDON: CPT

## 2024-06-11 PROCEDURE — 6360000002 HC RX W HCPCS: Performed by: INTERNAL MEDICINE

## 2024-06-11 PROCEDURE — 6370000000 HC RX 637 (ALT 250 FOR IP): Performed by: FAMILY MEDICINE

## 2024-06-11 PROCEDURE — 94761 N-INVAS EAR/PLS OXIMETRY MLT: CPT

## 2024-06-11 PROCEDURE — 6370000000 HC RX 637 (ALT 250 FOR IP)

## 2024-06-11 PROCEDURE — 96372 THER/PROPH/DIAG INJ SC/IM: CPT

## 2024-06-11 PROCEDURE — 6370000000 HC RX 637 (ALT 250 FOR IP): Performed by: INTERNAL MEDICINE

## 2024-06-11 PROCEDURE — G0378 HOSPITAL OBSERVATION PER HR: HCPCS

## 2024-06-11 PROCEDURE — 2580000003 HC RX 258: Performed by: INTERNAL MEDICINE

## 2024-06-11 PROCEDURE — 6360000002 HC RX W HCPCS: Performed by: FAMILY MEDICINE

## 2024-06-11 RX ORDER — LEVOFLOXACIN 750 MG/1
750 TABLET, FILM COATED ORAL EVERY OTHER DAY
Qty: 2 TABLET | Refills: 0 | Status: SHIPPED | OUTPATIENT
Start: 2024-06-11 | End: 2024-06-14

## 2024-06-11 RX ORDER — BUTALBITAL, ACETAMINOPHEN AND CAFFEINE 50; 325; 40 MG/1; MG/1; MG/1
1 TABLET ORAL ONCE
Status: COMPLETED | OUTPATIENT
Start: 2024-06-11 | End: 2024-06-11

## 2024-06-11 RX ADMIN — ACETAMINOPHEN 650 MG: 325 TABLET ORAL at 01:05

## 2024-06-11 RX ADMIN — AMLODIPINE BESYLATE 5 MG: 5 TABLET ORAL at 08:59

## 2024-06-11 RX ADMIN — PANTOPRAZOLE SODIUM 40 MG: 40 TABLET, DELAYED RELEASE ORAL at 09:00

## 2024-06-11 RX ADMIN — SODIUM CHLORIDE, POTASSIUM CHLORIDE, SODIUM LACTATE AND CALCIUM CHLORIDE: 600; 310; 30; 20 INJECTION, SOLUTION INTRAVENOUS at 00:59

## 2024-06-11 RX ADMIN — ENOXAPARIN SODIUM 30 MG: 100 INJECTION SUBCUTANEOUS at 08:58

## 2024-06-11 RX ADMIN — ASPIRIN 81 MG: 81 TABLET, COATED ORAL at 08:58

## 2024-06-11 RX ADMIN — HYDRALAZINE HYDROCHLORIDE 10 MG: 20 INJECTION, SOLUTION INTRAMUSCULAR; INTRAVENOUS at 01:00

## 2024-06-11 RX ADMIN — SODIUM CHLORIDE, PRESERVATIVE FREE 10 ML: 5 INJECTION INTRAVENOUS at 09:02

## 2024-06-11 RX ADMIN — BUTALBITAL, ACETAMINOPHEN, AND CAFFEINE 1 TABLET: 325; 50; 40 TABLET ORAL at 03:14

## 2024-06-11 ASSESSMENT — PAIN SCALES - GENERAL
PAINLEVEL_OUTOF10: 3
PAINLEVEL_OUTOF10: 5

## 2024-06-11 NOTE — PLAN OF CARE
Problem: Discharge Planning  Goal: Discharge to home or other facility with appropriate resources  Outcome: Progressing  Flowsheets (Taken 6/10/2024 2005)  Discharge to home or other facility with appropriate resources:   Identify barriers to discharge with patient and caregiver   Identify discharge learning needs (meds, wound care, etc)   Refer to discharge planning if patient needs post-hospital services based on physician order or complex needs related to functional status, cognitive ability or social support system     Problem: Skin/Tissue Integrity  Goal: Absence of new skin breakdown  Description: 1.  Monitor for areas of redness and/or skin breakdown  2.  Assess vascular access sites hourly  3.  Every 4-6 hours minimum:  Change oxygen saturation probe site  4.  Every 4-6 hours:  If on nasal continuous positive airway pressure, respiratory therapy assess nares and determine need for appliance change or resting period.  Outcome: Progressing     Problem: ABCDS Injury Assessment  Goal: Absence of physical injury  Outcome: Progressing     Problem: Safety - Adult  Goal: Free from fall injury  Outcome: Progressing     Problem: Occupational Therapy - Adult  Goal: By Discharge: Performs self-care activities at highest level of function for planned discharge setting.  See evaluation for individualized goals.  Description: FUNCTIONAL STATUS PRIOR TO ADMISSION:  Pt resides at [facility] where they assist her with all ADLs and transfers.  Pt states she was able to amb a few feet with RW and mainly uses manual w/c for mobility.  Receives Help From: Personal care attendant, ADL Assistance: Needs assistance, Bath: Moderate assistance, Dressing: Moderate assistance, Grooming: Supervision, Feeding: Modified independent , Toileting: Needs assistance, Ambulation Assistance: Needs assistance (per pt can amb a few feet with RW and assist), Transfer Assistance: Needs assistance, Active : No     HOME SUPPORT: Patient  lived at [facility].    Occupational Therapy Goals:  Initiated 6/10/2024  1.  Patient will perform toilet transfers with Moderate Assist  within 7 day(s).  2.  Patient will perform all aspects of toileting with Moderate Assist within 7 day(s).  3.  Patient will participate in upper extremity therapeutic exercise/activities with Supervision for 10 minutes within 7 day(s).    4.  Patient will utilize energy conservation techniques during functional activities with verbal and visual cues within 7 day(s).   6/10/2024 1412 by Eileen Rangel, OT  Outcome: Progressing     Problem: Physical Therapy - Adult  Goal: By Discharge: Performs mobility at highest level of function for planned discharge setting.  See evaluation for individualized goals.  Description: FUNCTIONAL STATUS PRIOR TO ADMISSION: The patient  required mod to maximum assistance for basic and instrumental ADLs. and The patient was functioning at the wheelchair level and required moderate assistancefor transfers to the chair.    HOME SUPPORT PRIOR TO ADMISSION: The patient lived with home care staff in [facility] and required maximum assistance for ADL and transfers to w/c.    Physical Therapy Goals  Initiated 6/10/2024  1.  Patient will move from supine to sit and sit to supine in bed with contact guard assist within 7 day(s).    2.  Patient will perform sit to stand with moderate assistance within 7 day(s).  3.  Patient will transfer from bed to chair and chair to bed with moderate assistance using the least restrictive device within 7 day(s).  4.  Patient will ambulate with moderate assistance for 3 feet with the least restrictive device within 7 day(s).   6/10/2024 1546 by Yuki Leonard, PT  Outcome: Progressing

## 2024-06-11 NOTE — CARE COORDINATION
06/11/24 1250   Discharge Planning   Patient expects to be discharged to: Assisted living   Services At/After Discharge   Transition of Care Consult (CM Consult) Assisted Living   Services At/After Discharge Assisted living     Patient with dc orders. Patient will return to [facility]. CM called and spoke with admissions, at [facility], no need for assessment. CM set transportation with [employer] with a p/u of 2pm today. Packet for transportation is on chart.   ______________________  Minda LOFTON, RN  Care Management  6/11/2024  12:55 PM

## 2024-06-11 NOTE — DISCHARGE SUMMARY
[facility]  [address] [place] [address] [phone]    [facility] Adult  Hospitalist Group     Discharge Summary       PATIENT ID: Wendy Melara  MRN: [id number]   YOB: 1924    DATE OF ADMISSION: 6/9/2024 12:03 PM    DATE OF DISCHARGE: 06/11/24   PRIMARY CARE PROVIDER: Holley Castellano MD     DISCHARGING PROVIDER: Rosalie Draper MD      CONSULTATIONS: IP CONSULT TO CARDIOLOGY    PROCEDURES/SURGERIES: * No surgery found *    ADMITTING DIAGNOSES & HOSPITAL COURSE:     Bradycardia, Afib?  -Dipped into 30s in Er. She is asymptomatic.   -LVH and diastolic dysfunction noted on echo 1 yr ago  -seen by cardiology. No med changes. Pt prefers not to do a pacemaker.     UTI  -growing e. Coli and 2nd GNR pending   -cont levaquin  -follow cx     JOSELIN  -Mild incr in Cr; appears dry on exam  -cont gntle fluids   -improved now      Hand tingling  -Chronic, similar to prior     HTN/CAD  -cont norvasc, ASA, statin      Debility  -She is oriented x 3 today and lives at [facility]  -PT/OT        PENDING TEST RESULTS:   At the time of discharge the following test results are still pending: urine cx final    FOLLOW UP APPOINTMENTS:    Holley Castellano MD  [address] [facility] [address] [phone]    Follow up in 1 week(s)  Discharge follow up         DIET: regular     ACTIVITY: as tolerated       DISCHARGE MEDICATIONS:     Medication List        START taking these medications      levoFLOXacin 750 MG tablet  Commonly known as: LEVAQUIN  Take 1 tablet by mouth every other day for 2 doses            CONTINUE taking these medications      acetaminophen 325 MG tablet  Commonly known as: TYLENOL     amLODIPine 5 MG tablet  Commonly known as: NORVASC     aspirin 81 MG EC tablet     atorvastatin 40 MG tablet  Commonly known as: LIPITOR     diphenhydrAMINE 2 % cream  Commonly known as: BENADRYL  Apply topically 3 times daily as needed for itching      Ferrous Fumarate 325 (106 Fe) MG Tabs     guaiFENesin 100 MG/5ML liquid  Commonly known as: ROBITUSSIN     loperamide 2 MG tablet  Commonly known as: IMODIUM A-D     ondansetron 4 MG disintegrating tablet  Commonly known as: ZOFRAN-ODT     pantoprazole 40 MG tablet  Commonly known as: PROTONIX     Polyethylene Glycol 3350 Powd     pregabalin 50 MG capsule  Commonly known as: LYRICA               Where to Get Your Medications        Information about where to get these medications is not yet available    Ask your nurse or doctor about these medications  levoFLOXacin 750 MG tablet           NOTIFY YOUR PHYSICIAN FOR ANY OF THE FOLLOWING:   Fever over 101 degrees for 24 hours.   Chest pain, shortness of breath, fever, chills, nausea, vomiting, diarrhea, change in mentation, falling, weakness, bleeding. Severe pain or pain not relieved by medications.  Or, any other signs or symptoms that you may have questions about.    DISPOSITION:  SNF/LTC      PHYSICAL EXAMINATION AT DISCHARGE:  General:          Alert, cooperative, no distress, appears stated age.     HEENT:           Atraumatic, anicteric sclerae, pink conjunctivae                          No oral ulcers, mucosa moist, throat clear, dentition fair  Neck:               Supple, symmetrical  Lungs:             Clear to auscultation bilaterally.  No Wheezing or Rhonchi. No rales.  Heart:              Regular  rhythm,  No  murmur   No edema  Abdomen:        Soft, non-tender. Not distended.  Bowel sounds normal  Extremities:     No cyanosis.  No clubbing,                            Skin turgor normal, Capillary refill normal  Skin:                Not pale.  Not Jaundiced  No rashes   Psych:             Not anxious or agitated.  Neurologic:      Alert, moves all extremities, answers questions appropriately and responds to commands       CHRONIC MEDICAL DIAGNOSES:      Greater than 30 minutes were spent with the patient on counseling and coordination of care    Signed:

## 2024-06-12 LAB
BACTERIA SPEC CULT: ABNORMAL
BACTERIA SPEC CULT: ABNORMAL
CC UR VC: ABNORMAL
SERVICE CMNT-IMP: ABNORMAL

## 2024-07-28 ENCOUNTER — APPOINTMENT (OUTPATIENT)
Facility: HOSPITAL | Age: 89
End: 2024-07-28
Payer: MEDICARE

## 2024-07-28 ENCOUNTER — HOSPITAL ENCOUNTER (OUTPATIENT)
Facility: HOSPITAL | Age: 89
Setting detail: OBSERVATION
Discharge: NURSING, REHAB OR LONG-TERM CARE | End: 2024-07-28
Attending: EMERGENCY MEDICINE | Admitting: INTERNAL MEDICINE
Payer: MEDICARE

## 2024-07-28 VITALS
BODY MASS INDEX: 32.78 KG/M2 | SYSTOLIC BLOOD PRESSURE: 136 MMHG | HEIGHT: 64 IN | WEIGHT: 192 LBS | HEART RATE: 87 BPM | TEMPERATURE: 98.5 F | RESPIRATION RATE: 16 BRPM | OXYGEN SATURATION: 94 % | DIASTOLIC BLOOD PRESSURE: 62 MMHG

## 2024-07-28 DIAGNOSIS — I42.9 CARDIOMYOPATHY, UNSPECIFIED TYPE (HCC): ICD-10-CM

## 2024-07-28 DIAGNOSIS — J96.01 ACUTE RESPIRATORY FAILURE WITH HYPOXIA (HCC): Primary | ICD-10-CM

## 2024-07-28 DIAGNOSIS — J98.4 PNEUMONITIS: ICD-10-CM

## 2024-07-28 DIAGNOSIS — J81.0 ACUTE PULMONARY EDEMA (HCC): ICD-10-CM

## 2024-07-28 PROBLEM — R09.02 HYPOXIA: Status: ACTIVE | Noted: 2024-07-28

## 2024-07-28 LAB
ALBUMIN SERPL-MCNC: 3.4 G/DL (ref 3.5–5)
ALBUMIN/GLOB SERPL: 0.9 (ref 1.1–2.2)
ALP SERPL-CCNC: 111 U/L (ref 45–117)
ALT SERPL-CCNC: 15 U/L (ref 12–78)
ANION GAP SERPL CALC-SCNC: 5 MMOL/L (ref 5–15)
AST SERPL-CCNC: 15 U/L (ref 15–37)
BASOPHILS # BLD: 0 K/UL (ref 0–0.1)
BASOPHILS NFR BLD: 1 % (ref 0–1)
BILIRUB SERPL-MCNC: 0.6 MG/DL (ref 0.2–1)
BUN SERPL-MCNC: 15 MG/DL (ref 6–20)
BUN/CREAT SERPL: 14 (ref 12–20)
CALCIUM SERPL-MCNC: 8.9 MG/DL (ref 8.5–10.1)
CHLORIDE SERPL-SCNC: 111 MMOL/L (ref 97–108)
CO2 SERPL-SCNC: 26 MMOL/L (ref 21–32)
COMMENT:: NORMAL
CREAT SERPL-MCNC: 1.1 MG/DL (ref 0.55–1.02)
DIFFERENTIAL METHOD BLD: NORMAL
EOSINOPHIL # BLD: 0.1 K/UL (ref 0–0.4)
EOSINOPHIL NFR BLD: 2 % (ref 0–7)
ERYTHROCYTE [DISTWIDTH] IN BLOOD BY AUTOMATED COUNT: 13.2 % (ref 11.5–14.5)
GLOBULIN SER CALC-MCNC: 3.7 G/DL (ref 2–4)
GLUCOSE SERPL-MCNC: 110 MG/DL (ref 65–100)
HCT VFR BLD AUTO: 46.9 % (ref 35–47)
HGB BLD-MCNC: 15.4 G/DL (ref 11.5–16)
IMM GRANULOCYTES # BLD AUTO: 0 K/UL (ref 0–0.04)
IMM GRANULOCYTES NFR BLD AUTO: 0 % (ref 0–0.5)
LYMPHOCYTES # BLD: 1.4 K/UL (ref 0.8–3.5)
LYMPHOCYTES NFR BLD: 23 % (ref 12–49)
MAGNESIUM SERPL-MCNC: 2.2 MG/DL (ref 1.6–2.4)
MCH RBC QN AUTO: 30.7 PG (ref 26–34)
MCHC RBC AUTO-ENTMCNC: 32.8 G/DL (ref 30–36.5)
MCV RBC AUTO: 93.4 FL (ref 80–99)
MONOCYTES # BLD: 0.5 K/UL (ref 0–1)
MONOCYTES NFR BLD: 9 % (ref 5–13)
NEUTS SEG # BLD: 3.9 K/UL (ref 1.8–8)
NEUTS SEG NFR BLD: 65 % (ref 32–75)
NRBC # BLD: 0 K/UL (ref 0–0.01)
NRBC BLD-RTO: 0 PER 100 WBC
NT PRO BNP: 530 PG/ML
PLATELET # BLD AUTO: 247 K/UL (ref 150–400)
PMV BLD AUTO: 10.7 FL (ref 8.9–12.9)
POTASSIUM SERPL-SCNC: 4.3 MMOL/L (ref 3.5–5.1)
PROT SERPL-MCNC: 7.1 G/DL (ref 6.4–8.2)
RBC # BLD AUTO: 5.02 M/UL (ref 3.8–5.2)
SODIUM SERPL-SCNC: 142 MMOL/L (ref 136–145)
SPECIMEN HOLD: NORMAL
WBC # BLD AUTO: 6 K/UL (ref 3.6–11)

## 2024-07-28 PROCEDURE — 6360000004 HC RX CONTRAST MEDICATION: Performed by: EMERGENCY MEDICINE

## 2024-07-28 PROCEDURE — 80053 COMPREHEN METABOLIC PANEL: CPT

## 2024-07-28 PROCEDURE — 94761 N-INVAS EAR/PLS OXIMETRY MLT: CPT

## 2024-07-28 PROCEDURE — 71275 CT ANGIOGRAPHY CHEST: CPT

## 2024-07-28 PROCEDURE — 2580000003 HC RX 258: Performed by: EMERGENCY MEDICINE

## 2024-07-28 PROCEDURE — 71045 X-RAY EXAM CHEST 1 VIEW: CPT

## 2024-07-28 PROCEDURE — 83880 ASSAY OF NATRIURETIC PEPTIDE: CPT

## 2024-07-28 PROCEDURE — 96375 TX/PRO/DX INJ NEW DRUG ADDON: CPT

## 2024-07-28 PROCEDURE — 83735 ASSAY OF MAGNESIUM: CPT

## 2024-07-28 PROCEDURE — 85025 COMPLETE CBC W/AUTO DIFF WBC: CPT

## 2024-07-28 PROCEDURE — G0378 HOSPITAL OBSERVATION PER HR: HCPCS

## 2024-07-28 PROCEDURE — 6360000002 HC RX W HCPCS: Performed by: EMERGENCY MEDICINE

## 2024-07-28 PROCEDURE — 36415 COLL VENOUS BLD VENIPUNCTURE: CPT

## 2024-07-28 PROCEDURE — 99285 EMERGENCY DEPT VISIT HI MDM: CPT

## 2024-07-28 PROCEDURE — 2700000000 HC OXYGEN THERAPY PER DAY

## 2024-07-28 PROCEDURE — 96365 THER/PROPH/DIAG IV INF INIT: CPT

## 2024-07-28 RX ORDER — FUROSEMIDE 10 MG/ML
20 INJECTION INTRAMUSCULAR; INTRAVENOUS DAILY
Status: DISCONTINUED | OUTPATIENT
Start: 2024-07-28 | End: 2024-07-28 | Stop reason: HOSPADM

## 2024-07-28 RX ORDER — ACETAMINOPHEN 650 MG/1
650 SUPPOSITORY RECTAL EVERY 6 HOURS PRN
Status: DISCONTINUED | OUTPATIENT
Start: 2024-07-28 | End: 2024-07-28 | Stop reason: HOSPADM

## 2024-07-28 RX ORDER — ONDANSETRON 4 MG/1
4 TABLET, ORALLY DISINTEGRATING ORAL EVERY 8 HOURS PRN
Status: DISCONTINUED | OUTPATIENT
Start: 2024-07-28 | End: 2024-07-28 | Stop reason: HOSPADM

## 2024-07-28 RX ORDER — ACETAMINOPHEN 325 MG/1
650 TABLET ORAL EVERY 6 HOURS PRN
Status: DISCONTINUED | OUTPATIENT
Start: 2024-07-28 | End: 2024-07-28 | Stop reason: HOSPADM

## 2024-07-28 RX ORDER — SODIUM CHLORIDE 9 MG/ML
INJECTION, SOLUTION INTRAVENOUS PRN
Status: DISCONTINUED | OUTPATIENT
Start: 2024-07-28 | End: 2024-07-28 | Stop reason: HOSPADM

## 2024-07-28 RX ORDER — FUROSEMIDE 10 MG/ML
20 INJECTION INTRAMUSCULAR; INTRAVENOUS ONCE
Status: COMPLETED | OUTPATIENT
Start: 2024-07-28 | End: 2024-07-28

## 2024-07-28 RX ORDER — DOXYCYCLINE HYCLATE 100 MG
100 TABLET ORAL 2 TIMES DAILY
Qty: 14 TABLET | Refills: 0 | Status: SHIPPED | OUTPATIENT
Start: 2024-07-28 | End: 2024-08-04

## 2024-07-28 RX ORDER — ONDANSETRON 2 MG/ML
4 INJECTION INTRAMUSCULAR; INTRAVENOUS EVERY 6 HOURS PRN
Status: DISCONTINUED | OUTPATIENT
Start: 2024-07-28 | End: 2024-07-28 | Stop reason: HOSPADM

## 2024-07-28 RX ORDER — SODIUM CHLORIDE 0.9 % (FLUSH) 0.9 %
5-40 SYRINGE (ML) INJECTION EVERY 12 HOURS SCHEDULED
Status: DISCONTINUED | OUTPATIENT
Start: 2024-07-28 | End: 2024-07-28 | Stop reason: HOSPADM

## 2024-07-28 RX ORDER — ENOXAPARIN SODIUM 100 MG/ML
30 INJECTION SUBCUTANEOUS DAILY
Status: DISCONTINUED | OUTPATIENT
Start: 2024-07-28 | End: 2024-07-28 | Stop reason: HOSPADM

## 2024-07-28 RX ORDER — LANOLIN ALCOHOL/MO/W.PET/CERES
3 CREAM (GRAM) TOPICAL NIGHTLY PRN
Status: DISCONTINUED | OUTPATIENT
Start: 2024-07-28 | End: 2024-07-28 | Stop reason: HOSPADM

## 2024-07-28 RX ORDER — SODIUM CHLORIDE 0.9 % (FLUSH) 0.9 %
5-40 SYRINGE (ML) INJECTION PRN
Status: DISCONTINUED | OUTPATIENT
Start: 2024-07-28 | End: 2024-07-28 | Stop reason: HOSPADM

## 2024-07-28 RX ORDER — POLYETHYLENE GLYCOL 3350 17 G/17G
17 POWDER, FOR SOLUTION ORAL DAILY PRN
Status: DISCONTINUED | OUTPATIENT
Start: 2024-07-28 | End: 2024-07-28 | Stop reason: HOSPADM

## 2024-07-28 RX ADMIN — IOPAMIDOL 73 ML: 755 INJECTION, SOLUTION INTRAVENOUS at 13:16

## 2024-07-28 RX ADMIN — WATER 1000 MG: 1 INJECTION INTRAMUSCULAR; INTRAVENOUS; SUBCUTANEOUS at 15:30

## 2024-07-28 RX ADMIN — FUROSEMIDE 20 MG: 10 INJECTION, SOLUTION INTRAMUSCULAR; INTRAVENOUS at 15:29

## 2024-07-28 RX ADMIN — AZITHROMYCIN MONOHYDRATE 500 MG: 500 INJECTION, POWDER, LYOPHILIZED, FOR SOLUTION INTRAVENOUS at 15:38

## 2024-07-28 ASSESSMENT — PAIN DESCRIPTION - LOCATION
LOCATION: LEG

## 2024-07-28 ASSESSMENT — PAIN DESCRIPTION - ORIENTATION
ORIENTATION: RIGHT;LEFT

## 2024-07-28 ASSESSMENT — PAIN SCALES - GENERAL
PAINLEVEL_OUTOF10: 5

## 2024-07-28 ASSESSMENT — PAIN DESCRIPTION - DESCRIPTORS
DESCRIPTORS: DISCOMFORT
DESCRIPTORS: SORE

## 2024-07-28 ASSESSMENT — PAIN - FUNCTIONAL ASSESSMENT: PAIN_FUNCTIONAL_ASSESSMENT: 0-10

## 2024-07-28 NOTE — ED PROVIDER NOTES
[facility] EMERGENCY DEPT  EMERGENCY DEPARTMENT ENCOUNTER      Pt Name: Wendy Melara  MRN: [id number]  Birthdate 4/26/1924  Date of evaluation: 7/28/2024  Provider: Chano Jarrett MD      HISTORY OF PRESENT ILLNESS      HPI  89-year-old female with a history of coronary artery disease hypertension and a right hip replacement presenting due to an abnormal sensation in her right legs.  Patient says when she woke up this morning she felt an intermittent hot and cold sensation in her legs.  They were numb.  They weren't tingly.  It was not painful.  Her symptoms have resolved.  She has had numbness in her hands but no sensation like this.  She has no other complaints      Nursing Notes were reviewed.    REVIEW OF SYSTEMS         Review of Systems  A complete review of systems was performed and all systems reviewed were negative less otherwise document in the HPI      PAST MEDICAL HISTORY     Past Medical History:   Diagnosis Date    CAD (coronary artery disease)     Femur fracture (HCC)     right    GERD (gastroesophageal reflux disease)     HTN (hypertension), benign 1/11/2011    Mixed hyperlipidemia 1/11/2011         SURGICAL HISTORY       Past Surgical History:   Procedure Laterality Date    FEMUR FRACTURE SURGERY      ORTHOPEDIC SURGERY      bilat knee replacements    TOTAL KNEE ARTHROPLASTY      bilateral    VEIN SURGERY           CURRENT MEDICATIONS       Previous Medications    ACETAMINOPHEN (TYLENOL) 325 MG TABLET    Take 2 tablets by mouth every 4 hours as needed    AMLODIPINE (NORVASC) 5 MG TABLET    Take 1 tablet by mouth daily    ASPIRIN 81 MG EC TABLET    Take 1 tablet by mouth    ATORVASTATIN (LIPITOR) 40 MG TABLET    Take 1 tablet by mouth nightly    DIPHENHYDRAMINE (BENADRYL) 2 % CREAM    Apply topically 3 times daily as needed for itching    FERROUS FUMARATE 325 (106 FE) MG TABS    Take by mouth    GUAIFENESIN (ROBITUSSIN) 100 MG/5ML LIQUID    Take 10 mLs by mouth 3 times daily as needed    LOPERAMIDE

## 2024-07-28 NOTE — H&P
History and Physical    Date of Service:  7/28/2024  Primary Care Provider: Holley Castellano MD  Source of information: Patient, Family, External Medical Records    Chief Complaint: Leg Pain      History of Presenting Illness:   Wendy Melara is a very pleasant 89 y.o. female who presents with 8 past medical history of A-fib with bradycardia, HTN, CAD, and recent admission for E. coli UTI in June 2024 presented to the emergency room due to lower leg pain and intermittent shortness of breath.    Patient states that at baseline she uses a walker to move around her [place].  She needs a wheelchair for longer distances, such as when she is taken to meals, but inside her apartment she either sits in a wheelchair where she uses a walker.  She states she has been having cramping in her lower extremities and they have been going \"hot and cold\" which is worsened her ambulation.  She also states that with movement she is having some difficulty due to some mild shortness of breath.    In the ER patient was found to have mild edema on CTA, hypoxia to 88% with exertion, and occasionally at rest.  Internal medicine was consulted for admission    The patient denies any headache, blurry vision, sore throat, trouble swallowing, trouble with speech, chest pain, SOB, cough, fever, chills, N/V/D, abd pain, urinary symptoms, constipation, recent travels, sick contacts, focal or generalized neurological symptoms, falls, injuries, rashes, contact with COVID-19 diagnosed patients, hematemesis, melena, hemoptysis, hematuria, rashes, denies starting any new medications and denies any other concerns or problems besides as mentioned above.         REVIEW OF SYSTEMS:  A comprehensive review of systems was negative except for that written in the History of Present Illness.     Past Medical History:   Diagnosis Date    CAD (coronary artery disease)     Femur fracture (HCC)     right    GERD (gastroesophageal reflux

## 2024-07-28 NOTE — ED TRIAGE NOTES
Pt arrived via EMS from nursing home c/o BLE \"hot and cold\" sensation starting this AM. ROM, sensation, and strength of extremities intact. Pt states he toes look swollen, no swelling noted. Pt A&Ox4.

## 2024-07-28 NOTE — ACP (ADVANCE CARE PLANNING)
Patient would like to be DNR, DNI, minimal interventions to be done.  She would not want an ICU admission.    Length of Voluntary ACP Conversation in minutes:  30 minutes    Meliza Briceno MD

## 2024-07-28 NOTE — DISCHARGE SUMMARY
(plus indication)    Hernandez     PICC     PEG    x None      DNR        PHYSICAL EXAMINATION AT DISCHARGE:    General : alert x 3, awake, no acute distress,   HEENT: PEERL, EOMI, moist mucus membrane  Neck: supple, no JVD, no meningeal signs  Chest: Clear to auscultation bilaterally   CVS: S1 S2 heard, Capillary refill less than 2 seconds  Abd: soft/ Non tender, non distended, BS physiological,   Ext: no clubbing, no cyanosis, no edema, brisk 2+ DP pulses  Neuro/Psych: pleasant mood and affect, CN 2-12 grossly intact  Skin: warm     CHRONIC MEDICAL DIAGNOSES:      Greater than 31 minutes were spent with the patient on counseling and coordination of care    Signed:   Meliza Briceno MD  7/28/2024  6:25 PM

## 2024-07-28 NOTE — DISCHARGE INSTRUCTIONS
Discharge Summary         PATIENT ID: Wendy Melara  MRN: [id number]   YOB: 1924    DATE OF ADMISSION: 7/28/2024  9:19 AM    DATE OF DISCHARGE: 7/28/2024 6:25 PM  PRIMARY CARE PROVIDER: Holley Castellano MD      ATTENDING PHYSICIAN: Meliza Briceno MD  DISCHARGING PROVIDER: Meliza Briceno MD    To contact this individual call [phone] and ask the  to page.  If unavailable ask to be transferred the Adult Hospitalist Department.     CONSULTATIONS: None     PROCEDURES/SURGERIES: * No surgery found *     ADMITTING DIAGNOSES & HOSPITAL COURSE:   Wendy Melara is a very pleasant 89 y.o. female who presents with 8 past medical history of A-fib with bradycardia, HTN, CAD, and recent admission for E. coli UTI in June 2024 presented to the emergency room due to lower leg pain and intermittent shortness of breath.      DISCHARGE DIAGNOSES / PLAN:       Patient was admitted to the hospital due to hypoxia, peripheral neuropathy, and difficulty ambulating.  She was given IV diuresis, with wonderful response, and no further episodes of hypoxia.  On discussion with patient's son he states it is common for her to have intermittent short periods of hypoxia as well as low blood pressure.  Patient has ample care at her facility to assist her with her daily activities, medications, and getting to and from meals.     With no further episodes of hypoxia, strong family support, patient is stable for discharge.  Of note, there is concern for possible early pneumonia on imaging, and the ER has written for antibiotics.        [facility] COMPLETED: Yes, scanned and on file, copy with son         PENDING TEST RESULTS:   At the time of discharge the following test results are still pending: None     FOLLOW UP APPOINTMENTS:    Holley Castellano MD  [address] [facility] [address] [phone]     Follow up in 5 day(s)           ADDITIONAL CARE RECOMMENDATIONS: Return to the ER for worsening shortness

## 2024-07-29 NOTE — ED NOTES
RN attempted to call report twice to [facility] to inform them that the patient is being discharged. No answer. Patient report given to [facility] for transport back to facility

## 2024-07-30 LAB
EKG ATRIAL RATE: 87 BPM
EKG DIAGNOSIS: NORMAL
EKG P AXIS: 33 DEGREES
EKG P-R INTERVAL: 182 MS
EKG Q-T INTERVAL: 378 MS
EKG QRS DURATION: 102 MS
EKG QTC CALCULATION (BAZETT): 454 MS
EKG R AXIS: -49 DEGREES
EKG T AXIS: 77 DEGREES
EKG VENTRICULAR RATE: 87 BPM

## 2025-01-06 ENCOUNTER — APPOINTMENT (OUTPATIENT)
Facility: HOSPITAL | Age: 89
End: 2025-01-06
Payer: MEDICARE

## 2025-01-06 ENCOUNTER — HOSPITAL ENCOUNTER (INPATIENT)
Facility: HOSPITAL | Age: 89
LOS: 5 days | Discharge: HOME HEALTH | End: 2025-01-11
Attending: STUDENT IN AN ORGANIZED HEALTH CARE EDUCATION/TRAINING PROGRAM | Admitting: INTERNAL MEDICINE
Payer: MEDICARE

## 2025-01-06 DIAGNOSIS — J10.1 INFLUENZA A: Primary | ICD-10-CM

## 2025-01-06 DIAGNOSIS — I50.9 ACUTE CONGESTIVE HEART FAILURE, UNSPECIFIED HEART FAILURE TYPE (HCC): ICD-10-CM

## 2025-01-06 DIAGNOSIS — I48.91 ATRIAL FIBRILLATION WITH RAPID VENTRICULAR RESPONSE (HCC): ICD-10-CM

## 2025-01-06 DIAGNOSIS — I48.0 PAROXYSMAL ATRIAL FIBRILLATION (HCC): ICD-10-CM

## 2025-01-06 DIAGNOSIS — J96.01 ACUTE HYPOXIC RESPIRATORY FAILURE: ICD-10-CM

## 2025-01-06 PROBLEM — J11.1 FLU: Status: ACTIVE | Noted: 2025-01-06

## 2025-01-06 LAB
ALBUMIN SERPL-MCNC: 3.6 G/DL (ref 3.5–5)
ALBUMIN/GLOB SERPL: 0.9 (ref 1.1–2.2)
ALP SERPL-CCNC: 124 U/L (ref 45–117)
ALT SERPL-CCNC: 15 U/L (ref 12–78)
ANION GAP SERPL CALC-SCNC: 7 MMOL/L (ref 2–12)
AST SERPL-CCNC: 17 U/L (ref 15–37)
BASOPHILS # BLD: 0 K/UL (ref 0–0.1)
BASOPHILS NFR BLD: 0 % (ref 0–1)
BILIRUB SERPL-MCNC: 0.5 MG/DL (ref 0.2–1)
BUN SERPL-MCNC: 20 MG/DL (ref 6–20)
BUN/CREAT SERPL: 14 (ref 12–20)
CALCIUM SERPL-MCNC: 8.9 MG/DL (ref 8.5–10.1)
CHLORIDE SERPL-SCNC: 107 MMOL/L (ref 97–108)
CO2 SERPL-SCNC: 24 MMOL/L (ref 21–32)
COMMENT:: NORMAL
COMMENT:: NORMAL
CREAT SERPL-MCNC: 1.38 MG/DL (ref 0.55–1.02)
DIFFERENTIAL METHOD BLD: ABNORMAL
EOSINOPHIL # BLD: 0 K/UL (ref 0–0.4)
EOSINOPHIL NFR BLD: 0 % (ref 0–7)
ERYTHROCYTE [DISTWIDTH] IN BLOOD BY AUTOMATED COUNT: 13.4 % (ref 11.5–14.5)
FLUAV RNA SPEC QL NAA+PROBE: DETECTED
FLUBV RNA SPEC QL NAA+PROBE: NOT DETECTED
GLOBULIN SER CALC-MCNC: 4.2 G/DL (ref 2–4)
GLUCOSE SERPL-MCNC: 130 MG/DL (ref 65–100)
HCT VFR BLD AUTO: 47.3 % (ref 35–47)
HGB BLD-MCNC: 15.4 G/DL (ref 11.5–16)
IMM GRANULOCYTES # BLD AUTO: 0.1 K/UL (ref 0–0.04)
IMM GRANULOCYTES NFR BLD AUTO: 1 % (ref 0–0.5)
INR PPP: 1 (ref 0.9–1.1)
LYMPHOCYTES # BLD: 3.2 K/UL (ref 0.8–3.5)
LYMPHOCYTES NFR BLD: 29 % (ref 12–49)
MAGNESIUM SERPL-MCNC: 2 MG/DL (ref 1.6–2.4)
MCH RBC QN AUTO: 31.4 PG (ref 26–34)
MCHC RBC AUTO-ENTMCNC: 32.6 G/DL (ref 30–36.5)
MCV RBC AUTO: 96.3 FL (ref 80–99)
MONOCYTES # BLD: 1.7 K/UL (ref 0–1)
MONOCYTES NFR BLD: 16 % (ref 5–13)
NEUTS SEG # BLD: 5.9 K/UL (ref 1.8–8)
NEUTS SEG NFR BLD: 54 % (ref 32–75)
NRBC # BLD: 0 K/UL (ref 0–0.01)
NRBC BLD-RTO: 0 PER 100 WBC
NT PRO BNP: 3145 PG/ML
PLATELET # BLD AUTO: 215 K/UL (ref 150–400)
PMV BLD AUTO: 10.6 FL (ref 8.9–12.9)
POTASSIUM SERPL-SCNC: 4.3 MMOL/L (ref 3.5–5.1)
PROT SERPL-MCNC: 7.8 G/DL (ref 6.4–8.2)
PROTHROMBIN TIME: 10.7 SEC (ref 9–11.1)
RBC # BLD AUTO: 4.91 M/UL (ref 3.8–5.2)
SARS-COV-2 RNA RESP QL NAA+PROBE: NOT DETECTED
SODIUM SERPL-SCNC: 138 MMOL/L (ref 136–145)
SOURCE: ABNORMAL
SPECIMEN HOLD: NORMAL
SPECIMEN HOLD: NORMAL
TROPONIN I SERPL HS-MCNC: 69 NG/L (ref 0–51)
WBC # BLD AUTO: 10.9 K/UL (ref 3.6–11)

## 2025-01-06 PROCEDURE — 6370000000 HC RX 637 (ALT 250 FOR IP)

## 2025-01-06 PROCEDURE — 85025 COMPLETE CBC W/AUTO DIFF WBC: CPT

## 2025-01-06 PROCEDURE — 83880 ASSAY OF NATRIURETIC PEPTIDE: CPT

## 2025-01-06 PROCEDURE — 71045 X-RAY EXAM CHEST 1 VIEW: CPT

## 2025-01-06 PROCEDURE — 84484 ASSAY OF TROPONIN QUANT: CPT

## 2025-01-06 PROCEDURE — 2500000003 HC RX 250 WO HCPCS: Performed by: STUDENT IN AN ORGANIZED HEALTH CARE EDUCATION/TRAINING PROGRAM

## 2025-01-06 PROCEDURE — 93005 ELECTROCARDIOGRAM TRACING: CPT | Performed by: STUDENT IN AN ORGANIZED HEALTH CARE EDUCATION/TRAINING PROGRAM

## 2025-01-06 PROCEDURE — 6370000000 HC RX 637 (ALT 250 FOR IP): Performed by: INTERNAL MEDICINE

## 2025-01-06 PROCEDURE — 96375 TX/PRO/DX INJ NEW DRUG ADDON: CPT

## 2025-01-06 PROCEDURE — 6360000002 HC RX W HCPCS

## 2025-01-06 PROCEDURE — 94761 N-INVAS EAR/PLS OXIMETRY MLT: CPT

## 2025-01-06 PROCEDURE — 80053 COMPREHEN METABOLIC PANEL: CPT

## 2025-01-06 PROCEDURE — 99222 1ST HOSP IP/OBS MODERATE 55: CPT | Performed by: SPECIALIST

## 2025-01-06 PROCEDURE — 2500000003 HC RX 250 WO HCPCS: Performed by: INTERNAL MEDICINE

## 2025-01-06 PROCEDURE — 99285 EMERGENCY DEPT VISIT HI MDM: CPT

## 2025-01-06 PROCEDURE — 93005 ELECTROCARDIOGRAM TRACING: CPT | Performed by: INTERNAL MEDICINE

## 2025-01-06 PROCEDURE — 71250 CT THORAX DX C-: CPT

## 2025-01-06 PROCEDURE — 6360000002 HC RX W HCPCS: Performed by: INTERNAL MEDICINE

## 2025-01-06 PROCEDURE — 83735 ASSAY OF MAGNESIUM: CPT

## 2025-01-06 PROCEDURE — 2580000003 HC RX 258

## 2025-01-06 PROCEDURE — 87636 SARSCOV2 & INF A&B AMP PRB: CPT

## 2025-01-06 PROCEDURE — 2060000000 HC ICU INTERMEDIATE R&B

## 2025-01-06 PROCEDURE — 2580000003 HC RX 258: Performed by: STUDENT IN AN ORGANIZED HEALTH CARE EDUCATION/TRAINING PROGRAM

## 2025-01-06 PROCEDURE — 96374 THER/PROPH/DIAG INJ IV PUSH: CPT

## 2025-01-06 PROCEDURE — 6370000000 HC RX 637 (ALT 250 FOR IP): Performed by: STUDENT IN AN ORGANIZED HEALTH CARE EDUCATION/TRAINING PROGRAM

## 2025-01-06 PROCEDURE — 36415 COLL VENOUS BLD VENIPUNCTURE: CPT

## 2025-01-06 PROCEDURE — 6360000002 HC RX W HCPCS: Performed by: STUDENT IN AN ORGANIZED HEALTH CARE EDUCATION/TRAINING PROGRAM

## 2025-01-06 PROCEDURE — 2500000003 HC RX 250 WO HCPCS

## 2025-01-06 PROCEDURE — 85610 PROTHROMBIN TIME: CPT

## 2025-01-06 RX ORDER — BUMETANIDE 0.25 MG/ML
1 INJECTION, SOLUTION INTRAMUSCULAR; INTRAVENOUS EVERY 12 HOURS
Status: DISCONTINUED | OUTPATIENT
Start: 2025-01-06 | End: 2025-01-08

## 2025-01-06 RX ORDER — GUAIFENESIN 600 MG/1
600 TABLET, EXTENDED RELEASE ORAL 2 TIMES DAILY
Status: DISCONTINUED | OUTPATIENT
Start: 2025-01-06 | End: 2025-01-11 | Stop reason: HOSPADM

## 2025-01-06 RX ORDER — FUROSEMIDE 10 MG/ML
40 INJECTION INTRAMUSCULAR; INTRAVENOUS ONCE
Status: COMPLETED | OUTPATIENT
Start: 2025-01-06 | End: 2025-01-06

## 2025-01-06 RX ORDER — OSELTAMIVIR PHOSPHATE 30 MG/1
30 CAPSULE ORAL DAILY
Status: DISCONTINUED | OUTPATIENT
Start: 2025-01-06 | End: 2025-01-06

## 2025-01-06 RX ORDER — FUROSEMIDE 10 MG/ML
20 INJECTION INTRAMUSCULAR; INTRAVENOUS ONCE
Status: DISCONTINUED | OUTPATIENT
Start: 2025-01-06 | End: 2025-01-06

## 2025-01-06 RX ORDER — 0.9 % SODIUM CHLORIDE 0.9 %
1000 INTRAVENOUS SOLUTION INTRAVENOUS ONCE
Status: COMPLETED | OUTPATIENT
Start: 2025-01-06 | End: 2025-01-06

## 2025-01-06 RX ORDER — ENOXAPARIN SODIUM 100 MG/ML
30 INJECTION SUBCUTANEOUS EVERY 24 HOURS
Status: DISCONTINUED | OUTPATIENT
Start: 2025-01-06 | End: 2025-01-11 | Stop reason: HOSPADM

## 2025-01-06 RX ORDER — OSELTAMIVIR PHOSPHATE 30 MG/1
30 CAPSULE ORAL DAILY
Status: COMPLETED | OUTPATIENT
Start: 2025-01-06 | End: 2025-01-10

## 2025-01-06 RX ORDER — ALBUTEROL SULFATE 90 UG/1
2 INHALANT RESPIRATORY (INHALATION) EVERY 6 HOURS PRN
COMMUNITY

## 2025-01-06 RX ORDER — IPRATROPIUM BROMIDE AND ALBUTEROL SULFATE 2.5; .5 MG/3ML; MG/3ML
1 SOLUTION RESPIRATORY (INHALATION)
Status: COMPLETED | OUTPATIENT
Start: 2025-01-06 | End: 2025-01-06

## 2025-01-06 RX ORDER — BENZONATATE 100 MG/1
100 CAPSULE ORAL 3 TIMES DAILY PRN
Status: DISCONTINUED | OUTPATIENT
Start: 2025-01-06 | End: 2025-01-11 | Stop reason: HOSPADM

## 2025-01-06 RX ORDER — BUMETANIDE 1 MG/1
1 TABLET ORAL EVERY 12 HOURS
Status: DISCONTINUED | OUTPATIENT
Start: 2025-01-06 | End: 2025-01-06

## 2025-01-06 RX ORDER — IPRATROPIUM BROMIDE AND ALBUTEROL SULFATE 2.5; .5 MG/3ML; MG/3ML
1 SOLUTION RESPIRATORY (INHALATION)
Status: DISCONTINUED | OUTPATIENT
Start: 2025-01-06 | End: 2025-01-11 | Stop reason: HOSPADM

## 2025-01-06 RX ORDER — OSELTAMIVIR PHOSPHATE 30 MG/1
30 CAPSULE ORAL 2 TIMES DAILY
Status: DISCONTINUED | OUTPATIENT
Start: 2025-01-06 | End: 2025-01-06 | Stop reason: SDUPTHER

## 2025-01-06 RX ORDER — IPRATROPIUM BROMIDE AND ALBUTEROL SULFATE 2.5; .5 MG/3ML; MG/3ML
1 SOLUTION RESPIRATORY (INHALATION) EVERY 6 HOURS PRN
Status: DISCONTINUED | OUTPATIENT
Start: 2025-01-06 | End: 2025-01-11 | Stop reason: HOSPADM

## 2025-01-06 RX ORDER — DILTIAZEM HYDROCHLORIDE 5 MG/ML
10 INJECTION INTRAVENOUS
Status: COMPLETED | OUTPATIENT
Start: 2025-01-06 | End: 2025-01-06

## 2025-01-06 RX ORDER — GUAIFENESIN 600 MG/1
600 TABLET, EXTENDED RELEASE ORAL 2 TIMES DAILY
Status: DISCONTINUED | OUTPATIENT
Start: 2025-01-06 | End: 2025-01-06

## 2025-01-06 RX ADMIN — IPRATROPIUM BROMIDE AND ALBUTEROL SULFATE 1 DOSE: 2.5; .5 SOLUTION RESPIRATORY (INHALATION) at 12:47

## 2025-01-06 RX ADMIN — FUROSEMIDE 40 MG: 10 INJECTION, SOLUTION INTRAMUSCULAR; INTRAVENOUS at 14:23

## 2025-01-06 RX ADMIN — AZITHROMYCIN MONOHYDRATE 500 MG: 500 INJECTION, POWDER, LYOPHILIZED, FOR SOLUTION INTRAVENOUS at 14:22

## 2025-01-06 RX ADMIN — IPRATROPIUM BROMIDE AND ALBUTEROL SULFATE 1 DOSE: 2.5; .5 SOLUTION RESPIRATORY (INHALATION) at 12:42

## 2025-01-06 RX ADMIN — SODIUM CHLORIDE 1000 ML: 9 INJECTION, SOLUTION INTRAVENOUS at 12:47

## 2025-01-06 RX ADMIN — DILTIAZEM HYDROCHLORIDE 10 MG: 5 INJECTION, SOLUTION INTRAVENOUS at 14:26

## 2025-01-06 RX ADMIN — SODIUM CHLORIDE 5 MG/HR: 900 INJECTION, SOLUTION INTRAVENOUS at 14:32

## 2025-01-06 RX ADMIN — OSELTAMIVIR PHOSPHATE 30 MG: 30 CAPSULE ORAL at 16:38

## 2025-01-06 RX ADMIN — WATER 40 MG: 1 INJECTION INTRAMUSCULAR; INTRAVENOUS; SUBCUTANEOUS at 19:34

## 2025-01-06 RX ADMIN — WATER 2000 MG: 1 INJECTION INTRAMUSCULAR; INTRAVENOUS; SUBCUTANEOUS at 14:10

## 2025-01-06 RX ADMIN — GUAIFENESIN 600 MG: 600 TABLET ORAL at 19:36

## 2025-01-06 RX ADMIN — WATER 125 MG: 1 INJECTION INTRAMUSCULAR; INTRAVENOUS; SUBCUTANEOUS at 12:45

## 2025-01-06 RX ADMIN — SODIUM CHLORIDE 10 MG/HR: 900 INJECTION, SOLUTION INTRAVENOUS at 20:59

## 2025-01-06 RX ADMIN — BUMETANIDE 1 MG: 0.25 INJECTION INTRAMUSCULAR; INTRAVENOUS at 19:34

## 2025-01-06 RX ADMIN — ENOXAPARIN SODIUM 30 MG: 100 INJECTION SUBCUTANEOUS at 18:31

## 2025-01-06 ASSESSMENT — PAIN SCALES - GENERAL: PAINLEVEL_OUTOF10: 0

## 2025-01-06 ASSESSMENT — PAIN - FUNCTIONAL ASSESSMENT: PAIN_FUNCTIONAL_ASSESSMENT: 0-10

## 2025-01-06 NOTE — ACP (ADVANCE CARE PLANNING)
Advance Care Planning   The patient has the following advanced directives on file:  Advance Directives       Power of  Living Will ACP-Advance Directive ACP-Power of     Not on File Not on File Not on File Not on File          [facility]    The patient has appointed the following active healthcare agents:  Arnold Melara, grandson (Father, pt's son was POA has passed) [phone]  Annalise Melara, daughter-in-law      The Patient has the following current code status:  DNR/DNI    Visit Documentation:  I discussed Advance Care Planning with Wendy Melara today which included the patient's choices for care and treatment in the case of a health event that adversely affects decision-making abilities. She stated the Advance Care Directives on file are current. We discussed her current values, goals and care preferences at the end of life.  Wendy Melara has no questions at this time and has agreed to keep me up-to-date should anything change.     1. Goals of medical treatment were reviewed .   2. Patient's stated goal/treatment preference is DNR/DNI.  3. The discussion lasted 20 minutes    Called & spoke w ELIZABETH on file, pt's grandson Arnold Melara (as noted above his father was POA however has since passed) & reviewed plan of care as well as DNR status. Grandson agrees with current plan & confirms pts DNR/DNI status.     Lisa Munoz, APRN - CNP  1/6/2025

## 2025-01-06 NOTE — CONSULTS
CARDIOLOGY    Corby Suero MD, [facility]      Date of  Admission: 1/6/2025 12:01 PM         IMPRESSION and RECOMMENDATIONS      AF:  Has H/O AF, but this particular episode is of unknown duration.  Presumably aggravated by flu.  No anticoagulation as per Dr. Barry's last office note as risks > benefits.  Stop norvasc and start dilt drip for rate-control.  BP ok.  Dyslipidemia:  I'm not sure there is any utility in continuing statin in this 89yo DNR woman.    I have discussed this plan with the patient.  She appears to understand this plan and wishes to proceed ahead.             History of Present Illness:     Wendy Melara is a 89 y.o. female with the above problem list who was admitted for No admission diagnoses are documented for this encounter..    Presented with C/O weakness, myalgias and being admitted with flu.  Noted to be in AF/RVR.  This is 89-year-old female with a history of hypertension, hyperlipidemia, coronary artery disease, presenting by EMS from an assisted living facility for cough, shortness of breath and generalized weakness and fatigue over the last 2 days. On medic arrival she was hypoxic to 88% on room air and received a DuoNeb treatment, upon arrival continues to be mildly hypoxic requiring 2 L nasal cannula. She denies any history of asthma or COPD. Continues to exhibit a productive cough without associated chest pain, abdominal pain, or vomiting. No new lower extremity pain or edema and she denies any history of venous thromboembolism. No other systemic complaints.     She denies chest pain/discomfort, orthopnea, paroxysmal noctural dyspnea, lower extremity edema, palpitations, syncope, or near-syncope.    Current Facility-Administered Medications   Medication Dose Route Frequency    dilTIAZem injection 10 mg  10 mg IntraVENous NOW    furosemide (LASIX) injection 40 mg  40 mg IntraVENous Once    cefTRIAXone (ROCEPHIN) 2,000 mg  bruits or pulsatile mass.         Extremities Exam:     The extremities are atraumatic appearing. There is no clubbing, cyanosis, edema, ulcers, varicose veins, rash, erythemia noted in the extremities. The neurovascular status is grossly intact with normal distal sensation and pulses.          Vascular Exam:     The radial, brachial, dorsalis pedis, posterior tibial, are equal and strong bilaterally The carotids are equal bilaterally without bruits.          Labs:     Recent Labs     01/06/25  1217      K 4.3      CO2 24   BUN 20   CREATININE 1.38*   GLUCOSE 130*   CALCIUM 8.9     Recent Labs     01/06/25  1217   WBC 10.9   HGB 15.4   HCT 47.3*        Recent Labs     01/06/25  1217   ALT 15   GLOB 4.2*     Recent Labs     01/06/25  1217   INR 1.0      No results for input(s): \"TROPONINI\" in the last 72 hours.  Recent Labs     01/06/25  1217   TROPHS 69*     No results for input(s): \"CHOL\", \"TRIG\", \"HDL\" in the last 72 hours.    Invalid input(s): \"LDLCALC\"    EKG:  AF @ 90, LVH, PAF

## 2025-01-06 NOTE — H&P
[facility]  [address] [place] [address] [phone]        Hospitalist Admission History and Physical      NAME:  Wendy Melara   :   1924   MRN:  [id number]     PCP:  Holley Castellano MD     Date/Time of service:  2025  3:45 PM        Subjective:     CHIEF COMPLAINT: Cough, SOB, malaise     HISTORY OF PRESENT ILLNESS:     Ms. Melara is a 89 y.o.   female who is admitted with AHRF due to flu A & CHF exacerbation.  Ms. Melara presented to the Emergency Department today complaining of cough, SOB, malaise x2d. PMH includes CAD, hx of R femur fx, , GERD, HTN. On EMS arrival to her assisted living facility the pt was hypoxic at 88% on RA, arrived to ED on 2L NC. In the ED she received an additional Duoneb x2, IV Solumedrol, and IV fluids initially and subsequently IV Lasix for diuresis. She also received rocephin/azithromycin empirically for possible multifocal pneumonia seen on CXR. Pt is a limited historian and asks that her son is called for review of history & medications.    Evening update: Called & spoke w pt's grandson who is COLTK (his father was POA & has since passed). ACP performed, see separate note. Also reviewed pt's PMH. He reports the 'fluid on her lungs is an ongoing issue and happens every time she is in the hospital.' States that she has had issues w bradycardia on previous admissions and 'when her heart rate drops so does her oxygen.' D/w him plan of care & inpatient admission. He agrees and asks that he is called with any changes in pt condition. He also states that when pt is admitted to hospital & has therapy evaluations that they recommend rehab. He states that pt does NOT want rehab, states she is content at her current [facility] and that they provide everything she needs. She is currently very active at this facility when she is not ill & performs chair aerobic classes etc. Notified him of room assignment to SD in ICU.

## 2025-01-06 NOTE — PROGRESS NOTES
Medication History Review by Pharmacist:    Comments/Recommendations:       Information obtained from: [facility] records      Allergies: Patient has no known allergies.    Prior to Admission Medications:   No current facility-administered medications on file prior to encounter.     Current Outpatient Medications on File Prior to Encounter   Medication Sig Dispense Refill    amLODIPine (NORVASC) 5 MG tablet Take 1 tablet by mouth daily      aspirin 81 MG EC tablet Take 1 tablet by mouth nightly      atorvastatin (LIPITOR) 40 MG tablet Take 1 tablet by mouth nightly      Ferrous Fumarate 325 (106 Fe) MG TABS Take 1 tablet by mouth daily      pantoprazole (PROTONIX) 40 MG tablet Take 1 tablet by mouth daily      albuterol sulfate HFA (VENTOLIN HFA) 108 (90 Base) MCG/ACT inhaler Inhale 2 puffs into the lungs every 6 hours as needed for Wheezing or Shortness of Breath      diphenhydrAMINE (BENADRYL) 2 % cream Apply topically 3 times daily as needed for itching (Patient not taking: Reported on 1/6/2025) 30 g 0    Polyethylene Glycol 3350 POWD by Other route (Patient not taking: Reported on 1/6/2025)      acetaminophen (TYLENOL) 325 MG tablet Take 2 tablets by mouth every 4 hours as needed      guaiFENesin (ROBITUSSIN) 100 MG/5ML liquid Take 10 mLs by mouth 3 times daily as needed (Patient not taking: Reported on 1/6/2025)      loperamide (IMODIUM A-D) 2 MG tablet Take 1 tablet by mouth 4 times daily as needed      ondansetron (ZOFRAN-ODT) 4 MG disintegrating tablet Take 1 tablet by mouth every 8 hours as needed      pregabalin (LYRICA) 50 MG capsule Take 1 capsule by mouth. (Patient not taking: Reported on 1/6/2025)

## 2025-01-06 NOTE — ED NOTES
Per Dr. Jimenez, no blood cultures needed prior to antibiotics. Paired blood cultures collected and sent to lab on hold labels.

## 2025-01-06 NOTE — ED PROVIDER NOTES
[facility] EMERGENCY DEPT  EMERGENCY DEPARTMENT ENCOUNTER      Patient Name:  Wendy Melara  MRN:   [id number]  :   1924  Date of Evaluation: 2025  Physician:  Leela Jimenez MD    Chief Complaint   Patient presents with    Cough    Shortness of Breath       HISTORY OF PRESENT ILLNESS    This is 89-year-old female with a history of hypertension, hyperlipidemia, coronary artery disease, presenting by EMS from an assisted living facility for cough, shortness of breath and generalized weakness and fatigue over the last 2 days.  On medic arrival she was hypoxic to 88% on room air and received a DuoNeb treatment, upon arrival continues to be mildly hypoxic requiring 2 L nasal cannula.  She denies any history of asthma or COPD.  Continues to exhibit a productive cough without associated chest pain, abdominal pain, or vomiting.  No new lower extremity pain or edema and she denies any history of venous thromboembolism.  No other systemic complaints.      REVIEW OF SYSTEMS   A review of systems was performed and was negative except as documented in the HPI.     PAST MEDICAL HISTORY     Past Medical History:   Diagnosis Date    CAD (coronary artery disease)     Femur fracture (HCC)     right    GERD (gastroesophageal reflux disease)     HTN (hypertension), benign 2011    Mixed hyperlipidemia 2011       PAST SURGICAL HISTORY     Past Surgical History:   Procedure Laterality Date    FEMUR FRACTURE SURGERY      ORTHOPEDIC SURGERY      bilat knee replacements    TOTAL KNEE ARTHROPLASTY      bilateral    VEIN SURGERY         ALLERGIES   Sulfa antibiotics, Fluvastatin, and Ampicillin    FAMILY HISTORY     Family History   Problem Relation Age of Onset    Other Mother          of renal failure, not sure what caused id    Other Father         something with throat, not sure if it was cancer    Heart Disease Sister        SOCIAL HISTORY     Social History     Socioeconomic History    Marital status:   for diuresis.  Rocephin/azithromycin empirically for multifocal pneumonia although I suspect the issue is moreso fluid retention given her elevated BNP.  She also presents in rapid atrial fibrillation, I reviewed her discharge summary from her admission in July of this year which documents a history of atrial fibrillation in the past, she is not anticoagulated.  She is rapid with ventricular rates into the 130s so I ordered bolused IV Cardizem and an infusion to follow given her refractory rates.  Dr. Suero (cardiology) is at the bedside evaluating.  I updated her son by phone as well as there are case management issues regarding her care going forward that will require resolution.  Will admit for continued management, discussed with the hospitalist.      FINAL IMPRESSION AND DISPOSITION   Perfect Serve Consult for Admission  1:50 PM    ED Room Number:   ER21/21  Patient Name and age:   Wendy Melara 89 y.o.  female  Working Diagnosis:     1. Influenza A    2. Acute congestive heart failure, unspecified heart failure type (HCC)    3. Atrial fibrillation with rapid ventricular response (HCC)    4. Acute hypoxic respiratory failure      COVID-19 Suspicion:   No  Sepsis present:    No   Code Status:     Full Code  Readmission:    No  Isolation Requirements:  yes - influenza  Recommended LOC:   step down  Department:    [facility] - [phone]  Other:     Receiving IV Lasix, bolused Cardizem (anticipate need for drip), Rocephin/azithromycin.  Elevated BNP.  Similar admission earlier this year for fluid retention.  Dr. Bills is at the bedside. Her grandson (Kelton) does not want her to be placed in rehab or that process to be initiated without discussing with him directly over the phone.  He can be reached at [phone].          Leela Jimenez MD  01/06/25 7353

## 2025-01-06 NOTE — ED TRIAGE NOTES
Patient arrives via EMS with c/o SOB, cough, generalized fatigue for past 2 days, progressively getting worse.    Per EMS, EKG read A-fib. Patient's SpO2 on arrival was 88%. Patient received duo neb in route.     Pt is residence of [facility].    Patient is A&O x 4.     Denies chest pain, N/V.

## 2025-01-06 NOTE — ED NOTES
TRANSFER - OUT REPORT:    Verbal report given to Radha WINKLER on Wendy Melara  being transferred to ICU/Stepdown for routine progression of patient care       Report consisted of patient's Situation, Background, Assessment and   Recommendations(SBAR).     Information from the following report(s) Nurse Handoff Report, ED SBAR, Adult Overview, MAR, Recent Results, and Neuro Assessment was reviewed with the receiving nurse.    [place] Fall Assessment:    Presents to emergency department  because of falls (Syncope, seizure, or loss of consciousness): No  Age > 70: No  Altered Mental Status, Intoxication with alcohol or substance confusion (Disorientation, impaired judgment, poor safety awaremess, or inability to follow instructions): No  Impaired Mobility: Ambulates or transfers with assistive devices or assistance; Unable to ambulate or transer.: No             Lines:   Peripheral IV 01/06/25 Posterior;Right Forearm (Active)   Site Assessment Clean, dry & intact 01/06/25 1232   Line Status Blood return noted 01/06/25 1232   Phlebitis Assessment No symptoms 01/06/25 1232   Infiltration Assessment 0 01/06/25 1232   Dressing Status Clean, dry & intact 01/06/25 1232   Dressing Type Transparent 01/06/25 1232       Peripheral IV 01/06/25 Left;Proximal Forearm (Active)        Opportunity for questions and clarification was provided.      Patient transported with:  Monitor and Registered Nurse

## 2025-01-07 LAB
ALBUMIN SERPL-MCNC: 3.1 G/DL (ref 3.5–5)
ALBUMIN/GLOB SERPL: 0.8 (ref 1.1–2.2)
ALP SERPL-CCNC: 105 U/L (ref 45–117)
ALT SERPL-CCNC: 16 U/L (ref 12–78)
ANION GAP SERPL CALC-SCNC: 6 MMOL/L (ref 2–12)
AST SERPL-CCNC: 21 U/L (ref 15–37)
BILIRUB SERPL-MCNC: 0.4 MG/DL (ref 0.2–1)
BUN SERPL-MCNC: 27 MG/DL (ref 6–20)
BUN/CREAT SERPL: 20 (ref 12–20)
CALCIUM SERPL-MCNC: 8.6 MG/DL (ref 8.5–10.1)
CHLORIDE SERPL-SCNC: 108 MMOL/L (ref 97–108)
CO2 SERPL-SCNC: 25 MMOL/L (ref 21–32)
CREAT SERPL-MCNC: 1.37 MG/DL (ref 0.55–1.02)
CRP SERPL-MCNC: 4.88 MG/DL (ref 0–0.3)
EKG DIAGNOSIS: NORMAL
EKG DIAGNOSIS: NORMAL
EKG Q-T INTERVAL: 360 MS
EKG Q-T INTERVAL: 374 MS
EKG QRS DURATION: 106 MS
EKG QRS DURATION: 96 MS
EKG QTC CALCULATION (BAZETT): 457 MS
EKG QTC CALCULATION (BAZETT): 504 MS
EKG R AXIS: -43 DEGREES
EKG R AXIS: -52 DEGREES
EKG T AXIS: 100 DEGREES
EKG T AXIS: 73 DEGREES
EKG VENTRICULAR RATE: 118 BPM
EKG VENTRICULAR RATE: 90 BPM
ERYTHROCYTE [DISTWIDTH] IN BLOOD BY AUTOMATED COUNT: 13.5 % (ref 11.5–14.5)
GLOBULIN SER CALC-MCNC: 4.1 G/DL (ref 2–4)
GLUCOSE SERPL-MCNC: 158 MG/DL (ref 65–100)
HCT VFR BLD AUTO: 43.8 % (ref 35–47)
HGB BLD-MCNC: 14.3 G/DL (ref 11.5–16)
MAGNESIUM SERPL-MCNC: 1.9 MG/DL (ref 1.6–2.4)
MCH RBC QN AUTO: 31 PG (ref 26–34)
MCHC RBC AUTO-ENTMCNC: 32.6 G/DL (ref 30–36.5)
MCV RBC AUTO: 95 FL (ref 80–99)
NRBC # BLD: 0 K/UL (ref 0–0.01)
NRBC BLD-RTO: 0 PER 100 WBC
NT PRO BNP: 9249 PG/ML
PHOSPHATE SERPL-MCNC: 3.8 MG/DL (ref 2.6–4.7)
PLATELET # BLD AUTO: 216 K/UL (ref 150–400)
PMV BLD AUTO: 10.8 FL (ref 8.9–12.9)
POTASSIUM SERPL-SCNC: 4.2 MMOL/L (ref 3.5–5.1)
PROCALCITONIN SERPL-MCNC: 0.19 NG/ML
PROT SERPL-MCNC: 7.2 G/DL (ref 6.4–8.2)
RBC # BLD AUTO: 4.61 M/UL (ref 3.8–5.2)
SODIUM SERPL-SCNC: 139 MMOL/L (ref 136–145)
TROPONIN I SERPL HS-MCNC: 61 NG/L (ref 0–51)
WBC # BLD AUTO: 6.4 K/UL (ref 3.6–11)

## 2025-01-07 PROCEDURE — 94640 AIRWAY INHALATION TREATMENT: CPT

## 2025-01-07 PROCEDURE — 2500000003 HC RX 250 WO HCPCS: Performed by: INTERNAL MEDICINE

## 2025-01-07 PROCEDURE — 6360000002 HC RX W HCPCS: Performed by: INTERNAL MEDICINE

## 2025-01-07 PROCEDURE — 2700000000 HC OXYGEN THERAPY PER DAY

## 2025-01-07 PROCEDURE — 84100 ASSAY OF PHOSPHORUS: CPT

## 2025-01-07 PROCEDURE — APPSS30 APP SPLIT SHARED TIME 16-30 MINUTES: Performed by: NURSE PRACTITIONER

## 2025-01-07 PROCEDURE — 36415 COLL VENOUS BLD VENIPUNCTURE: CPT

## 2025-01-07 PROCEDURE — 94761 N-INVAS EAR/PLS OXIMETRY MLT: CPT

## 2025-01-07 PROCEDURE — 84484 ASSAY OF TROPONIN QUANT: CPT

## 2025-01-07 PROCEDURE — 2580000003 HC RX 258: Performed by: INTERNAL MEDICINE

## 2025-01-07 PROCEDURE — 6360000002 HC RX W HCPCS

## 2025-01-07 PROCEDURE — 6370000000 HC RX 637 (ALT 250 FOR IP): Performed by: INTERNAL MEDICINE

## 2025-01-07 PROCEDURE — 80053 COMPREHEN METABOLIC PANEL: CPT

## 2025-01-07 PROCEDURE — 93010 ELECTROCARDIOGRAM REPORT: CPT | Performed by: SPECIALIST

## 2025-01-07 PROCEDURE — 86140 C-REACTIVE PROTEIN: CPT

## 2025-01-07 PROCEDURE — 85027 COMPLETE CBC AUTOMATED: CPT

## 2025-01-07 PROCEDURE — 6370000000 HC RX 637 (ALT 250 FOR IP): Performed by: NURSE PRACTITIONER

## 2025-01-07 PROCEDURE — 83735 ASSAY OF MAGNESIUM: CPT

## 2025-01-07 PROCEDURE — 83880 ASSAY OF NATRIURETIC PEPTIDE: CPT

## 2025-01-07 PROCEDURE — 2060000000 HC ICU INTERMEDIATE R&B

## 2025-01-07 PROCEDURE — 84145 PROCALCITONIN (PCT): CPT

## 2025-01-07 PROCEDURE — 6370000000 HC RX 637 (ALT 250 FOR IP)

## 2025-01-07 RX ORDER — DILTIAZEM HYDROCHLORIDE 120 MG/1
120 CAPSULE, COATED, EXTENDED RELEASE ORAL DAILY
Status: DISCONTINUED | OUTPATIENT
Start: 2025-01-07 | End: 2025-01-11 | Stop reason: HOSPADM

## 2025-01-07 RX ADMIN — GUAIFENESIN 600 MG: 600 TABLET ORAL at 20:24

## 2025-01-07 RX ADMIN — IPRATROPIUM BROMIDE AND ALBUTEROL SULFATE 1 DOSE: 2.5; .5 SOLUTION RESPIRATORY (INHALATION) at 20:42

## 2025-01-07 RX ADMIN — DILTIAZEM HYDROCHLORIDE 120 MG: 120 CAPSULE, COATED, EXTENDED RELEASE ORAL at 11:00

## 2025-01-07 RX ADMIN — WATER 40 MG: 1 INJECTION INTRAMUSCULAR; INTRAVENOUS; SUBCUTANEOUS at 08:04

## 2025-01-07 RX ADMIN — IPRATROPIUM BROMIDE AND ALBUTEROL SULFATE 1 DOSE: 2.5; .5 SOLUTION RESPIRATORY (INHALATION) at 13:52

## 2025-01-07 RX ADMIN — BUMETANIDE 1 MG: 0.25 INJECTION INTRAMUSCULAR; INTRAVENOUS at 20:08

## 2025-01-07 RX ADMIN — GUAIFENESIN 600 MG: 600 TABLET ORAL at 08:07

## 2025-01-07 RX ADMIN — IPRATROPIUM BROMIDE AND ALBUTEROL SULFATE 1 DOSE: 2.5; .5 SOLUTION RESPIRATORY (INHALATION) at 10:09

## 2025-01-07 RX ADMIN — OSELTAMIVIR PHOSPHATE 30 MG: 30 CAPSULE ORAL at 08:07

## 2025-01-07 RX ADMIN — WATER 1000 MG: 1 INJECTION INTRAMUSCULAR; INTRAVENOUS; SUBCUTANEOUS at 08:05

## 2025-01-07 RX ADMIN — IPRATROPIUM BROMIDE AND ALBUTEROL SULFATE 1 DOSE: 2.5; .5 SOLUTION RESPIRATORY (INHALATION) at 00:02

## 2025-01-07 RX ADMIN — DOXYCYCLINE 100 MG: 100 INJECTION, POWDER, LYOPHILIZED, FOR SOLUTION INTRAVENOUS at 18:31

## 2025-01-07 RX ADMIN — WATER 40 MG: 1 INJECTION INTRAMUSCULAR; INTRAVENOUS; SUBCUTANEOUS at 20:09

## 2025-01-07 RX ADMIN — ENOXAPARIN SODIUM 30 MG: 100 INJECTION SUBCUTANEOUS at 17:06

## 2025-01-07 RX ADMIN — AZITHROMYCIN MONOHYDRATE 500 MG: 500 INJECTION, POWDER, LYOPHILIZED, FOR SOLUTION INTRAVENOUS at 08:02

## 2025-01-07 RX ADMIN — BUMETANIDE 1 MG: 0.25 INJECTION INTRAMUSCULAR; INTRAVENOUS at 08:03

## 2025-01-07 NOTE — CARE COORDINATION
Care Management Initial Assessment  1/7/2025 2:46 PM  If patient is discharged prior to next notation, then this note serves as note for discharge by case management.    Reason for Admission:   Flu [J11.1]  Paroxysmal atrial fibrillation (HCC) [I48.0]  Influenza A [J10.1]  Atrial fibrillation with rapid ventricular response (HCC) [I48.91]  Acute congestive heart failure, unspecified heart failure type (HCC) [I50.9]  Acute hypoxic respiratory failure [J96.01]         Patient Admission Status: Inpatient  Date Admitted to IN: 1/6/25  RUR: Readmission Risk Score: 12.8    Hospitalization in the last 30 days (Readmission):  no        Advance Care Planning:  Code Status: DNR  Primary Healthcare Decision Maker:  Brooke=marii Malone    Advance Directive: has an advanced directive - a copy has been provided     __________________________________________________________________________  Assessment:   Influenza +  Atrial fib    Comments: DNR    Discharge Concerns: []Yes [x]No []Unknown   Describe:    Financial concerns/barriers: []Yes, explain: [x]No []Unknown/Not discussed  __________________________________________________________________________    Insurer:   Active Insurance as of 1/6/2025       Primary Coverage       Payor Plan Insurance Group Employer/Plan Group    HUMANA MEDICARE HUMANA CHOICE-PPO MEDICARE [id number]       Payor Plan Address Payor Plan Phone Number Payor Plan Fax Number Effective Dates    [address] [address]   1/1/2023 - None Entered    [facility] [id number]         Subscriber Name Subscriber Birth Date Member ID       ELOISA MALONE 4/26/1924 [id number]                     PCP: Holley Castellano MD   Address: [address] [facility] [address]   Phone number: [phone]    Pharmacy: [facility]   DC Transport:  to be determined        Transition of care plan:    [x]Unable to determine at this time. Awaiting clinical progress, and disposition recommendations.    []

## 2025-01-07 NOTE — PLAN OF CARE
Problem: Safety - Adult  Goal: Free from fall injury  Outcome: Progressing     Problem: Discharge Planning  Goal: Discharge to home or other facility with appropriate resources  Outcome: Progressing     Problem: Pain  Goal: Verbalizes/displays adequate comfort level or baseline comfort level  Outcome: Progressing     Problem: Respiratory - Adult  Goal: Achieves optimal ventilation and oxygenation  1/7/2025 1125 by Dona Aranda RN  Outcome: Progressing  1/7/2025 0358 by Ester Jansen, RT  Outcome: Progressing

## 2025-01-07 NOTE — PROGRESS NOTES
near-syncope.    Current Facility-Administered Medications   Medication Dose Route Frequency    guaiFENesin (MUCINEX) extended release tablet 600 mg  600 mg Oral BID    benzonatate (TESSALON) capsule 100 mg  100 mg Oral TID PRN    cefTRIAXone (ROCEPHIN) 1,000 mg in sterile water 10 mL IV syringe  1,000 mg IntraVENous Q24H    azithromycin (ZITHROMAX) 500 mg in sodium chloride 0.9 % 250 mL IVPB (Rqob0Eag)  500 mg IntraVENous Q24H    ipratropium 0.5 mg-albuterol 2.5 mg (DUONEB) nebulizer solution 1 Dose  1 Dose Inhalation Q6H WA RT    methylPREDNISolone sodium succ (SOLU-MEDROL) 40 mg in sterile water 1 mL injection  40 mg IntraVENous Q12H    bumetanide (BUMEX) injection 1 mg  1 mg IntraVENous Q12H    dilTIAZem 100 mg in sodium chloride 0.9 % 100 mL infusion (ADD-[place])  2.5-15 mg/hr IntraVENous Continuous    ipratropium 0.5 mg-albuterol 2.5 mg (DUONEB) nebulizer solution 1 Dose  1 Dose Inhalation Q6H PRN    enoxaparin Sodium (LOVENOX) injection 30 mg  30 mg SubCUTAneous Q24H    oseltamivir (TAMIFLU) capsule 30 mg  30 mg Oral Daily      Allergies   Allergen Reactions    Sulfa Antibiotics Rash    Fluvastatin      Other reaction(s): Unknown (comments)    Ampicillin Rash     Tolerates cefazolin      Family History   Problem Relation Age of Onset    Other Mother          of renal failure, not sure what caused id    Other Father         something with throat, not sure if it was cancer    Heart Disease Sister       Social History     Socioeconomic History    Marital status:      Spouse name: Not on file    Number of children: Not on file    Years of education: Not on file    Highest education level: Not on file   Occupational History    Not on file   Tobacco Use    Smoking status: Never    Smokeless tobacco: Never   Substance and Sexual Activity    Alcohol use: No    Drug use: No    Sexual activity: Not on file   Other Topics Concern    Not on file   Social History Narrative    Not on file     Social  Determinants of Health     Financial Resource Strain: Not on file   Food Insecurity: No Food Insecurity (6/9/2024)    Hunger Vital Sign     Worried About Running Out of Food in the Last Year: Never true     Ran Out of Food in the Last Year: Never true   Transportation Needs: No Transportation Needs (6/9/2024)    PRAPARE - Transportation     Lack of Transportation (Medical): No     Lack of Transportation (Non-Medical): No   Physical Activity: Not on file   Stress: Not on file   Social Connections: Not on file   Intimate Partner Violence: Not on file   Housing Stability: Unknown (6/9/2024)    Housing Stability Vital Sign     Unable to Pay for Housing in the Last Year: No     Number of Places Lived in the Last Year: Not on file     Unstable Housing in the Last Year: No       Physical Exam:     Patient Vitals for the past 16 hrs:   BP Temp Temp src Pulse Resp SpO2   01/07/25 0803 127/73 -- -- -- -- --   01/07/25 0645 107/60 -- -- 71 17 94 %   01/07/25 0630 114/72 -- -- 75 17 95 %   01/07/25 0615 112/65 -- -- 88 17 95 %   01/07/25 0600 116/61 -- -- 70 17 94 %   01/07/25 0545 117/66 -- -- 69 15 96 %   01/07/25 0530 109/67 -- -- 72 18 94 %   01/07/25 0515 128/70 -- -- 74 20 96 %   01/07/25 0500 115/68 -- -- 88 18 95 %   01/07/25 0445 (!) 117/99 -- -- 80 16 93 %   01/07/25 0430 116/69 -- -- 71 16 94 %   01/07/25 0415 132/77 -- -- 84 16 93 %   01/07/25 0400 124/80 -- -- 80 14 95 %   01/07/25 0345 113/80 97.8 °F (36.6 °C) Oral 79 14 95 %   01/07/25 0330 (!) 110/53 -- -- 81 17 92 %   01/07/25 0315 107/64 -- -- 86 16 93 %   01/07/25 0300 120/65 -- -- 83 19 95 %   01/07/25 0245 108/69 -- -- 87 16 93 %   01/07/25 0230 (!) 122/57 -- -- 70 17 93 %   01/07/25 0215 125/74 -- -- 99 21 95 %   01/07/25 0200 (!) 109/59 -- -- 94 20 94 %   01/07/25 0145 117/71 -- -- 91 19 94 %   01/07/25 0130 (!) 131/55 -- -- 87 19 94 %   01/07/25 0115 101/62 -- -- 82 17 95 %   01/07/25 0100 (!) 112/54 -- -- 89 17 93 %   01/07/25 0045 133/81 -- -- 93 19 95

## 2025-01-07 NOTE — PROGRESS NOTES
Hospitalist Progress Note      NAME: Wendy Melara   :  1924  MRM:  [id number]    Date/Time: 2025  12:00 PM    Ms. Melara is a 89 y.o.   female who who presented to the ER with cough and SOB, found to be in AHRF due to flu A & CHF exacerbation.          Assessment/Plan:       Ms. Melara is a 89 y.o.   female who is admitted with AHRF due to flu A & CHF exacerbation. Ms. Melara presented to the Emergency Department today complaining of cough, SOB, malaise x2d.      #Acute Hypoxic Respiratory Failure   #Influenza A Positive  -CXR with Mild diffuse interstitial and airspace opacities may resent pulmonary edema and/or pneumonia, with a probable small left pleural effusion.  -Start course of Tamiflu, IVCS  -Empiric atb therapy w CTX. Change Zithromax to Doxy with risk of PNA in setting of Influenza. Risk of MRSA, check MRSA screen, sputum cultures  -CT chest with b/l groundglass opacities, infectious/inflammatory process/edema   -Isolation precautions  -Titrate O2 PRN     #Afib with RVR  -Hx bradycardia on prior admission  -RVR likely exacerbated by acute illness  -Cardiology consult  -Dilt gtt transitioned to  mg PO qd  -risks>benefits of AC per Cards  -monitor electrolytes  -monitor Tele     #Acute on chronic HF exacerbation  -Echo from 2023 reviewed with normal left ventricular systolic function with a visually estimated EF of 65 - 70%   -Repeat echo ordered  -S/p 40mg IV Lasix in ED, continue diuresis w 1mg Bumex Q12h  -Strict I&Os, daily weights  -monitor hemodynamics    #JOSELIN  -Baseline Cr ~0.9  -Cr 1.38 eGFR 34  -Diurese, monitor I&Os, avoid hypotension & nephrotoxins     #HTN  #CAD  -On Norvasc, ASA, statin on prior admission  -Consult to pharmacy for med rec     #Dyslipidemia  -Risk vs benefit in setting of advanced age, agree w cardiology     Risk of deterioration: high         Total time spent with patient care: 45 Minutes      **I personally saw and examined the

## 2025-01-08 ENCOUNTER — APPOINTMENT (OUTPATIENT)
Facility: HOSPITAL | Age: 89
End: 2025-01-08
Attending: INTERNAL MEDICINE
Payer: MEDICARE

## 2025-01-08 LAB
ANION GAP SERPL CALC-SCNC: 6 MMOL/L (ref 2–12)
BASOPHILS # BLD: 0.01 K/UL (ref 0–0.1)
BASOPHILS NFR BLD: 0.1 % (ref 0–1)
BUN SERPL-MCNC: 35 MG/DL (ref 6–20)
BUN/CREAT SERPL: 26 (ref 12–20)
CALCIUM SERPL-MCNC: 8.2 MG/DL (ref 8.5–10.1)
CHLORIDE SERPL-SCNC: 110 MMOL/L (ref 97–108)
CO2 SERPL-SCNC: 24 MMOL/L (ref 21–32)
CREAT SERPL-MCNC: 1.34 MG/DL (ref 0.55–1.02)
DIFFERENTIAL METHOD BLD: ABNORMAL
ECHO AO ARCH DIAM: 2 CM
ECHO AO ASC DIAM: 2.9 CM
ECHO AO ASCENDING AORTA INDEX: 1.5 CM/M2
ECHO AO ROOT DIAM: 3.2 CM
ECHO AO ROOT INDEX: 1.66 CM/M2
ECHO AV AREA PEAK VELOCITY: 2.2 CM2
ECHO AV AREA VTI: 2.4 CM2
ECHO AV AREA/BSA PEAK VELOCITY: 1.1 CM2/M2
ECHO AV AREA/BSA VTI: 1.2 CM2/M2
ECHO AV MEAN GRADIENT: 2 MMHG
ECHO AV MEAN VELOCITY: 0.7 M/S
ECHO AV PEAK GRADIENT: 3 MMHG
ECHO AV PEAK VELOCITY: 0.9 M/S
ECHO AV VELOCITY RATIO: 1.11
ECHO AV VTI: 17.2 CM
ECHO BSA: 1.99 M2
ECHO LA DIAMETER INDEX: 1.5 CM/M2
ECHO LA DIAMETER: 2.9 CM
ECHO LA TO AORTIC ROOT RATIO: 0.91
ECHO LA VOL A-L A2C: 49 ML (ref 22–52)
ECHO LA VOL A-L A4C: 48 ML (ref 22–52)
ECHO LA VOL BP: 48 ML (ref 22–52)
ECHO LA VOL MOD A2C: 47 ML (ref 22–52)
ECHO LA VOL MOD A4C: 47 ML (ref 22–52)
ECHO LA VOL/BSA BIPLANE: 25 ML/M2 (ref 16–34)
ECHO LA VOLUME AREA LENGTH: 50 ML
ECHO LA VOLUME INDEX A-L A2C: 25 ML/M2 (ref 16–34)
ECHO LA VOLUME INDEX A-L A4C: 25 ML/M2 (ref 16–34)
ECHO LA VOLUME INDEX AREA LENGTH: 26 ML/M2 (ref 16–34)
ECHO LA VOLUME INDEX MOD A2C: 24 ML/M2 (ref 16–34)
ECHO LA VOLUME INDEX MOD A4C: 24 ML/M2 (ref 16–34)
ECHO LV E' LATERAL VELOCITY: 4.82 CM/S
ECHO LV E' SEPTAL VELOCITY: 4.55 CM/S
ECHO LV EDV A2C: 43 ML
ECHO LV EDV A4C: 57 ML
ECHO LV EDV BP: 54 ML (ref 56–104)
ECHO LV EDV INDEX A4C: 30 ML/M2
ECHO LV EDV INDEX BP: 28 ML/M2
ECHO LV EDV NDEX A2C: 22 ML/M2
ECHO LV EF PHYSICIAN: 60 %
ECHO LV EJECTION FRACTION A2C: 56 %
ECHO LV EJECTION FRACTION A4C: 59 %
ECHO LV EJECTION FRACTION BIPLANE: 59 % (ref 55–100)
ECHO LV ESV A2C: 19 ML
ECHO LV ESV A4C: 23 ML
ECHO LV ESV BP: 22 ML (ref 19–49)
ECHO LV ESV INDEX A2C: 10 ML/M2
ECHO LV ESV INDEX A4C: 12 ML/M2
ECHO LV ESV INDEX BP: 11 ML/M2
ECHO LV FRACTIONAL SHORTENING: 22 % (ref 28–44)
ECHO LV INTERNAL DIMENSION DIASTOLE INDEX: 1.4 CM/M2
ECHO LV INTERNAL DIMENSION DIASTOLIC: 2.7 CM (ref 3.9–5.3)
ECHO LV INTERNAL DIMENSION SYSTOLIC INDEX: 1.09 CM/M2
ECHO LV INTERNAL DIMENSION SYSTOLIC: 2.1 CM
ECHO LV IVSD: 1.2 CM (ref 0.6–0.9)
ECHO LV MASS 2D: 94.6 G (ref 67–162)
ECHO LV MASS INDEX 2D: 49 G/M2 (ref 43–95)
ECHO LV POSTERIOR WALL DIASTOLIC: 1.2 CM (ref 0.6–0.9)
ECHO LV RELATIVE WALL THICKNESS RATIO: 0.89
ECHO LVOT AREA: 2 CM2
ECHO LVOT AV VTI INDEX: 1.17
ECHO LVOT DIAM: 1.6 CM
ECHO LVOT MEAN GRADIENT: 2 MMHG
ECHO LVOT PEAK GRADIENT: 4 MMHG
ECHO LVOT PEAK VELOCITY: 1 M/S
ECHO LVOT STROKE VOLUME INDEX: 21 ML/M2
ECHO LVOT SV: 40.6 ML
ECHO LVOT VTI: 20.2 CM
ECHO MV A VELOCITY: 0.4 M/S
ECHO MV AREA VTI: 1 CM2
ECHO MV E DECELERATION TIME (DT): 170.4 MS
ECHO MV E VELOCITY: 1.49 M/S
ECHO MV E/A RATIO: 3.73
ECHO MV E/E' LATERAL: 30.91
ECHO MV E/E' RATIO (AVERAGED): 31.83
ECHO MV E/E' SEPTAL: 32.75
ECHO MV LVOT VTI INDEX: 2.03
ECHO MV MAX VELOCITY: 1.7 M/S
ECHO MV MEAN GRADIENT: 4 MMHG
ECHO MV MEAN VELOCITY: 0.9 M/S
ECHO MV PEAK GRADIENT: 11 MMHG
ECHO MV VTI: 41.1 CM
ECHO PV MAX VELOCITY: 0.8 M/S
ECHO PV PEAK GRADIENT: 3 MMHG
ECHO RV FREE WALL PEAK S': 11.5 CM/S
ECHO RV INTERNAL DIMENSION: 3.7 CM
ECHO RV TAPSE: 1.3 CM (ref 1.7–?)
EOSINOPHIL # BLD: 0 K/UL (ref 0–0.4)
EOSINOPHIL NFR BLD: 0 % (ref 0–7)
ERYTHROCYTE [DISTWIDTH] IN BLOOD BY AUTOMATED COUNT: 13.4 % (ref 11.5–14.5)
GLUCOSE SERPL-MCNC: 152 MG/DL (ref 65–100)
HCT VFR BLD AUTO: 44.7 % (ref 35–47)
HGB BLD-MCNC: 14.6 G/DL (ref 11.5–16)
IMM GRANULOCYTES # BLD AUTO: 0.04 K/UL (ref 0–0.04)
IMM GRANULOCYTES NFR BLD AUTO: 0.4 % (ref 0–0.5)
LYMPHOCYTES # BLD: 0.81 K/UL (ref 0.8–3.5)
LYMPHOCYTES NFR BLD: 8.1 % (ref 12–49)
MCH RBC QN AUTO: 31.4 PG (ref 26–34)
MCHC RBC AUTO-ENTMCNC: 32.7 G/DL (ref 30–36.5)
MCV RBC AUTO: 96.1 FL (ref 80–99)
MONOCYTES # BLD: 0.67 K/UL (ref 0–1)
MONOCYTES NFR BLD: 6.7 % (ref 5–13)
NEUTS SEG # BLD: 8.46 K/UL (ref 1.8–8)
NEUTS SEG NFR BLD: 84.7 % (ref 32–75)
NRBC # BLD: 0 K/UL (ref 0–0.01)
NRBC BLD-RTO: 0 PER 100 WBC
PLATELET # BLD AUTO: 217 K/UL (ref 150–400)
PMV BLD AUTO: 10.5 FL (ref 8.9–12.9)
POTASSIUM SERPL-SCNC: 3.9 MMOL/L (ref 3.5–5.1)
RBC # BLD AUTO: 4.65 M/UL (ref 3.8–5.2)
SODIUM SERPL-SCNC: 140 MMOL/L (ref 136–145)
WBC # BLD AUTO: 10 K/UL (ref 3.6–11)

## 2025-01-08 PROCEDURE — 2500000003 HC RX 250 WO HCPCS: Performed by: INTERNAL MEDICINE

## 2025-01-08 PROCEDURE — 2700000000 HC OXYGEN THERAPY PER DAY

## 2025-01-08 PROCEDURE — 2580000003 HC RX 258: Performed by: INTERNAL MEDICINE

## 2025-01-08 PROCEDURE — 36415 COLL VENOUS BLD VENIPUNCTURE: CPT

## 2025-01-08 PROCEDURE — 6370000000 HC RX 637 (ALT 250 FOR IP)

## 2025-01-08 PROCEDURE — 51798 US URINE CAPACITY MEASURE: CPT

## 2025-01-08 PROCEDURE — 80048 BASIC METABOLIC PNL TOTAL CA: CPT

## 2025-01-08 PROCEDURE — 97165 OT EVAL LOW COMPLEX 30 MIN: CPT

## 2025-01-08 PROCEDURE — 94640 AIRWAY INHALATION TREATMENT: CPT

## 2025-01-08 PROCEDURE — 97530 THERAPEUTIC ACTIVITIES: CPT

## 2025-01-08 PROCEDURE — 2060000000 HC ICU INTERMEDIATE R&B

## 2025-01-08 PROCEDURE — 6370000000 HC RX 637 (ALT 250 FOR IP): Performed by: INTERNAL MEDICINE

## 2025-01-08 PROCEDURE — 6360000002 HC RX W HCPCS

## 2025-01-08 PROCEDURE — 94761 N-INVAS EAR/PLS OXIMETRY MLT: CPT

## 2025-01-08 PROCEDURE — 85025 COMPLETE CBC W/AUTO DIFF WBC: CPT

## 2025-01-08 PROCEDURE — 6360000002 HC RX W HCPCS: Performed by: INTERNAL MEDICINE

## 2025-01-08 PROCEDURE — 6370000000 HC RX 637 (ALT 250 FOR IP): Performed by: NURSE PRACTITIONER

## 2025-01-08 PROCEDURE — 93306 TTE W/DOPPLER COMPLETE: CPT

## 2025-01-08 RX ADMIN — WATER 1000 MG: 1 INJECTION INTRAMUSCULAR; INTRAVENOUS; SUBCUTANEOUS at 08:20

## 2025-01-08 RX ADMIN — WATER 40 MG: 1 INJECTION INTRAMUSCULAR; INTRAVENOUS; SUBCUTANEOUS at 08:19

## 2025-01-08 RX ADMIN — DOXYCYCLINE 100 MG: 100 INJECTION, POWDER, LYOPHILIZED, FOR SOLUTION INTRAVENOUS at 17:49

## 2025-01-08 RX ADMIN — DOXYCYCLINE 100 MG: 100 INJECTION, POWDER, LYOPHILIZED, FOR SOLUTION INTRAVENOUS at 06:33

## 2025-01-08 RX ADMIN — IPRATROPIUM BROMIDE AND ALBUTEROL SULFATE 1 DOSE: 2.5; .5 SOLUTION RESPIRATORY (INHALATION) at 14:40

## 2025-01-08 RX ADMIN — DILTIAZEM HYDROCHLORIDE 120 MG: 120 CAPSULE, COATED, EXTENDED RELEASE ORAL at 08:19

## 2025-01-08 RX ADMIN — GUAIFENESIN 600 MG: 600 TABLET ORAL at 08:19

## 2025-01-08 RX ADMIN — IPRATROPIUM BROMIDE AND ALBUTEROL SULFATE 1 DOSE: 2.5; .5 SOLUTION RESPIRATORY (INHALATION) at 20:38

## 2025-01-08 RX ADMIN — IPRATROPIUM BROMIDE AND ALBUTEROL SULFATE 1 DOSE: 2.5; .5 SOLUTION RESPIRATORY (INHALATION) at 08:19

## 2025-01-08 RX ADMIN — BUMETANIDE 1 MG: 0.25 INJECTION INTRAMUSCULAR; INTRAVENOUS at 08:19

## 2025-01-08 RX ADMIN — ENOXAPARIN SODIUM 30 MG: 100 INJECTION SUBCUTANEOUS at 17:49

## 2025-01-08 RX ADMIN — GUAIFENESIN 600 MG: 600 TABLET ORAL at 21:30

## 2025-01-08 RX ADMIN — OSELTAMIVIR PHOSPHATE 30 MG: 30 CAPSULE ORAL at 08:20

## 2025-01-08 RX ADMIN — WATER 40 MG: 1 INJECTION INTRAMUSCULAR; INTRAVENOUS; SUBCUTANEOUS at 21:29

## 2025-01-08 NOTE — PLAN OF CARE
Problem: Safety - Adult  Goal: Free from fall injury  1/7/2025 2245 by Lurdes Gonzalez RN  Outcome: Progressing  1/7/2025 1125 by Dona Aranda RN  Outcome: Progressing     Problem: Discharge Planning  Goal: Discharge to home or other facility with appropriate resources  1/7/2025 2245 by Lurdes Gonzalez RN  Outcome: Progressing  Flowsheets (Taken 1/7/2025 2000)  Discharge to home or other facility with appropriate resources:   Identify barriers to discharge with patient and caregiver   Arrange for needed discharge resources and transportation as appropriate   Identify discharge learning needs (meds, wound care, etc)   Refer to discharge planning if patient needs post-hospital services based on physician order or complex needs related to functional status, cognitive ability or social support system  1/7/2025 1125 by Dona Aranda, RN  Outcome: Progressing     Problem: Pain  Goal: Verbalizes/displays adequate comfort level or baseline comfort level  1/7/2025 2245 by Lurdes Gonzalez RN  Outcome: Progressing  1/7/2025 1125 by Dona Aranda RN  Outcome: Progressing     Problem: Respiratory - Adult  Goal: Achieves optimal ventilation and oxygenation  1/7/2025 2245 by Lurdes Gonzalez RN  Outcome: Progressing  1/7/2025 1125 by Dona Aranda RN  Outcome: Progressing     Problem: Skin/Tissue Integrity  Goal: Absence of new skin breakdown  Description: 1.  Monitor for areas of redness and/or skin breakdown  2.  Assess vascular access sites hourly  3.  Every 4-6 hours minimum:  Change oxygen saturation probe site  4.  Every 4-6 hours:  If on nasal continuous positive airway pressure, respiratory therapy assess nares and determine need for appliance change or resting period.  Outcome: Progressing

## 2025-01-08 NOTE — PLAN OF CARE
Problem: Occupational Therapy - Adult  Goal: By Discharge: Performs self-care activities at highest level of function for planned discharge setting.  See evaluation for individualized goals.  Description: FUNCTIONAL STATUS PRIOR TO ADMISSION:  Patient reports she resided at [facility] and received assistance with bathing, dressing, and toileting but was independent with standing and ambulation using RW.  Endorses no recent falls.  On room air.    Occupational Therapy Goals:  Initiated 1/8/2025  1.  Patient will perform grooming standing at sink with Contact Guard Assist within 7 day(s).  2.  Patient will perform upper body dressing with Set-up within 7 day(s).  3.  Patient will perform bathing with Minimal Assist within 7 day(s).  4.  Patient will perform toilet transfers with Contact Guard Assist  within 7 day(s).  5.  Patient will perform all aspects of toileting with Minimal assist within 7 day(s).  6.  Patient will participate in upper extremity therapeutic exercise/activities with Supervision for 10 minutes within 7 day(s).    7.  Patient will utilize energy conservation techniques during functional activities with verbal cues within 7 day(s).    Outcome: Progressing     OCCUPATIONAL THERAPY EVALUATION    Patient: Wendy Melara (89 y.o. female)  Date: 1/8/2025  Primary Diagnosis: Flu [J11.1]  Paroxysmal atrial fibrillation (HCC) [I48.0]  Influenza A [J10.1]  Atrial fibrillation with rapid ventricular response (HCC) [I48.91]  Acute congestive heart failure, unspecified heart failure type (HCC) [I50.9]  Acute hypoxic respiratory failure [J96.01]         Precautions: fall    ASSESSMENT :  Patient reports prior to admission she resided at [facility] and received assistance with bathing, dressing, and toileting but was independent with standing and ambulation using RW.  Endorses no recent falls.  On room air.     Patient now admitted for AHRF/ flu/ Afib with RVR/ JOSELIN and presents for OT evaluation A&Ox4 with impaired

## 2025-01-08 NOTE — PROGRESS NOTES
Hospitalist Progress Note      NAME: Wendy Melara   :  1924  MRM:  [id number]    Date/Time: 2025  6:10 PM    Ms. Melara is a 89 y.o.   female who who presented to the ER with cough and SOB, found to be in AHRF due to flu A & CHF exacerbation.          Assessment/Plan:       Ms. Melara is a 89 y.o.   female who is admitted with AHRF due to flu A & CHF exacerbation. Ms. Melara presented to the Emergency Department today complaining of cough, SOB, malaise x2d.      #Acute Hypoxic Respiratory Failure   #Influenza A Positive  -CXR with Mild diffuse interstitial and airspace opacities may resent pulmonary edema and/or pneumonia, with a probable small left pleural effusion.  -Start course of Tamiflu, IVCS  -Empiric atb therapy w CTX. Change Zithromax to Doxy with risk of PNA in setting of Influenza. Risk of MRSA, check MRSA screen, sputum cultures  -CT chest with b/l groundglass opacities, infectious/inflammatory process/edema   -Isolation precautions  -Titrate O2 PRN     #Afib with RVR  -Hx bradycardia on prior admission  -RVR likely exacerbated by acute illness  -Cardiology consult  -Dilt gtt transitioned to  mg PO qd  -risks>benefits of AC per Cards  -monitor electrolytes  -monitor Tele     #Acute on chronic HF exacerbation  -Echo from 2023 reviewed with normal left ventricular systolic function with a visually estimated EF of 65 - 70%   -Repeat ECHO: EF 60%  -S/p 40mg IV Lasix in ED, continue diuresis w 1mg Bumex Q12h. Holding diuresis for now with uptrending Cr, resume as tolerated.   -Strict I&Os, daily weights  -monitor hemodynamics    #JOSELIN  -Baseline Cr ~0.9  -Cr 1.38 eGFR 34  -Diurese, monitor I&Os, avoid hypotension & nephrotoxins  -bladder scan, no retention  -monitor BMP     #HTN  #CAD  -On Norvasc, ASA, statin on prior admission  -Consult to pharmacy for med rec     #Dyslipidemia  -Risk vs benefit in setting of advanced age, agree w cardiology     Risk of  with bibasilar consolidative opacities can be seen with infectious/inflammatory process although pulmonary edema could appear similarly and would need to be excluded clinically. Pulmonic trunk dilation while nonspecific can be seen with pulmonary hypertension in the appropriate clinical setting. Other chronic/incidental findings as above. Electronically signed by Eder LUGO CHEST PORTABLE    Result Date: 1/6/2025  Mild diffuse interstitial and airspace opacities may resent pulmonary edema and/or pneumonia, with a probable small left pleural effusion. Electronically signed by Robin Rene         ___________________________________________________    Attending Physician: Lora Mcqueen MD

## 2025-01-09 LAB
ANION GAP SERPL CALC-SCNC: 4 MMOL/L (ref 2–12)
BACTERIA SPEC CULT: NORMAL
BACTERIA SPEC CULT: NORMAL
BASOPHILS # BLD: 0.01 K/UL (ref 0–0.1)
BASOPHILS NFR BLD: 0.1 % (ref 0–1)
BUN SERPL-MCNC: 41 MG/DL (ref 6–20)
BUN/CREAT SERPL: 32 (ref 12–20)
CALCIUM SERPL-MCNC: 8.6 MG/DL (ref 8.5–10.1)
CHLORIDE SERPL-SCNC: 108 MMOL/L (ref 97–108)
CO2 SERPL-SCNC: 28 MMOL/L (ref 21–32)
CREAT SERPL-MCNC: 1.3 MG/DL (ref 0.55–1.02)
DIFFERENTIAL METHOD BLD: ABNORMAL
EOSINOPHIL # BLD: 0 K/UL (ref 0–0.4)
EOSINOPHIL NFR BLD: 0 % (ref 0–7)
ERYTHROCYTE [DISTWIDTH] IN BLOOD BY AUTOMATED COUNT: 13.4 % (ref 11.5–14.5)
GLUCOSE SERPL-MCNC: 156 MG/DL (ref 65–100)
HCT VFR BLD AUTO: 44.1 % (ref 35–47)
HGB BLD-MCNC: 14 G/DL (ref 11.5–16)
IMM GRANULOCYTES # BLD AUTO: 0.04 K/UL (ref 0–0.04)
IMM GRANULOCYTES NFR BLD AUTO: 0.5 % (ref 0–0.5)
LYMPHOCYTES # BLD: 0.76 K/UL (ref 0.8–3.5)
LYMPHOCYTES NFR BLD: 9.4 % (ref 12–49)
MCH RBC QN AUTO: 30.4 PG (ref 26–34)
MCHC RBC AUTO-ENTMCNC: 31.7 G/DL (ref 30–36.5)
MCV RBC AUTO: 95.9 FL (ref 80–99)
MONOCYTES # BLD: 0.34 K/UL (ref 0–1)
MONOCYTES NFR BLD: 4.2 % (ref 5–13)
NEUTS SEG # BLD: 6.95 K/UL (ref 1.8–8)
NEUTS SEG NFR BLD: 85.8 % (ref 32–75)
NRBC # BLD: 0 K/UL (ref 0–0.01)
NRBC BLD-RTO: 0 PER 100 WBC
PLATELET # BLD AUTO: 228 K/UL (ref 150–400)
PMV BLD AUTO: 10.5 FL (ref 8.9–12.9)
POTASSIUM SERPL-SCNC: 3.9 MMOL/L (ref 3.5–5.1)
RBC # BLD AUTO: 4.6 M/UL (ref 3.8–5.2)
RBC MORPH BLD: ABNORMAL
SERVICE CMNT-IMP: NORMAL
SODIUM SERPL-SCNC: 140 MMOL/L (ref 136–145)
WBC # BLD AUTO: 8.1 K/UL (ref 3.6–11)

## 2025-01-09 PROCEDURE — 2500000003 HC RX 250 WO HCPCS: Performed by: INTERNAL MEDICINE

## 2025-01-09 PROCEDURE — 1100000000 HC RM PRIVATE

## 2025-01-09 PROCEDURE — 97530 THERAPEUTIC ACTIVITIES: CPT

## 2025-01-09 PROCEDURE — 2580000003 HC RX 258: Performed by: INTERNAL MEDICINE

## 2025-01-09 PROCEDURE — 97162 PT EVAL MOD COMPLEX 30 MIN: CPT

## 2025-01-09 PROCEDURE — 6370000000 HC RX 637 (ALT 250 FOR IP): Performed by: INTERNAL MEDICINE

## 2025-01-09 PROCEDURE — 6360000002 HC RX W HCPCS: Performed by: INTERNAL MEDICINE

## 2025-01-09 PROCEDURE — 36415 COLL VENOUS BLD VENIPUNCTURE: CPT

## 2025-01-09 PROCEDURE — 80048 BASIC METABOLIC PNL TOTAL CA: CPT

## 2025-01-09 PROCEDURE — 2060000000 HC ICU INTERMEDIATE R&B

## 2025-01-09 PROCEDURE — 6360000002 HC RX W HCPCS

## 2025-01-09 PROCEDURE — 85025 COMPLETE CBC W/AUTO DIFF WBC: CPT

## 2025-01-09 PROCEDURE — 6370000000 HC RX 637 (ALT 250 FOR IP)

## 2025-01-09 PROCEDURE — 6370000000 HC RX 637 (ALT 250 FOR IP): Performed by: NURSE PRACTITIONER

## 2025-01-09 PROCEDURE — 94640 AIRWAY INHALATION TREATMENT: CPT

## 2025-01-09 RX ORDER — DOXYCYCLINE HYCLATE 100 MG
100 TABLET ORAL EVERY 12 HOURS SCHEDULED
Status: DISCONTINUED | OUTPATIENT
Start: 2025-01-09 | End: 2025-01-11 | Stop reason: HOSPADM

## 2025-01-09 RX ADMIN — WATER 40 MG: 1 INJECTION INTRAMUSCULAR; INTRAVENOUS; SUBCUTANEOUS at 20:26

## 2025-01-09 RX ADMIN — GUAIFENESIN 600 MG: 600 TABLET ORAL at 20:26

## 2025-01-09 RX ADMIN — DILTIAZEM HYDROCHLORIDE 120 MG: 120 CAPSULE, COATED, EXTENDED RELEASE ORAL at 08:44

## 2025-01-09 RX ADMIN — GUAIFENESIN 600 MG: 600 TABLET ORAL at 08:44

## 2025-01-09 RX ADMIN — DOXYCYCLINE HYCLATE 100 MG: 100 TABLET, COATED ORAL at 20:26

## 2025-01-09 RX ADMIN — IPRATROPIUM BROMIDE AND ALBUTEROL SULFATE 1 DOSE: 2.5; .5 SOLUTION RESPIRATORY (INHALATION) at 13:55

## 2025-01-09 RX ADMIN — IPRATROPIUM BROMIDE AND ALBUTEROL SULFATE 1 DOSE: 2.5; .5 SOLUTION RESPIRATORY (INHALATION) at 21:26

## 2025-01-09 RX ADMIN — DOXYCYCLINE 100 MG: 100 INJECTION, POWDER, LYOPHILIZED, FOR SOLUTION INTRAVENOUS at 06:38

## 2025-01-09 RX ADMIN — DOXYCYCLINE HYCLATE 100 MG: 100 TABLET, COATED ORAL at 17:03

## 2025-01-09 RX ADMIN — IPRATROPIUM BROMIDE AND ALBUTEROL SULFATE 1 DOSE: 2.5; .5 SOLUTION RESPIRATORY (INHALATION) at 07:54

## 2025-01-09 RX ADMIN — ENOXAPARIN SODIUM 30 MG: 100 INJECTION SUBCUTANEOUS at 17:04

## 2025-01-09 RX ADMIN — OSELTAMIVIR PHOSPHATE 30 MG: 30 CAPSULE ORAL at 08:44

## 2025-01-09 RX ADMIN — WATER 1000 MG: 1 INJECTION INTRAMUSCULAR; INTRAVENOUS; SUBCUTANEOUS at 08:45

## 2025-01-09 RX ADMIN — WATER 40 MG: 1 INJECTION INTRAMUSCULAR; INTRAVENOUS; SUBCUTANEOUS at 08:45

## 2025-01-09 ASSESSMENT — PAIN SCALES - GENERAL: PAINLEVEL_OUTOF10: 0

## 2025-01-09 NOTE — PROGRESS NOTES
Hospitalist Progress Note      NAME: Wendy Melara   :  1924  MRM:  [id number]    Date/Time: 2025  6:58 PM    Ms. Melara is a 89 y.o.   female who who presented to the ER with cough and SOB, found to be in AHRF due to flu A & CHF exacerbation.          Assessment/Plan:       Ms. Melara is a 89 y.o.   female who is admitted with AHRF due to flu A & CHF exacerbation. Ms. Melara presented to the Emergency Department today complaining of cough, SOB, malaise x2d.      #Acute Hypoxic Respiratory Failure   #Influenza A Positive  -CXR with Mild diffuse interstitial and airspace opacities may resent pulmonary edema and/or pneumonia, with a probable small left pleural effusion.  -Start course of Tamiflu, IVCS  -Empiric atb therapy w CTX. Change Zithromax to Doxy with risk of PNA in setting of Influenza. Risk of MRSA, check MRSA screen, sputum cultures  -CT chest with b/l groundglass opacities, infectious/inflammatory process/edema   -Isolation precautions  -Titrate O2 PRN     #Afib with RVR  -Hx bradycardia on prior admission  -RVR likely exacerbated by acute illness  -Cardiology consult  -Dilt gtt transitioned to  mg PO qd  -risks>benefits of AC per Cards  -monitor electrolytes  -monitor Tele     #Acute on chronic HF exacerbation  -Echo from 2023 reviewed with normal left ventricular systolic function with a visually estimated EF of 65 - 70%   -Repeat ECHO: EF 60%  -S/p 40mg IV Lasix in ED, continue diuresis w 1mg Bumex Q12h. Holding diuresis for now with uptrending Cr, resume as tolerated.   -Strict I&Os, daily weights  -monitor hemodynamics    #JOSELIN  -Baseline Cr ~0.9  -Cr 1.38 eGFR 34  -Diurese, monitor I&Os, avoid hypotension & nephrotoxins  -bladder scan, no retention  -monitor BMP     #HTN  #CAD  -On Norvasc, ASA, statin on prior admission  -Consult to pharmacy for med rec     #Dyslipidemia  -Risk vs benefit in setting of advanced age, agree w cardiology     Risk of  dilation while nonspecific can be seen with pulmonary hypertension in the appropriate clinical setting. Other chronic/incidental findings as above. Electronically signed by Eder Guadalupe    XR CHEST PORTABLE    Result Date: 1/6/2025  Mild diffuse interstitial and airspace opacities may resent pulmonary edema and/or pneumonia, with a probable small left pleural effusion. Electronically signed by Robin Rene         ___________________________________________________    Attending Physician: Lora Mcqueen MD

## 2025-01-09 NOTE — PROGRESS NOTES
Pharmacy Dosing    Doxycycline changed from IV to PO per protocol.  - Takes PO meds  - Diet: Easy to chew   - WBC/ T ok     Thank you, Osei Wade PharmD (x [phone])

## 2025-01-09 NOTE — CARE COORDINATION
1/9/25  4:59 PM        Care Management Initial Assessment  1/9/2025 5:00 PM  If patient is discharged prior to next notation, then this note serves as note for discharge by case management.    Reason for Admission:   Flu [J11.1]  Paroxysmal atrial fibrillation (HCC) [I48.0]  Influenza A [J10.1]  Atrial fibrillation with rapid ventricular response (HCC) [I48.91]  Acute congestive heart failure, unspecified heart failure type (HCC) [I50.9]  Acute hypoxic respiratory failure [J96.01]         Patient Admission Status: Inpatient  Date Admitted to INP: 1/6/25  RUR: Readmission Risk Score: 12.6    Hospitalization in the last 30 days (Readmission):  No        Advance Care Planning:  Code Status: DNR  Primary Healthcare Decision Maker: (P) Named in Scanned ACP Document   Advance Directive: has an advanced directive - a copy has been provided, has an advanced directive - a copy HAS NOT been provided.     __________________________________________________________________________  Assessment:      01/09/25 1658   Service Assessment   Patient Orientation Alert and Oriented   Cognition Alert   History Provided By Patient   Primary Caregiver Other (Comment)  (Resides at [facility])   Support Systems Family Members   Patient's Healthcare Decision Maker is: Named in Scanned ACP Document   PCP Verified by CM Yes   Last Visit to PCP Within last 3 months   Prior Functional Level Assistance with the following:;Bathing;Dressing;Toileting;Cooking;Housework;Shopping   Current Functional Level Assistance with the following:;Bathing;Dressing;Toileting;Cooking;Housework;Shopping   Can patient return to prior living arrangement Yes   Ability to make needs known: Good   Family able to assist with home care needs: No   Would you like for me to discuss the discharge plan with any other family members/significant others, and if so, who? Yes   Financial Resources Medicare   Social/Functional History   Lives With Other (Comment)   Type of Home  Assisted living   Home Layout One level   Home Access Level entry   Bathroom Shower/Tub Walk-in shower   Bathroom Equipment Grab bars in shower;Grab bars around toilet;Shower chair   Home Equipment Walker - Rolling   Prior Level of Assist for ADLs Needs assistance   Prior Level of Assist for Transfers Independent   Active  No   Patient's  Info [facility]   Occupation Retired   Discharge Planning   Type of Residence Assisted living   Potential Assistance Needed Home Care   Type of Home Care Services PT;OT   Patient expects to be discharged to: Assisted living   Services At/After Discharge   Transition of Care Consult (CM Consult) Home Health   Condition of Participation: Discharge Planning   The Plan for Transition of Care is related to the following treatment goals: Transition back to [facility]   The Patient and/or Patient Representative was provided with a Choice of Provider? Patient   The Patient and/Or Patient Representative agree with the Discharge Plan? Yes   Freedom of Choice list was provided with basic dialogue that supports the patient's individualized plan of care/goals, treatment preferences, and shares the quality data associated with the providers?  Yes         Comments: Patient resides at [facility]. PT and OT are recommending HH at [facility]. MAYRA placed a phone call to [facility] to speak to the DON regarding recommendations. MAYRA spoke to Beto with [facility]. He said he will come out to complete a beside evaluation on 1/10/25 to determine if she can return back to [facility].       Discharge Concerns: []Yes [x]No []Unknown   Describe:    Financial concerns/barriers: []Yes, explain: [x]No []Unknown/Not discussed  __________________________________________________________________________    Insurer:   Active Insurance as of 1/6/2025       Primary Coverage       Payor Plan Insurance Group Employer/Plan Group    HUMANA MEDICARE HUMANA CHOICE-PPO MEDICARE [id number]       Payor Plan Address

## 2025-01-09 NOTE — PLAN OF CARE
Problem: Safety - Adult  Goal: Free from fall injury  Outcome: Progressing     Problem: Respiratory - Adult  Goal: Achieves optimal ventilation and oxygenation  1/8/2025 2200 by Donato Villa RN  Outcome: Progressing  1/8/2025 0823 by Arabella Gaxiola, RT  Outcome: Progressing     Problem: Skin/Tissue Integrity  Goal: Absence of new skin breakdown  Description: 1.  Monitor for areas of redness and/or skin breakdown  2.  Assess vascular access sites hourly  3.  Every 4-6 hours minimum:  Change oxygen saturation probe site  4.  Every 4-6 hours:  If on nasal continuous positive airway pressure, respiratory therapy assess nares and determine need for appliance change or resting period.  Outcome: Progressing     Problem: Occupational Therapy - Adult  Goal: By Discharge: Performs self-care activities at highest level of function for planned discharge setting.  See evaluation for individualized goals.  Description: FUNCTIONAL STATUS PRIOR TO ADMISSION:  Patient reports she resided at [facility] and received assistance with bathing, dressing, and toileting but was independent with standing and ambulation using RW.  Endorses no recent falls.  On room air.    Occupational Therapy Goals:  Initiated 1/8/2025  1.  Patient will perform grooming standing at sink with Contact Guard Assist within 7 day(s).  2.  Patient will perform upper body dressing with Set-up within 7 day(s).  3.  Patient will perform bathing with Minimal Assist within 7 day(s).  4.  Patient will perform toilet transfers with Contact Guard Assist  within 7 day(s).  5.  Patient will perform all aspects of toileting with Minimal assist within 7 day(s).  6.  Patient will participate in upper extremity therapeutic exercise/activities with Supervision for 10 minutes within 7 day(s).    7.  Patient will utilize energy conservation techniques during functional activities with verbal cues within 7 day(s).    1/8/2025 1623 by Mj Jansen, LATRICE  Outcome:

## 2025-01-09 NOTE — PROGRESS NOTES
Occupational Therapy: defer    Attempted OT treatment session.  Patient received already sitting OOB in chair.  She wished to remain sitting in chair and declined additional activity at this time.  OT session deferred.  Will follow-up as able and appropriate.    Mj Cervantes, OTR/L

## 2025-01-09 NOTE — PLAN OF CARE
Problem: Physical Therapy - Adult  Goal: By Discharge: Performs mobility at highest level of function for planned discharge setting.  See evaluation for individualized goals.  Description: FUNCTIONAL STATUS PRIOR TO ADMISSION: Patient reports she resided at [facility] and received assistance with bathing, dressing, and toileting but was independent with standing and ambulation using RW.  Endorses no recent falls.  On room air.    Physical Therapy Goals  Initiated 1/9/2025  1.  Patient will move from supine to sit and sit to supine, scoot up and down, and roll side to side in bed with modified independence within 7 day(s).    2.  Patient will perform sit to stand with contact guard assist within 7 day(s).  3.  Patient will transfer from bed to chair and chair to bed with contact guard assist using the least restrictive device within 7 day(s).  4.  Patient will ambulate with contact guard assist for 30 feet with the least restrictive device within 7 day(s).   Outcome: Progressing     PHYSICAL THERAPY EVALUATION    Patient: Wendy Melara (89 y.o. female)  Date: 1/9/2025  Primary Diagnosis: Flu [J11.1]  Paroxysmal atrial fibrillation (HCC) [I48.0]  Influenza A [J10.1]  Atrial fibrillation with rapid ventricular response (HCC) [I48.91]  Acute congestive heart failure, unspecified heart failure type (HCC) [I50.9]  Acute hypoxic respiratory failure [J96.01]       Precautions: Restrictions/Precautions: Fall Risk (contact/droplet)                    ASSESSMENT :   DEFICITS/IMPAIRMENTS:   The patient is limited by decreased functional mobility, independence in ADLs, high-level IADLs, strength, activity tolerance, endurance, cognition, coordination, balance, posture. Patient is functioning below baseline of modified independence with standing and ambulation with rolling walker and currently requiring overall moderate assist with rolling walker to transfer to chair. Patient requires assist to place feet in appropriate position for  fracture (HCC)     right    GERD (gastroesophageal reflux disease)     HTN (hypertension), benign 1/11/2011    Mixed hyperlipidemia 1/11/2011     Past Surgical History:   Procedure Laterality Date    FEMUR FRACTURE SURGERY      ORTHOPEDIC SURGERY      bilat knee replacements    TOTAL KNEE ARTHROPLASTY      bilateral    VEIN SURGERY       Home Situation:  Social/Functional History  Lives With: Alone  Type of Home: Assisted living  Home Layout: One level  Home Access: Level entry  Bathroom Shower/Tub: Walk-in shower  Bathroom Equipment: Grab bars in shower, Grab bars around toilet, Shower chair  Home Equipment: Walker - Rolling  Has the patient had two or more falls in the past year or any fall with injury in the past year?: No  Prior Level of Assist for ADLs: Needs assistance  Prior Level of Assist for Transfers: Independent  Active : No    Cognitive/Behavioral Status:     Cognition  Overall Cognitive Status: Exceptions  Arousal/Alertness: Appears intact  Following Commands: Appears intact  Attention Span: Appears intact  Memory: Decreased short term memory  Insights: Impaired  Sequencing: Requires cues for some    Skin: grossly intact    Edema: none apparent    Vision/Perceptual:      Wears glasses for reading       Strength:    Strength: Generally decreased, functional    Tone & Sensation:   Tone: Normal  Sensation: Intact    Coordination:  Coordination: Generally decreased, functional    Range Of Motion:  AROM: Generally decreased, functional       Functional Mobility:  Bed Mobility:     Bed Mobility Training  Bed Mobility Training: Yes  Interventions: Manual cues;Safety awareness training;Tactile cues;Verbal cues;Weight shifting training/pressure relief  Supine to Sit: Minimum assistance  Scooting: Minimum assistance;Moderate assistance (to EOB)  Transfers:     Transfer Training  Sit to Stand: Moderate assistance  Stand to Sit: Moderate assistance  Bed to Chair: Moderate assistance;Adaptive equipment

## 2025-01-10 LAB
ANION GAP SERPL CALC-SCNC: 6 MMOL/L (ref 2–12)
BASOPHILS # BLD: 0.01 K/UL (ref 0–0.1)
BASOPHILS NFR BLD: 0.1 % (ref 0–1)
BUN SERPL-MCNC: 41 MG/DL (ref 6–20)
BUN/CREAT SERPL: 37 (ref 12–20)
CALCIUM SERPL-MCNC: 8.5 MG/DL (ref 8.5–10.1)
CHLORIDE SERPL-SCNC: 109 MMOL/L (ref 97–108)
CO2 SERPL-SCNC: 26 MMOL/L (ref 21–32)
CREAT SERPL-MCNC: 1.1 MG/DL (ref 0.55–1.02)
DIFFERENTIAL METHOD BLD: ABNORMAL
EOSINOPHIL # BLD: 0 K/UL (ref 0–0.4)
EOSINOPHIL NFR BLD: 0 % (ref 0–7)
ERYTHROCYTE [DISTWIDTH] IN BLOOD BY AUTOMATED COUNT: 13.2 % (ref 11.5–14.5)
GLUCOSE SERPL-MCNC: 164 MG/DL (ref 65–100)
HCT VFR BLD AUTO: 46.1 % (ref 35–47)
HGB BLD-MCNC: 15.1 G/DL (ref 11.5–16)
IMM GRANULOCYTES # BLD AUTO: 0.05 K/UL (ref 0–0.04)
IMM GRANULOCYTES NFR BLD AUTO: 0.7 % (ref 0–0.5)
LYMPHOCYTES # BLD: 0.87 K/UL (ref 0.8–3.5)
LYMPHOCYTES NFR BLD: 12.9 % (ref 12–49)
MCH RBC QN AUTO: 31.1 PG (ref 26–34)
MCHC RBC AUTO-ENTMCNC: 32.8 G/DL (ref 30–36.5)
MCV RBC AUTO: 94.9 FL (ref 80–99)
MONOCYTES # BLD: 0.41 K/UL (ref 0–1)
MONOCYTES NFR BLD: 6.1 % (ref 5–13)
NEUTS SEG # BLD: 5.4 K/UL (ref 1.8–8)
NEUTS SEG NFR BLD: 80.2 % (ref 32–75)
NRBC # BLD: 0 K/UL (ref 0–0.01)
NRBC BLD-RTO: 0 PER 100 WBC
PLATELET # BLD AUTO: 218 K/UL (ref 150–400)
PMV BLD AUTO: 10.4 FL (ref 8.9–12.9)
POTASSIUM SERPL-SCNC: 4.1 MMOL/L (ref 3.5–5.1)
RBC # BLD AUTO: 4.86 M/UL (ref 3.8–5.2)
SODIUM SERPL-SCNC: 141 MMOL/L (ref 136–145)
WBC # BLD AUTO: 6.7 K/UL (ref 3.6–11)

## 2025-01-10 PROCEDURE — 6370000000 HC RX 637 (ALT 250 FOR IP): Performed by: INTERNAL MEDICINE

## 2025-01-10 PROCEDURE — 97530 THERAPEUTIC ACTIVITIES: CPT

## 2025-01-10 PROCEDURE — 94640 AIRWAY INHALATION TREATMENT: CPT

## 2025-01-10 PROCEDURE — 1100000000 HC RM PRIVATE

## 2025-01-10 PROCEDURE — 94761 N-INVAS EAR/PLS OXIMETRY MLT: CPT

## 2025-01-10 PROCEDURE — 6360000002 HC RX W HCPCS: Performed by: INTERNAL MEDICINE

## 2025-01-10 PROCEDURE — 85025 COMPLETE CBC W/AUTO DIFF WBC: CPT

## 2025-01-10 PROCEDURE — 2500000003 HC RX 250 WO HCPCS: Performed by: INTERNAL MEDICINE

## 2025-01-10 PROCEDURE — 6360000002 HC RX W HCPCS

## 2025-01-10 PROCEDURE — 36415 COLL VENOUS BLD VENIPUNCTURE: CPT

## 2025-01-10 PROCEDURE — 6370000000 HC RX 637 (ALT 250 FOR IP)

## 2025-01-10 PROCEDURE — 6370000000 HC RX 637 (ALT 250 FOR IP): Performed by: NURSE PRACTITIONER

## 2025-01-10 PROCEDURE — 80048 BASIC METABOLIC PNL TOTAL CA: CPT

## 2025-01-10 PROCEDURE — 97116 GAIT TRAINING THERAPY: CPT

## 2025-01-10 RX ORDER — BUMETANIDE 0.25 MG/ML
1 INJECTION, SOLUTION INTRAMUSCULAR; INTRAVENOUS 2 TIMES DAILY
Status: DISCONTINUED | OUTPATIENT
Start: 2025-01-10 | End: 2025-01-11 | Stop reason: HOSPADM

## 2025-01-10 RX ADMIN — WATER 40 MG: 1 INJECTION INTRAMUSCULAR; INTRAVENOUS; SUBCUTANEOUS at 08:31

## 2025-01-10 RX ADMIN — DOXYCYCLINE HYCLATE 100 MG: 100 TABLET, COATED ORAL at 21:03

## 2025-01-10 RX ADMIN — WATER 1000 MG: 1 INJECTION INTRAMUSCULAR; INTRAVENOUS; SUBCUTANEOUS at 08:30

## 2025-01-10 RX ADMIN — BUMETANIDE 1 MG: 0.25 INJECTION INTRAMUSCULAR; INTRAVENOUS at 08:30

## 2025-01-10 RX ADMIN — WATER 40 MG: 1 INJECTION INTRAMUSCULAR; INTRAVENOUS; SUBCUTANEOUS at 21:03

## 2025-01-10 RX ADMIN — GUAIFENESIN 600 MG: 600 TABLET ORAL at 08:31

## 2025-01-10 RX ADMIN — BUMETANIDE 1 MG: 0.25 INJECTION INTRAMUSCULAR; INTRAVENOUS at 17:09

## 2025-01-10 RX ADMIN — ENOXAPARIN SODIUM 30 MG: 100 INJECTION SUBCUTANEOUS at 17:09

## 2025-01-10 RX ADMIN — IPRATROPIUM BROMIDE AND ALBUTEROL SULFATE 1 DOSE: 2.5; .5 SOLUTION RESPIRATORY (INHALATION) at 10:01

## 2025-01-10 RX ADMIN — GUAIFENESIN 600 MG: 600 TABLET ORAL at 21:03

## 2025-01-10 RX ADMIN — OSELTAMIVIR PHOSPHATE 30 MG: 30 CAPSULE ORAL at 08:31

## 2025-01-10 RX ADMIN — DOXYCYCLINE HYCLATE 100 MG: 100 TABLET, COATED ORAL at 08:31

## 2025-01-10 RX ADMIN — DILTIAZEM HYDROCHLORIDE 120 MG: 120 CAPSULE, COATED, EXTENDED RELEASE ORAL at 08:31

## 2025-01-10 ASSESSMENT — PAIN SCALES - GENERAL: PAINLEVEL_OUTOF10: 0

## 2025-01-10 NOTE — CARE COORDINATION
1:28 PM  TRANSFER - OUT REPORT:    Verbal report given to Ernestina Forman(name) on Wendy Melara (patient name)  being transferred to [facility](unit) for routine progression of patient care   Report consisted of patient’s Situation, Background, Assessment and   Recommendations(SBAR).         1:24 PM  [facility] has accepted- will send the AVS when dc order is placed notify of discharge.     1:18 PM  Referral pending for  services with [facility]. MAYRA sent an additional referral to [facility].     1/10/25  11:22 AM    Care Management Progress Note    Reason for Admission:   Flu [J11.1]  Paroxysmal atrial fibrillation (HCC) [I48.0]  Influenza A [J10.1]  Atrial fibrillation with rapid ventricular response (HCC) [I48.91]  Acute congestive heart failure, unspecified heart failure type (HCC) [I50.9]  Acute hypoxic respiratory failure [J96.01]         Patient Admission Status: Inpatient  RUR: 12%  Hospitalization in the last 30 days (Readmission):  No        Transition of care plan:  Ongoing medical management- possibly ready for dc today  Discharge plan: Back to [facility] with   Discharge plan communicated with patient and/or discharge caregiver: Yes    Date 1st IMM letter given: 1/9/2025  Outpatient follow-up.  Transport at discharge: likely wheelchair van      MAYRA spoke to Beto over the phone with [facility]. Patient is able to return today if medically stable.     Nursing please call report to Beto at [phone].     MAYRA spoke to patients Arnold nam and he let CM know that if patient needs transport via wheelchair, CM should call Ashish Davison at [phone] as he handles her finances.    Roxy Wallace MSW  [facility]      Available via [employer]

## 2025-01-10 NOTE — PLAN OF CARE
Problem: Occupational Therapy - Adult  Goal: By Discharge: Performs self-care activities at highest level of function for planned discharge setting.  See evaluation for individualized goals.  Description: FUNCTIONAL STATUS PRIOR TO ADMISSION:  Patient reports she resided at [facility] and received assistance with bathing, dressing, and toileting but was independent with standing and ambulation using RW.  Endorses no recent falls.  On room air.    Occupational Therapy Goals:  Initiated 1/8/2025  1.  Patient will perform grooming standing at sink with Contact Guard Assist within 7 day(s).  2.  Patient will perform upper body dressing with Set-up within 7 day(s).  3.  Patient will perform bathing with Minimal Assist within 7 day(s).  4.  Patient will perform toilet transfers with Contact Guard Assist  within 7 day(s).  5.  Patient will perform all aspects of toileting with Minimal assist within 7 day(s).  6.  Patient will participate in upper extremity therapeutic exercise/activities with Supervision for 10 minutes within 7 day(s).    7.  Patient will utilize energy conservation techniques during functional activities with verbal cues within 7 day(s).    Outcome: Progressing     OCCUPATIONAL THERAPY TREATMENT  Patient: Wendy Melara (89 y.o. female)  Date: 1/10/2025  Primary Diagnosis: Flu [J11.1]  Paroxysmal atrial fibrillation (HCC) [I48.0]  Influenza A [J10.1]  Atrial fibrillation with rapid ventricular response (HCC) [I48.91]  Acute congestive heart failure, unspecified heart failure type (HCC) [I50.9]  Acute hypoxic respiratory failure [J96.01]       Precautions: Fall Risk (contact/droplet)                Chart, occupational therapy assessment, plan of care, and goals were reviewed.    ASSESSMENT    Patient is progressing in OT goals.  She remains limited by mildly impaired short-term memory (although Ox4), very pleasant, agreeable, and motivated to maintain function as she typically performs chair-level exercise

## 2025-01-10 NOTE — PROGRESS NOTES
Hospitalist Progress Note      NAME: Wendy Melara   :  1924  MRM:  [id number]    Date/Time: 1/10/2025  3:15 PM    Ms. Melara is a 89 y.o.   female who who presented to the ER with cough and SOB, found to be in AHRF due to flu A & CHF exacerbation.          Assessment/Plan:       Ms. Melara is a 89 y.o.   female who is admitted with AHRF due to flu A & CHF exacerbation. Ms. Melara presented to the Emergency Department today complaining of cough, SOB, malaise x2d.      #Acute Hypoxic Respiratory Failure   #Influenza A Positive  -CXR with Mild diffuse interstitial and airspace opacities may resent pulmonary edema and/or pneumonia, with a probable small left pleural effusion.  -Start course of Tamiflu, IVCS  -Empiric atb therapy w CTX. Change Zithromax to Doxy with risk of PNA in setting of Influenza. Risk of MRSA, check MRSA screen, sputum cultures  -CT chest with b/l groundglass opacities, infectious/inflammatory process/edema   -Isolation precautions  -Titrate O2 PRN     #Afib with RVR  -Hx bradycardia on prior admission  -RVR likely exacerbated by acute illness  -Cardiology consult  -Dilt gtt transitioned to  mg PO qd  -risks>benefits of AC per Cards  -monitor electrolytes  -monitor Tele     #Acute on chronic HF exacerbation  -Echo from 2023 reviewed with normal left ventricular systolic function with a visually estimated EF of 65 - 70%   -Repeat ECHO: EF 60%  -S/p 40mg IV Lasix in ED, continue diuresis w 1mg Bumex Q12h. Monitor renal function   -Strict I&Os, daily weights  -monitor hemodynamics    #JOSELIN  -Baseline Cr ~0.9  -Cr 1.38 eGFR 34  -Diurese, monitor I&Os, avoid hypotension & nephrotoxins  -bladder scan, no retention  -monitor BMP     #HTN  #CAD  -On Norvasc, ASA, statin on prior admission  -Consult to pharmacy for med rec     #Dyslipidemia  -Risk vs benefit in setting of advanced age, agree w cardiology     Risk of deterioration: high         Total time spent with

## 2025-01-10 NOTE — PLAN OF CARE
Problem: Physical Therapy - Adult  Goal: By Discharge: Performs mobility at highest level of function for planned discharge setting.  See evaluation for individualized goals.  Description: FUNCTIONAL STATUS PRIOR TO ADMISSION: Patient reports she resided at [facility] and received assistance with bathing, dressing, and toileting but was independent with standing and ambulation using RW.  Endorses no recent falls.  On room air.    Physical Therapy Goals  Initiated 1/9/2025  1.  Patient will move from supine to sit and sit to supine, scoot up and down, and roll side to side in bed with modified independence within 7 day(s).    2.  Patient will perform sit to stand with contact guard assist within 7 day(s).  3.  Patient will transfer from bed to chair and chair to bed with contact guard assist using the least restrictive device within 7 day(s).  4.  Patient will ambulate with contact guard assist for 30 feet with the least restrictive device within 7 day(s).   Outcome: Progressing   PHYSICAL THERAPY TREATMENT    Patient: Wendy Melara (89 y.o. female)  Date: 1/10/2025  Diagnosis: Flu [J11.1]  Paroxysmal atrial fibrillation (HCC) [I48.0]  Influenza A [J10.1]  Atrial fibrillation with rapid ventricular response (HCC) [I48.91]  Acute congestive heart failure, unspecified heart failure type (HCC) [I50.9]  Acute hypoxic respiratory failure [J96.01] Flu      Precautions: Fall Risk (contact/droplet)                      ASSESSMENT:  Patient continues to benefit from skilled PT services and is progressing towards goals. Communicated with nurse cleared for therapy patient just move up on 5th floor. Communicated with nurse cleared for therapy. Mobilized pain today with min assist using a rolling walker. Sit on the edge of bed, sit to stand and ambulate with rolling walker in the room and around the bed , slow pace gait no loss of balance need some verbal and tactile cues to advance rolling walker  for safety. OOB to chair as

## 2025-01-10 NOTE — PLAN OF CARE
Problem: Safety - Adult  Goal: Free from fall injury  Outcome: Progressing     Problem: Discharge Planning  Goal: Discharge to home or other facility with appropriate resources  Outcome: Progressing     Problem: Pain  Goal: Verbalizes/displays adequate comfort level or baseline comfort level  Outcome: Progressing     Problem: Respiratory - Adult  Goal: Achieves optimal ventilation and oxygenation  Outcome: Progressing     Problem: Skin/Tissue Integrity  Goal: Absence of new skin breakdown  Description: 1.  Monitor for areas of redness and/or skin breakdown  2.  Assess vascular access sites hourly  3.  Every 4-6 hours minimum:  Change oxygen saturation probe site  4.  Every 4-6 hours:  If on nasal continuous positive airway pressure, respiratory therapy assess nares and determine need for appliance change or resting period.  Outcome: Progressing     Problem: Occupational Therapy - Adult  Goal: By Discharge: Performs self-care activities at highest level of function for planned discharge setting.  See evaluation for individualized goals.  Description: FUNCTIONAL STATUS PRIOR TO ADMISSION:  Patient reports she resided at [facility] and received assistance with bathing, dressing, and toileting but was independent with standing and ambulation using RW.  Endorses no recent falls.  On room air.    Occupational Therapy Goals:  Initiated 1/8/2025  1.  Patient will perform grooming standing at sink with Contact Guard Assist within 7 day(s).  2.  Patient will perform upper body dressing with Set-up within 7 day(s).  3.  Patient will perform bathing with Minimal Assist within 7 day(s).  4.  Patient will perform toilet transfers with Contact Guard Assist  within 7 day(s).  5.  Patient will perform all aspects of toileting with Minimal assist within 7 day(s).  6.  Patient will participate in upper extremity therapeutic exercise/activities with Supervision for 10 minutes within 7 day(s).    7.  Patient will utilize energy conservation

## 2025-01-11 VITALS
OXYGEN SATURATION: 93 % | HEIGHT: 64 IN | RESPIRATION RATE: 20 BRPM | WEIGHT: 193.5 LBS | SYSTOLIC BLOOD PRESSURE: 138 MMHG | BODY MASS INDEX: 33.03 KG/M2 | HEART RATE: 87 BPM | DIASTOLIC BLOOD PRESSURE: 90 MMHG | TEMPERATURE: 97.7 F

## 2025-01-11 LAB
ANION GAP SERPL CALC-SCNC: 4 MMOL/L (ref 2–12)
BASOPHILS # BLD: 0.02 K/UL (ref 0–0.1)
BASOPHILS NFR BLD: 0.3 % (ref 0–1)
BUN SERPL-MCNC: 42 MG/DL (ref 6–20)
BUN/CREAT SERPL: 34 (ref 12–20)
CALCIUM SERPL-MCNC: 8.6 MG/DL (ref 8.5–10.1)
CHLORIDE SERPL-SCNC: 106 MMOL/L (ref 97–108)
CO2 SERPL-SCNC: 28 MMOL/L (ref 21–32)
CREAT SERPL-MCNC: 1.22 MG/DL (ref 0.55–1.02)
DIFFERENTIAL METHOD BLD: ABNORMAL
EOSINOPHIL # BLD: 0 K/UL (ref 0–0.4)
EOSINOPHIL NFR BLD: 0 % (ref 0–7)
ERYTHROCYTE [DISTWIDTH] IN BLOOD BY AUTOMATED COUNT: 13.1 % (ref 11.5–14.5)
GLUCOSE SERPL-MCNC: 163 MG/DL (ref 65–100)
HCT VFR BLD AUTO: 51.5 % (ref 35–47)
HGB BLD-MCNC: 16.7 G/DL (ref 11.5–16)
IMM GRANULOCYTES # BLD AUTO: 0.07 K/UL (ref 0–0.04)
IMM GRANULOCYTES NFR BLD AUTO: 1 % (ref 0–0.5)
LYMPHOCYTES # BLD: 0.9 K/UL (ref 0.8–3.5)
LYMPHOCYTES NFR BLD: 13 % (ref 12–49)
MCH RBC QN AUTO: 30.8 PG (ref 26–34)
MCHC RBC AUTO-ENTMCNC: 32.4 G/DL (ref 30–36.5)
MCV RBC AUTO: 94.8 FL (ref 80–99)
MONOCYTES # BLD: 0.38 K/UL (ref 0–1)
MONOCYTES NFR BLD: 5.5 % (ref 5–13)
NEUTS SEG # BLD: 5.54 K/UL (ref 1.8–8)
NEUTS SEG NFR BLD: 80.2 % (ref 32–75)
NRBC # BLD: 0 K/UL (ref 0–0.01)
NRBC BLD-RTO: 0 PER 100 WBC
PLATELET # BLD AUTO: 214 K/UL (ref 150–400)
PMV BLD AUTO: 10.3 FL (ref 8.9–12.9)
POTASSIUM SERPL-SCNC: 4.3 MMOL/L (ref 3.5–5.1)
RBC # BLD AUTO: 5.43 M/UL (ref 3.8–5.2)
SODIUM SERPL-SCNC: 138 MMOL/L (ref 136–145)
WBC # BLD AUTO: 6.9 K/UL (ref 3.6–11)

## 2025-01-11 PROCEDURE — 85025 COMPLETE CBC W/AUTO DIFF WBC: CPT

## 2025-01-11 PROCEDURE — 80048 BASIC METABOLIC PNL TOTAL CA: CPT

## 2025-01-11 PROCEDURE — 6370000000 HC RX 637 (ALT 250 FOR IP): Performed by: INTERNAL MEDICINE

## 2025-01-11 PROCEDURE — 94761 N-INVAS EAR/PLS OXIMETRY MLT: CPT

## 2025-01-11 PROCEDURE — 6360000002 HC RX W HCPCS: Performed by: INTERNAL MEDICINE

## 2025-01-11 PROCEDURE — 36415 COLL VENOUS BLD VENIPUNCTURE: CPT

## 2025-01-11 PROCEDURE — 6370000000 HC RX 637 (ALT 250 FOR IP): Performed by: NURSE PRACTITIONER

## 2025-01-11 PROCEDURE — 94640 AIRWAY INHALATION TREATMENT: CPT

## 2025-01-11 PROCEDURE — 2500000003 HC RX 250 WO HCPCS: Performed by: INTERNAL MEDICINE

## 2025-01-11 RX ORDER — PREDNISONE 10 MG/1
TABLET ORAL
Qty: 21 TABLET | Refills: 0 | Status: SHIPPED | OUTPATIENT
Start: 2025-01-11 | End: 2025-01-20

## 2025-01-11 RX ORDER — GUAIFENESIN 600 MG/1
600 TABLET, EXTENDED RELEASE ORAL 2 TIMES DAILY
Qty: 14 TABLET | Refills: 0 | Status: SHIPPED | OUTPATIENT
Start: 2025-01-11 | End: 2025-01-18

## 2025-01-11 RX ORDER — BENZONATATE 100 MG/1
100 CAPSULE ORAL 3 TIMES DAILY PRN
Qty: 21 CAPSULE | Refills: 0 | Status: SHIPPED | OUTPATIENT
Start: 2025-01-11 | End: 2025-01-18

## 2025-01-11 RX ORDER — DILTIAZEM HYDROCHLORIDE 120 MG/1
120 CAPSULE, COATED, EXTENDED RELEASE ORAL DAILY
Qty: 30 CAPSULE | Refills: 3 | Status: SHIPPED | OUTPATIENT
Start: 2025-01-11

## 2025-01-11 RX ORDER — BUMETANIDE 1 MG/1
1 TABLET ORAL DAILY
Qty: 30 TABLET | Refills: 0 | Status: SHIPPED | OUTPATIENT
Start: 2025-01-11

## 2025-01-11 RX ORDER — DOXYCYCLINE HYCLATE 100 MG
100 TABLET ORAL EVERY 12 HOURS SCHEDULED
Qty: 6 TABLET | Refills: 0 | Status: SHIPPED | OUTPATIENT
Start: 2025-01-11 | End: 2025-01-14

## 2025-01-11 RX ORDER — CEPHALEXIN 500 MG/1
500 CAPSULE ORAL 2 TIMES DAILY
Qty: 10 CAPSULE | Refills: 0 | Status: SHIPPED | OUTPATIENT
Start: 2025-01-11 | End: 2025-01-16

## 2025-01-11 RX ADMIN — DOXYCYCLINE HYCLATE 100 MG: 100 TABLET, COATED ORAL at 09:22

## 2025-01-11 RX ADMIN — WATER 1000 MG: 1 INJECTION INTRAMUSCULAR; INTRAVENOUS; SUBCUTANEOUS at 09:22

## 2025-01-11 RX ADMIN — BUMETANIDE 1 MG: 0.25 INJECTION INTRAMUSCULAR; INTRAVENOUS at 09:23

## 2025-01-11 RX ADMIN — DILTIAZEM HYDROCHLORIDE 120 MG: 120 CAPSULE, COATED, EXTENDED RELEASE ORAL at 09:22

## 2025-01-11 RX ADMIN — GUAIFENESIN 600 MG: 600 TABLET ORAL at 09:22

## 2025-01-11 RX ADMIN — IPRATROPIUM BROMIDE AND ALBUTEROL SULFATE 1 DOSE: 2.5; .5 SOLUTION RESPIRATORY (INHALATION) at 07:36

## 2025-01-11 RX ADMIN — Medication 6 MG: at 01:13

## 2025-01-11 RX ADMIN — WATER 40 MG: 1 INJECTION INTRAMUSCULAR; INTRAVENOUS; SUBCUTANEOUS at 09:23

## 2025-01-11 NOTE — PLAN OF CARE
Problem: Respiratory - Adult  Goal: Achieves optimal ventilation and oxygenation  1/11/2025 0503 by Celia Oliver RCP  Outcome: Progressing  1/10/2025 1712 by Aliya Duke RN  Outcome: Progressing     Problem: Respiratory - Adult  Goal: Achieves optimal ventilation and oxygenation  1/11/2025 0503 by Celia Oliver RCP  Outcome: Progressing

## 2025-01-11 NOTE — DISCHARGE SUMMARY
cont statin and asa  - stop norvasc     #Dyslipidemia  - cont statin    Imaging  CT CHEST WO CONTRAST    Result Date: 1/6/2025  Multifocal bilateral groundglass opacities with heterogeneous attenuation of lung parenchyma with bibasilar consolidative opacities can be seen with infectious/inflammatory process although pulmonary edema could appear similarly and would need to be excluded clinically. Pulmonic trunk dilation while nonspecific can be seen with pulmonary hypertension in the appropriate clinical setting. Other chronic/incidental findings as above. Electronically signed by Eder Guadalupe    XR CHEST PORTABLE    Result Date: 1/6/2025  Mild diffuse interstitial and airspace opacities may resent pulmonary edema and/or pneumonia, with a probable small left pleural effusion. Electronically signed by Robin Rene       PCP: Holley Castellano MD     Consults: cardiology    Condition of patient at discharge: Improved    Discharge Exam:    Physical Exam:    Gen: in no acute distress  HEENT:  Pink conjunctivae, EOMI, hearing intact to voice, moist mucous membranes  Neck: Supple, without masses, thyroid non-tender  Resp: coarse throughout  Card: No murmurs, normal S1, S2 without thrills, bruits or peripheral edema  Abd:  Soft, non-tender, non-distended, normoactive bowel sounds are present, no palpable organomegaly and no detectable hernias  Musc: No cyanosis or clubbing  Skin: No rashes or ulcers, skin turgor is good  Neuro:  Cranial nerves are grossly intact, no focal motor weakness, follows commands appropriately  Psych:  Good insight, oriented to person, place and time, alert          Disposition: home    Patient Instructions:   Current Discharge Medication List        START taking these medications    Details   cephALEXin (KEFLEX) 500 MG capsule Take 1 capsule by mouth 2 times daily for 5 days  Qty: 10 capsule, Refills: 0      benzonatate (TESSALON) 100 MG capsule Take 1 capsule by mouth 3 times daily as needed for  tolerated  Diet: regular diet  Wound Care: none needed    Follow-up with Holley Castellano MD in 7 days.  Follow-up tests/labs CBC, CMP     Approximate time spent in patient care on day of discharge: 45 minutes    Signed:  Radha Wilkerson MD  1/11/2025  8:31 AM

## 2025-01-11 NOTE — PLAN OF CARE
Problem: Safety - Adult  Goal: Free from fall injury  1/11/2025 1134 by Linda Vasques RN  Outcome: HH/HSPC Resolved Met  1/11/2025 0613 by Malathi Layne RN  Outcome: Progressing  Flowsheets (Taken 1/11/2025 0613)  Free From Fall Injury: Instruct family/caregiver on patient safety     Problem: Discharge Planning  Goal: Discharge to home or other facility with appropriate resources  1/11/2025 1134 by Linda Vasques RN  Outcome: HH/HSPC Resolved Met  1/11/2025 0613 by Malathi Layne RN  Outcome: Progressing  Flowsheets (Taken 1/9/2025 2000 by Evie Clayton, RN)  Discharge to home or other facility with appropriate resources:   Identify barriers to discharge with patient and caregiver   Arrange for needed discharge resources and transportation as appropriate   Identify discharge learning needs (meds, wound care, etc)   Arrange for interpreters to assist at discharge as needed   Refer to discharge planning if patient needs post-hospital services based on physician order or complex needs related to functional status, cognitive ability or social support system     Problem: Pain  Goal: Verbalizes/displays adequate comfort level or baseline comfort level  1/11/2025 1134 by Linda Vasques RN  Outcome: HH/HSPC Resolved Met  1/11/2025 0613 by Malathi Layne RN  Outcome: Progressing  Flowsheets (Taken 1/8/2025 0800 by Cass Norton, RN)  Verbalizes/displays adequate comfort level or baseline comfort level:   Encourage patient to monitor pain and request assistance   Assess pain using appropriate pain scale   Administer analgesics based on type and severity of pain and evaluate response   Implement non-pharmacological measures as appropriate and evaluate response     Problem: Respiratory - Adult  Goal: Achieves optimal ventilation and oxygenation  1/11/2025 1134 by Linda Vasques RN  Outcome: HH/HSPC Resolved Met  1/11/2025 0813 by Calli Proctor RT  Outcome: Progressing  1/11/2025 0613 by Malathi Layne

## 2025-01-11 NOTE — PLAN OF CARE
Problem: Safety - Adult  Goal: Free from fall injury  1/11/2025 0613 by Malathi Layne RN  Outcome: Progressing  Flowsheets (Taken 1/11/2025 0613)  Free From Fall Injury: Instruct family/caregiver on patient safety  1/10/2025 1712 by Aliya Duke RN  Outcome: Progressing     Problem: Discharge Planning  Goal: Discharge to home or other facility with appropriate resources  1/11/2025 0613 by Malathi Layne RN  Outcome: Progressing  Flowsheets (Taken 1/9/2025 2000 by Evie Clayton, RN)  Discharge to home or other facility with appropriate resources:   Identify barriers to discharge with patient and caregiver   Arrange for needed discharge resources and transportation as appropriate   Identify discharge learning needs (meds, wound care, etc)   Arrange for interpreters to assist at discharge as needed   Refer to discharge planning if patient needs post-hospital services based on physician order or complex needs related to functional status, cognitive ability or social support system  1/10/2025 1712 by Aliya Duke RN  Outcome: Progressing     Problem: Pain  Goal: Verbalizes/displays adequate comfort level or baseline comfort level  1/11/2025 0613 by Malathi Layne RN  Outcome: Progressing  Flowsheets (Taken 1/8/2025 0800 by Cass Norton, RN)  Verbalizes/displays adequate comfort level or baseline comfort level:   Encourage patient to monitor pain and request assistance   Assess pain using appropriate pain scale   Administer analgesics based on type and severity of pain and evaluate response   Implement non-pharmacological measures as appropriate and evaluate response  1/10/2025 1712 by Aliya Duke RN  Outcome: Progressing     Problem: Respiratory - Adult  Goal: Achieves optimal ventilation and oxygenation  1/11/2025 0613 by Malathi Layne RN  Outcome: Progressing  Flowsheets (Taken 1/11/2025 0613)  Achieves optimal ventilation and oxygenation:   Assess for changes in respiratory status

## 2025-01-11 NOTE — DISCHARGE INSTRUCTIONS
HOSPITALIST DISCHARGE INSTRUCTIONS  NAME:  Wendy Melara   :  1924   MRN:  [id number]     Date/Time:  2025 8:29 AM    ADMIT DATE: 2025     DISCHARGE DATE: 2025     DISCHARGE DIAGNOSIS:  Respiratory failure    DISCHARGE INSTRUCTIONS:  Thank you for allowing us to participate in your care. Your discharging Hospitalist is Radha Wilkerson MD. You were admitted for evaluation and treatment of the above. You were given sterodsi      MEDICATIONS:    It is important that you take the medication exactly as they are prescribed.   Keep your medication in the bottles provided by the pharmacist and keep a list of the medication names, dosages, and times to be taken in your wallet.   Do not take other medications without consulting your doctor.             If you experience any of the following symptoms then please call your primary care physician or return to the emergency room if you cannot get hold of your doctor:  Fever, chills, nausea, vomiting, diarrhea, change in mentation, falling, bleeding, shortness of breath    Follow Up:  Please call the below provider to arrange hospital follow up appointment      [address] [address] [phone]  Follow up  [facility] services for physical therapy    Holley Castellano MD  [address] [facility] [address] [phone]    Schedule an appointment as soon as possible for a visit      Corby Barry MD  [address] [address] [phone]    Follow up          Information obtained by :  I understand that if any problems occur once I am at home I am to contact my physician.    I understand and acknowledge receipt of the instructions indicated above.                                                                                                                                           Physician's or R.N.'s Signature

## 2025-01-11 NOTE — PROGRESS NOTES
TRANSFER - OUT REPORT:    Verbal report given to Beto  on Ireton H Kelton  being transferred to [facility]  for routine progression of patient care       Report consisted of patient's Situation, Background, Assessment and   Recommendations(SBAR).     Information from the following report(s) Nurse Handoff Report was reviewed with the receiving nurse.

## 2025-01-11 NOTE — CARE COORDINATION
Case Management Discharge Note    Discharge Plan:  Patient discharging back to [facility] today.  Transportation:  [facility] requested for 1400.      DC Packet in the chart.        CM met with patient at bedside and has also contacted & discussed DCP updates with:  patient's grandsonArnold (lives locally) [phone] and Ashish Freedman [phone] (lives in [place] and handles patient's finances).      Patient and family are agreeable to the discharge plan.        (1)  [facility]:  [facility]--Accepted.  AVS and DC Summary uploaded to [facility].        (2)  Transportation:  Initially, Mr. Freedman requested to use [facility], as he stated that they bought in a transportation policy for the patient, that is only covered through [facility].  CM informed him that [facility] covers \"emergency calls only\" and not transporting patients to their homes.  He stated full understanding and was agreeable to CM utilizing a different transportation company.      CM informed him that we can use a wheelchair transport.  However, Mr. Freedman stated that patient needs a stretcher transport, as she was transported to the hospital via ambulance.  CM informed him of Transportation Medicare Guidelines, stating that he may get a transportation bill, if in case Medicare does not cover transportation cost.  He stated full understanding.        [facility]  [address], [place]    :  Beto [phone].    [facility] called and confirmed with Beto that patient can return to their facility today.      CM provided bedside nurse of his number to call report.      CM confirmed with Beto that he only needs DC orders.  DC packet completed with AVS, DC Summary, and H&P.      Medical treatment team informed of updates.  Patient & family stated no other needs.    ______________________________________  ROLLY Mcknight, RN-[facility]

## 2025-01-14 NOTE — PROGRESS NOTES
Physician Progress Note      PATIENT:               ELOISA MALONE  CSN #:                  [id number]  :                       1924  ADMIT DATE:       2025 12:01 PM  DISCH DATE:        2025 2:15 PM  RESPONDING  PROVIDER #:        Radha Wilkerson MD          QUERY TEXT:    Pt admitted with SOB and has CHF documented. If possible, please document in   progress notes and discharge summary further specificity regarding the type   and acuity of CHF:    The medical record reflects the following:  Risk Factors: 89 year old female, Afib, HTN, CAD  Clinical Indicators:  Attending PN - Acute on chronic HF exacerbation   CXR - Mild diffuse interstitial and airspace opacities may resent   pulmonary edema  and/or pneumonia, with a probable small left pleural effusion.   ECHO - Left?Ventricle: Normal left ventricular systolic function. EF by   visual approximation is 60%. Left ventricle size is normal. Normal wall   thickness. Normal wall motion.  BNP : 3,145, : 9, 249  Treatment: IV Lasix 40mg , IV Bumex  - current        Please email [email or web address] with any questions  Options provided:  -- Acute on Chronic Diastolic CHF/HFpEF  -- Acute Diastolic CHF/HFpEF  -- Chronic Diastolic CHF/HFpEF  -- Other - I will add my own diagnosis  -- Disagree - Not applicable / Not valid  -- Disagree - Clinically unable to determine / Unknown  -- Refer to Clinical Documentation Reviewer    PROVIDER RESPONSE TEXT:    This patient is in acute on chronic diastolic CHF/HFpEF.    Query created by: Ana Ramesh on 2025 7:55 AM      Electronically signed by:  Radha Wilkerson MD 2025 11:05 AM